# Patient Record
Sex: MALE | Race: WHITE | Employment: OTHER
[De-identification: names, ages, dates, MRNs, and addresses within clinical notes are randomized per-mention and may not be internally consistent; named-entity substitution may affect disease eponyms.]

---

## 2017-01-02 ENCOUNTER — HOME CARE VISIT (OUTPATIENT)
Dept: SCHEDULING | Facility: HOME HEALTH | Age: 82
End: 2017-01-02
Payer: MEDICARE

## 2017-01-02 VITALS
SYSTOLIC BLOOD PRESSURE: 130 MMHG | HEART RATE: 52 BPM | RESPIRATION RATE: 16 BRPM | OXYGEN SATURATION: 98 % | DIASTOLIC BLOOD PRESSURE: 70 MMHG

## 2017-01-02 PROCEDURE — G0151 HHCP-SERV OF PT,EA 15 MIN: HCPCS

## 2017-02-06 RX ORDER — CHLORPHENIRAMINE MALEATE 4 MG
TABLET ORAL
Qty: 45 G | Refills: 0 | Status: SHIPPED | OUTPATIENT
Start: 2017-02-06 | End: 2017-02-09 | Stop reason: SDUPTHER

## 2017-02-09 ENCOUNTER — HOSPITAL ENCOUNTER (OUTPATIENT)
Dept: LAB | Age: 82
Discharge: HOME | End: 2017-02-09
Payer: MEDICARE

## 2017-02-09 ENCOUNTER — OFFICE VISIT (OUTPATIENT)
Dept: INTERNAL MEDICINE CLINIC | Age: 82
End: 2017-02-09

## 2017-02-09 VITALS
WEIGHT: 211 LBS | DIASTOLIC BLOOD PRESSURE: 91 MMHG | SYSTOLIC BLOOD PRESSURE: 154 MMHG | RESPIRATION RATE: 16 BRPM | BODY MASS INDEX: 30.21 KG/M2 | TEMPERATURE: 97.5 F | OXYGEN SATURATION: 96 % | HEART RATE: 68 BPM | HEIGHT: 70 IN

## 2017-02-09 DIAGNOSIS — I10 ESSENTIAL HYPERTENSION: ICD-10-CM

## 2017-02-09 DIAGNOSIS — F02.80 DEMENTIA OF THE ALZHEIMER'S TYPE WITH LATE ONSET WITHOUT BEHAVIORAL DISTURBANCE (HCC): ICD-10-CM

## 2017-02-09 DIAGNOSIS — F32.A DEPRESSION, UNSPECIFIED DEPRESSION TYPE: ICD-10-CM

## 2017-02-09 DIAGNOSIS — I48.0 PAROXYSMAL ATRIAL FIBRILLATION (HCC): ICD-10-CM

## 2017-02-09 DIAGNOSIS — G30.1 DEMENTIA OF THE ALZHEIMER'S TYPE WITH LATE ONSET WITHOUT BEHAVIORAL DISTURBANCE (HCC): ICD-10-CM

## 2017-02-09 DIAGNOSIS — R48.2 GAIT APRAXIA OF ELDERLY: Primary | ICD-10-CM

## 2017-02-09 PROCEDURE — 80048 BASIC METABOLIC PNL TOTAL CA: CPT

## 2017-02-09 PROCEDURE — 84443 ASSAY THYROID STIM HORMONE: CPT

## 2017-02-09 PROCEDURE — 36415 COLL VENOUS BLD VENIPUNCTURE: CPT

## 2017-02-09 NOTE — MR AVS SNAPSHOT
Visit Information Date & Time Provider Department Dept. Phone Encounter #  
 2/9/2017  2:00 PM Ajay Villeda, [address] [id number] Follow-up Instructions Return in about 6 months (around 8/9/2017). Your Appointments 6/16/2017  2:40 PM  
Follow Up with Jesus Holden MD  
[facility] at [facility] [address]) Appt Note: f/u memory loss, $0cp, jrb 11/16/2016  
 [facility], 
[address] [place] [address], [address] [phone]  
[address] [facility], [address] [place] [address], [address] [address] Upcoming Health Maintenance Date Due  
 MEDICARE YEARLY EXAM 11/16/2016 GLAUCOMA SCREENING Q2Y 1/2/2017 DTaP/Tdap/Td series (2 - Td) 11/7/2025 Allergies as of 2/9/2017  Review Complete On: 2/9/2017 By: Kia Almanza III, DO No Known Allergies Current Immunizations  Reviewed on 10/6/2016 Name Date Influenza High Dose Vaccine PF 10/6/2016, 10/31/2014 Influenza Vaccine (Quad) PF 11/16/2015 Pneumococcal Conjugate (PCV-13) 8/12/2015 Pneumococcal Polysaccharide (PPSV-23) 7/29/2013,  Deferred (Patient Refused) Tdap 11/7/2015  6:22 PM  
  
 Not reviewed this visit You Were Diagnosed With   
  
 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 
ICD-9-CM: 401.9 Vitals BP Pulse Temp Resp Height(growth percentile) Weight(growth percentile)  (!) 154/91 (BP 1 Location: Left arm, BP Patient Position: Sitting) 68 97.5 °F (36.4 °C) (Oral) 16 5' 10\" (1.778 m) 211 lb (95.7 kg) SpO2 BMI Smoking Status 96% 30.28 kg/m2 Never Smoker Vitals History BMI and BSA Data Body Mass Index Body Surface Area  
 30.28 kg/m 2 2.17 m 2 Preferred Pharmacy Pharmacy Name Phone [facility] [address], [address] [place] [address]. [phone] Your Updated Medication List  
  
   
This list is accurate as of: 2/9/17  3:18 PM.  Always use your most recent med list.  
  
  
  
  
 acetaminophen 325 mg tablet Commonly known as:  TYLENOL Take  by mouth every four (4) hours as needed for Pain. b complex vitamins tablet Take 1 Tab by mouth daily. BROVANA 15 mcg/2 mL Nebu neb solution Generic drug:  arformoterol 15 mcg by Nebulization route two (2) times a day. budesonide 0.5 mg/2 mL Nbsp Commonly known as:  PULMICORT  
500 mcg by Nebulization route two (2) times a day. buPROPion  mg tablet Commonly known as:  Ettie Pickler Take 1 Tab by mouth every morning. butalbital-acetaminophen-caffeine -40 mg per tablet Commonly known as:  Sheria Loser Take 1 Tab by mouth every six (6) hours as needed for Headache. Max Daily Amount: 4 Tabs. clotrimazole 1 % topical cream  
Commonly known as:  [place] Apply  to affected area two (2) times a day. ELIQUIS 5 mg tablet Generic drug:  apixaban TAKE ONE TABLET BY MOUTH EVERY 12 HOURS  
  
 fluticasone-salmeterol 250-50 mcg/dose diskus inhaler Commonly known as:  ADVAIR DISKUS Take 1 Puff by inhalation every twelve (12) hours. furosemide 40 mg tablet Commonly known as:  LASIX Take 1 Tab by mouth daily. If leg started getting swollen again or weight gain > 3 pounds then switch back to 2 times daily  
  
 hydrOXYzine HCl 25 mg tablet Commonly known as:  ATARAX Take 25 mg by mouth three (3) times daily as needed for Itching. lisinopril 40 mg tablet Commonly known as:  PRINIVIL, ZESTRIL  
TAKE ONE TABLET BY MOUTH DAILY  
  
 memantine 10 mg tablet Commonly known as:  Atha Gander Take 1 Tab by mouth two (2) times a day. metoprolol tartrate 25 mg tablet Commonly known as:  LOPRESSOR Take 25 mg by mouth two (2) times a day. montelukast 10 mg tablet Commonly known as:  SINGULAIR Take 1 Tab by mouth daily. naproxen sodium 220 mg Cap Take  by mouth.  
  
 potassium chloride SR 10 mEq tablet Commonly known as:  KLOR-CON 10 Take 10 mEq by mouth two (2) times a day. promethazine 25 mg tablet Commonly known as:  PHENERGAN Take 1 Tab by mouth every six (6) hours as needed for Nausea. QUEtiapine 25 mg tablet Commonly known as:  SEROquel Take 1 Tab by mouth nightly as needed (agitation). We Performed the Following AMB SUPPLY ORDER [[id number] Custom] Comments:  
 Rolling walker METABOLIC PANEL, BASIC [33824 CPT(R)] [address] Comments:  
 Please evaluate patient for needs home health aide to help with showering etc.  
 [facility] 3RD GENERATION [45266 CPT(R)] Follow-up Instructions Return in about 6 months (around 8/9/2017). Referral Information Referral ID Referred By Referred To  
  
 [id number] Juanita KO Not Available Visits Status Start Date End Date 1 New Request 2/9/17 2/9/18 If your referral has a status of pending review or denied, additional information will be sent to support the outcome of this decision. Patient Instructions Helping a Person With Alzheimer's Disease: Care Instructions Your Care Instructions Alzheimer's disease is a type of dementia. It affects memory, intelligence, judgment, language, and behavior. It is not clear what causes this disease. But it is the most common form of dementia in older adults. It may take many years to develop. Alzheimer's disease is different than mild memory loss that occurs with aging. Family members usually notice symptoms first. But the person also may realize that something is wrong. Follow-up care is a key part of your loved one's treatment and safety. Be sure to make and go to all appointments, and call your doctor if your loved one is having problems. It's also a good idea to know your loved one's test results and keep a list of the medicines he or she takes. How can you care for your loved one at home? · Develop a routine. The person will feel less frustrated or confused with a clear, simple daily plan. Remind him or her about important facts and events. · Be patient. It may take longer for the person to complete a task than it used to. · Help the person eat a balanced diet. Serve plenty of whole grains, fruits, and vegetables every day. If the person is not eating well at mealtimes, give snacks at midmorning and in the afternoon. Offer drinks such as Boost, Ensure, or Sustacal if he or she is losing weight. · Encourage exercise. Walking and other activity may slow the decline of mental ability. Help the person keep an active mind. Encourage hobbies such as reading and crossword puzzles. · Take steps to help if the person is sundowning. This is the restless behavior and trouble with sleeping that may occur in late afternoon and at night. Try not to let the person nap during the day. Offer a glass of warm milk or caffeine-free tea before bedtime. · Ask family members and friends for help. You may need breaks where others can help care for the person. · Talk to the person's doctor about what resources are available for help in your area. · Review all of the person's medicines with his or her doctor. · For as long as the person is able, allow him or her to make decisions about activities, food, clothing, and other choices.  Let the person be independent, even if tasks take more time or are not done perfectly. Tailor tasks to the person's abilities. For example, if cooking is no longer safe, ask for other help. He or she can help set the table or make simple dishes such as a salad. When the person needs help, offer it gently. Keeping safe · Make your home (or the person's home) safe. Tack down rugs, and put no-slip tape in the tub. Install handrails, and put safety switches on stoves and appliances. Keep rooms free of clutter. Make sure walkways around furniture are clear. Do not move furniture around, because the person may become confused. · Use locks on doors and cupboards. Lock up knives, scissors, medicines, cleaning supplies, and other dangerous things. · Do not let the person drive or cook if he or she cannot do it safely. A person with Alzheimer's should not drive unless he or she is able to pass an on-road driving test. Your state 's license bureau can do a driving test if there is any question. · Get medical alert jewel for the person so you can be contacted if he or she wanders away. If possible, provide a safe place for wandering, such as an enclosed yard or garden. When should you call for help? Call 911 anytime you think you may need emergency care. For example, call if: · A person who has Alzheimer's disease has disappeared. · A person who has Alzheimer's disease is seriously injured. Call your doctor now or seek immediate medical care if: · The person you are caring for suddenly sees or hears things that are not there (hallucinates). · The person you are caring for has a sudden, drastic change in his or her behavior. Watch closely for changes in your loved one's health, and be sure to contact the doctor if: · A person who has Alzheimer's disease gradually gets worse or has symptoms that could cause injury. · You need help caring for a person with Alzheimer's disease. · The person has problems with his or her medicine. Where can you learn more? Go to http://rosey-wilmer.info/. Enter D041 in the search box to learn more about \"Helping a Person With Alzheimer's Disease: Care Instructions. \" Current as of: July 26, 2016 Content Version: 11.1 © 3883-4076 [employer]. Care instructions adapted under license by [employer] (which disclaims liability or warranty for this information). If you have questions about a medical condition or this instruction, always ask your healthcare professional. [address] any warranty or liability for your use of this information. Please provide this summary of care documentation to your next provider. Your primary care clinician is listed as Professor Perry 108 If you have any questions after today's visit, please call [phone].

## 2017-02-09 NOTE — PATIENT INSTRUCTIONS
Helping a Person With Alzheimer's Disease: Care Instructions  Your Care Instructions  Alzheimer's disease is a type of dementia. It affects memory, intelligence, judgment, language, and behavior. It is not clear what causes this disease. But it is the most common form of dementia in older adults. It may take many years to develop. Alzheimer's disease is different than mild memory loss that occurs with aging. Family members usually notice symptoms first. But the person also may realize that something is wrong. Follow-up care is a key part of your loved one's treatment and safety. Be sure to make and go to all appointments, and call your doctor if your loved one is having problems. It's also a good idea to know your loved one's test results and keep a list of the medicines he or she takes. How can you care for your loved one at home? · Develop a routine. The person will feel less frustrated or confused with a clear, simple daily plan. Remind him or her about important facts and events. · Be patient. It may take longer for the person to complete a task than it used to. · Help the person eat a balanced diet. Serve plenty of whole grains, fruits, and vegetables every day. If the person is not eating well at mealtimes, give snacks at midmorning and in the afternoon. Offer drinks such as Boost, Ensure, or Sustacal if he or she is losing weight. · Encourage exercise. Walking and other activity may slow the decline of mental ability. Help the person keep an active mind. Encourage hobbies such as reading and crossword puzzles. · Take steps to help if the person is sundowning. This is the restless behavior and trouble with sleeping that may occur in late afternoon and at night. Try not to let the person nap during the day. Offer a glass of warm milk or caffeine-free tea before bedtime. · Ask family members and friends for help. You may need breaks where others can help care for the person.   · Talk to the person's doctor about what resources are available for help in your area. · Review all of the person's medicines with his or her doctor. · For as long as the person is able, allow him or her to make decisions about activities, food, clothing, and other choices. Let the person be independent, even if tasks take more time or are not done perfectly. Tailor tasks to the person's abilities. For example, if cooking is no longer safe, ask for other help. He or she can help set the table or make simple dishes such as a salad. When the person needs help, offer it gently. Keeping safe  · Make your home (or the person's home) safe. Tack down rugs, and put no-slip tape in the tub. Install handrails, and put safety switches on stoves and appliances. Keep rooms free of clutter. Make sure walkways around furniture are clear. Do not move furniture around, because the person may become confused. · Use locks on doors and cupboards. Lock up knives, scissors, medicines, cleaning supplies, and other dangerous things. · Do not let the person drive or cook if he or she cannot do it safely. A person with Alzheimer's should not drive unless he or she is able to pass an on-road driving test. Your state 's license bureau can do a driving test if there is any question. · Get medical alert jewelry for the person so you can be contacted if he or she wanders away. If possible, provide a safe place for wandering, such as an enclosed yard or garden. When should you call for help? Call 911 anytime you think you may need emergency care. For example, call if:  · A person who has Alzheimer's disease has disappeared. · A person who has Alzheimer's disease is seriously injured. Call your doctor now or seek immediate medical care if:  · The person you are caring for suddenly sees or hears things that are not there (hallucinates). · The person you are caring for has a sudden, drastic change in his or her behavior.   Watch closely for changes in your loved one's health, and be sure to contact the doctor if:  · A person who has Alzheimer's disease gradually gets worse or has symptoms that could cause injury. · You need help caring for a person with Alzheimer's disease. · The person has problems with his or her medicine. Where can you learn more? Go to http://rosey-wilmer.info/. Enter N876 in the search box to learn more about \"Helping a Person With Alzheimer's Disease: Care Instructions. \"  Current as of: July 26, 2016  Content Version: 11.1  © 4726-8026 [employer], [employer]. Care instructions adapted under license by [employer] (which disclaims liability or warranty for this information). If you have questions about a medical condition or this instruction, always ask your healthcare professional. [address] any warranty or liability for your use of this information.

## 2017-02-09 NOTE — PROGRESS NOTES
Reviewed record in preparation for visit and have obtained necessary documentation. Identified pt with two pt identifiers(name and ). Chief Complaint   Patient presents with    Hypertension     follow up       Health Maintenance Due   Topic Date Due    MEDICARE YEARLY EXAM  2016    GLAUCOMA SCREENING Q2Y  2017       Mr. Marlee Doss has a reminder for a \"due or due soon\" health maintenance. I have asked that he discuss health maintenance topic(s) due with His  primary care provider. Coordination of Care Questionnaire:  :     1) Have you been to an emergency room, urgent care clinic since your last visit? no   Hospitalized since your last visit? no             2) Have you seen or consulted any other health care providers outside of [address] [place] [address] since your last visit? no  (Include any pap smears or colon screenings in this section.)      Patient is accompanied by self I have received verbal consent from Carlee Ventura to discuss any/all medical information while they are present in the room.

## 2017-02-09 NOTE — PROGRESS NOTES
Divina Calderon is a [de-identified] y.o. male who presents for evaluation of routine follow up. Last seen by me oct 6. Had one trip to ED on dec 7 for dehydration, unclear what precipitated it. Has done well since, though his short term memory is very poor. Does not do much at home. Wife asks for walker for him, as well as aide to help around house, as she is undergoing treatment for ovarian cancer. ROS:  Constitutional: negative for fevers, chills, anorexia and weight loss  Eyes:   negative for visual disturbance and irritation  ENT:   negative for tinnitus,sore throat,nasal congestion,ear pain,hoarseness  Respiratory:  negative for cough, hemoptysis, dyspnea,wheezing  CV:   negative for chest pain, palpitations, lower extremity edema  GI:   negative for nausea, vomiting, diarrhea, abdominal pain,melena  Genitourinary: negative for frequency, dysuria and hematuria  Musculoskel: negative for myalgias, arthralgias, back pain, muscle weakness, joint pain  Neurological:  negative for headaches, dizziness, focal weakness, numbness  Psychiatric:     Negative for depression or anxiety      Past Medical History   Diagnosis Date    Allergy      seasonal    Arrhythmia     Asthma     Dementia     Depression     Heart failure ([facility] [address]     Hypertension     Neurological disorder      dementia    Other ill-defined conditions(799.89)      a-fib    Stroke [facility])        Past Surgical History   Procedure Laterality Date    Hx heent       cateract surgery       Family History   Problem Relation Age of Onset    Stroke Mother     Heart Disease Mother     Cancer Father      prostate    Cancer Brother      colon       Social History     Social History    Marital status:      Spouse name: N/A    Number of children: N/A    Years of education: N/A     Occupational History    Not on file.      Social History Main Topics    Smoking status: Never Smoker    Smokeless tobacco: Never Used    Alcohol use No    Drug use: No    Sexual activity: No     Other Topics Concern    Not on file     Social History Narrative            Visit Vitals    BP (!) 154/91 (BP 1 Location: Left arm, BP Patient Position: Sitting)    Pulse 68    Temp 97.5 °F (36.4 °C) (Oral)    Resp 16    Ht 5' 10\" (1.778 m)    Wt 211 lb (95.7 kg)    SpO2 96%    BMI 30.28 kg/m2       Physical Examination:   General - Well appearing male  HEENT - PERRL, TM no erythema/opacification, normal nasal turbinates, no oropharyngeal erythema or exudate, MMM  Neck - supple, no bruits, no thyroidomegaly, no lymphadenopathy  Pulm - clear to auscultation bilaterally  Cardio - RRR, normal S1 S2, no murmur  Abd - soft, nontender, no masses, no HSM  Extrem - no edema, +2 distal pulses  Neuro-  No focal deficits, CN intact     Assessment/Plan:    1.  htn--bit elevated today, continue with lisinopril, metoprolol, lasix. Reluctant to increase any of those meds  2. Dementia--on namenda, seroquel. Follows with dr Sabrina Edmond. [facility]. 3.  Depression--stable with wellbutrin  4. pafib--eliquis  5. Asthma--on pulmicort, brovana, singulair  6. Recent ed visit for dehydration--check bmp.      rtc 6 months. Wife not interested in filling out any acp paper work--i won't ask her any more.         Booker Felipe III, DO

## 2017-02-09 NOTE — LETTER
2/13/2017 3:28 PM 
 
Mr. Carmelo Ovalle [address] [facility] [address] [id number] Dear Carmelo Ovalle: Please find your most recent results below. Resulted Orders METABOLIC PANEL, BASIC Result Value Ref Range Glucose 109 (H) 65 - 99 mg/dL BUN 15 8 - 27 mg/dL Creatinine 1.24 0.76 - 1.27 mg/dL GFR est non-AA 55 (L) >59 mL/min/1.73 GFR est AA 63 >59 mL/min/1.73  
 BUN/Creatinine ratio 12 10 - 22 Sodium 143 134 - 144 mmol/L Potassium 4.8 3.5 - 5.2 mmol/L Chloride 94 (L) 96 - 106 mmol/L  
 CO2 29 18 - 29 mmol/L Calcium 9.8 8.6 - 10.2 mg/dL Narrative Performed at:  [address]  [id number] : Guicho Esparza MD, Phone:  [phone] [facility] 3RD GENERATION Result Value Ref Range TSH 2.670 0.450 - 4.500 uIU/mL Narrative Performed at:  [address]  [id number] : Guicho Esparza MD, Phone:  [phone] RECOMMENDATIONS: 
Kidney/dehydration numbers back to normal.  
Thyroid normal.  
No new recs. Please call me if you have any questions: [phone] Sincerely, Desi Mercury III, DO

## 2017-02-10 LAB
BUN SERPL-MCNC: 15 MG/DL (ref 8–27)
BUN/CREAT SERPL: 12 (ref 10–22)
CALCIUM SERPL-MCNC: 9.8 MG/DL (ref 8.6–10.2)
CHLORIDE SERPL-SCNC: 94 MMOL/L (ref 96–106)
CO2 SERPL-SCNC: 29 MMOL/L (ref 18–29)
CREAT SERPL-MCNC: 1.24 MG/DL (ref 0.76–1.27)
GLUCOSE SERPL-MCNC: 109 MG/DL (ref 65–99)
POTASSIUM SERPL-SCNC: 4.8 MMOL/L (ref 3.5–5.2)
SODIUM SERPL-SCNC: 143 MMOL/L (ref 134–144)
TSH SERPL DL<=0.005 MIU/L-ACNC: 2.67 UIU/ML (ref 0.45–4.5)

## 2017-03-01 ENCOUNTER — APPOINTMENT (OUTPATIENT)
Dept: GENERAL RADIOLOGY | Age: 82
End: 2017-03-01
Attending: PHYSICIAN ASSISTANT
Payer: MEDICARE

## 2017-03-01 ENCOUNTER — APPOINTMENT (OUTPATIENT)
Dept: CT IMAGING | Age: 82
End: 2017-03-01
Attending: PHYSICIAN ASSISTANT
Payer: MEDICARE

## 2017-03-01 ENCOUNTER — HOSPITAL ENCOUNTER (EMERGENCY)
Age: 82
Discharge: HOME | End: 2017-03-01
Attending: EMERGENCY MEDICINE
Payer: MEDICARE

## 2017-03-01 VITALS
SYSTOLIC BLOOD PRESSURE: 158 MMHG | BODY MASS INDEX: 30.21 KG/M2 | OXYGEN SATURATION: 97 % | DIASTOLIC BLOOD PRESSURE: 90 MMHG | RESPIRATION RATE: 18 BRPM | HEIGHT: 70 IN | WEIGHT: 211 LBS | HEART RATE: 91 BPM | TEMPERATURE: 98.7 F

## 2017-03-01 DIAGNOSIS — F03.90 DEMENTIA WITHOUT BEHAVIORAL DISTURBANCE, UNSPECIFIED DEMENTIA TYPE: ICD-10-CM

## 2017-03-01 DIAGNOSIS — M79.604 RIGHT LEG PAIN: ICD-10-CM

## 2017-03-01 DIAGNOSIS — N30.00 ACUTE CYSTITIS WITHOUT HEMATURIA: Primary | ICD-10-CM
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
## 2017-03-01 LAB
ALBUMIN SERPL BCP-MCNC: 3.1 G/DL (ref 3.5–5)
ALBUMIN/GLOB SERPL: 0.8 {RATIO} (ref 1.1–2.2)
ALP SERPL-CCNC: 74 U/L (ref 45–117)
ALT SERPL-CCNC: 21 U/L (ref 12–78)
ANION GAP BLD CALC-SCNC: 11 MMOL/L (ref 5–15)
APPEARANCE UR: ABNORMAL
AST SERPL W P-5'-P-CCNC: 27 U/L (ref 15–37)
ATRIAL RATE: 102 BPM
BACTERIA URNS QL MICRO: ABNORMAL /HPF
BASOPHILS # BLD AUTO: 0 K/UL (ref 0–0.1)
BASOPHILS # BLD: 0 % (ref 0–1)
BILIRUB SERPL-MCNC: 2 MG/DL (ref 0.2–1)
BILIRUB UR QL: NEGATIVE
BUN SERPL-MCNC: 17 MG/DL (ref 6–20)
BUN/CREAT SERPL: 12 (ref 12–20)
CALCIUM SERPL-MCNC: 8.4 MG/DL (ref 8.5–10.1)
CALCULATED R AXIS, ECG10: 122 DEGREES
CALCULATED T AXIS, ECG11: 28 DEGREES
CHLORIDE SERPL-SCNC: 101 MMOL/L (ref 97–108)
CK MB CFR SERPL CALC: 0.8 % (ref 0–2.5)
CK MB SERPL-MCNC: 1.7 NG/ML (ref 5–25)
CK SERPL-CCNC: 209 U/L (ref 39–308)
CO2 SERPL-SCNC: 29 MMOL/L (ref 21–32)
COLOR UR: ABNORMAL
CREAT SERPL-MCNC: 1.41 MG/DL (ref 0.7–1.3)
DIAGNOSIS, 93000: NORMAL
EOSINOPHIL # BLD: 0 K/UL (ref 0–0.4)
EOSINOPHIL NFR BLD: 0 % (ref 0–7)
EPITH CASTS URNS QL MICRO: ABNORMAL /LPF
ERYTHROCYTE [DISTWIDTH] IN BLOOD BY AUTOMATED COUNT: 13.7 % (ref 11.5–14.5)
GLOBULIN SER CALC-MCNC: 3.9 G/DL (ref 2–4)
GLUCOSE SERPL-MCNC: 107 MG/DL (ref 65–100)
GLUCOSE UR STRIP.AUTO-MCNC: NEGATIVE MG/DL
HCT VFR BLD AUTO: 49.3 % (ref 36.6–50.3)
HGB BLD-MCNC: 16.7 G/DL (ref 12.1–17)
HGB UR QL STRIP: ABNORMAL
HYALINE CASTS URNS QL MICRO: ABNORMAL /LPF (ref 0–5)
KETONES UR QL STRIP.AUTO: NEGATIVE MG/DL
LEUKOCYTE ESTERASE UR QL STRIP.AUTO: ABNORMAL
LYMPHOCYTES # BLD AUTO: 6 % (ref 12–49)
LYMPHOCYTES # BLD: 1 K/UL (ref 0.8–3.5)
MCH RBC QN AUTO: 28.9 PG (ref 26–34)
MCHC RBC AUTO-ENTMCNC: 33.9 G/DL (ref 30–36.5)
MCV RBC AUTO: 85.4 FL (ref 80–99)
MONOCYTES # BLD: 1.7 K/UL (ref 0–1)
MONOCYTES NFR BLD AUTO: 11 % (ref 5–13)
NEUTS SEG # BLD: 12.4 K/UL (ref 1.8–8)
NEUTS SEG NFR BLD AUTO: 83 % (ref 32–75)
NITRITE UR QL STRIP.AUTO: POSITIVE
PH UR STRIP: 6 [PH] (ref 5–8)
PLATELET # BLD AUTO: 191 K/UL (ref 150–400)
POTASSIUM SERPL-SCNC: 3.9 MMOL/L (ref 3.5–5.1)
PROT SERPL-MCNC: 7 G/DL (ref 6.4–8.2)
PROT UR STRIP-MCNC: ABNORMAL MG/DL
Q-T INTERVAL, ECG07: 368 MS
QRS DURATION, ECG06: 84 MS
QTC CALCULATION (BEZET), ECG08: 452 MS
RBC # BLD AUTO: 5.77 M/UL (ref 4.1–5.7)
RBC #/AREA URNS HPF: ABNORMAL /HPF (ref 0–5)
SODIUM SERPL-SCNC: 141 MMOL/L (ref 136–145)
SP GR UR REFRACTOMETRY: 1.01 (ref 1–1.03)
TROPONIN I SERPL-MCNC: <0.04 NG/ML
UA: UC IF INDICATED,UAUC: ABNORMAL
UROBILINOGEN UR QL STRIP.AUTO: 0.2 EU/DL (ref 0.2–1)
VENTRICULAR RATE, ECG03: 91 BPM
WBC # BLD AUTO: 15 K/UL (ref 4.1–11.1)
WBC URNS QL MICRO: >100 /HPF (ref 0–4)

## 2017-03-01 PROCEDURE — 73502 X-RAY EXAM HIP UNI 2-3 VIEWS: CPT

## 2017-03-01 PROCEDURE — 97161 PT EVAL LOW COMPLEX 20 MIN: CPT

## 2017-03-01 PROCEDURE — 97116 GAIT TRAINING THERAPY: CPT

## 2017-03-01 PROCEDURE — 73564 X-RAY EXAM KNEE 4 OR MORE: CPT

## 2017-03-01 PROCEDURE — 84484 ASSAY OF TROPONIN QUANT: CPT | Performed by: PHYSICIAN ASSISTANT

## 2017-03-01 PROCEDURE — 87186 SC STD MICRODIL/AGAR DIL: CPT | Performed by: EMERGENCY MEDICINE

## 2017-03-01 PROCEDURE — 36415 COLL VENOUS BLD VENIPUNCTURE: CPT | Performed by: PHYSICIAN ASSISTANT

## 2017-03-01 PROCEDURE — 81001 URINALYSIS AUTO W/SCOPE: CPT | Performed by: PHYSICIAN ASSISTANT

## 2017-03-01 PROCEDURE — 74011250636 HC RX REV CODE- 250/636: Performed by: PHYSICIAN ASSISTANT

## 2017-03-01 PROCEDURE — G8979 MOBILITY GOAL STATUS: HCPCS

## 2017-03-01 PROCEDURE — 93005 ELECTROCARDIOGRAM TRACING: CPT

## 2017-03-01 PROCEDURE — G8991 OTHER PT/OT GOAL STATUS: HCPCS

## 2017-03-01 PROCEDURE — 87077 CULTURE AEROBIC IDENTIFY: CPT | Performed by: EMERGENCY MEDICINE

## 2017-03-01 PROCEDURE — G8978 MOBILITY CURRENT STATUS: HCPCS

## 2017-03-01 PROCEDURE — 70450 CT HEAD/BRAIN W/O DYE: CPT

## 2017-03-01 PROCEDURE — 82550 ASSAY OF CK (CPK): CPT | Performed by: PHYSICIAN ASSISTANT

## 2017-03-01 PROCEDURE — 96361 HYDRATE IV INFUSION ADD-ON: CPT

## 2017-03-01 PROCEDURE — 97162 PT EVAL MOD COMPLEX 30 MIN: CPT

## 2017-03-01 PROCEDURE — G8980 MOBILITY D/C STATUS: HCPCS

## 2017-03-01 PROCEDURE — 71010 XR CHEST SNGL V: CPT

## 2017-03-01 PROCEDURE — 85025 COMPLETE CBC W/AUTO DIFF WBC: CPT | Performed by: PHYSICIAN ASSISTANT

## 2017-03-01 PROCEDURE — 97530 THERAPEUTIC ACTIVITIES: CPT

## 2017-03-01 PROCEDURE — 80053 COMPREHEN METABOLIC PANEL: CPT | Performed by: PHYSICIAN ASSISTANT

## 2017-03-01 PROCEDURE — 87086 URINE CULTURE/COLONY COUNT: CPT | Performed by: EMERGENCY MEDICINE

## 2017-03-01 PROCEDURE — 74011000258 HC RX REV CODE- 258: Performed by: PHYSICIAN ASSISTANT

## 2017-03-01 PROCEDURE — 99284 EMERGENCY DEPT VISIT MOD MDM: CPT

## 2017-03-01 PROCEDURE — 96365 THER/PROPH/DIAG IV INF INIT: CPT

## 2017-03-01 RX ORDER — SODIUM CHLORIDE 0.9 % (FLUSH) 0.9 %
5-10 SYRINGE (ML) INJECTION EVERY 8 HOURS
Status: DISCONTINUED | OUTPATIENT
Start: 2017-03-01 | End: 2017-03-01 | Stop reason: HOSPADM

## 2017-03-01 RX ORDER — SODIUM CHLORIDE 0.9 % (FLUSH) 0.9 %
5-10 SYRINGE (ML) INJECTION AS NEEDED
Status: DISCONTINUED | OUTPATIENT
Start: 2017-03-01 | End: 2017-03-01 | Stop reason: HOSPADM

## 2017-03-01 RX ORDER — CEPHALEXIN 500 MG/1
500 CAPSULE ORAL 3 TIMES DAILY
Qty: 15 CAP | Refills: 0 | Status: SHIPPED | OUTPATIENT
Start: 2017-03-01 | End: 2017-04-30 | Stop reason: CLARIF

## 2017-03-01 RX ORDER — SODIUM CHLORIDE 9 MG/ML
75 INJECTION, SOLUTION INTRAVENOUS CONTINUOUS
Status: DISCONTINUED | OUTPATIENT
Start: 2017-03-01 | End: 2017-03-01 | Stop reason: HOSPADM

## 2017-03-01 RX ADMIN — Medication 10 ML: at 10:51

## 2017-03-01 RX ADMIN — SODIUM CHLORIDE 75 ML/HR: 900 INJECTION, SOLUTION INTRAVENOUS at 09:45

## 2017-03-01 RX ADMIN — CEFTRIAXONE 1 G: 1 INJECTION, POWDER, FOR SOLUTION INTRAMUSCULAR; INTRAVENOUS at 11:14

## 2017-03-01 NOTE — ED NOTES
Pt assisted with putting on paper scrubs as pt incontinent of urine upon arrival and changed out of his own clothes that were wet. Wife and son remain with pt. Pt sitting up on end of stretcher. No distress noted. Pending lab work.

## 2017-03-01 NOTE — ED NOTES
Assumed care of patient. Pt resting in position of comfort. Call bell within reach. Pt presents to ED via EMS from home. Pt lives with his wife. Hx of dementia. Found on kitchen floor by wife, unsure how long he was on the floor, estimated 3-4 hours. Pt incontinent of urine and wears adult briefs. Hx of stroke last year with no deficits but was told to use a walker which he does not use as prescribed. EMS states that patient holds onto things when he walks normally and was able to ambulate to EMS stretcher today with assistance of EMS providers. Pt has no complaints at this time. Soiled clothing removed and placed in personal belonging bag. Pt able to lift his hips when removing clothing. Pts wife at bedside.  Pt taken to xray

## 2017-03-01 NOTE — DISCHARGE INSTRUCTIONS

## 2017-03-01 NOTE — ED NOTES
FREDDY Russ visited pt. Discharge orders and instructions noted. Discharge instructions, follow up care and prescriptions reviewed with pt and wife and understanding verbalized. Opportunity for questions and clarifications provided. Pt left via wheelchair with ED RN to awaiting vehicle driven by wife/son. In no acute distress.

## 2017-03-01 NOTE — ED PROVIDER NOTES
The history is provided by the EMS personnel and the spouse. No  was used. Maurisio Odonnell is a [de-identified] y.o. male who presents via EMS in [address] to [facility] ED after patient's wife called EMS due to wife finding patient in the kitchen beside kitchen table on floor this morning. Wife did not hear patient fall; wife advises patient often gets up during overnights to [place]". Patient is supposed to use a walker at home to ambulate, but he often does not use it. Josh Mcgraw was not found close to kitchen table /patient this morning. Wife advises that patient's urine has had recent strong odor and patient recently has experienced \"minor\" incontinence of urine. Patient has hx dementia; patient unable to provide details about incident. No other specific c/o or concerns today. PCP: Kia Almanza III, DO  Allergies: none  Social hx: never tobacco, no alcohol    There are no other complaints, changes or physical findings at this time. Past Medical History:   Diagnosis Date    Allergy     seasonal    Arrhythmia     Asthma     Dementia     Depression     Heart failure (HCC)     Hypertension     Neurological disorder     dementia    Other ill-defined conditions(799.89)     a-fib    Stroke [facility])        Past Surgical History:   Procedure Laterality Date    HX HEENT      cateract surgery         Family History:   Problem Relation Age of Onset    Stroke Mother     Heart Disease Mother     Cancer Father      prostate    Cancer Brother      colon       Social History     Social History    Marital status:      Spouse name: N/A    Number of children: N/A    Years of education: N/A     Occupational History    Not on file.      Social History Main Topics    Smoking status: Never Smoker    Smokeless tobacco: Never Used    Alcohol use No    Drug use: No    Sexual activity: No     Other Topics Concern    Not on file     Social History Narrative         ALLERGIES: Review of patient's allergies indicates no known allergies. Review of Systems   Reason unable to perform ROS: unable to provide due to hx dementia; wife and EMS provided history. Vitals:    03/01/17 0917 03/01/17 1232   BP: 162/87 158/90   Pulse: 90 91   Resp: 18 18   Temp: 98.7 °F (37.1 °C)    SpO2: 100% 97%   Weight: 95.7 kg (211 lb)    Height: 5' 10\" (1.778 m)             Physical Exam   Constitutional: He is oriented to person, place, and time. He appears well-developed and well-nourished. No distress. Pleasant [de-identified] yo  male with dementia that is a poor historian   HENT:   Head: Normocephalic and atraumatic. Eyes: Conjunctivae are normal. Right eye exhibits no discharge. Left eye exhibits no discharge. Neck: Normal range of motion. Neck supple. Cardiovascular: Normal rate, regular rhythm, normal heart sounds and intact distal pulses. No murmur heard. Pulmonary/Chest: Effort normal and breath sounds normal. No respiratory distress. He has no wheezes. Abdominal: Soft. Bowel sounds are normal. He exhibits no distension. There is no tenderness. There is no rebound and no guarding. Musculoskeletal:   BACK: Normal spinal curvatures. No step off or deformity. NT to palpation. Pain with supine SLR on right. Strength of the LE 5/5 and equal bilaterally. HIP: TTP to the lateral hip with swelling. NT to palpation of the remainder of the R leg with FROM of the knee, ankle and toes. ROM of the hip limited with pain. Distal NV intact. Cap refill < 2 sec. Lymphadenopathy:     He has no cervical adenopathy. Neurological: He is alert and oriented to person, place, and time. Skin: Skin is warm and dry. He is not diaphoretic. Psychiatric: He has a normal mood and affect. His behavior is normal.   Nursing note and vitals reviewed.        MDM  Number of Diagnoses or Management Options  Diagnosis management comments: DDx: fracture, UTI, dementia, contusion, ICB, SAH, rhabdo    RX meds faxed to pharmacy    ED Course       Procedures     Wife at bedside. Notes that patient has been urinating frequently and notes a strong odor to urine. CONSULT NOTE:   10:17 AM  FREDDY Jackson spoke with Kim Ramos,   Specialty: Physical Therapy  Discussed pt's hx, disposition, and available diagnostic and imaging results. Reviewed care plans. Consultant agrees with plans as outlined. Jeferson Salmeron will evaluate and attempt to ambulate patient. 10:28 AM  Jeferson Salmeron at bedside. PA [facility] aware. PT advises patient is more consistently pointing to right distal femur area and to right knee cap. Family is requesting imaging of right knee. Patient continually advises \"I don't feel well\" Omayra Rochelle patient has at home is a borrowed walker of standard variety which should cause him difficulty. PT will contact Miriam Solorio, Case Management, about home health and rolling walker and resources for caregivers as needed. CONSULT NOTE:   10:59 AM  FREDDY Jackson spoke with Lita Smith,   Specialty: Case Management  Discussed pt's hx, disposition, and available diagnostic and imaging results. Reviewed care plans. Consultant agrees with plans as outlined. Miriam Solorio at bedside evaluating patient. Miriam Solorio will order rolling walker and will order home PT for patient. EKG interpretation: (Preliminary) 10:05  Rhythm: atrial fib; and irregular. Rate (approx.): 91; Axis: right axis deviation; Irregular intervals. Sub-millimeter depression in V4 and V5. History of atrial fibrillation. Written by KIKO Fox, as dictated by Dejan Mcdonough MD.    DISCHARGE NOTE:  1:27 PM  The care plan has been outline with the patient and/or family, who verbally conveyed understanding and agreement. Available results have been reviewed. Patient and/or family understand the follow up plan as outlined and discharge instructions.  Should their condition deterioration at any time after discharge the patient agrees to return, follow up sooner than outlined or seek medical assistance at the closest Emergency Room as soon as possible. Questions have been answered. Discharge instructions and educational information regarding the patient's diagnosis as well a list of reasons why the patient would want to seek immediate medical attention, should their condition change, were reviewed directly with the patient/family     LABS COMPLETED AND REVIEWED:  Recent Results (from the past 12 hour(s))   CBC WITH AUTOMATED DIFF    Collection Time: 03/01/17  9:20 AM   Result Value Ref Range    WBC 15.0 (H) 4.1 - 11.1 K/uL    RBC 5.77 (H) 4.10 - 5.70 M/uL    HGB 16.7 12.1 - 17.0 g/dL    HCT 49.3 36.6 - 50.3 %    MCV 85.4 80.0 - 99.0 FL    MCH 28.9 26.0 - 34.0 PG    MCHC 33.9 30.0 - 36.5 g/dL    RDW 13.7 11.5 - 14.5 %    PLATELET 639 422 - 138 K/uL    NEUTROPHILS 83 (H) 32 - 75 %    LYMPHOCYTES 6 (L) 12 - 49 %    MONOCYTES 11 5 - 13 %    EOSINOPHILS 0 0 - 7 %    BASOPHILS 0 0 - 1 %    ABS. NEUTROPHILS 12.4 (H) 1.8 - 8.0 K/UL    ABS. LYMPHOCYTES 1.0 0.8 - 3.5 K/UL    ABS. MONOCYTES 1.7 (H) 0.0 - 1.0 K/UL    ABS. EOSINOPHILS 0.0 0.0 - 0.4 K/UL    ABS.  BASOPHILS 0.0 0.0 - 0.1 K/UL   EKG, 12 LEAD, INITIAL    Collection Time: 03/01/17 10:05 AM   Result Value Ref Range    Ventricular Rate 91 BPM    Atrial Rate 102 BPM    QRS Duration 84 ms    Q-T Interval 368 ms    QTC Calculation (Bezet) 452 ms    Calculated R Axis 122 degrees    Calculated T Axis 28 degrees    Diagnosis       Atrial fibrillation  Right axis deviation  Nonspecific ST abnormality    Confirmed by Sterling Milligan MD, Jose Delgado ([id number]) on 3/1/2017 39:92:56 AM     METABOLIC PANEL, COMPREHENSIVE    Collection Time: 03/01/17 10:13 AM   Result Value Ref Range    Sodium 141 136 - 145 mmol/L    Potassium 3.9 3.5 - 5.1 mmol/L    Chloride 101 97 - 108 mmol/L    CO2 29 21 - 32 mmol/L    Anion gap 11 5 - 15 mmol/L    Glucose 107 (H) 65 - 100 mg/dL    BUN 17 6 - 20 MG/DL    Creatinine 1.41 (H) 0.70 - 1.30 MG/DL    BUN/Creatinine ratio 12 12 - 20      GFR est AA 59 (L) >60 ml/min/1.73m2    GFR est non-AA 48 (L) >60 ml/min/1.73m2    Calcium 8.4 (L) 8.5 - 10.1 MG/DL    Bilirubin, total 2.0 (H) 0.2 - 1.0 MG/DL    ALT (SGPT) 21 12 - 78 U/L    AST (SGOT) 27 15 - 37 U/L    Alk.  phosphatase 74 45 - 117 U/L    Protein, total 7.0 6.4 - 8.2 g/dL    Albumin 3.1 (L) 3.5 - 5.0 g/dL    Globulin 3.9 2.0 - 4.0 g/dL    A-G Ratio 0.8 (L) 1.1 - 2.2     CK W/ CKMB & INDEX    Collection Time: 03/01/17 10:13 AM   Result Value Ref Range     39 - 308 U/L    CK - MB 1.7 <3.6 NG/ML    CK-MB Index 0.8 0 - 2.5     TROPONIN I    Collection Time: 03/01/17 10:13 AM   Result Value Ref Range    Troponin-I, Qt. <0.04 <0.05 ng/mL   URINALYSIS W/ REFLEX CULTURE    Collection Time: 03/01/17 10:50 AM   Result Value Ref Range    Color YELLOW/STRAW      Appearance CLOUDY (A) CLEAR      Specific gravity 1.015 1.003 - 1.030      pH (UA) 6.0 5.0 - 8.0      Protein TRACE (A) NEG mg/dL    Glucose NEGATIVE  NEG mg/dL    Ketone NEGATIVE  NEG mg/dL    Bilirubin NEGATIVE  NEG      Blood LARGE (A) NEG      Urobilinogen 0.2 0.2 - 1.0 EU/dL    Nitrites POSITIVE (A) NEG      Leukocyte Esterase LARGE (A) NEG      WBC >100 (H) 0 - 4 /hpf    RBC 20-50 0 - 5 /hpf    Epithelial cells FEW FEW /lpf    Bacteria 4+ (A) NEG /hpf    UA:UC IF INDICATED URINE CULTURE ORDERED (A) CNI      Hyaline cast 0-2 0 - 5 /lpf       IMAGING COMPLETED AND REVIEWED:    Ordering Provider: FREDDY Howard Authorizing Provider: FREDDY Howard   Ordering Phone: [phone] Authorizing Phone:     Ordering Fax: [phone] Attending Provider: Justino Reyes MD   Ordering Pager:   PCP: Felix Bernal III      Other Ordering Provider:        Procedure: XR CHEST SNGL V [[id number]]    Procedure Date: 03/01/2017 9:50 AM   Accession Number: [id number]   Order Number: [id number]      Ordering Diagnosis:     Reason for Exam: Chest Pain   Performing Department: [facility] RADIOLOGY   Patient Class: EMERGENCY          Study Result      EXAM: XR CHEST SNGL V     INDICATION: Dementia with chest pain     COMPARISON: 12/7/2016     FINDINGS:   An AP radiograph of the chest was obtained. The positioning is very lordotic. Lines: None. Lungs: The lungs are clear. Pleura: There is no pneumothorax or pleural effusion. Mediastinum: The cardiac and mediastinal contours and pulmonary vascularity are  normal. The aorta is atherosclerotic. Bones and soft tissues: The bones and soft tissues are within normal limits.         IMPRESSION  IMPRESSION: No acute abnormality and no change.                  Result History      XR CHEST SNGL V (Order #[id number]) on 3/1/2017 - Order Result History Report                 There are no end exam questions for this visit.         Signing Date/Time: 03/01/2017 10:01 AM   Signed by:  Elidia Arvizu MD   Interpreted/Read by: Elidia Arvizu MD        Ordering Provider: FREDDY Tanner Authorizing Provider: FREDDY Tanner   Ordering Phone: [phone] Authorizing Phone:     Ordering Fax: [phone] Attending Provider: Cee Mar MD   Ordering Pager:   PCP: Selena Kwon III      Other Ordering Provider:        Procedure: XR HIP RT W OR WO PELV 2-3 VWS [[id number]]    Procedure Date: 03/01/2017 9:50 AM   Accession Number: [id number]   Order Number: [id number]      Ordering Diagnosis:     Reason for Exam: Trauma   Performing Department: [facility] RADIOLOGY   Patient Class: EMERGENCY          Study Result      EXAM: XR HIP RT W OR WO PELV 2-3 VWS     INDICATION: Dementia with right hip pain after being found on the floor by his  wife.     COMPARISON: None.     FINDINGS: An AP view of the pelvis and a frogleg lateral view of the right hip  demonstrate no fracture, dislocation or other acute abnormality.  There are  degenerative changes of the lumbar spine.     IMPRESSION  IMPRESSION: No acute abnormality.                        Result History      XR HIP RT W OR WO PELV 2-3 VWS (Order #[id number]) on 3/1/2017 - Order Result History Report                 There are no end exam questions for this visit.         Signing Date/Time: 03/01/2017 10:01 AM   Signed by:  Elma Sawyer MD   Interpreted/Read by: Elma Sawyer MD        Ordering Provider: Frankey Coyer, [place] Authorizing Provider: Frankey Coyer, [place]   Ordering Phone: [phone] Phone:     Ordering Fax: [phone] Attending Provider: Harry Hernandez MD   Ordering Pager:   PCP: Damien Hong III      Other Ordering Provider:        Procedure: CT HEAD WO CONT [[id number]]    Procedure Date: 03/01/2017 9:57 AM   Accession Number: [id number]   Order Number: [id number]      Ordering Diagnosis:     Reason for Exam: Dementia   Performing Department: [facility] RAD CT   Patient Class: EMERGENCY          Study Result      EXAM: CT HEAD WITHOUT CONTRAST     INDICATION: Dementia.     COMPARISON: 12/7/2016.     CONTRAST: None.     TECHNIQUE: Unenhanced CT of the head was performed using 5 mm images. Brain and  bone windows were generated. Sagittal and coronal reformations were generated. CT dose reduction was achieved through use of a standardized protocol tailored  for this examination and automatic exposure control for dose modulation. CT dose  reduction was achieved through use of a standardized protocol tailored for this  examination and automatic exposure control for dose modulation.     FINDINGS:  The ventricles and sulci are normal in size, shape and configuration and  midline. There is extensive confluent decreased attenuation in the  periventricular white matter which is nonspecific but consistent with small  vessel disease and unchanged. There is no intracranial hemorrhage. There is no  extra-axial collection, mass, mass effect or midline shift. The basilar  cisterns are open. No acute infarct is identified. The bone windows demonstrate  no abnormalities.  The visualized portions of the paranasal sinuses and mastoid  air cells are clear.     IMPRESSION  IMPRESSION: Extensive microvascular disease unchanged. No acute intracranial  abnormality.                     End Exam Questions Answers Comments   CTDIvol               Signing Date/Time: 03/01/2017 10:09 AM   Signed by:  Peg Fuchs MD   Interpreted/Read by: Peg Fuchs MD      Ordering Provider: FREDDY Alvarez Authorizing Provider: FREDDY Alvarez   Ordering Phone: [phone] Phone:     Ordering Fax: [phone] Attending Provider: Jesus Marion MD   Ordering Pager:   PCP: Sharri Boyce III      Other Ordering Provider:        Procedure: XR KNEE RT MIN 4 V [[id number]]    Procedure Date: 03/01/2017 11:47 AM   Accession Number: [id number]   Order Number: [id number]      Ordering Diagnosis:     Reason for Exam: Trauma   Performing Department: [facility] RADIOLOGY   Patient Class: EMERGENCY          Study Result      EXAM: XR KNEE RT MIN 4 V     INDICATION: Dementia with right knee pain after being found on the floor by  his wife this morning.     COMPARISON: None.     FINDINGS: Five images which comprise at least 3 or 4 views of the right knee  demonstrate mild degenerative changes of the knee with joint space narrowing and  some chondrocalcinosis. There is no fracture or other acute osseous or articular  abnormality. There is no effusion. There is no true lateral view. There is  vascular calcification of the superficial femoral and popliteal arteries.     IMPRESSION  IMPRESSION: Osteoarthrosis.  No fracture or acute abnormality.                  Result History      XR KNEE RT MIN 4 V (Order #[id number]) on 3/1/2017 - Order Result History Report                 There are no end exam questions for this visit.         Signing Date/Time: 03/01/2017 12:00 PM   Signed by:  Peg Fuchs MD   Interpreted/Read by: Peg Fuchs MD              Medications   sodium chloride (NS) flush 5-10 mL (10 mL IntraVENous Given 3/1/17 1051)   sodium chloride (NS) flush 5-10 mL (not administered)   0.9% sodium chloride infusion (0 mL/hr IntraVENous IV Completed 3/1/17 1201)   cefTRIAXone (ROCEPHIN) 1 g in 0.9% sodium chloride (MBP/ADV) 50 mL (0 g IntraVENous IV Completed 3/1/17 1201)       CLINICAL IMPRESSION:  1. Acute cystitis without hematuria    2. Right leg pain    3. Fall, initial encounter    4. Dementia without behavioral disturbance, unspecified dementia type        Plan:  Discharge Medication List as of 3/1/2017  1:08 PM      START taking these medications    Details   cephALEXin (KEFLEX) 500 mg capsule Take 1 Cap by mouth three (3) times daily. , Normal, Disp-15 Cap, R-0         CONTINUE these medications which have NOT CHANGED    Details   hydrOXYzine HCl (ATARAX) 25 mg tablet Take 25 mg by mouth three (3) times daily as needed for Itching., Historical Med      promethazine (PHENERGAN) 25 mg tablet Take 1 Tab by mouth every six (6) hours as needed for Nausea., Normal, Disp-12 Tab, R-0      buPROPion XL (WELLBUTRIN XL) 150 mg tablet Take 1 Tab by mouth every morning., Normal, Disp-30 Tab, R-5      ELIQUIS 5 mg tablet TAKE ONE TABLET BY MOUTH EVERY 12 HOURS, Normal, Disp-60 Tab, R-3      clotrimazole (LOTRIMIN) 1 % topical cream Apply  to affected area two (2) times a day., Normal, Disp-30 g, R-1      fluticasone-salmeterol (ADVAIR DISKUS) 250-50 mcg/dose diskus inhaler Take 1 Puff by inhalation every twelve (12) hours. , Normal, Disp-1 Inhaler, R-12      lisinopril (PRINIVIL, ZESTRIL) 40 mg tablet TAKE ONE TABLET BY MOUTH DAILY, NormalCHANGED DOSEDisp-90 Tab, R-3      montelukast (SINGULAIR) 10 mg tablet Take 1 Tab by mouth daily. , Normal, Disp-30 Tab, R-6      b complex vitamins tablet Take 1 Tab by mouth daily. , Historical Med      memantine (NAMENDA) 10 mg tablet Take 1 Tab by mouth two (2) times a day., Normal, Disp-60 Tab, R-11      acetaminophen (TYLENOL) 325 mg tablet Take  by mouth every four (4) hours as needed for Pain., Historical Med      naproxen sodium 220 mg cap Take  by mouth., Historical Med butalbital-acetaminophen-caffeine (FIORICET, ESGIC) -40 mg per tablet Take 1 Tab by mouth every six (6) hours as needed for Headache. Max Daily Amount: 4 Tabs., Print, Disp-30 Tab, R-1      QUEtiapine (SEROQUEL) 25 mg tablet Take 1 Tab by mouth nightly as needed (agitation). , Normal, Disp-30 Tab, R-3      furosemide (LASIX) 40 mg tablet Take 1 Tab by mouth daily. If leg started getting swollen again or weight gain > 3 pounds then switch back to 2 times daily, No Print, Disp-30 Tab, R-0      arformoterol (BROVANA) 15 mcg/2 mL nebu neb solution 15 mcg by Nebulization route two (2) times a day., Historical Med      budesonide (PULMICORT) 0.5 mg/2 mL nebulizer suspension 500 mcg by Nebulization route two (2) times a day., Historical Med      potassium chloride SR (KLOR-CON 10) 10 mEq tablet Take 10 mEq by mouth two (2) times a day., Historical Med      metoprolol (LOPRESSOR) 25 mg tablet Take 25 mg by mouth two (2) times a day., Historical Med           Follow-up Information     Follow up With Details Comments Contact Info    Mohsen Jacques III, DO In 2 days As needed [address]  [phone]          Return to the closest emergency room or follow up sooner for any deterioration    This note is prepared by Tessy Lawrence, acting as Scribe for [place] CAMRON [place] PA-C : The scribe's documentation has been prepared under my direction and personally reviewed by me in its entirety. I confirm that the note above accurately reflects all work, treatment, procedures, and medical decision making performed by me.    6:09 PM  I was personally available for consultation in the emergency department. I have reviewed the chart and agree with the documentation recorded by the [facility], including the assessment, treatment plan, and disposition.   Ofe Benedict MD

## 2017-03-01 NOTE — PROGRESS NOTES
This is an [de-identified] yr old  male who presented to ER via EMS post a fall at home this AM. Patient has a history of dementia. HF, HTN and afib. Therapy mobilized patient in ER and patient was recommended to be discharged home in care of family with home PT. Met with both patient, spouse and son to answer questions the family had regarding care in the home. Mrs. Nuha Godwin stated she is paying privately to have a care aid come several times a week by [employer] but will now increase the hours to daily. Education was provided on Medicare and Medicaid benefits along with personal care, adult day care and foster/assisted living homes. Tea of choice was provided on home care and referral placed to [facility] per Mrs. Silva request. Patient has a standard walker that was given to him but PT is recommending a rolling walker. DME has been ordered and delivered to patient by [employer].     Care Management Interventions  PCP Verified by CM: Yes  Mode of Transport at Discharge:  Other (see comment) (spouse and son to provide)  Transition of Care Consult (CM Consult): [address]: No  Reason Outside [place]: Patient already serviced by other home care/hospice agency ([facility] and [address] [facility] [address])  MyChart Signup: No  Discharge Durable Medical Equipment: Yes (rolling walker through [employer])  Health Maintenance Reviewed: Yes  Physical Therapy Consult: Yes  Occupational Therapy Consult: No  Speech Therapy Consult: No  Current Support Network: Lives with Spouse  Confirm Follow Up Transport: Family  Plan discussed with Pt/Family/Caregiver: Yes  Freedom of Choice Offered: Yes  Discharge Location  Discharge Placement: Home with home health

## 2017-03-01 NOTE — PROGRESS NOTES
physical Therapy Emergency Department EVALUATION/DISCHARGE with CMS G codes  Patient: Amaris Hartmann (6 y.o. male)  Date: 3/1/2017  Primary Diagnosis: There are no admission diagnoses documented for this encounter. Precautions: dementia, fall     ASSESSMENT :  Based on the objective data described below, the patient presents s/p fall, unwitnessed, this am with no known cause. Pt is neg for fxs on xray. Asked by Court HAYES to see pt for eval.   Pt lives with wife in one story home. He is assisted by wife in ADLs. He has been amb with no device but has been recommended a walker. Wife has borrowed a std walker from friend, but pt has been not wanting to use it. Pt currently at a CGA to min A for bed mobility. He is CGA + vcs to [place] [facility] He stands with CGA for using urinal.  He is able to amb with CGA with RW and cues for direction and safety. Pt does not have increased pain c/o with amb. He does not exhibit grimacing or other facial expressions/gestures that indicate pain. He does not exhibit an antalgic gait. He only c/o pain with knee and hip flexion but shows range to 80 degrees in supine and sits well at edge of bed. He does exhibit some swelling of distal RLE with redness and one slightly darkened circular area distal anterior laterally. He does not follow MMT commands but does not appear to have any focal or lateralized weakness. Family is requesting R knee xray as well as some of pt's c/o include the knee; however pt is extremely variable in his reports. The only consistent verbalization is \"I don't feel well\". VSS without significant drop in BP sit to stand. At this time, would recommend use of RW which would be much more appropriate than standard due to dementia, 24/7 S and guarding from family, HHPT to train and assess current safety level in the home and work with family on the prevention of falls and any other injury as dementia progresses.   Family states they are looking into hiring a caregiver. Rounded with PA and CM for referral to HHPT, obtaining RW, and providing resources for caregivers as needed. Further acute physical therapy in the ED is not indicated at this time. PLAN :  Discharge Recommendations:     [x]   Home with family  []   Skilled nursing facility  []   Admission to hospital with rehab likely needed  []   Inpatient rehab referral  []   Outpatient physical therapy referral  [x]   Other: HHPT    Further Equipment Recommendations for Discharge:   [x]   Rolling walker with 5\" wheels  []   Crutches   []   Girtha Lanny   []   Wheelchair   []   Other:     COMMUNICATION/EDUCATION:   Communication/Collaboration:  [x]   Fall prevention education was provided and the patient/caregiver indicated understanding. [x]   Patient/family have participated as able and agree with findings and recommendations. []   Patient is unable to participate in plan of care at this time. Findings and recommendations were discussed with: MD physician and physician assistant, care manager       SUBJECTIVE:   Patient stated I don't feel well.     OBJECTIVE DATA SUMMARY:     Past Medical History:   Diagnosis Date    Allergy     seasonal    Arrhythmia     Asthma     Dementia     Depression     Heart failure (HCC)     Hypertension     Neurological disorder     dementia    Other ill-defined conditions(229.89)     a-fib    Stroke [facility])      Past Surgical History:   Procedure Laterality Date    HX HEENT      cateract surgery     Prior Level of Function/Home Situation: Pt lives with wife in one story home. He is assisted by wife in ADLs. He has been amb with no device but has been recommended a walker. Wife has borrowed a std walker from friend, but pt has been not wanting to use it.       Home Situation  Home Environment: Private residence  Living Alone: No  Support Systems: Child(riri), Family member(s), Spouse/Significant Other/Partner  Patient Expects to be Discharged to[de-identified] Private residence  Current DME Used/Available at Home: Esme Calderon, Shower chair  Critical Behavior:  Neurologic State: Alert, Confused  Orientation Level: Oriented to person  Cognition: Memory loss, Poor safety awareness, Recognition of people/places     Skin:  See above narrative  Strength:    Strength: Generally decreased, functional                    Tone & Sensation: Unable to assess due to dementia                                 Range Of Motion:  AROM: Generally decreased, functional (decreased RLE, various c/o pain with knee and hip flexion)           PROM: Generally decreased, functional (Tolerates R hip flexion to ~80 degrees, resists d/t pain c/o)           Coordination: No noted ataxia or tremor       Functional Mobility:  Bed Mobility:  Rolling: Contact guard assistance (using rail)  Supine to Sit: Minimum assistance  Sit to Supine: Contact guard assistance     Transfers:  Sit to Stand: Minimum assistance  Stand to Sit: Contact guard assistance                       Balance:   Sitting: Intact  Standing: Impaired  Standing - Static: Fair; Other (comment) (with RW)  Standing - Dynamic : Fair (with RW)  Ambulation/Gait Training:  Distance (ft): 35 Feet (ft)  Assistive Device: Walker, rolling  Ambulation - Level of Assistance: Contact guard assistance        Gait Abnormalities: Decreased step clearance;Shuffling gait        Base of Support: Widened     Speed/Josie: Slow;Shuffled  Step Length: Right shortened;Left shortened             Functional Measure:  Barthel Index:    Bathin  Bladder: 10  Bowels: 10  Groomin  Dressin  Feedin  Mobility: 10  Stairs: 5  Toilet Use: 5  Transfer (Bed to Chair and Back): 10  Total: 60       Barthel and G-code impairment scale:  Percentage of impairment CH  0% CI  1-19% CJ  20-39% CK  40-59% CL  60-79% CM  80-99% CN  100%   Barthel Score 0-100 100 99-80 79-60 59-40 20-39 1-19   0   Barthel Score 0-20 20 17-19 13-16 9-12 5-8 1-4 0      The Barthel ADL Index: Guidelines  1. The index should be used as a record of what a patient does, not as a record of what a patient could do. 2. The main aim is to establish degree of independence from any help, physical or verbal, however minor and for whatever reason. 3. The need for supervision renders the patient not independent. 4. A patient's performance should be established using the best available evidence. Asking the patient, friends/relatives and nurses are the usual sources, but direct observation and common sense are also important. However direct testing is not needed. 5. Usually the patient's performance over the preceding 24-48 hours is important, but occasionally longer periods will be relevant. 6. Middle categories imply that the patient supplies over 50 per cent of the effort. 7. Use of aids to be independent is allowed. Harlee Cowden., Barthel, DGeraldineW. ([id number]). Functional evaluation: the Barthel Index. [address] [facility] (14)2. Leonardo Parra brandi ELIESER Abbott, Osmin Abel, Brian Andrew., [place], [address] [place] [address] (1999). Measuring the change indisability after inpatient rehabilitation; comparison of the responsiveness of the Barthel Index and Functional [place] Measure. Journal of Neurology, Neurosurgery, and Psychiatry, 66(4), 541-605. Ligia Ku, N.J.A, MAMTA Joshi, & Noe Bolton MPRASAD. (2004.) Assessment of post-stroke quality of life in cost-effectiveness studies: The usefulness of the Barthel Index and the EuroQoL-5D. Quality of Life Research, 13, 427-71       In compliance with CMSs Claims Based Outcome Reporting, the following G-code set was chosen for this patient based on their primary functional limitation being treated: The outcome measure chosen to determine the severity of the functional limitation was the barthel with a score of 60/100 which was correlated with the impairment scale.     ? Mobility - Walking and Moving Around:     - CURRENT STATUS: CJ - 20%-39% impaired, limited or restricted    - GOAL STATUS: CJ - 20%-39% impaired, limited or restricted    - D/C STATUS:  CJ - 20%-39% impaired, limited or restricted   Pain:  Pain Scale 1: Adult Nonverbal Pain Scale                 Activity Tolerance:   No acute distress during session; only c/o not feeling well but VSS and no grimacing or expressions indicating increased pain with mobility  Please refer to the flowsheet for vital signs taken during this treatment.   After treatment:   []         Patient left in no apparent distress sitting up in chair  [x]         Patient left in no apparent distress in bed  [x]         Call bell left within reach  [x]         Nursing notified  [x]         Caregiver present  []         Bed alarm activated        Thank you for this referral.  Claudia Grady, PT   Time Calculation: 37 mins

## 2017-03-03 ENCOUNTER — TELEPHONE (OUTPATIENT)
Dept: INTERNAL MEDICINE CLINIC | Age: 82
End: 2017-03-03

## 2017-03-03 LAB
BACTERIA SPEC CULT: ABNORMAL
CC UR VC: ABNORMAL
SERVICE CMNT-IMP: ABNORMAL

## 2017-03-13 ENCOUNTER — TELEPHONE (OUTPATIENT)
Dept: INTERNAL MEDICINE CLINIC | Age: 82
End: 2017-03-13

## 2017-03-13 NOTE — TELEPHONE ENCOUNTER
Best contact number is [phone]. Pt wife Rosa Rg, wants to know if  the pt still has a UTI. The urine was delivered to the office  early this week.        Message received & copied from [facility] after closing on 3/10/17

## 2017-03-13 NOTE — TELEPHONE ENCOUNTER
I called and spoke with wife. She was given negative culture result. She states that dony has been running high. She does not have a working bp cuff at home. She states that patient is taking 3 blood pressure medications (metoprolol, lisinopril, and lasix once daily. ). She would like to know if this should be increased.

## 2017-03-13 NOTE — TELEPHONE ENCOUNTER
Pt's wife, Neville Stevens states she wants a call back today as soon as possible. She is worried about this.

## 2017-03-15 NOTE — TELEPHONE ENCOUNTER
I called and spoke with suly (on HIPPA). She was advised to not focus too much on BP as this bp is not high enough to worry. She will only check bp a few times a week. She was advised that patient can do PT but if gets any dizziness, he needs to stop. He verbalized understanding.

## 2017-03-15 NOTE — TELEPHONE ENCOUNTER
Mike Poster [facility] PT states pt's b/p is  160/102 at rest.  He is asking that you call the wife and discuss this as soon as possible.   He couldn't do PT with this elevated b/p

## 2017-03-30 RX ORDER — CHLORPHENIRAMINE MALEATE 4 MG
TABLET ORAL
Qty: 45 G | Refills: 0 | Status: SHIPPED | OUTPATIENT
Start: 2017-03-30 | End: 2017-04-30 | Stop reason: CLARIF

## 2017-03-31 ENCOUNTER — TELEPHONE (OUTPATIENT)
Dept: INTERNAL MEDICINE CLINIC | Age: 82
End: 2017-03-31

## 2017-03-31 NOTE — TELEPHONE ENCOUNTER
Sunitha/[facility] states she's returning your call. Please call back as quickly as possible.  Thank you

## 2017-03-31 NOTE — TELEPHONE ENCOUNTER
Sunitha//[facility] states she needs a call back to et orders for physical therapy extension for patient. Please call.  Thank you

## 2017-03-31 NOTE — TELEPHONE ENCOUNTER
I called and spoke with Peter Allred. She was informed that patient can have extended PT. She verbalized understanding.

## 2017-04-02 DIAGNOSIS — R45.86 MOOD CHANGE: ICD-10-CM

## 2017-04-02 DIAGNOSIS — G30.1 LATE ONSET ALZHEIMER'S DISEASE WITHOUT BEHAVIORAL DISTURBANCE (HCC): ICD-10-CM

## 2017-04-02 DIAGNOSIS — F02.80 LATE ONSET ALZHEIMER'S DISEASE WITHOUT BEHAVIORAL DISTURBANCE (HCC): ICD-10-CM

## 2017-04-02 RX ORDER — MEMANTINE HYDROCHLORIDE 10 MG/1
TABLET ORAL
Qty: 60 TAB | Refills: 10 | Status: SHIPPED | OUTPATIENT
Start: 2017-04-02 | End: 2017-12-18 | Stop reason: SDUPTHER

## 2017-04-30 ENCOUNTER — HOSPITAL ENCOUNTER (EMERGENCY)
Age: 82
Discharge: OTHER INSTITUTION | End: 2017-04-30
Attending: EMERGENCY MEDICINE

## 2017-04-30 ENCOUNTER — HOSPITAL ENCOUNTER (EMERGENCY)
Age: 82
Discharge: HOME | End: 2017-04-30
Attending: EMERGENCY MEDICINE
Payer: MEDICARE

## 2017-04-30 ENCOUNTER — HOSPITAL ENCOUNTER (EMERGENCY)
Age: 82
Discharge: HOME | End: 2017-05-01
Attending: EMERGENCY MEDICINE
Payer: MEDICARE

## 2017-04-30 ENCOUNTER — APPOINTMENT (OUTPATIENT)
Dept: GENERAL RADIOLOGY | Age: 82
End: 2017-04-30
Attending: EMERGENCY MEDICINE
Payer: MEDICARE

## 2017-04-30 VITALS
TEMPERATURE: 97.1 F | OXYGEN SATURATION: 95 % | RESPIRATION RATE: 20 BRPM | DIASTOLIC BLOOD PRESSURE: 102 MMHG | SYSTOLIC BLOOD PRESSURE: 175 MMHG | HEART RATE: 76 BPM

## 2017-04-30 VITALS
BODY MASS INDEX: 29.67 KG/M2 | WEIGHT: 207.23 LBS | OXYGEN SATURATION: 96 % | HEART RATE: 72 BPM | SYSTOLIC BLOOD PRESSURE: 172 MMHG | RESPIRATION RATE: 14 BRPM | TEMPERATURE: 97.8 F | DIASTOLIC BLOOD PRESSURE: 107 MMHG | HEIGHT: 70 IN

## 2017-04-30 DIAGNOSIS — M79.672 LEFT FOOT PAIN: ICD-10-CM

## 2017-04-30 DIAGNOSIS — R60.9 PERIPHERAL EDEMA: Primary | ICD-10-CM

## 2017-04-30 DIAGNOSIS — R60.9 DEPENDENT EDEMA: ICD-10-CM

## 2017-04-30 DIAGNOSIS — G30.9 ALZHEIMER'S DEMENTIA WITHOUT BEHAVIORAL DISTURBANCE, UNSPECIFIED TIMING OF DEMENTIA ONSET: ICD-10-CM

## 2017-04-30 DIAGNOSIS — R40.4 ALTERED AWARENESS, TRANSIENT: ICD-10-CM

## 2017-04-30 DIAGNOSIS — F02.80 ALZHEIMER'S DEMENTIA WITHOUT BEHAVIORAL DISTURBANCE, UNSPECIFIED TIMING OF DEMENTIA ONSET: ICD-10-CM
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
## 2017-04-30 LAB
ALBUMIN SERPL BCP-MCNC: 3.4 G/DL (ref 3.5–5)
ALBUMIN/GLOB SERPL: 0.9 {RATIO} (ref 1.1–2.2)
ALP SERPL-CCNC: 86 U/L (ref 45–117)
ALT SERPL-CCNC: 25 U/L (ref 12–78)
ANION GAP BLD CALC-SCNC: 6 MMOL/L (ref 5–15)
APPEARANCE UR: CLEAR
AST SERPL W P-5'-P-CCNC: 20 U/L (ref 15–37)
BACTERIA URNS QL MICRO: NEGATIVE /HPF
BASOPHILS # BLD AUTO: 0 K/UL (ref 0–0.1)
BASOPHILS # BLD: 0 % (ref 0–1)
BILIRUB SERPL-MCNC: 0.7 MG/DL (ref 0.2–1)
BILIRUB UR QL: NEGATIVE
BNP SERPL-MCNC: 2031 PG/ML (ref 0–450)
BUN SERPL-MCNC: 21 MG/DL (ref 6–20)
BUN/CREAT SERPL: 14 (ref 12–20)
CALCIUM SERPL-MCNC: 9 MG/DL (ref 8.5–10.1)
CHLORIDE SERPL-SCNC: 98 MMOL/L (ref 97–108)
CK MB CFR SERPL CALC: 1.9 % (ref 0–2.5)
CK MB SERPL-MCNC: 1.3 NG/ML (ref 5–25)
CK SERPL-CCNC: 67 U/L (ref 39–308)
CO2 SERPL-SCNC: 34 MMOL/L (ref 21–32)
COLOR UR: NORMAL
CREAT SERPL-MCNC: 1.45 MG/DL (ref 0.7–1.3)
EOSINOPHIL # BLD: 0.4 K/UL (ref 0–0.4)
EOSINOPHIL NFR BLD: 4 % (ref 0–7)
EPITH CASTS URNS QL MICRO: NORMAL /LPF
ERYTHROCYTE [DISTWIDTH] IN BLOOD BY AUTOMATED COUNT: 13.7 % (ref 11.5–14.5)
GLOBULIN SER CALC-MCNC: 4 G/DL (ref 2–4)
GLUCOSE SERPL-MCNC: 116 MG/DL (ref 65–100)
GLUCOSE UR STRIP.AUTO-MCNC: NEGATIVE MG/DL
HCT VFR BLD AUTO: 48.3 % (ref 36.6–50.3)
HGB BLD-MCNC: 16.4 G/DL (ref 12.1–17)
HGB UR QL STRIP: NEGATIVE
HYALINE CASTS URNS QL MICRO: NORMAL /LPF (ref 0–5)
KETONES UR QL STRIP.AUTO: NEGATIVE MG/DL
LEUKOCYTE ESTERASE UR QL STRIP.AUTO: NEGATIVE
LYMPHOCYTES # BLD AUTO: 14 % (ref 12–49)
LYMPHOCYTES # BLD: 1.2 K/UL (ref 0.8–3.5)
MAGNESIUM SERPL-MCNC: 2 MG/DL (ref 1.6–2.4)
MCH RBC QN AUTO: 28.7 PG (ref 26–34)
MCHC RBC AUTO-ENTMCNC: 34 G/DL (ref 30–36.5)
MCV RBC AUTO: 84.6 FL (ref 80–99)
MONOCYTES # BLD: 1.2 K/UL (ref 0–1)
MONOCYTES NFR BLD AUTO: 14 % (ref 5–13)
NEUTS SEG # BLD: 5.5 K/UL (ref 1.8–8)
NEUTS SEG NFR BLD AUTO: 68 % (ref 32–75)
NITRITE UR QL STRIP.AUTO: NEGATIVE
PH UR STRIP: 7 [PH] (ref 5–8)
PLATELET # BLD AUTO: 201 K/UL (ref 150–400)
POTASSIUM SERPL-SCNC: 3.7 MMOL/L (ref 3.5–5.1)
PROT SERPL-MCNC: 7.4 G/DL (ref 6.4–8.2)
PROT UR STRIP-MCNC: NEGATIVE MG/DL
RBC # BLD AUTO: 5.71 M/UL (ref 4.1–5.7)
RBC #/AREA URNS HPF: NORMAL /HPF (ref 0–5)
SODIUM SERPL-SCNC: 138 MMOL/L (ref 136–145)
SP GR UR REFRACTOMETRY: 1.01 (ref 1–1.03)
TROPONIN I SERPL-MCNC: <0.04 NG/ML
UA: UC IF INDICATED,UAUC: NORMAL
UROBILINOGEN UR QL STRIP.AUTO: 0.2 EU/DL (ref 0.2–1)
WBC # BLD AUTO: 8.2 K/UL (ref 4.1–11.1)
WBC URNS QL MICRO: NORMAL /HPF (ref 0–4)

## 2017-04-30 PROCEDURE — 94762 N-INVAS EAR/PLS OXIMTRY CONT: CPT

## 2017-04-30 PROCEDURE — 99285 EMERGENCY DEPT VISIT HI MDM: CPT

## 2017-04-30 RX ORDER — FUROSEMIDE 40 MG/1
40 TABLET ORAL 2 TIMES DAILY
COMMUNITY
End: 2022-03-04

## 2017-04-30 RX ORDER — QUETIAPINE FUMARATE 25 MG/1
25 TABLET, FILM COATED ORAL AS NEEDED
COMMUNITY
End: 2017-09-12

## 2017-04-30 RX ORDER — FUROSEMIDE 10 MG/ML
80 INJECTION INTRAMUSCULAR; INTRAVENOUS
Status: COMPLETED | OUTPATIENT
Start: 2017-04-30 | End: 2017-04-30

## 2017-04-30 RX ORDER — MONTELUKAST SODIUM 10 MG/1
10 TABLET ORAL DAILY
COMMUNITY
End: 2022-03-04

## 2017-04-30 RX ORDER — FLUTICASONE PROPIONATE 50 MCG
2 SPRAY, SUSPENSION (ML) NASAL AS NEEDED
COMMUNITY
End: 2017-09-12

## 2017-04-30 RX ORDER — ALBUTEROL SULFATE 0.83 MG/ML
SOLUTION RESPIRATORY (INHALATION)
COMMUNITY
End: 2017-09-12

## 2017-04-30 RX ADMIN — FUROSEMIDE 80 MG: 10 INJECTION, SOLUTION INTRAMUSCULAR; INTRAVENOUS at 13:57

## 2017-04-30 NOTE — UC PROVIDER NOTE
Patient is a [de-identified] y.o. male presenting with swelling. The history is provided by the patient and the spouse. Swelling    This is a new problem. The current episode started 2 days ago. The problem occurs constantly. The problem has been gradually worsening. The pain is associated with an unknown factor. Pain location: bilateral feet. Quality: unable to describe. The pain is moderate. Pertinent negatives include no fever, no nausea, no vomiting, no chest pain and no back pain. Associated symptoms comments: Difficult to assess, patient with dementia. The pain is worsened by activity (standing). The pain is relieved by nothing. Past workup comments: none. His past medical history is significant for UTI. His past medical history does not include PUD, gallstones, GERD, ulcerative colitis, Crohn's disease, cancer, pancreatitis or diverticulitis. Past medical history comments: Heart failure, atrial fib. The patient's surgical history non-contributory. Past Medical History:   Diagnosis Date    Allergy     seasonal    Arrhythmia     Asthma     Dementia     Depression     Heart failure (HCC)     Hypertension     Neurological disorder     dementia    Other ill-defined conditions     a-fib    Stroke [facility])         Past Surgical History:   Procedure Laterality Date    HX HEENT      cateract surgery         Family History   Problem Relation Age of Onset    Stroke Mother     Heart Disease Mother     Cancer Father      prostate    Cancer Brother      colon        Social History     Social History    Marital status:      Spouse name: N/A    Number of children: N/A    Years of education: N/A     Occupational History    Not on file.      Social History Main Topics    Smoking status: Never Smoker    Smokeless tobacco: Never Used    Alcohol use No    Drug use: No    Sexual activity: No     Other Topics Concern    Not on file     Social History Narrative                ALLERGIES: Review of patient's allergies indicates no known allergies. Review of Systems   Unable to perform ROS: Dementia   Constitutional: Negative for fever. Cardiovascular: Negative for chest pain. Gastrointestinal: Negative for nausea and vomiting. Musculoskeletal: Negative for back pain. Vitals:    04/30/17 1005 04/30/17 1011   BP: (!) 175/102    Pulse: 76    Resp:  20   Temp: 97.1 °F (36.2 °C)    SpO2:  95%       Physical Exam   Constitutional: He appears well-developed and well-nourished. HENT:   Head: Normocephalic and atraumatic. Mouth/Throat: Oropharynx is clear and moist. No oropharyngeal exudate. Eyes: Conjunctivae and EOM are normal. Pupils are equal, round, and reactive to light. Right eye exhibits no discharge. Left eye exhibits no discharge. No scleral icterus. Neck: Normal range of motion. Neck supple. No tracheal deviation present. No thyromegaly present. Cardiovascular: Normal rate, regular rhythm and normal heart sounds. No murmur heard. Pulmonary/Chest: Effort normal and breath sounds normal. No respiratory distress. He has no wheezes. He has no rales. Abdominal: Soft. Bowel sounds are normal. He exhibits no distension. There is no tenderness. There is no rebound and no guarding. Musculoskeletal: He exhibits edema. He exhibits no tenderness. 2+ pitting edema to feet, lower leg bilaterally with marked erythema of feet bilaterally, neurovascular intact, stasis changes present   Lymphadenopathy:     He has no cervical adenopathy. Neurological: He is alert. No cranial nerve deficit. Coordination normal.   Skin: Skin is warm. No rash noted. No erythema. Psychiatric: He has a normal mood and affect. His behavior is normal. Judgment normal.   Nursing note and vitals reviewed.       MDM     Differential Diagnosis; Clinical Impression; Plan:     Bilateral lower leg edema, differential includes chf, venous stasis, cellulitis, less likely dvt bilateral--evaluation outside of scope of urgent care, family to transport patient to emergency department,      Procedures

## 2017-04-30 NOTE — ED NOTES
Assumed care of patient. Patient placed in position of comfort. Call bell in reach. Skin warm and dry. Respirations even and unlabored. In no apparent distress at this time. Pt presents to the ED from the [facility] for c/o increased bilateral lower extremity swelling and pain x several days. History of the same-currently taking 40 mg Lasix BID. Denies CP, SOB. A&O x 4. Family at bedside.

## 2017-04-30 NOTE — UC NOTE
After being seen by provider pt advised to be evaluated in the emergency department.  Report called to [facility] by Dr Yue Vasquez

## 2017-04-30 NOTE — DISCHARGE INSTRUCTIONS
Leg and Ankle Edema: Care Instructions     INCREASE your Lasix to 80 mg (2 tablets) in the morning and 40 mg in the evening (1 tablets) for the next 2 days. Call your primary care doctor in the morning to make a follow up appointment. Your Care Instructions  Swelling in the legs, ankles, and feet is called edema. It is common after you sit or stand for a while. Long plane flights or car rides often cause swelling in the legs and feet. You may also have swelling if you have to stand for long periods of time at your job. Problems with the veins in the legs (varicose veins) and changes in hormones can also cause swelling. Sometimes the swelling in the ankles and feet is caused by a more serious problem, such as heart failure, infection, blood clots, or liver or kidney disease. Follow-up care is a key part of your treatment and safety. Be sure to make and go to all appointments, and call your doctor if you are having problems. Its also a good idea to know your test results and keep a list of the medicines you take. How can you care for yourself at home? · If your doctor gave you medicine, take it as prescribed. Call your doctor if you think you are having a problem with your medicine. · Whenever you are resting, raise your legs up. Try to keep the swollen area higher than the level of your heart. · Take breaks from standing or sitting in one position. ¨ Walk around to increase the blood flow in your lower legs. ¨ Move your feet and ankles often while you stand, or tighten and relax your leg muscles. · Wear support stockings. Put them on in the morning, before swelling gets worse. · Eat a balanced diet. Lose weight if you need to. · Limit the amount of salt (sodium) in your diet. Salt holds fluid in the body and may increase swelling. When should you call for help? Call 911 anytime you think you may need emergency care.  For example, call if:  · You have symptoms of a blood clot in your lung (called a pulmonary embolism). These may include:  ¨ Sudden chest pain. ¨ Trouble breathing. ¨ Coughing up blood. Call your doctor now or seek immediate medical care if:  · You have signs of a blood clot, such as:  ¨ Pain in your calf, back of the knee, thigh, or groin. ¨ Redness and swelling in your leg or groin. · You have symptoms of infection, such as:  ¨ Increased pain, swelling, warmth, or redness. ¨ Red streaks or pus. ¨ A fever. Watch closely for changes in your health, and be sure to contact your doctor if:  · Your swelling is getting worse. · You have new or worsening pain in your legs. · You do not get better as expected. Where can you learn more? Go to http://rosey-wilmer.info/. Enter A566 in the search box to learn more about \"Leg and Ankle Edema: Care Instructions. \"  Current as of: May 27, 2016  Content Version: 11.2  © 5016-2344 [employer]. Care instructions adapted under license by [employer] (which disclaims liability or warranty for this information). If you have questions about a medical condition or this instruction, always ask your healthcare professional. [place] [address] any warranty or liability for your use of this information.

## 2017-04-30 NOTE — ED NOTES
Condom catheter removed, pt tolerated well. Dr. Colón Serve at bedside to provide discharge paperwork. Vital signs stable. Pt in no apparent distress at this time. Mental status at baseline. To waiting room via wheelchair, discharge paperwork in hand. Accompanied by his wife.

## 2017-04-30 NOTE — ED NOTES
Condom catheter applied. Pt tolerated well. Resting in position of comfort. Will continue to monitor.

## 2017-04-30 NOTE — ED PROVIDER NOTES
HPI Comments: Kendrick Taylor, [de-identified] y.o. Male, presents ambulatory with his Spouse to [facility] ED with cc of increased leg swelling with associated leg pain x 3 days. Per spouse, the patient has chronic leg swelling, but she states that it has noticeably worsened. Per spouse, the patient also c/o a mild cough that is exacerbated when he is laying flat. Per spouse, the patient is prescribed Lasix 40 mg BID, and she notes that his last dose was this morning. Per spouse, the patient was also prescribed Eliquis. Per spouse, the patient denies any recent changes in his medications or recent ABX use. The patient specifically denies SOB at this time. PCP: Vadim Vanegas III, DO    PMHx significant for: HTN, atrial fibrillation, CHF, dementia, asthma, stroke, depression  Social history significant for: - Tobacco, - EtOH, - Illicit Drug Use    There are no other complaints, changes, or physical findings at this time. Written by KIKO Milleribalicja, as dictated by Dov Jack DO. The history is provided by the patient and the spouse. No  was used. Past Medical History:   Diagnosis Date    Allergy     seasonal    Arrhythmia     Asthma     Dementia     Depression     Heart failure (HCC)     Hypertension     Neurological disorder     dementia    Other ill-defined conditions     a-fib    Stroke [facility])        Past Surgical History:   Procedure Laterality Date    HX HEENT      cateract surgery         Family History:   Problem Relation Age of Onset    Stroke Mother     Heart Disease Mother     Cancer Father      prostate    Cancer Brother      colon       Social History     Social History    Marital status:      Spouse name: N/A    Number of children: N/A    Years of education: N/A     Occupational History    Not on file.      Social History Main Topics    Smoking status: Never Smoker    Smokeless tobacco: Never Used    Alcohol use No    Drug use: No    Sexual activity: No     Other Topics Concern    Not on file     Social History Narrative         ALLERGIES: Review of patient's allergies indicates no known allergies. Review of Systems   Constitutional: Negative for chills, fatigue and fever. HENT: Negative for congestion and rhinorrhea. Eyes: Negative for visual disturbance. Respiratory: Positive for cough. Negative for shortness of breath and wheezing. Cardiovascular: Positive for leg swelling. Negative for chest pain and palpitations. Gastrointestinal: Negative for abdominal distention, abdominal pain, constipation, diarrhea, nausea and vomiting. Endocrine: Negative. Genitourinary: Negative for difficulty urinating and dysuria. Musculoskeletal: Positive for myalgias (Leg pain). Skin: Negative for rash. Neurological: Negative for dizziness, weakness and light-headedness. Psychiatric/Behavioral: Negative for suicidal ideas. Vitals:    04/30/17 1043 04/30/17 1200   BP: (!) 204/115 (!) 159/106   Pulse: 79 74   Resp: 18 17   Temp: 97.8 °F (36.6 °C)    SpO2: 97% 95%   Weight: 94 kg (207 lb 3.7 oz)    Height: 5' 10\" (1.778 m)             Physical Exam   Constitutional: He is oriented to person, place, and time. He appears well-developed and well-nourished. No distress. HENT:   Head: Normocephalic and atraumatic. Mouth/Throat: Oropharynx is clear and moist.   Eyes: Conjunctivae and EOM are normal.   Neck: Neck supple. No JVD present. No tracheal deviation present. Cardiovascular: Normal rate and intact distal pulses. An irregularly irregular rhythm present. Exam reveals no gallop and no friction rub. No murmur heard. Pulmonary/Chest: Effort normal and breath sounds normal. No stridor. No respiratory distress. He has no wheezes. Lung sounds are clear   Abdominal: Soft. Bowel sounds are normal. He exhibits no distension and no mass. There is no tenderness. There is no guarding. Musculoskeletal: Normal range of motion.  He exhibits no edema or tenderness. 2+ pitting edema to BL LEs   Neurological: He is alert and oriented to person, place, and time. He has normal strength. No focal deficits   Skin: Skin is warm, dry and intact. No rash noted. Psychiatric: He has a normal mood and affect. His behavior is normal. Judgment and thought content normal.   Nursing note and vitals reviewed. Written by KIKO Dominguez, as dictated by Mckay Velasco DO.    MDM  Number of Diagnoses or Management Options  Peripheral edema:   Diagnosis management comments: Patient presenting with bilateral lower extremity edema for the past several days that has been worsening despite being on lasix BID. Patient is in no respiratory distress and has no SOB. Low suspicion for DVT as patient is on Eliquis. Patient has no s/s of cellulitis. Will check labs, CXR to eval for chf exacerbation, anemia, hypoalbuminemia. Amount and/or Complexity of Data Reviewed  Clinical lab tests: ordered and reviewed  Tests in the radiology section of CPT®: ordered and reviewed  Tests in the medicine section of CPT®: ordered and reviewed  Obtain history from someone other than the patient: yes (Spouse)  Review and summarize past medical records: yes  Independent visualization of images, tracings, or specimens: yes    Patient Progress  Patient progress: stable    ED Course       Procedures    EKG interpretation: (Preliminary) 11:24  Rhythm: Atrial fibrillation with premature ventricular or aberrantly conducted complexes; and irregular. Rate (approx.): 73; Axis: left axis deviation; QRS interval: normal ; ST/T wave: No ST changes. Written by KIKO Dominguez, as dictated by Mckay Velasco DO. Progress Note:  2:08 PM  The patient was informed of his lab and imaging results. Will start on IV Lasix and re-evaluate. Written by KIKO Dominguez, as dictated by Mckay Velasco DO.     Progress Note:  3:00 PM  Upon discharge, the patient states that he is feeling better. Written by KIKO Roblero, as dictated by Charmayne Nip, DO.    LABORATORY TESTS:  Recent Results (from the past 12 hour(s))   METABOLIC PANEL, COMPREHENSIVE    Collection Time: 04/30/17 11:20 AM   Result Value Ref Range    Sodium 138 136 - 145 mmol/L    Potassium 3.7 3.5 - 5.1 mmol/L    Chloride 98 97 - 108 mmol/L    CO2 34 (H) 21 - 32 mmol/L    Anion gap 6 5 - 15 mmol/L    Glucose 116 (H) 65 - 100 mg/dL    BUN 21 (H) 6 - 20 MG/DL    Creatinine 1.45 (H) 0.70 - 1.30 MG/DL    BUN/Creatinine ratio 14 12 - 20      GFR est AA 57 (L) >60 ml/min/1.73m2    GFR est non-AA 47 (L) >60 ml/min/1.73m2    Calcium 9.0 8.5 - 10.1 MG/DL    Bilirubin, total 0.7 0.2 - 1.0 MG/DL    ALT (SGPT) 25 12 - 78 U/L    AST (SGOT) 20 15 - 37 U/L    Alk. phosphatase 86 45 - 117 U/L    Protein, total 7.4 6.4 - 8.2 g/dL    Albumin 3.4 (L) 3.5 - 5.0 g/dL    Globulin 4.0 2.0 - 4.0 g/dL    A-G Ratio 0.9 (L) 1.1 - 2.2     CK W/ CKMB & INDEX    Collection Time: 04/30/17 11:20 AM   Result Value Ref Range    CK 67 39 - 308 U/L    CK - MB 1.3 <3.6 NG/ML    CK-MB Index 1.9 0 - 2.5     CBC WITH AUTOMATED DIFF    Collection Time: 04/30/17 11:20 AM   Result Value Ref Range    WBC 8.2 4.1 - 11.1 K/uL    RBC 5.71 (H) 4.10 - 5.70 M/uL    HGB 16.4 12.1 - 17.0 g/dL    HCT 48.3 36.6 - 50.3 %    MCV 84.6 80.0 - 99.0 FL    MCH 28.7 26.0 - 34.0 PG    MCHC 34.0 30.0 - 36.5 g/dL    RDW 13.7 11.5 - 14.5 %    PLATELET 943 468 - 896 K/uL    NEUTROPHILS 68 32 - 75 %    LYMPHOCYTES 14 12 - 49 %    MONOCYTES 14 (H) 5 - 13 %    EOSINOPHILS 4 0 - 7 %    BASOPHILS 0 0 - 1 %    ABS. NEUTROPHILS 5.5 1.8 - 8.0 K/UL    ABS. LYMPHOCYTES 1.2 0.8 - 3.5 K/UL    ABS. MONOCYTES 1.2 (H) 0.0 - 1.0 K/UL    ABS. EOSINOPHILS 0.4 0.0 - 0.4 K/UL    ABS.  BASOPHILS 0.0 0.0 - 0.1 K/UL   TROPONIN I    Collection Time: 04/30/17 11:20 AM   Result Value Ref Range    Troponin-I, Qt. <0.04 <0.05 ng/mL   MAGNESIUM    Collection Time: 04/30/17 11:20 AM   Result Value Ref Range    Magnesium 2.0 1.6 - 2.4 mg/dL   PRO-BNP    Collection Time: 04/30/17 11:20 AM   Result Value Ref Range    NT pro-BNP 2031 (H) 0 - 450 PG/ML   EKG, 12 LEAD, INITIAL    Collection Time: 04/30/17 11:21 AM   Result Value Ref Range    Ventricular Rate 78 BPM    Atrial Rate 63 BPM    QRS Duration 88 ms    Q-T Interval 388 ms    QTC Calculation (Bezet) 442 ms    Calculated R Axis -60 degrees    Calculated T Axis 54 degrees    Diagnosis       ** Poor data quality, interpretation may be adversely affected  Atrial fibrillation  Left axis deviation  Abnormal ECG  When compared with ECG of 01-MAR-2017 10:05,  Questionable change in QRS axis  Nonspecific T wave abnormality no longer evident in Inferior leads     URINALYSIS W/ REFLEX CULTURE    Collection Time: 04/30/17 12:23 PM   Result Value Ref Range    Color YELLOW/STRAW      Appearance CLEAR CLEAR      Specific gravity 1.009 1.003 - 1.030      pH (UA) 7.0 5.0 - 8.0      Protein NEGATIVE  NEG mg/dL    Glucose NEGATIVE  NEG mg/dL    Ketone NEGATIVE  NEG mg/dL    Bilirubin NEGATIVE  NEG      Blood NEGATIVE  NEG      Urobilinogen 0.2 0.2 - 1.0 EU/dL    Nitrites NEGATIVE  NEG      Leukocyte Esterase NEGATIVE  NEG      WBC 0-4 0 - 4 /hpf    RBC 0-5 0 - 5 /hpf    Epithelial cells FEW FEW /lpf    Bacteria NEGATIVE  NEG /hpf    UA:UC IF INDICATED CULTURE NOT INDICATED BY UA RESULT CNI      Hyaline cast 0-2 0 - 5 /lpf       IMAGING RESULTS:  CXR Results  (Last 48 hours)               04/30/17 1146  XR CHEST PORT Final result    Impression:  IMPRESSION: No acute findings               Narrative:  EXAM:  XR CHEST PORT       INDICATION:  Increased leg swelling over last 3 days with leg pain. Mild cough   exacerbated by lying flat. COMPARISON:  March 1, 2017       FINDINGS: A portable AP radiograph of the chest was obtained at 1138 hours. The   lungs and pleural margins are clear. Cardiac size is within normal limits.     There is mild thoracic aortic tortuosity which is unchanged. Mediastinal and   hilar contours are otherwise normal.  No substantial osseous abnormality is   shown. MEDICATIONS GIVEN:  Medications   furosemide (LASIX) injection 80 mg (80 mg IntraVENous Given 4/30/17 4059)       IMPRESSION:  1. Peripheral edema        PLAN:  1. Current Discharge Medication List        2. Follow-up Information     Follow up With Details Comments Rafaela Gu [address] III, DO Schedule an appointment as soon as possible for a visit in 1 day  [address]  [address] [facility]  [phone]      [facility] EMERGENCY DEPT  As needed, If symptoms worsen [address] [place] [address]  [phone]        Return to ED if worse     Discharge Note:  3:05 PM  The pt is ready for discharge. The pt's signs, symptoms, diagnosis, and discharge instructions have been discussed and pt has conveyed their understanding. The pt is to follow up as recommended or return to ER should their symptoms worsen. Plan has been discussed and pt is in agreement. This note is prepared by Johnny Goodson, acting as a Scribe for Hali Alpers, DO. Hali Alpers, DO: The scribe's documentation has been prepared under my direction and personally reviewed by me in its entirety. I confirm that the notes above accurately reflects all work, treatment, procedures, and medical decision making performed by me.

## 2017-04-30 NOTE — DISCHARGE INSTRUCTIONS
Leg and Ankle Edema: Care Instructions  Your Care Instructions  Swelling in the legs, ankles, and feet is called edema. It is common after you sit or stand for a while. Long plane flights or car rides often cause swelling in the legs and feet. You may also have swelling if you have to stand for long periods of time at your job. Problems with the veins in the legs (varicose veins) and changes in hormones can also cause swelling. Sometimes the swelling in the ankles and feet is caused by a more serious problem, such as heart failure, infection, blood clots, or liver or kidney disease. Follow-up care is a key part of your treatment and safety. Be sure to make and go to all appointments, and call your doctor if you are having problems. Its also a good idea to know your test results and keep a list of the medicines you take. How can you care for yourself at home? · If your doctor gave you medicine, take it as prescribed. Call your doctor if you think you are having a problem with your medicine. · Whenever you are resting, raise your legs up. Try to keep the swollen area higher than the level of your heart. · Take breaks from standing or sitting in one position. ¨ Walk around to increase the blood flow in your lower legs. ¨ Move your feet and ankles often while you stand, or tighten and relax your leg muscles. · Wear support stockings. Put them on in the morning, before swelling gets worse. · Eat a balanced diet. Lose weight if you need to. · Limit the amount of salt (sodium) in your diet. Salt holds fluid in the body and may increase swelling. When should you call for help? Call 911 anytime you think you may need emergency care. For example, call if:  · You have symptoms of a blood clot in your lung (called a pulmonary embolism). These may include:  ¨ Sudden chest pain. ¨ Trouble breathing. ¨ Coughing up blood.   Call your doctor now or seek immediate medical care if:  · You have signs of a blood clot, such as:  ¨ Pain in your calf, back of the knee, thigh, or groin. ¨ Redness and swelling in your leg or groin. · You have symptoms of infection, such as:  ¨ Increased pain, swelling, warmth, or redness. ¨ Red streaks or pus. ¨ A fever. Watch closely for changes in your health, and be sure to contact your doctor if:  · Your swelling is getting worse. · You have new or worsening pain in your legs. · You do not get better as expected. Where can you learn more? Go to http://rosey-wilmer.info/. Enter H855 in the search box to learn more about \"Leg and Ankle Edema: Care Instructions. \"  Current as of: May 27, 2016  Content Version: 11.2  © 7164-3575 [employer]. Care instructions adapted under license by [employer] (which disclaims liability or warranty for this information). If you have questions about a medical condition or this instruction, always ask your healthcare professional. [place] [address] any warranty or liability for your use of this information.

## 2017-05-01 ENCOUNTER — APPOINTMENT (OUTPATIENT)
Dept: GENERAL RADIOLOGY | Age: 82
End: 2017-05-01
Attending: EMERGENCY MEDICINE
Payer: MEDICARE

## 2017-05-01 ENCOUNTER — TELEPHONE (OUTPATIENT)
Dept: NEUROLOGY | Age: 82
End: 2017-05-01

## 2017-05-01 ENCOUNTER — APPOINTMENT (OUTPATIENT)
Dept: CT IMAGING | Age: 82
End: 2017-05-01
Attending: EMERGENCY MEDICINE
Payer: MEDICARE

## 2017-05-01 ENCOUNTER — TELEPHONE (OUTPATIENT)
Dept: INTERNAL MEDICINE CLINIC | Age: 82
End: 2017-05-01

## 2017-05-01 VITALS
OXYGEN SATURATION: 95 % | RESPIRATION RATE: 24 BRPM | HEART RATE: 73 BPM | BODY MASS INDEX: 29.67 KG/M2 | WEIGHT: 207.23 LBS | HEIGHT: 70 IN | DIASTOLIC BLOOD PRESSURE: 88 MMHG | TEMPERATURE: 98.4 F | SYSTOLIC BLOOD PRESSURE: 148 MMHG

## 2017-05-01 LAB
ALBUMIN SERPL BCP-MCNC: 3.3 G/DL (ref 3.5–5)
ALBUMIN/GLOB SERPL: 0.8 {RATIO} (ref 1.1–2.2)
ALP SERPL-CCNC: 80 U/L (ref 45–117)
ALT SERPL-CCNC: 23 U/L (ref 12–78)
ANION GAP BLD CALC-SCNC: ABNORMAL MMOL/L (ref 5–15)
APTT PPP: 29 SEC (ref 22.1–32.5)
AST SERPL W P-5'-P-CCNC: 18 U/L (ref 15–37)
ATRIAL RATE: 150 BPM
ATRIAL RATE: 33 BPM
BASOPHILS # BLD AUTO: 0 K/UL (ref 0–0.1)
BASOPHILS # BLD: 0 % (ref 0–1)
BILIRUB SERPL-MCNC: 1.4 MG/DL (ref 0.2–1)
BUN SERPL-MCNC: 18 MG/DL (ref 6–20)
BUN/CREAT SERPL: 12 (ref 12–20)
CALCIUM SERPL-MCNC: 9 MG/DL (ref 8.5–10.1)
CALCULATED R AXIS, ECG10: -61 DEGREES
CALCULATED R AXIS, ECG10: -62 DEGREES
CALCULATED T AXIS, ECG11: -2 DEGREES
CALCULATED T AXIS, ECG11: 68 DEGREES
CHLORIDE SERPL-SCNC: 112 MMOL/L (ref 97–108)
CO2 SERPL-SCNC: 36 MMOL/L (ref 21–32)
CREAT SERPL-MCNC: 1.45 MG/DL (ref 0.7–1.3)
DIAGNOSIS, 93000: NORMAL
DIAGNOSIS, 93000: NORMAL
EOSINOPHIL # BLD: 0.2 K/UL (ref 0–0.4)
EOSINOPHIL NFR BLD: 3 % (ref 0–7)
ERYTHROCYTE [DISTWIDTH] IN BLOOD BY AUTOMATED COUNT: 13.5 % (ref 11.5–14.5)
GLOBULIN SER CALC-MCNC: 4.1 G/DL (ref 2–4)
GLUCOSE BLD STRIP.AUTO-MCNC: 87 MG/DL (ref 65–100)
GLUCOSE SERPL-MCNC: 89 MG/DL (ref 65–100)
HCT VFR BLD AUTO: 47.1 % (ref 36.6–50.3)
HGB BLD-MCNC: 16.4 G/DL (ref 12.1–17)
INR BLD: 1.4 (ref 0.9–1.2)
INR PPP: 1.2 (ref 0.9–1.1)
LYMPHOCYTES # BLD AUTO: 19 % (ref 12–49)
LYMPHOCYTES # BLD: 1.4 K/UL (ref 0.8–3.5)
MCH RBC QN AUTO: 29.7 PG (ref 26–34)
MCHC RBC AUTO-ENTMCNC: 34.8 G/DL (ref 30–36.5)
MCV RBC AUTO: 85.3 FL (ref 80–99)
MONOCYTES # BLD: 1 K/UL (ref 0–1)
MONOCYTES NFR BLD AUTO: 14 % (ref 5–13)
NEUTS SEG # BLD: 4.6 K/UL (ref 1.8–8)
NEUTS SEG NFR BLD AUTO: 64 % (ref 32–75)
PLATELET # BLD AUTO: 193 K/UL (ref 150–400)
POTASSIUM SERPL-SCNC: 3.9 MMOL/L (ref 3.5–5.1)
PROT SERPL-MCNC: 7.4 G/DL (ref 6.4–8.2)
PROTHROMBIN TIME: 12.1 SEC (ref 9–11.1)
Q-T INTERVAL, ECG07: 402 MS
Q-T INTERVAL, ECG07: 422 MS
QRS DURATION, ECG06: 84 MS
QRS DURATION, ECG06: 90 MS
QTC CALCULATION (BEZET), ECG08: 442 MS
QTC CALCULATION (BEZET), ECG08: 452 MS
RBC # BLD AUTO: 5.52 M/UL (ref 4.1–5.7)
SERVICE CMNT-IMP: NORMAL
SODIUM SERPL-SCNC: 146 MMOL/L (ref 136–145)
THERAPEUTIC RANGE,PTTT: NORMAL SECS (ref 58–77)
VENTRICULAR RATE, ECG03: 69 BPM
VENTRICULAR RATE, ECG03: 73 BPM
WBC # BLD AUTO: 7.2 K/UL (ref 4.1–11.1)

## 2017-05-01 PROCEDURE — 85025 COMPLETE CBC W/AUTO DIFF WBC: CPT | Performed by: EMERGENCY MEDICINE

## 2017-05-01 PROCEDURE — 80053 COMPREHEN METABOLIC PANEL: CPT | Performed by: EMERGENCY MEDICINE

## 2017-05-01 PROCEDURE — 82962 GLUCOSE BLOOD TEST: CPT

## 2017-05-01 PROCEDURE — 73630 X-RAY EXAM OF FOOT: CPT

## 2017-05-01 PROCEDURE — 85730 THROMBOPLASTIN TIME PARTIAL: CPT | Performed by: EMERGENCY MEDICINE

## 2017-05-01 PROCEDURE — 70450 CT HEAD/BRAIN W/O DYE: CPT

## 2017-05-01 PROCEDURE — 93005 ELECTROCARDIOGRAM TRACING: CPT

## 2017-05-01 PROCEDURE — 36415 COLL VENOUS BLD VENIPUNCTURE: CPT | Performed by: EMERGENCY MEDICINE

## 2017-05-01 PROCEDURE — 74011250637 HC RX REV CODE- 250/637: Performed by: EMERGENCY MEDICINE

## 2017-05-01 PROCEDURE — 71010 XR CHEST PORT: CPT

## 2017-05-01 PROCEDURE — 85610 PROTHROMBIN TIME: CPT

## 2017-05-01 PROCEDURE — 85610 PROTHROMBIN TIME: CPT | Performed by: EMERGENCY MEDICINE

## 2017-05-01 RX ORDER — LIDOCAINE 50 MG/G
1 PATCH TOPICAL
Status: DISCONTINUED | OUTPATIENT
Start: 2017-05-01 | End: 2017-05-01 | Stop reason: HOSPADM

## 2017-05-01 NOTE — ED NOTES
Bedside and Verbal shift change report given to EDIS Moss (oncoming nurse) by Sanjay Pack (offgoing nurse). Report included the following information SBAR, ED Summary, Intake/Output, MAR and Recent Results. Monitor x 3. Call bell in reach. Bed in lowest position, with side rails up x 2. Pt updated on plan of care. Family at bedside.

## 2017-05-01 NOTE — DISCHARGE INSTRUCTIONS
Preventing Falls: Care Instructions  Your Care Instructions  Getting around your home safely can be a challenge if you have injuries or health problems that make it easy for you to fall. Loose rugs and furniture in walkways are among the dangers for many older people who have problems walking or who have poor eyesight. People who have conditions such as arthritis, osteoporosis, or dementia also have to be careful not to fall. You can make your home safer with a few simple measures. Follow-up care is a key part of your treatment and safety. Be sure to make and go to all appointments, and call your doctor if you are having problems. It's also a good idea to know your test results and keep a list of the medicines you take. How can you care for yourself at home? Taking care of yourself  · You may get dizzy if you do not drink enough water. To prevent dehydration, drink plenty of fluids, enough so that your urine is light yellow or clear like water. Choose water and other caffeine-free clear liquids. If you have kidney, heart, or liver disease and have to limit fluids, talk with your doctor before you increase the amount of fluids you drink. · Exercise regularly to improve your strength, muscle tone, and balance. Walk if you can. Swimming may be a good choice if you cannot walk easily. · Have your vision and hearing checked each year or any time you notice a change. If you have trouble seeing and hearing, you might not be able to avoid objects and could lose your balance. · Know the side effects of the medicines you take. Ask your doctor or pharmacist whether the medicines you take can affect your balance. Sleeping pills or sedatives can affect your balance. · Limit the amount of alcohol you drink. Alcohol can impair your balance and other senses. · Ask your doctor whether calluses or corns on your feet need to be removed.  If you wear loose-fitting shoes because of calluses or corns, you can lose your balance and fall. · Talk to your doctor if you have numbness in your feet. Preventing falls at home  · Remove raised doorway thresholds, throw rugs, and clutter. Repair loose carpet or raised areas in the floor. · Move furniture and electrical cords to keep them out of walking paths. · Use nonskid floor wax, and wipe up spills right away, especially on ceramic tile floors. · If you use a walker or cane, put rubber tips on it. If you use crutches, clean the bottoms of them regularly with an abrasive pad, such as steel wool. · Keep your house well lit, especially [place], and outside walkways. Use night-lights in areas such as hallways and bathrooms. Add extra light switches or use remote switches (such as switches that go on or off when you clap your hands) to make it easier to turn lights on if you have to get up during the night. · Install sturdy handrails on stairways. · Move items in your cabinets so that the things you use a lot are on the lower shelves (about waist level). · Keep a cordless phone and a flashlight with new batteries by your bed. If possible, put a phone in each of the main rooms of your house, or carry a cell phone in case you fall and cannot reach a phone. Or, you can wear a device around your neck or wrist. You push a button that sends a signal for help. · Wear low-heeled shoes that fit well and give your feet good support. Use footwear with nonskid soles. Check the heels and soles of your shoes for wear. Repair or replace worn heels or soles. · Do not wear socks without shoes on wood floors. · Walk on the grass when the sidewalks are slippery. If you live in an area that gets snow and ice in the winter, sprinkle salt on slippery steps and sidewalks. Preventing falls in the bath  · Install grab bars and nonskid mats inside and outside your shower or tub and near the toilet and sinks. · Use shower chairs and bath benches.   · Use a hand-held shower head that will allow you to sit while showering. · Get into a tub or shower by putting the weaker leg in first. Get out of a tub or shower with your strong side first.  · Repair loose toilet seats and consider installing a raised toilet seat to make getting on and off the toilet easier. · Keep your bathroom door unlocked while you are in the shower. Where can you learn more? Go to http://rosey-wilmer.info/. Enter 0476 [id number] in the search box to learn more about \"Preventing Falls: Care Instructions. \"  Current as of: August 4, 2016  Content Version: 11.2  © 8508-0644 [employer]. Care instructions adapted under license by [employer] (which disclaims liability or warranty for this information). If you have questions about a medical condition or this instruction, always ask your healthcare professional. [place] [address] any warranty or liability for your use of this information. Foot Pain: Care Instructions  Your Care Instructions  Foot injuries that cause pain and swelling are fairly common. Almost all sports or home repair projects can cause a misstep that ends up as foot pain. Normal wear and tear, especially as you get older, also can cause foot pain. Most minor foot injuries will heal on their own, and home treatment is usually all you need to do. If you have a severe injury, you may need tests and treatment. Follow-up care is a key part of your treatment and safety. Be sure to make and go to all appointments, and call your doctor if you are having problems. Its also a good idea to know your test results and keep a list of the medicines you take. How can you care for yourself at home? · Take pain medicines exactly as directed. ¨ If the doctor gave you a prescription medicine for pain, take it as prescribed. ¨ If you are not taking a prescription pain medicine, ask your doctor if you can take an over-the-counter medicine. · Rest and protect your foot.  Take a break from any activity that may cause pain. · Put ice or a cold pack on your foot for 10 to 20 minutes at a time. Put a thin cloth between the ice and your skin. · Prop up the sore foot on a pillow when you ice it or anytime you sit or lie down during the next 3 days. Try to keep it above the level of your heart. This will help reduce swelling. · Your doctor may recommend that you wrap your foot with an elastic bandage. Keep your foot wrapped for as long as your doctor advises. · If your doctor recommends crutches, use them as directed. · Wear roomy footwear. · As soon as pain and swelling end, begin gentle exercises of your foot. Your doctor can tell you which exercises will help. When should you call for help? Call 911 anytime you think you may need emergency care. For example, call if:  · Your foot turns pale, white, blue, or cold. Call your doctor now or seek immediate medical care if:  · You cannot move or stand on your foot. · Your foot looks twisted or out of its normal position. · Your foot is not stable when you step down. · You have signs of infection, such as:  ¨ Increased pain, swelling, warmth, or redness. ¨ Red streaks leading from the sore area. ¨ Pus draining from a place on your foot. ¨ A fever. · Your foot is numb or tingly. Watch closely for changes in your health, and be sure to contact your doctor if:  · You do not get better as expected. · You have bruises from an injury that last longer than 2 weeks. Where can you learn more? Go to http://rosey-wilmer.info/. Enter R053 in the search box to learn more about \"Foot Pain: Care Instructions. \"  Current as of: May 23, 2016  Content Version: 11.2  © 2980-9624 [employer]. Care instructions adapted under license by [employer] (which disclaims liability or warranty for this information).  If you have questions about a medical condition or this instruction, always ask your healthcare professional. [address] any warranty or liability for your use of this information. Alzheimer's Disease: Care Instructions  Your Care Instructions  Alzheimer's disease is a type of dementia. It causes memory loss and affects judgment, language, and behavior. You may have trouble making decisions or may get lost in places that you used to know well. Alzheimer's disease is different than mild memory loss that occurs with aging. It is not clear what causes Alzheimer's disease, but it is the most common form of dementia in older adults. Finding out that you have this disease is a shock. You may be afraid and worried about how the condition will change your life. Although there is no cure at this time, medicine in some cases may slow memory loss for a while. Other medicines may be able to help you sleep or cope with depression and behavior changes. Alzheimer's disease is different for everyone. It may take many years to develop. In some cases, people can function well for a long time. In the early stage of the disease, you can do things at home to make life easier and safer. You also can keep doing your hobbies and other activities. Many people find comfort in planning now for their future needs. Follow-up care is a key part of your treatment and safety. Be sure to make and go to all appointments, and call your doctor if you are having problems. Its also a good idea to know your test results and keep a list of the medicines you take. How can you care for yourself at home? Taking care of yourself  · If your doctor gives you medicines, take them exactly as prescribed. Call your doctor if you think you are having a problem with your medicine. You will get more details on the medicines your doctor prescribes. · Eat a balanced diet. Get plenty of whole grains, fruits, and vegetables every day. If you are not hungry at mealtimes, eat snacks at midmorning and in the afternoon.  Try drinks such as Boost, Ensure, or Sustacal if you are having trouble keeping your weight up. · Stay active. Exercise such as walking may slow the decline of your mental abilities. Try to stay active mentally too. Read and work crossword puzzles if you enjoy these activities. · If you have trouble sleeping, do not nap during the day. Get regular exercise (but not within several hours of bedtime). Drink a glass of warm milk or caffeine-free herbal tea before going to bed. · Ask your doctor about support groups and other resources in your area. They can help people who have Alzheimer's disease and their families. · Be patient. You may find that a task takes you longer than it used to. · If you have not already done so, make a list of advance directives. Advance directives are instructions to your doctor and family members about what kind of care you want if you become unable to speak or express yourself. Talk to a  about making a will, if you do not already have one. Keeping schedules  · Develop a routine. You will feel less frustrated or confused if you have a clear, simple plan of what to do every day. ¨ Make lists of your medicines and when to take them. ¨ Write down appointments and other tasks in a calendar. ¨ Put sticky notes around the house to help you remember events and other things you have to do. ¨ Schedule activities and tasks for times of the day when you are best able to handle them. Staying safe  · Tell someone when you are going out and where you are going. Let the person know when you will be back. Before you go out alone, write down where you are going, how to get there, and how to get back home. Do this even if you have gone there many times before. Take someone along with you when possible. · Make your home safe. Tack down rugs, put no-slip tape in the tub, use handrails, and put safety switches on stoves and appliances.   · Have a family member or other caregiver tell you whether you are driving badly. Deciding to stop driving is very hard for many people. Driving helps you feel independent. Your state s license bureau can do a driving test if there is any question. Plan for other means of getting around when you are no longer able to drive. · Use strong lighting, especially at night. Put night-lights in bedrooms, hallways, and bathrooms. · Lower the hot water temperature setting to 120°F or lower to avoid burns. When should you call for help? Call 911 anytime you think you may need emergency care. For example, call if:  · You are lost and do not know whom to call. · You are injured and do not know whom to call. Call your doctor now or seek immediate medical care if:  · Your symptoms suddenly get much worse. Watch closely for changes in your health, and be sure to contact your doctor if:  · You want more information about how you can take care of yourself. Where can you learn more? Go to http://rosey-wilmer.info/. Enter Y179 in the search box to learn more about \"Alzheimer's Disease: Care Instructions. \"  Current as of: July 26, 2016  Content Version: 11.2  © 5470-0339 [employer]. Care instructions adapted under license by [employer] (which disclaims liability or warranty for this information). If you have questions about a medical condition or this instruction, always ask your healthcare professional. [place] [address] any warranty or liability for your use of this information.

## 2017-05-01 NOTE — ED NOTES
.Discharge instructions reviewed with patient & family and given to pt per MD Ed Cole. Pt & family able to return/verbalize discharge instructions. Copy of discharge instructions given. Pt condition stable, no further complaints. Pt out of ER, accompanied by self & family. Family to drive patient home. Assisted to car via wheelchair by ED staff. Patient out of ED prior to obtaining discharge vitals.

## 2017-05-01 NOTE — ED NOTES
Pt ambulated with walker without difficulty. Heather Bustos MD at bedside to speak with pt's family in regards to care plan.

## 2017-05-01 NOTE — ED PROVIDER NOTES
HPI Comments: Roya Cleveland is a [de-identified] y.o. male with PMHx significant for HTN / Afib / Asthma / CVA / Dementia, who presents via EMS to ED [facility] ED for evaluation s/p GLF PTA this evening. Family states they heard a \"thud\" when they found the pt on the floor next to his bed this evening. Family reports the pt appeared lethargic and had slurred speech at that time, prompting them to call EMS. Pt states he was attempting to ambulate to the bathroom when he fell. Family notes the pt was \"in and out of it\" after they found him. Wife reports the pt's dementia has recently appeared to accelerate; she states he has been found multiple times wandering around the house without his pants or underwear on. Wife notes this is not usual behavior for the pt and states he, intermittently walks himself to the bathroom, only to void on the floor. Wife notes the pt has a hx of stroke ~2 years ago. On evaluation in the ED, family notes the pt was seen yesterday morning for BLE swelling and redness; pt continues to c/o L foot pain. Following CT study family notes the pt's speech is improved, but not back to baseline. Family reports adherence with the pt's daily Lasix and Eliquis. Pt specifically denies any recent fever, chills, nausea, vomiting, diarrhea, CP, SOB, neck pain, hip pain, back pain, or HA. [address] [place] [address] Rd III, DO  Cardiologist: Aby Brown MD  Neurologist: Kory Pratt MD      PMHx: Dementia, HTN, Asthma, A-fib, CVA  PSHx: Cataract surgery  Social Hx: - smoking; - EtOH; - illicit drug use    There are no other complains, changes, or physical findings at this time. The history is provided by the patient, a relative and the EMS personnel. No  was used.         Past Medical History:   Diagnosis Date    Allergy     seasonal    Arrhythmia     Asthma     Dementia     Depression     Heart failure (HCC)     Hypertension     Neurological disorder     dementia    Other ill-defined conditions     a-fib    Stroke [facility])        Past Surgical History:   Procedure Laterality Date    HX HEENT      cateract surgery         Family History:   Problem Relation Age of Onset    Stroke Mother     Heart Disease Mother     Cancer Father      prostate    Cancer Brother      colon       Social History     Social History    Marital status:      Spouse name: N/A    Number of children: N/A    Years of education: N/A     Occupational History    Not on file. Social History Main Topics    Smoking status: Never Smoker    Smokeless tobacco: Never Used    Alcohol use No    Drug use: No    Sexual activity: No     Other Topics Concern    Not on file     Social History Narrative         ALLERGIES: Review of patient's allergies indicates no known allergies. Review of Systems   Constitutional: Positive for fatigue. Negative for chills and fever. HENT: Negative. Eyes: Negative. Respiratory: Negative for cough, chest tightness and shortness of breath. Cardiovascular: Negative for chest pain and leg swelling. Gastrointestinal: Negative for abdominal pain, diarrhea, nausea and vomiting. Endocrine: Negative. Genitourinary: Negative for difficulty urinating and dysuria. Musculoskeletal: Negative for myalgias. Skin: Negative. Neurological: Positive for speech difficulty. Negative for headaches. Psychiatric/Behavioral: Negative. All other systems reviewed and are negative.       Patient Vitals for the past 12 hrs:   Temp Pulse Resp BP SpO2   05/01/17 0318 - 72 19 (!) 137/94 (!) 87 %   05/01/17 0215 - 73 20 139/81 95 %   05/01/17 0130 - 70 21 115/81 94 %   05/01/17 0115 - - - 115/59 -   05/01/17 0114 - 72 18 - 98 %   05/01/17 0101 98.4 °F (36.9 °C) - - - -   05/01/17 0100 - 69 19 121/76 93 %   05/01/17 0045 - 69 27 134/72 96 %   05/01/17 0036 - 70 19 - 98 %   05/01/17 0030 - - - 136/90 -   05/01/17 0026 - 71 25 - 99 %   05/01/17 0025 - 71 25 130/71 99 %   05/01/17 0023 - - - 130/71 -            Physical Exam   Constitutional: He appears well-developed and well-nourished. Non-toxic appearance. He does not have a sickly appearance. He does not appear ill. No distress. HENT:   Head: Normocephalic and atraumatic. Nose: Nose normal.   Mouth/Throat: No oropharyngeal exudate. Eyes: Conjunctivae and EOM are normal. Pupils are equal, round, and reactive to light. Neck: Normal range of motion. Neck supple. No JVD present. Cardiovascular: Normal rate, regular rhythm, normal heart sounds and intact distal pulses. Exam reveals no friction rub. No murmur heard. Pulmonary/Chest: Effort normal and breath sounds normal. No stridor. No respiratory distress. He has no wheezes. He has no rales. Abdominal: Soft. Bowel sounds are normal. He exhibits no distension. There is no tenderness. There is no rebound. Musculoskeletal: Normal range of motion. He exhibits no tenderness. Neurological: He is alert. He has normal strength. He is disoriented (disoriented to tonight's events, but aware that he fell, unsure why). No cranial nerve deficit (no appreciable facial droop at time of intial exam by provider (after CT)) or sensory deficit. Abnormal muscle tone: no asymmetery noted  He displays a negative Romberg sign. Coordination normal. GCS eye subscore is 4. GCS verbal subscore is 5. GCS motor subscore is 6. Skin: Skin is warm and dry. No rash noted. He is not diaphoretic. Psychiatric: He has a normal mood and affect. His behavior is normal. Judgment and thought content normal. His speech is not slurred (no speech disturbance appreciated by MD during exam, pt easily understood and without noted slurring). Cognition and memory are impaired. Nursing note and vitals reviewed.        MDM  Number of Diagnoses or Management Options  Altered awareness, transient:   Alzheimer's dementia without behavioral disturbance, unspecified timing of dementia onset:   Dependent edema:   Fall, initial encounter:   Left foot pain:   Diagnosis management comments: DDX:  CVA, ICH, uti, non compliance with walker use, chronic gait instability, dehydration    Plan:  Code S protocol initiated and then discontinued. Head ct, labs, ua, ambulation trial, chart review, foot xray, electrolyte abnormality    Impression:  Recurrent falls, L foot pain, dementia         Amount and/or Complexity of Data Reviewed  Clinical lab tests: ordered and reviewed  Tests in the radiology section of CPT®: ordered and reviewed  Tests in the medicine section of CPT®: ordered and reviewed  Obtain history from someone other than the patient: yes (Daughter / Wife / EMS)  Review and summarize past medical records: yes  Independent visualization of images, tracings, or specimens: yes    Patient Progress  Patient progress: stable    Procedures    Elenore Lesser  1935    Arrival time to ED: [address] [facility] [address]: 0000  Physician at Bedside: 0016  CT Order Time: 0010  ACT Page: Ventura Huan  ACT Call Back: n/a  Cancel Code S: 4850      I reviewed our electronic medical record system for any past medical records that were available that may contribute to the patients current condition, the nursing notes and and vital signs from today's visit    Pt seen in ED earlier today for Bilateral LE edema/ swelling. Negative w/u. 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 Currently anticoagulated and with eliquis. Nursing notes will be reviewed as they become available in realtime while the pt has been in the ED. Annita Lo MD      12:18 AM  Pt's rate is 71 bpm with a ns rhythm as noted on Cardiac monitor. SpO2 is 99%, on RA    PROGRESS NOTE:  12:40 AM  Per nurse, Pt was noted to present slight facial droop prior to CT scan, that has now resolved following study. EKG interpretation 0025: afib, L Axis, rate 69; LA n/a, QRS 9, QTc 452; no significant change from previous, no acute ischemia;  Annita Lo MD    I personally reviewed pt's imaging. Official read by radiology listed below. Miranda Gallagher MD    PROGRESS NOTE:  2:44 AM  Pt reevaluated. Pt and family updated on all available lab and imaging findings at this time. Pt able to ambulate with walker and nursing staff without difficulty. Family reports the pt is currently back to baseline to baseline. Family notes the pt is supposed to be using a walker to ambulate at home, but doesn't. Wife states the pt sleeps in his own room by himself and occasionally gets himself out of bed without aid. Pt already taking Eliquis; family feels comfortable taking the pt home at this time to follow up as an outpatient with Dr. Estefany Macedo later this morning. Pt currently c/o continued L foot pain; denies any hx of gout. Will x-ray and reassess. Written by Damien Qureshi ED Scribe, as dictated by Miranda Gallagher MD      3:38 AM  Progress note:  Pt noted to be feeling better, denies dizziness or vision changes at any time, pt and family feel ready for discharge. Discussed lab and imaging findings with pt and/or family. Will follow up as instructed. All questions have been answered, pt voiced understanding and agreement with plan. If narcotics were prescribed, pt was advised not to drive or operate heavy machinery. If abx were prescribed, pt advised that diarrhea and rash are possible side effects of the medications. Specific return precautions provided as well as instructions to return to the ED should sx worsen at any time.   Miranda Gallagher MD    LABORATORY TESTS:  Recent Results (from the past 12 hour(s))   GLUCOSE, POC    Collection Time: 05/01/17 12:01 AM   Result Value Ref Range    Glucose (POC) 87 65 - 100 mg/dL    Performed by Roxy Montague    POC INR    Collection Time: 05/01/17 12:07 AM   Result Value Ref Range    INR (POC) 1.4 (H) <1.2     CBC WITH AUTOMATED DIFF    Collection Time: 05/01/17 12:26 AM   Result Value Ref Range    WBC 7.2 4.1 - 11.1 K/uL    RBC 5. 52 4.10 - 5.70 M/uL    HGB 16.4 12.1 - 17.0 g/dL    HCT 47.1 36.6 - 50.3 %    MCV 85.3 80.0 - 99.0 FL    MCH 29.7 26.0 - 34.0 PG    MCHC 34.8 30.0 - 36.5 g/dL    RDW 13.5 11.5 - 14.5 %    PLATELET 469 490 - 356 K/uL    NEUTROPHILS 64 32 - 75 %    LYMPHOCYTES 19 12 - 49 %    MONOCYTES 14 (H) 5 - 13 %    EOSINOPHILS 3 0 - 7 %    BASOPHILS 0 0 - 1 %    ABS. NEUTROPHILS 4.6 1.8 - 8.0 K/UL    ABS. LYMPHOCYTES 1.4 0.8 - 3.5 K/UL    ABS. MONOCYTES 1.0 0.0 - 1.0 K/UL    ABS. EOSINOPHILS 0.2 0.0 - 0.4 K/UL    ABS. BASOPHILS 0.0 0.0 - 0.1 K/UL   METABOLIC PANEL, COMPREHENSIVE    Collection Time: 05/01/17 12:26 AM   Result Value Ref Range    Sodium 146 (H) 136 - 145 mmol/L    Potassium 3.9 3.5 - 5.1 mmol/L    Chloride 112 (H) 97 - 108 mmol/L    CO2 36 (H) 21 - 32 mmol/L    Anion gap NEG 2 5 - 15 mmol/L    Glucose 89 65 - 100 mg/dL    BUN 18 6 - 20 MG/DL    Creatinine 1.45 (H) 0.70 - 1.30 MG/DL    BUN/Creatinine ratio 12 12 - 20      GFR est AA 57 (L) >60 ml/min/1.73m2    GFR est non-AA 47 (L) >60 ml/min/1.73m2    Calcium 9.0 8.5 - 10.1 MG/DL    Bilirubin, total 1.4 (H) 0.2 - 1.0 MG/DL    ALT (SGPT) 23 12 - 78 U/L    AST (SGOT) 18 15 - 37 U/L    Alk. phosphatase 80 45 - 117 U/L    Protein, total 7.4 6.4 - 8.2 g/dL    Albumin 3.3 (L) 3.5 - 5.0 g/dL    Globulin 4.1 (H) 2.0 - 4.0 g/dL    A-G Ratio 0.8 (L) 1.1 - 2.2     PROTHROMBIN TIME + INR    Collection Time: 05/01/17 12:26 AM   Result Value Ref Range    INR 1.2 (H) 0.9 - 1.1      Prothrombin time 12.1 (H) 9.0 - 11.1 sec   PTT    Collection Time: 05/01/17 12:26 AM   Result Value Ref Range    aPTT 29.0 22.1 - 32.5 sec    aPTT, therapeutic range     58.0 - 77.0 SECS       IMAGING RESULTS:    CT Results  (Last 48 hours)               05/01/17 0020  CT CODE NEURO HEAD WO CONTRAST Final result    Impression:  IMPRESSION:        Chronic microvascular ischemic disease is unchanged. No acute process on CT.            Narrative:  EXAM:  CT CODE NEURO HEAD WO CONTRAST       INDICATION: Episode of unresponsiveness earlier today. COMPARISON: CT head on 3/1/2017. MRI brain on 11/8/2015. TECHNIQUE: Noncontrast head CT. Coronal and sagittal reformats. CT dose   reduction was achieved through the use of a standardized protocol tailored for   this examination and automatic exposure control for dose modulation. FINDINGS: The ventricles and sulci are age-appropriate without hydrocephalus. There is no mass effect or midline shift. There is no intracranial hemorrhage or   extra-axial fluid collection. Hypoattenuation in the cerebral white matter is   unchanged. No CT evidence of acute infarct. The calvarium is intact. The visualized paranasal sinuses and mastoid air cells   are clear. CXR Results  (Last 48 hours)               05/01/17 0041  XR CHEST PORT Final result    Impression:  IMPRESSION:       No acute process on portable chest. No change. Narrative:  EXAM:  XR CHEST PORT       INDICATION:  Unresponsive episode for a few minutes today. COMPARISON: Portable chest on 4/30/2017 at 1138 hours       TECHNIQUE: Portable semiupright chest AP view 2 images       FINDINGS: Cardiac monitoring wires overlie the thorax. The cardiomediastinal and   hilar contours are within normal limits. The pulmonary vasculature is within   normal limits. The lungs and pleural spaces are clear. The visualized bones and upper abdomen   are age-appropriate. 04/30/17 1146  XR CHEST PORT Final result    Impression:  IMPRESSION: No acute findings               Narrative:  EXAM:  XR CHEST PORT       INDICATION:  Increased leg swelling over last 3 days with leg pain. Mild cough   exacerbated by lying flat. COMPARISON:  March 1, 2017       FINDINGS: A portable AP radiograph of the chest was obtained at 1138 hours. The   lungs and pleural margins are clear. Cardiac size is within normal limits. There is mild thoracic aortic tortuosity which is unchanged. Mediastinal and   hilar contours are otherwise normal.  No substantial osseous abnormality is   shown. EXAM: XR FOOT LT MIN 3 V     INDICATION: Left foot pain.     COMPARISON: None.     FINDINGS: Three views of the left foot demonstrate no acute fracture or  dislocation. The soft tissues are within normal limits. Age-appropriate  multifocal arthritis.     IMPRESSION  IMPRESSION: No acute abnormality. MEDICATIONS GIVEN:  Medications   lidocaine (LIDODERM) 5 % patch 1 Patch (not administered)       IMPRESSION:  1. Fall, initial encounter    2. Alzheimer's dementia without behavioral disturbance, unspecified timing of dementia onset    3. Left foot pain    4. Dependent edema    5. Altered awareness, transient        PLAN:  1. Current Discharge Medication List        2. Follow-up Information     Follow up With Details Comments [address] III, DO Schedule an appointment as soon as possible for a visit in 2 days  [address] [facility]  [address] [facility]  [address] [facility] Flores Gregg MD Call today  [address]   [address]  [phone]          Return to ED if worse     DISCHARGE NOTE:  3:39 AM  The patient's results have been reviewed with family and/or caregiver. They verbally convey their understanding and agreement of the patient's signs, symptoms, diagnosis, treatment, and prognosis. They additionally agree to follow up as recommended in the discharge instructions or to return to the Emergency Room should the patient's condition change prior to their follow-up appointment. The family and/or caregiver verbally agrees with the care-plan and all of their questions have been answered.  The discharge instructions have also been provided to the them along with educational information regarding the patient's diagnosis and a list of reasons why the patient would want to return to the ER prior to their follow-up appointment should their condition change. Written by KIKO Duarte, as dictated by Marcos Alfredo MD.     ATTESTATION:  This note is prepared by Yusuf Greco, acting as Scribe for Marcos Alfredo MD.    Marcos Alfredo MD: The scribe's documentation has been prepared under my direction and personally reviewed by me in its entirety. I confirm that the note above accurately reflects all work, treatment, procedures, and medical decision making performed by me. This note is prepared by Peter Marshall, acting as Scribe for MD Marcos Diop MD : The scribe's documentation has been prepared under my direction and personally reviewed by me in its entirety. I confirm that the note above accurately reflects all work, treatment, procedures, and medical decision making performed by me. This note will not be viewable in [address]

## 2017-05-01 NOTE — ED NOTES
Report given to Deepthi You RN on Nicholas Villagranr at shift change for routine progression of care. Report consisted of patients Situation, Background, Assessment and Recommendations(SBAR). Information from the following report(s) SBAR, ED Summary, [facility] and Recent Results was reviewed with the receiving nurse. Opportunity for questions and clarification was provided.

## 2017-05-01 NOTE — ED NOTES
Assumed care of pt from EMS. Pt presents to ED with chief complaint of unresponsive/altered mental status. PT's wife states that pt fell today and was \"unreponsvie\" for a few minutes. PT denies hitting his head. Pt's wife states  Pt is A&O x 4. Pt denies any other symptoms at this time. Pt resting comfortably on the stretcher in a position of comfort. Pt in no acute distress at this time. Call bell within reach. Side rails x 2. Cardiac monitor x 3. Stretcher locked in the lowest position. Pt aware of plan to await for MD/PA-C/NP assessment, and pt/family verbalizes understanding. Will continue to monitor.

## 2017-05-02 NOTE — TELEPHONE ENCOUNTER
She states that the ED doctor to look at CT was done to make sure nothing else is needed. Please advise.  In the chart for your review

## 2017-05-03 NOTE — TELEPHONE ENCOUNTER
Keven Minor, can you bring the patient in at 200 tomorrow, I tried to call them Mrs. Alfonso Alarcon was not there, seems likely may be out to take a look at him from the ER note

## 2017-05-04 ENCOUNTER — OFFICE VISIT (OUTPATIENT)
Dept: INTERNAL MEDICINE CLINIC | Age: 82
End: 2017-05-04

## 2017-05-04 ENCOUNTER — OFFICE VISIT (OUTPATIENT)
Dept: NEUROLOGY | Age: 82
End: 2017-05-04

## 2017-05-04 VITALS
RESPIRATION RATE: 16 BRPM | SYSTOLIC BLOOD PRESSURE: 146 MMHG | WEIGHT: 205 LBS | HEIGHT: 70 IN | BODY MASS INDEX: 29.35 KG/M2 | OXYGEN SATURATION: 95 % | HEART RATE: 78 BPM | DIASTOLIC BLOOD PRESSURE: 92 MMHG

## 2017-05-04 VITALS
BODY MASS INDEX: 29.26 KG/M2 | DIASTOLIC BLOOD PRESSURE: 88 MMHG | TEMPERATURE: 97.7 F | OXYGEN SATURATION: 96 % | HEIGHT: 70 IN | SYSTOLIC BLOOD PRESSURE: 138 MMHG | WEIGHT: 204.4 LBS | HEART RATE: 63 BPM

## 2017-05-04 DIAGNOSIS — F02.80 DEMENTIA OF THE ALZHEIMER'S TYPE WITH LATE ONSET WITHOUT BEHAVIORAL DISTURBANCE (HCC): ICD-10-CM

## 2017-05-04 DIAGNOSIS — R48.2 GAIT APRAXIA OF ELDERLY: ICD-10-CM

## 2017-05-04 DIAGNOSIS — G30.1 DEMENTIA OF THE ALZHEIMER'S TYPE WITH LATE ONSET WITHOUT BEHAVIORAL DISTURBANCE (HCC): ICD-10-CM

## 2017-05-04 DIAGNOSIS — I63.30 CEREBRAL THROMBOSIS WITH CEREBRAL INFARCTION (HCC): ICD-10-CM

## 2017-05-04 DIAGNOSIS — N18.30 CKD (CHRONIC KIDNEY DISEASE) STAGE 3, GFR 30-59 ML/MIN (HCC): Chronic | ICD-10-CM

## 2017-05-04 DIAGNOSIS — I63.231 CEREBRAL INFARCTION DUE TO STENOSIS OF RIGHT CAROTID ARTERY (HCC): ICD-10-CM

## 2017-05-04 DIAGNOSIS — I10 ESSENTIAL HYPERTENSION: ICD-10-CM

## 2017-05-04 DIAGNOSIS — Z86.73 HISTORY OF CVA (CEREBROVASCULAR ACCIDENT): Chronic | ICD-10-CM

## 2017-05-04 DIAGNOSIS — M47.812 SPONDYLOSIS OF CERVICAL REGION WITHOUT MYELOPATHY OR RADICULOPATHY: ICD-10-CM

## 2017-05-04 DIAGNOSIS — I48.0 PAROXYSMAL ATRIAL FIBRILLATION (HCC): ICD-10-CM

## 2017-05-04 DIAGNOSIS — G44.229 CHRONIC TENSION-TYPE HEADACHE, NOT INTRACTABLE: Primary | ICD-10-CM

## 2017-05-04 DIAGNOSIS — I63.30 CEREBRAL THROMBOSIS WITH CEREBRAL INFARCTION (HCC): Primary | ICD-10-CM

## 2017-05-04 DIAGNOSIS — M10.9 ACUTE GOUT INVOLVING TOE OF LEFT FOOT, UNSPECIFIED CAUSE: Primary | ICD-10-CM

## 2017-05-04 DIAGNOSIS — G44.229 CHRONIC TENSION-TYPE HEADACHE, NOT INTRACTABLE: ICD-10-CM

## 2017-05-04 DIAGNOSIS — R60.0 EDEMA EXTREMITIES: ICD-10-CM

## 2017-05-04 PROBLEM — J41.0 SIMPLE CHRONIC BRONCHITIS (HCC): Chronic | Status: ACTIVE | Noted: 2017-05-04

## 2017-05-04 RX ORDER — PREDNISONE 20 MG/1
TABLET ORAL
Qty: 18 TAB | Refills: 0 | Status: SHIPPED | OUTPATIENT
Start: 2017-05-04 | End: 2017-06-14 | Stop reason: CLARIF

## 2017-05-04 NOTE — PROCEDURES
This study consisted of pulsed wave Doppler examination, Color-flow imaging, and Duplex imaging of both the right and left carotid systems, and both vertebral arteries.        Imaging of both right and left carotid systems showed mild mixed plaquing at the bifurcations and proximal and distal internal and external carotid arteries bilaterally, with stenosis in the range of 16-49 % only and with no flow abnormalities identified.        Both vertebral arteries showed normal antegrade flow.         Clinical correlation recommended

## 2017-05-04 NOTE — PROGRESS NOTES
Yassine Stephenson is a [de-identified] y.o. male who presents for evaluation of hospital follow up. Went to [facility] ed on April 30 and may 1. First visit was for feet swelling, 2nd visit was sp fall. Left foot still bothering him somewhat. Wife contemplating memory units for him, which i agreed with. ROS:  Constitutional: negative for fevers, chills, anorexia and weight loss  Eyes:   negative for visual disturbance and irritation  ENT:   negative for tinnitus,sore throat,nasal congestion,ear pain,hoarseness  Respiratory:  negative for cough, hemoptysis, dyspnea,wheezing  CV:   negative for chest pain, palpitations, lower extremity edema  GI:   negative for nausea, vomiting, diarrhea, abdominal pain,melena  Genitourinary: negative for frequency, dysuria and hematuria  Musculoskel: negative for myalgias, arthralgias, back pain, muscle weakness, joint pain. ++left foot great toe pain  Neurological:  negative for headaches, dizziness, focal weakness, numbness  Psychiatric:     Negative for depression or anxiety      Past Medical History:   Diagnosis Date    Allergy     seasonal    Arrhythmia     Asthma     Dementia     Depression     Heart failure (HCC)     Hypertension     Neurological disorder     dementia    Other ill-defined conditions     a-fib    Stroke [facility])        Past Surgical History:   Procedure Laterality Date    HX HEENT      cateract surgery       Family History   Problem Relation Age of Onset    Stroke Mother     Heart Disease Mother     Cancer Father      prostate    Cancer Brother      colon       Social History     Social History    Marital status:      Spouse name: N/A    Number of children: N/A    Years of education: N/A     Occupational History    Not on file.      Social History Main Topics    Smoking status: Never Smoker    Smokeless tobacco: Never Used    Alcohol use No    Drug use: No    Sexual activity: No     Other Topics Concern    Not on file     Social History Narrative            Visit Vitals    /88 (BP 1 Location: Left arm, BP Patient Position: Sitting)    Pulse 63    Temp 97.7 °F (36.5 °C) (Oral)    Ht 5' 10\" (1.778 m)    Wt 204 lb 6.4 oz (92.7 kg)    SpO2 96%    BMI 29.33 kg/m2       Physical Examination:   General - Well appearing male  HEENT - PERRL, TM no erythema/opacification, normal nasal turbinates, no oropharyngeal erythema or exudate, MMM  Neck - supple, no bruits, no thyroidomegaly, no lymphadenopathy  Pulm - clear to auscultation bilaterally  Cardio - irregular, normal S1 S2, no murmur  Abd - soft, nontender, no masses, no HSM  Extrem - no edema, +2 distal pulses. ++left great toe red, warm, swollen  Neuro-  No focal deficits, CN intact     Assessment/Plan:    1. Gout flare--rx for prednisone. 2.  htn--continue lisinopril, lasix, metoprolol  3.  ckd 3--stable  4. Hx cva--on eliquis  5. pafib--on metoprolol, eliquis  6. Copd--on pulmicort, flonase, albuterol, brovana, singulair  7.   Anxiety and depression--on wellbutrin, seroquel    rtc 3 months        Lety Paniagua III, DO

## 2017-05-04 NOTE — PROGRESS NOTES
Consult    Subjective:     Radha Montoya is a [de-identified] y.o. Right-handed  male seen for urgent work in evaluation of new problem of sudden spell where he collapsed, fell to the floor, seemed confused afterwards and having difficulty with his speech and generalized weakness, and was taken to the emergency room at [facility], where he had workup done and CT scan done of the head that looked unremarkable and showed no change. The patient has had progressive unsteadiness in gait, and is getting physical therapy once a week for gait training with some improvement. He has had 4 falls in the last 6-12 months. He is now using a walker for ambulation and no longer using the cane since he fell twice in the last 2 months. The family is not sure why he loses his balance or falls, but he is somewhat unsteady and ataxic. 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 His previous carotid Dopplers that showed mild disease less than 50%, and they were repeated today and they still show the same without significant carotid stenosis to explain his syncope and unsteadiness and dizziness. He gets dizzy when he stands up quickly. He denies any chest pain or palpitations. He has had no new focal weakness or sensory loss or new focal neurologic deficits to suggest a stroke. He has moderate memory loss, and is on Namenda 10 mg twice a day and tolerating it pretty well. His memory seems to be progressively getting worse however. In view of his recurring syncope we will not add Aricept yet, but he may be a candidate for that soon if his blood pressure stable. He is also seen for memory loss and new problem of worsening gait. He is on Wellbutrin 150 mg once a day for depression, and did not have an apparent seizures the best I can tell by family description. Patient was on Namenda 10 mg twice a day for memory loss and seems to be tolerating it well, and his family feels that it has significantly helped him.  Both his behavior and his memory seems better on the medication. Patient had neuropsych testing done July 2015 that showed he had a moderate severe dementia consistent with Alzheimer's disease, and pseudodementia was not supported. Patient has significant cardiac issues, and his family is not anxious for more medication like cholinesterase inhibitors that may upset his cardiac rhythm. He also has bipolar disorder it appears. He has osteoporosis and COPD and organic heart disease, and atrial fibrillation with previous embolic stroke on anticoagulation now. We encouraged the patient to take a multivitamin and vitamin D on a regular basis and to start a regular exercise program, and try to remain mentally and physically active. We counseled the patient for 20 minutes today, and his family will also try to help him followup with the exercise and he is to continue his therapy at home with PT. . Other behavioral counseling given, and patient encouraged to do puzzles, read, watch TV, and remain physically active and try to interact more socially. Patient has had no new focal weakness, sensory loss, headache, visual changes, or change in bowel or bladder control. He had a small stroke in November with a left cerebral embolic infarct from which she has pretty much recovered. This was diagnosed on an MRI scan in November 2015. He has remained stable on anticoagulation.  These films and his chart was reviewed in detail on the computer by myself today the patient and his family    Past Medical History:   Diagnosis Date    Allergy     seasonal    Arrhythmia     Asthma     Dementia     Depression     Heart failure ([address]     Hypertension     Neurological disorder     dementia    Other ill-defined conditions     a-fib    Stroke [facility])       Past Surgical History:   Procedure Laterality Date    HX HEENT      cateract surgery     Family History   Problem Relation Age of Onset    Stroke Mother     Heart Disease Mother     Cancer Father prostate    Cancer Brother      colon      Social History   Substance Use Topics    Smoking status: Never Smoker    Smokeless tobacco: Never Used    Alcohol use No         Current Outpatient Prescriptions:     furosemide (LASIX) 40 mg tablet, Take 40 mg by mouth two (2) times a day., Disp: , Rfl:     albuterol (PROVENTIL VENTOLIN) 2.5 mg /3 mL (0.083 %) nebulizer solution, by Nebulization route every twelve (12) hours as needed for Wheezing., Disp: , Rfl:     QUEtiapine (SEROQUEL) 25 mg tablet, Take 25 mg by mouth as needed. , Disp: , Rfl:     montelukast (SINGULAIR) 10 mg tablet, Take 10 mg by mouth daily. , Disp: , Rfl:     fluticasone (FLONASE) 50 mcg/actuation nasal spray, 2 Sprays by Both Nostrils route as needed for Rhinitis., Disp: , Rfl:     memantine (NAMENDA) 10 mg tablet, TAKE ONE TABLET BY MOUTH TWICE A DAY, Disp: 60 Tab, Rfl: 10    ELIQUIS 5 mg tablet, TAKE ONE TABLET BY MOUTH EVERY 12 HOURS, Disp: 60 Tab, Rfl: 9    buPROPion XL (WELLBUTRIN XL) 150 mg tablet, Take 1 Tab by mouth every morning., Disp: 30 Tab, Rfl: 5    lisinopril (PRINIVIL, ZESTRIL) 40 mg tablet, TAKE ONE TABLET BY MOUTH DAILY, Disp: 90 Tab, Rfl: 3    arformoterol (BROVANA) 15 mcg/2 mL nebu neb solution, 15 mcg by Nebulization route two (2) times a day., Disp: , Rfl:     budesonide (PULMICORT) 0.5 mg/2 mL nebulizer suspension, 500 mcg by Nebulization route two (2) times a day., Disp: , Rfl:     potassium chloride SR (KLOR-CON 10) 10 mEq tablet, Take 10 mEq by mouth two (2) times a day., Disp: , Rfl:     metoprolol (LOPRESSOR) 25 mg tablet, Take 25 mg by mouth two (2) times a day., Disp: , Rfl:         No Known Allergies     Review of Systems:  A comprehensive review of systems was negative except for: Constitutional: positive for fatigue and malaise  Ears, nose, mouth, throat, and face: positive for hearing loss  Cardiovascular: positive for chest pressure/discomfort, palpitations, irregular heart beats, exertional chest pressure/discomfort  Musculoskeletal: positive for myalgias, arthralgias and stiff joints  Neurological: positive for memory problems, coordination problems, gait problems and weakness  Behvioral/Psych: positive for depression   Vitals:    05/04/17 1303   BP: (!) 146/92   Pulse: 78   Resp: 16   SpO2: 95%   Weight: 205 lb (93 kg)   Height: 5' 10\" (1.778 m)     Objective:     I      NEUROLOGICAL EXAM:    Appearance: The patient is well developed, well nourished, provides a poor history and is in no acute distress. Mental Status: Oriented to place and person, and the president, patient knows age and date of birth, but difficulty giving much of the history. Cognitive function is abnormal and speech is fluent and no aphasia or dysarthria. Mood and affect appropriate but depressed   Cranial Nerves:   Intact visual fields. Fundi are benign. FOZIA, EOM's full, no nystagmus, no ptosis. Facial sensation is normal. Corneal reflexes are not tested. Facial movement is symmetric. Hearing is abnormal bilaterally. Palate is midline with normal sternocleidomastoid and trapezius muscles are normal. Tongue is midline. Neck without meningismus or bruits  Temporal arteries are not tender or enlarged    Motor:  4/5 strength in upper and lower proximal and distal muscles. Normal bulk and tone. No fasciculations. Reflexes:   Deep tendon reflexes 1+/4 and symmetrical.  No babinski or clonus present   Sensory:   Normal to touch, pinprick and vibration and temperature decreased in both feet. DSS is intact   Gait:  Abnormal gait as patient moves slowly with wide-based gait. Tremor:   No tremor noted. Cerebellar:  Abnormal Romberg and tandem cerebellar signs present. Neurovascular:  Abnormal heart sounds and irregular rhythm, peripheral pulses decreased, and no carotid bruits.            Assessment:           Plan:     Patient may have had a TIA or vertebrobasilar artery syndrome, but Dopplers look stable today will continue aspirin and Eliquis  Patient with new problem of recent fall and syncope and possible TIA, for which she got a carotid Doppler study today that looks stable, and patient will continue his current therapy and aspirin therapy  Patient's dementia is getting worse, and I am afraid of Aricept in view of his GI and cardiac history, we will just continue him on Namenda and refills given.   Patient's ER notes all reviewed in detail, CT scan head reviewed, and I agree with interpretations and plans  We encouraged the wife to continue his therapy, keep him mentally and physically active, make sure he uses a walker when walking, and continue his physical therapy  Patient has previous stroke secondary to atrial fibrillation, currently stable on anticoagulation  Patient has senile dementia of Alzheimer's type, stable on Namenda, family does not want cholinesterase inhibitors because of his cardiac problems  Patient counseled for 20 minutes today, told to exercise read and remained mentally and physically active and take vitamins and vitamin D on a regular basis  Refills for his medications in for the patient  35 minutes spent with patient, going over his records, discussing further treatment options, reviewing his x-rays unless [facility] system personally, and discussing his diagnosis prognosis and further treatment and evaluation  Followup in 2 months time or earlier as needed      Signed By: Aydee Reyes MD     May 4, 2017

## 2017-05-04 NOTE — LETTER
5/4/2017 2:25 PM 
 
Patient:  King Urbano YOB: 1935 Date of Visit: 5/4/2017 Dear No Recipients: Thank you for referring Mr. Vernon Lima to me for evaluation/treatment. Below are the relevant portions of my assessment and plan of care. Doppler dictated in procedure note If you have questions, please do not hesitate to call me. I look forward to following Mr. Karyle Gemma along with you. Sincerely, [address]

## 2017-05-04 NOTE — PATIENT INSTRUCTIONS

## 2017-05-04 NOTE — PATIENT INSTRUCTIONS
Helping a Person With Alzheimer's Disease: Care Instructions  Your Care Instructions  Alzheimer's disease is a type of dementia. It affects memory, intelligence, judgment, language, and behavior. It is not clear what causes this disease. But it is the most common form of dementia in older adults. It may take many years to develop. Alzheimer's disease is different than mild memory loss that occurs with aging. Family members usually notice symptoms first. But the person also may realize that something is wrong. Follow-up care is a key part of your loved one's treatment and safety. Be sure to make and go to all appointments, and call your doctor if your loved one is having problems. It's also a good idea to know your loved one's test results and keep a list of the medicines he or she takes. How can you care for your loved one at home? · Develop a routine. The person will feel less frustrated or confused with a clear, simple daily plan. Remind him or her about important facts and events. · Be patient. It may take longer for the person to complete a task than it used to. · Help the person eat a balanced diet. Serve plenty of whole grains, fruits, and vegetables every day. If the person is not eating well at mealtimes, give snacks at midmorning and in the afternoon. Offer drinks such as Boost, Ensure, or Sustacal if he or she is losing weight. · Encourage exercise. Walking and other activity may slow the decline of mental ability. Help the person keep an active mind. Encourage hobbies such as reading and crossword puzzles. · Take steps to help if the person is sundowning. This is the restless behavior and trouble with sleeping that may occur in late afternoon and at night. Try not to let the person nap during the day. Offer a glass of warm milk or caffeine-free tea before bedtime. · Ask family members and friends for help. You may need breaks where others can help care for the person.   · Talk to the person's doctor about what resources are available for help in your area. · Review all of the person's medicines with his or her doctor. · For as long as the person is able, allow him or her to make decisions about activities, food, clothing, and other choices. Let the person be independent, even if tasks take more time or are not done perfectly. Tailor tasks to the person's abilities. For example, if cooking is no longer safe, ask for other help. He or she can help set the table or make simple dishes such as a salad. When the person needs help, offer it gently. Keeping safe  · Make your home (or the person's home) safe. Tack down rugs, and put no-slip tape in the tub. Install handrails, and put safety switches on stoves and appliances. Keep rooms free of clutter. Make sure walkways around furniture are clear. Do not move furniture around, because the person may become confused. · Use locks on doors and cupboards. Lock up knives, scissors, medicines, cleaning supplies, and other dangerous things. · Do not let the person drive or cook if he or she cannot do it safely. A person with Alzheimer's should not drive unless he or she is able to pass an on-road driving test. Your state 's license bureau can do a driving test if there is any question. · Get medical alert jewelry for the person so you can be contacted if he or she wanders away. If possible, provide a safe place for wandering, such as an enclosed yard or garden. When should you call for help? Call 911 anytime you think you may need emergency care. For example, call if:  · A person who has Alzheimer's disease has disappeared. · A person who has Alzheimer's disease is seriously injured. Call your doctor now or seek immediate medical care if:  · The person you are caring for suddenly sees or hears things that are not there (hallucinates). · The person you are caring for has a sudden, drastic change in his or her behavior.   Watch closely for changes in your loved one's health, and be sure to contact the doctor if:  · A person who has Alzheimer's disease gradually gets worse or has symptoms that could cause injury. · You need help caring for a person with Alzheimer's disease. · The person has problems with his or her medicine. Where can you learn more? Go to http://rosey-wilmer.info/. Enter S180 in the search box to learn more about \"Helping a Person With Alzheimer's Disease: Care Instructions. \"  Current as of: July 26, 2016  Content Version: 11.2  © 4954-5444 [employer]. Care instructions adapted under license by [employer] (which disclaims liability or warranty for this information). If you have questions about a medical condition or this instruction, always ask your healthcare professional. [address] any warranty or liability for your use of this information. Gout: Care Instructions  Your Care Instructions  Gout is a form of arthritis caused by a buildup of uric acid crystals in a joint. It causes sudden attacks of pain, swelling, redness, and stiffness, usually in one joint, especially the big toe. Gout usually comes on without a cause. But it can be brought on by drinking alcohol (especially beer) or eating seafood and red meat. Taking certain medicines, such as diuretics or aspirin, also can bring on an attack of gout. Taking your medicines as prescribed and following up with your doctor regularly can help you avoid gout attacks in the future. Follow-up care is a key part of your treatment and safety. Be sure to make and go to all appointments, and call your doctor if you are having problems. Its also a good idea to know your test results and keep a list of the medicines you take. How can you care for yourself at home? · If the joint is swollen, put ice or a cold pack on the area for 10 to 20 minutes at a time.  Put a thin cloth between the ice and your skin.  · Prop up the sore limb on a pillow when you ice it or anytime you sit or lie down during the next 3 days. Try to keep it above the level of your heart. This will help reduce swelling. · Rest sore joints. Avoid activities that put weight or strain on the joints for a few days. Take short rest breaks from your regular activities during the day. · Take your medicines exactly as prescribed. Call your doctor if you think you are having a problem with your medicine. · Take pain medicines exactly as directed. ¨ If the doctor gave you a prescription medicine for pain, take it as prescribed. ¨ If you are not taking a prescription pain medicine, ask your doctor if you can take an over-the-counter medicine. · Eat less seafood and red meat. · Check with your doctor before drinking alcohol. · Losing weight, if you are overweight, may help reduce attacks of gout. But do not go on a [employer]. \" Losing a lot of weight in a short amount of time can cause a gout attack. When should you call for help? Call your doctor now or seek immediate medical care if:  · You have a fever. · The joint is so painful you cannot use it. · You have sudden, unexplained swelling, redness, warmth, or severe pain in one or more joints. Watch closely for changes in your health, and be sure to contact your doctor if:  · You have joint pain. · Your symptoms get worse or are not improving after 2 or 3 days. Where can you learn more? Go to http://rosey-wilmer.info/. Enter Y166 in the search box to learn more about \"Gout: Care Instructions. \"  Current as of: October 31, 2016  Content Version: 11.2  © 8592-4172 [employer]. Care instructions adapted under license by [employer] (which disclaims liability or warranty for this information).  If you have questions about a medical condition or this instruction, always ask your healthcare professional. Tammie Eduardo disclaims any warranty or liability for your use of this information. Purine-Restricted Diet: Care Instructions  Your Care Instructions  Purines are substances that are found in some foods. Your body turns purines into uric acid. High levels of uric acid can cause gout, which is a form of arthritis that causes pain and inflammation in joints. You may be able to help control the amount of uric acid in your body by limiting high-purine foods in your diet. Follow-up care is a key part of your treatment and safety. Be sure to make and go to all appointments, and call your doctor if you are having problems. It's also a good idea to know your test results and keep a list of the medicines you take. How can you care for yourself at home? · Plan your meals and snacks around foods that are low in purines and are safe for you to eat. These foods include:  ¨ Green vegetables and tomatoes. ¨ Fruits. ¨ Whole-grain breads, rice, and cereals. ¨ Eggs, peanut butter, and nuts. ¨ Low-fat milk, cheese, and other milk products. ¨ Popcorn. ¨ Gelatin desserts, chocolate, cocoa, and cakes and sweets, in small amounts. · You can eat certain foods that are medium-high in purines, but eat them only once in a while. These foods include:  ¨ Legumes, such as dried beans and dried peas. You can have 1 cup cooked legumes each day. ¨ Asparagus, cauliflower, spinach, mushrooms, and green peas. ¨ Fish and seafood (other than very high-purine seafood). ¨ Oatmeal, wheat bran, and wheat germ. · Limit very high-purine foods, including:  ¨ Organ meats, such as liver, kidneys, sweetbreads, and brains. ¨ Meats, including walter, beef, pork, and lamb. ¨ Game meats and any other meats in large amounts. ¨ Anchovies, sardines, herring, mackerel, and scallops. ¨ Gravy. ¨ Beer. Where can you learn more? Go to http://rosey-wilmer.info/. Enter F448 in the search box to learn more about \"Purine-Restricted Diet: Care Instructions. \"  Current as of: July 26, 2016  Content Version: 11.2  © 0859-6835 [employer]. Care instructions adapted under license by [employer] (which disclaims liability or warranty for this information). If you have questions about a medical condition or this instruction, always ask your healthcare professional. [address] any warranty or liability for your use of this information.

## 2017-05-04 NOTE — LETTER
5/4/2017 2:21 PM 
 
Patient:  Marco Villatoro YOB: 1935 Date of Visit: 5/4/2017 Dear No Recipients: Thank you for referring . Alejandro Snellen to me for evaluation/treatment. Below are the relevant portions of my assessment and plan of care. Consult Subjective:  
 
Marco Villatoro is a [de-identified] y.o. Right-handed  male seen for urgent work in evaluation of new problem of sudden spell where he collapsed, fell to the floor, seemed confused afterwards and having difficulty with his speech and generalized weakness, and was taken to the emergency room at [facility], where he had workup done and CT scan done of the head that looked unremarkable and showed no change. The patient has had progressive unsteadiness in gait, and is getting physical therapy once a week for gait training with some improvement. He has had 4 falls in the last 6-12 months. He is now using a walker for ambulation and no longer using the cane since he fell twice in the last 2 months. The family is not sure why he loses his balance or falls, but he is somewhat unsteady and ataxic. 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 His previous carotid Dopplers that showed mild disease less than 50%, and they were repeated today and they still show the same without significant carotid stenosis to explain his syncope and unsteadiness and dizziness. He gets dizzy when he stands up quickly. He denies any chest pain or palpitations. He has had no new focal weakness or sensory loss or new focal neurologic deficits to suggest a stroke. He has moderate memory loss, and is on Namenda 10 mg twice a day and tolerating it pretty well. His memory seems to be progressively getting worse however. In view of his recurring syncope we will not add Aricept yet, but he may be a candidate for that soon if his blood pressure stable.   He is also seen for memory loss and new problem of worsening gait.  He is on Wellbutrin 150 mg once a day for depression, and did not have an apparent seizures the best I can tell by family description. Patient was on Namenda 10 mg twice a day for memory loss and seems to be tolerating it well, and his family feels that it has significantly helped him. Both his behavior and his memory seems better on the medication. Patient had neuropsych testing done July 2015 that showed he had a moderate severe dementia consistent with Alzheimer's disease, and pseudodementia was not supported. Patient has significant cardiac issues, and his family is not anxious for more medication like cholinesterase inhibitors that may upset his cardiac rhythm. He also has bipolar disorder it appears. He has osteoporosis and COPD and organic heart disease, and atrial fibrillation with previous embolic stroke on anticoagulation now. We encouraged the patient to take a multivitamin and vitamin D on a regular basis and to start a regular exercise program, and try to remain mentally and physically active. We counseled the patient for 20 minutes today, and his family will also try to help him followup with the exercise and he is to continue his therapy at home with PT. . Other behavioral counseling given, and patient encouraged to do puzzles, read, watch TV, and remain physically active and try to interact more socially. Patient has had no new focal weakness, sensory loss, headache, visual changes, or change in bowel or bladder control. He had a small stroke in November with a left cerebral embolic infarct from which she has pretty much recovered. This was diagnosed on an MRI scan in November 2015. He has remained stable on anticoagulation. These films and his chart was reviewed in detail on the computer by myself today the patient and his family Past Medical History:  
Diagnosis Date  Allergy   
 seasonal  
 Arrhythmia  Asthma  Dementia  Depression  Heart failure ([facility] [address]  Hypertension  Neurological disorder   
 dementia  Other ill-defined conditions   
 a-fib  Stroke ([facility] [address] Past Surgical History:  
Procedure Laterality Date  HX HEENT    
 cateract surgery Family History Problem Relation Age of Onset  Stroke Mother  Heart Disease Mother  Cancer Father   
  prostate  Cancer Brother   
  colon Social History Substance Use Topics  Smoking status: Never Smoker  Smokeless tobacco: Never Used  Alcohol use No  
   
 
Current Outpatient Prescriptions:  
  furosemide (LASIX) 40 mg tablet, Take 40 mg by mouth two (2) times a day., Disp: , Rfl:  
  albuterol (PROVENTIL VENTOLIN) 2.5 mg /3 mL (0.083 %) nebulizer solution, by Nebulization route every twelve (12) hours as needed for Wheezing., Disp: , Rfl:  
  QUEtiapine (SEROQUEL) 25 mg tablet, Take 25 mg by mouth as needed. , Disp: , Rfl:  
  montelukast (SINGULAIR) 10 mg tablet, Take 10 mg by mouth daily. , Disp: , Rfl:  
  fluticasone (FLONASE) 50 mcg/actuation nasal spray, 2 Sprays by Both Nostrils route as needed for Rhinitis., Disp: , Rfl:  
  memantine (NAMENDA) 10 mg tablet, TAKE ONE TABLET BY MOUTH TWICE A DAY, Disp: 60 Tab, Rfl: 10 
  ELIQUIS 5 mg tablet, TAKE ONE TABLET BY MOUTH EVERY 12 HOURS, Disp: 60 Tab, Rfl: 9 
  buPROPion XL (WELLBUTRIN XL) 150 mg tablet, Take 1 Tab by mouth every morning., Disp: 30 Tab, Rfl: 5 
  lisinopril (PRINIVIL, ZESTRIL) 40 mg tablet, TAKE ONE TABLET BY MOUTH DAILY, Disp: 90 Tab, Rfl: 3 
  arformoterol (BROVANA) 15 mcg/2 mL nebu neb solution, 15 mcg by Nebulization route two (2) times a day., Disp: , Rfl:  
  budesonide (PULMICORT) 0.5 mg/2 mL nebulizer suspension, 500 mcg by Nebulization route two (2) times a day., Disp: , Rfl:  
  potassium chloride SR (KLOR-CON 10) 10 mEq tablet, Take 10 mEq by mouth two (2) times a day., Disp: , Rfl:  
  metoprolol (LOPRESSOR) 25 mg tablet, Take 25 mg by mouth two (2) times a day., Disp: , Rfl:  
 
 
 
 No Known Allergies Review of Systems: A comprehensive review of systems was negative except for: Constitutional: positive for fatigue and malaise Ears, nose, mouth, throat, and face: positive for hearing loss Cardiovascular: positive for chest pressure/discomfort, palpitations, irregular heart beats, exertional chest pressure/discomfort Musculoskeletal: positive for myalgias, arthralgias and stiff joints Neurological: positive for memory problems, coordination problems, gait problems and weakness Behvioral/Psych: positive for depression Vitals:  
 05/04/17 1303 BP: (!) 146/92 Pulse: 78 Resp: 16 SpO2: 95% Weight: 205 lb (93 kg) Height: 5' 10\" (1.778 m) Objective: I 
 
 
NEUROLOGICAL EXAM: 
 
Appearance: The patient is well developed, well nourished, provides a poor history and is in no acute distress. Mental Status: Oriented to place and person, and the president, patient knows age and date of birth, but difficulty giving much of the history. Cognitive function is abnormal and speech is fluent and no aphasia or dysarthria. Mood and affect appropriate but depressed Cranial Nerves:   Intact visual fields. Fundi are benign. FOZIA, EOM's full, no nystagmus, no ptosis. Facial sensation is normal. Corneal reflexes are not tested. Facial movement is symmetric. Hearing is abnormal bilaterally. Palate is midline with normal sternocleidomastoid and trapezius muscles are normal. Tongue is midline. Neck without meningismus or bruits Temporal arteries are not tender or enlarged Motor:  4/5 strength in upper and lower proximal and distal muscles. Normal bulk and tone. No fasciculations. Reflexes:   Deep tendon reflexes 1+/4 and symmetrical. 
No babinski or clonus present Sensory:   Normal to touch, pinprick and vibration and temperature decreased in both feet. DSS is intact Gait:  Abnormal gait as patient moves slowly with wide-based gait. Tremor:   No tremor noted. Cerebellar:  Abnormal Romberg and tandem cerebellar signs present. Neurovascular:  Abnormal heart sounds and irregular rhythm, peripheral pulses decreased, and no carotid bruits. Assessment:  
 
 
 
 
Plan:  
 
Patient may have had a TIA or vertebrobasilar artery syndrome, but Dopplers look stable today will continue aspirin and Eliquis Patient with new problem of recent fall and syncope and possible TIA, for which she got a carotid Doppler study today that looks stable, and patient will continue his current therapy and aspirin therapy Patient's dementia is getting worse, and I am afraid of Aricept in view of his GI and cardiac history, we will just continue him on Namenda and refills given. Patient's ER notes all reviewed in detail, CT scan head reviewed, and I agree with interpretations and plans We encouraged the wife to continue his therapy, keep him mentally and physically active, make sure he uses a walker when walking, and continue his physical therapy Patient has previous stroke secondary to atrial fibrillation, currently stable on anticoagulation Patient has senile dementia of Alzheimer's type, stable on Namenda, family does not want cholinesterase inhibitors because of his cardiac problems Patient counseled for 20 minutes today, told to exercise read and remained mentally and physically active and take vitamins and vitamin D on a regular basis Refills for his medications in for the patient 35 minutes spent with patient, going over his records, discussing further treatment options, reviewing his x-rays unless [facility] system personally, and discussing his diagnosis prognosis and further treatment and evaluation Followup in 2 months time or earlier as needed Signed By: Dustin Alberto MD   
 May 4, 2017 If you have questions, please do not hesitate to call me. I look forward to following  Isis Mondragon along with you. Sincerely, Dustin Alberto MD

## 2017-05-04 NOTE — MR AVS SNAPSHOT
Visit Information Date & Time Provider Department Dept. Phone Encounter #  
 5/4/2017  2:15 PM Chema Mendes, [address] [id number] Follow-up Instructions Return in about 3 months (around 8/4/2017). Your Appointments 6/16/2017  2:40 PM  
Follow Up with Antwon Higginbotham MD  
Neurology Clinic at [facility] [address]) Appt Note: f/u memory loss, $0cp, jrb 11/16/2016  
 [address] [facility], 
[address], [address] [address]  
[address], [address], [address] [address] Upcoming Health Maintenance Date Due  
 MEDICARE YEARLY EXAM 11/16/2016 GLAUCOMA SCREENING Q2Y 1/2/2017 INFLUENZA AGE 9 TO ADULT 8/1/2017 DTaP/Tdap/Td series (2 - Td) 11/7/2025 Allergies as of 5/4/2017  Review Complete On: 5/4/2017 By: Agustin Bennett III, DO No Known Allergies Current Immunizations  Reviewed on 10/6/2016 Name Date Influenza High Dose Vaccine PF 10/6/2016, 10/31/2014 Influenza Vaccine (Quad) PF 11/16/2015 Pneumococcal Conjugate (PCV-13) 8/12/2015 Pneumococcal Polysaccharide (PPSV-23) 7/29/2013,  Deferred (Patient Refused) Tdap 11/7/2015  6:22 PM  
  
 Not reviewed this visit You Were Diagnosed With   
  
 Codes Comments Acute gout involving toe of left foot, unspecified cause    -  Primary ICD-10-CM: M10.9 ICD-9-CM: 274.01 Paroxysmal atrial fibrillation (HCC)     ICD-10-CM: I48.0 ICD-9-CM: 427.31 Essential hypertension     ICD-10-CM: I10 
ICD-9-CM: 401.9 Dementia of the Alzheimer's type with late onset without behavioral disturbance     ICD-10-CM: G30.1, F02.80 ICD-9-CM: 331.0, 294.10 Edema extremities     ICD-10-CM: R60.0 ICD-9-CM: 782.3 History of CVA (cerebrovascular accident)     ICD-10-CM: Z86.73 
ICD-9-CM: V12.54   
 CKD (chronic kidney disease) stage 3, GFR 30-59 ml/min     ICD-10-CM: N18.3 ICD-9-CM: 938. 3 Vitals BP Pulse Temp Height(growth percentile) Weight(growth percentile) SpO2  
 138/88 (BP 1 Location: Left arm, BP Patient Position: Sitting) 63 97.7 °F (36.5 °C) (Oral) 5' 10\" (1.778 m) 204 lb 6.4 oz (92.7 kg) 96% BMI Smoking Status 29.33 kg/m2 Never Smoker Vitals History BMI and BSA Data Body Mass Index Body Surface Area  
 29.33 kg/m 2 2.14 m 2 Preferred Pharmacy Pharmacy Name Phone Anastasia Gauze [address] [place] [address] Dr Valle, [address] [place] [address]. [phone] Your Updated Medication List  
  
   
This list is accurate as of: 5/4/17  4:02 PM.  Always use your most recent med list.  
  
  
  
  
 albuterol 2.5 mg /3 mL (0.083 %) nebulizer solution Commonly known as:  PROVENTIL VENTOLIN  
by Nebulization route every twelve (12) hours as needed for Wheezing. BROVANA 15 mcg/2 mL Nebu neb solution Generic drug:  arformoterol 15 mcg by Nebulization route two (2) times a day. budesonide 0.5 mg/2 mL Nbsp Commonly known as:  PULMICORT  
500 mcg by Nebulization route two (2) times a day. buPROPion  mg tablet Commonly known as:  Omar Floro Take 1 Tab by mouth every morning. ELIQUIS 5 mg tablet Generic drug:  apixaban TAKE ONE TABLET BY MOUTH EVERY 12 HOURS  
  
 fluticasone 50 mcg/actuation nasal spray Commonly known as:  Makenzie Aid 2 Sprays by Both Nostrils route as needed for Rhinitis. furosemide 40 mg tablet Commonly known as:  LASIX Take 40 mg by mouth two (2) times a day. lisinopril 40 mg tablet Commonly known as:  PRINIVIL, ZESTRIL  
TAKE ONE TABLET BY MOUTH DAILY  
  
 memantine 10 mg tablet Commonly known as:  Radha Banana TAKE ONE TABLET BY MOUTH TWICE A DAY  
  
 metoprolol tartrate 25 mg tablet Commonly known as:  LOPRESSOR Take 25 mg by mouth two (2) times a day. montelukast 10 mg tablet Commonly known as:  SINGULAIR Take 10 mg by mouth daily. potassium chloride SR 10 mEq tablet Commonly known as:  KLOR-CON 10 Take 10 mEq by mouth two (2) times a day. predniSONE 20 mg tablet Commonly known as:  DELTASONE  
60 mg daily x 3 days, then 40 mg daily x 3 days, then 20 mg daily x 3 days, then stop. Indications: GOUTY ARTHRITIS QUEtiapine 25 mg tablet Commonly known as:  SEROquel Take 25 mg by mouth as needed. Prescriptions Sent to Pharmacy Refills  
 predniSONE (DELTASONE) 20 mg tablet 0 Si mg daily x 3 days, then 40 mg daily x 3 days, then 20 mg daily x 3 days, then stop. Indications: GOUTY ARTHRITIS Class: Normal  
 Pharmacy: [address] Dr Valle, [address] [place] [address].  #: [phone] Follow-up Instructions Return in about 3 months (around 2017). Patient Instructions Helping a Person With Alzheimer's Disease: Care Instructions Your Care Instructions Alzheimer's disease is a type of dementia. It affects memory, intelligence, judgment, language, and behavior. It is not clear what causes this disease. But it is the most common form of dementia in older adults. It may take many years to develop. Alzheimer's disease is different than mild memory loss that occurs with aging. Family members usually notice symptoms first. But the person also may realize that something is wrong. Follow-up care is a key part of your loved one's treatment and safety. Be sure to make and go to all appointments, and call your doctor if your loved one is having problems. It's also a good idea to know your loved one's test results and keep a list of the medicines he or she takes. How can you care for your loved one at home? · Develop a routine. The person will feel less frustrated or confused with a clear, simple daily plan. Remind him or her about important facts and events. · Be patient. It may take longer for the person to complete a task than it used to. · Help the person eat a balanced diet. Serve plenty of whole grains, fruits, and vegetables every day. If the person is not eating well at mealtimes, give snacks at midmorning and in the afternoon. Offer drinks such as Boost, Ensure, or Sustacal if he or she is losing weight. · Encourage exercise. Walking and other activity may slow the decline of mental ability. Help the person keep an active mind. Encourage hobbies such as reading and crossword puzzles. · Take steps to help if the person is sundowning. This is the restless behavior and trouble with sleeping that may occur in late afternoon and at night. Try not to let the person nap during the day. Offer a glass of warm milk or caffeine-free tea before bedtime. · Ask family members and friends for help. You may need breaks where others can help care for the person. · Talk to the person's doctor about what resources are available for help in your area. · Review all of the person's medicines with his or her doctor. · For as long as the person is able, allow him or her to make decisions about activities, food, clothing, and other choices. Let the person be independent, even if tasks take more time or are not done perfectly. Tailor tasks to the person's abilities. For example, if cooking is no longer safe, ask for other help. He or she can help set the table or make simple dishes such as a salad. When the person needs help, offer it gently. Keeping safe · Make your home (or the person's home) safe. Tack down rugs, and put no-slip tape in the tub. Install handrails, and put safety switches on stoves and appliances. Keep rooms free of clutter. Make sure walkways around furniture are clear. Do not move furniture around, because the person may become confused. · Use locks on doors and cupboards. Lock up knives, scissors, medicines, cleaning supplies, and other dangerous things. · Do not let the person drive or cook if he or she cannot do it safely.  A person with Alzheimer's should not drive unless he or she is able to pass an on-road driving test. Your state 's license bureau can do a driving test if there is any question. · Get medical alert jewelry for the person so you can be contacted if he or she wanders away. If possible, provide a safe place for wandering, such as an enclosed yard or garden. When should you call for help? Call 911 anytime you think you may need emergency care. For example, call if: · A person who has Alzheimer's disease has disappeared. · A person who has Alzheimer's disease is seriously injured. Call your doctor now or seek immediate medical care if: · The person you are caring for suddenly sees or hears things that are not there (hallucinates). · The person you are caring for has a sudden, drastic change in his or her behavior. Watch closely for changes in your loved one's health, and be sure to contact the doctor if: · A person who has Alzheimer's disease gradually gets worse or has symptoms that could cause injury. · You need help caring for a person with Alzheimer's disease. · The person has problems with his or her medicine. Where can you learn more? Go to http://rosey-wilmer.info/. Enter S515 in the search box to learn more about \"Helping a Person With Alzheimer's Disease: Care Instructions. \" Current as of: July 26, 2016 Content Version: 11.2 © 2790-7951 MobiTV. Care instructions adapted under license by [employer]ptive Technologies (which disclaims liability or warranty for this information). If you have questions about a medical condition or this instruction, always ask your healthcare professional. [place] [address] any warranty or liability for your use of this information. Gout: Care Instructions Your Care Instructions Gout is a form of arthritis caused by a buildup of uric acid crystals in a joint. It causes sudden attacks of pain, swelling, redness, and stiffness, usually in one joint, especially the big toe. Gout usually comes on without a cause. But it can be brought on by drinking alcohol (especially beer) or eating seafood and red meat. Taking certain medicines, such as diuretics or aspirin, also can bring on an attack of gout. Taking your medicines as prescribed and following up with your doctor regularly can help you avoid gout attacks in the future. Follow-up care is a key part of your treatment and safety. Be sure to make and go to all appointments, and call your doctor if you are having problems. Its also a good idea to know your test results and keep a list of the medicines you take. How can you care for yourself at home? · If the joint is swollen, put ice or a cold pack on the area for 10 to 20 minutes at a time. Put a thin cloth between the ice and your skin. · Prop up the sore limb on a pillow when you ice it or anytime you sit or lie down during the next 3 days. Try to keep it above the level of your heart. This will help reduce swelling. · Rest sore joints. Avoid activities that put weight or strain on the joints for a few days. Take short rest breaks from your regular activities during the day. · Take your medicines exactly as prescribed. Call your doctor if you think you are having a problem with your medicine. · Take pain medicines exactly as directed. ¨ If the doctor gave you a prescription medicine for pain, take it as prescribed. ¨ If you are not taking a prescription pain medicine, ask your doctor if you can take an over-the-counter medicine. · Eat less seafood and red meat. · Check with your doctor before drinking alcohol. · Losing weight, if you are overweight, may help reduce attacks of gout. But do not go on a [employer]. \" Losing a lot of weight in a short amount of time can cause a gout attack. When should you call for help? Call your doctor now or seek immediate medical care if: 
· You have a fever. · The joint is so painful you cannot use it. · You have sudden, unexplained swelling, redness, warmth, or severe pain in one or more joints. Watch closely for changes in your health, and be sure to contact your doctor if: 
· You have joint pain. · Your symptoms get worse or are not improving after 2 or 3 days. Where can you learn more? Go to http://rosey-wilmer.info/. Enter J115 in the search box to learn more about \"Gout: Care Instructions. \" Current as of: October 31, 2016 Content Version: 11.2 © 2857-6874 Parabel. Care instructions adapted under license by LeftRight Studios (which disclaims liability or warranty for this information). If you have questions about a medical condition or this instruction, always ask your healthcare professional. [place] [address] any warranty or liability for your use of this information. Purine-Restricted Diet: Care Instructions Your Care Instructions Purines are substances that are found in some foods. Your body turns purines into uric acid. High levels of uric acid can cause gout, which is a form of arthritis that causes pain and inflammation in joints. You may be able to help control the amount of uric acid in your body by limiting high-purine foods in your diet. Follow-up care is a key part of your treatment and safety. Be sure to make and go to all appointments, and call your doctor if you are having problems. It's also a good idea to know your test results and keep a list of the medicines you take. How can you care for yourself at home? · Plan your meals and snacks around foods that are low in purines and are safe for you to eat. These foods include: ¨ Green vegetables and tomatoes. ¨ Fruits. ¨ Whole-grain breads, rice, and cereals. ¨ Eggs, peanut butter, and nuts. ¨ Low-fat milk, cheese, and other milk products. ¨ Popcorn. ¨ Gelatin desserts, chocolate, cocoa, and cakes and sweets, in small amounts. · You can eat certain foods that are medium-high in purines, but eat them only once in a while. These foods include: ¨ Legumes, such as dried beans and dried peas. You can have 1 cup cooked legumes each day. ¨ Asparagus, cauliflower, spinach, mushrooms, and green peas. ¨ Fish and seafood (other than very high-purine seafood). ¨ Oatmeal, wheat bran, and wheat germ. · Limit very high-purine foods, including: ¨ Organ meats, such as liver, kidneys, sweetbreads, and brains. ¨ Meats, including walter, beef, pork, and lamb. ¨ Game meats and any other meats in large amounts. ¨ Anchovies, sardines, herring, mackerel, and scallops. ¨ Gravy. ¨ Beer. Where can you learn more? Go to http://rosey-wilmer.info/. Enter F448 in the search box to learn more about \"Purine-Restricted Diet: Care Instructions. \" Current as of: July 26, 2016 Content Version: 11.2 © 3306-8739 Ikaria. Care instructions adapted under license by Nichewith (which disclaims liability or warranty for this information). If you have questions about a medical condition or this instruction, always ask your healthcare professional. [place] [address] any warranty or liability for your use of this information. Please provide this summary of care documentation to your next provider. Your primary care clinician is listed as Zhen Melchor If you have any questions after today's visit, please call [phone].

## 2017-05-04 NOTE — MR AVS SNAPSHOT
Visit Information Date & Time Provider Department Dept. Phone Encounter #  
 5/4/2017 12:40 PM Brit Bueno MD Neurology Clinic at [facility] [phone] [id number] Follow-up Instructions Return in about 6 months (around 11/4/2017). Your Appointments 5/4/2017  2:15 PM  
ROUTINE CARE with [facility], [facility] [address] [facility]) Appt Note: ER 4/30/17 F/U//  
 [address] [address]  
[address] [facility] [address] [address]  
  
    
 6/16/2017  2:40 PM  
Follow Up with Brit Bueno MD  
Neurology Clinic at [address]) Appt Note: f/u memory loss, $0cp, jrb 11/16/2016  
 [facility], 
300 [facility], [address] [address]  
[address] , [address], [address] [address] Upcoming Health Maintenance Date Due  
 MEDICARE YEARLY EXAM 11/16/2016 GLAUCOMA SCREENING Q2Y 1/2/2017 INFLUENZA AGE 9 TO ADULT 8/1/2017 DTaP/Tdap/Td series (2 - Td) 11/7/2025 Allergies as of 5/4/2017  Review Complete On: 5/4/2017 By: Brit Bueno MD  
 No Known Allergies Current Immunizations  Reviewed on 10/6/2016 Name Date Influenza High Dose Vaccine PF 10/6/2016, 10/31/2014 Influenza Vaccine (Quad) PF 11/16/2015 Pneumococcal Conjugate (PCV-13) 8/12/2015 Pneumococcal Polysaccharide (PPSV-23) 7/29/2013,  Deferred (Patient Refused) Tdap 11/7/2015  6:22 PM  
  
 Not reviewed this visit You Were Diagnosed With   
  
 Codes Comments Chronic tension-type headache, not intractable    -  Primary ICD-10-CM: L78.347 ICD-9-CM: 339.12 Cerebral thrombosis with cerebral infarction [facility])     ICD-10-CM: I63.30 ICD-9-CM: 434.01 Dementia of the Alzheimer's type with late onset without behavioral disturbance     ICD-10-CM: G30.1, F02.80 ICD-9-CM: 331.0, 294.10 Spondylosis of cervical region without myelopathy or radiculopathy     ICD-10-CM: M47.812 ICD-9-CM: 721.0 Gait apraxia of elderly     ICD-10-CM: R48.2 ICD-9-CM: 784.69 Vitals BP Pulse Resp Height(growth percentile) Weight(growth percentile) SpO2  
 (!) 146/92 78 16 5' 10\" (1.778 m) 205 lb (93 kg) 95% BMI Smoking Status 29.41 kg/m2 Never Smoker Vitals History BMI and BSA Data Body Mass Index Body Surface Area  
 29.41 kg/m 2 2.14 m 2 Preferred Pharmacy Pharmacy Name Phone Tanner Victoria [address], [address] [place] [address]. [phone] Your Updated Medication List  
  
   
This list is accurate as of: 5/4/17  1:22 PM.  Always use your most recent med list.  
  
  
  
  
 albuterol 2.5 mg /3 mL (0.083 %) nebulizer solution Commonly known as:  PROVENTIL VENTOLIN  
by Nebulization route every twelve (12) hours as needed for Wheezing. BROVANA 15 mcg/2 mL Nebu neb solution Generic drug:  arformoterol 15 mcg by Nebulization route two (2) times a day. budesonide 0.5 mg/2 mL Nbsp Commonly known as:  PULMICORT  
500 mcg by Nebulization route two (2) times a day. buPROPion  mg tablet Commonly known as:  Reza Madrid Take 1 Tab by mouth every morning. ELIQUIS 5 mg tablet Generic drug:  apixaban TAKE ONE TABLET BY MOUTH EVERY 12 HOURS  
  
 fluticasone 50 mcg/actuation nasal spray Commonly known as:  Wilbern Cathy 2 Sprays by Both Nostrils route as needed for Rhinitis. furosemide 40 mg tablet Commonly known as:  LASIX Take 40 mg by mouth two (2) times a day. lisinopril 40 mg tablet Commonly known as:  PRINIVIL, ZESTRIL  
TAKE ONE TABLET BY MOUTH DAILY  
  
 memantine 10 mg tablet Commonly known as:  Simuel Nayeli TAKE ONE TABLET BY MOUTH TWICE A DAY  
  
 metoprolol tartrate 25 mg tablet Commonly known as:  LOPRESSOR  
 Take 25 mg by mouth two (2) times a day. montelukast 10 mg tablet Commonly known as:  SINGULAIR Take 10 mg by mouth daily. potassium chloride SR 10 mEq tablet Commonly known as:  KLOR-CON 10 Take 10 mEq by mouth two (2) times a day. QUEtiapine 25 mg tablet Commonly known as:  SEROquel Take 25 mg by mouth as needed. Follow-up Instructions Return in about 6 months (around 11/4/2017). To-Do List   
 05/04/2017 Imaging:  DUPLEX CAROTID BILATERAL AMB NEURO Patient Instructions A Healthy Lifestyle: Care Instructions Your Care Instructions A healthy lifestyle can help you feel good, stay at a healthy weight, and have plenty of energy for both work and play. A healthy lifestyle is something you can share with your whole family. A healthy lifestyle also can lower your risk for serious health problems, such as high blood pressure, heart disease, and diabetes. You can follow a few steps listed below to improve your health and the health of your family. Follow-up care is a key part of your treatment and safety. Be sure to make and go to all appointments, and call your doctor if you are having problems. Its also a good idea to know your test results and keep a list of the medicines you take. How can you care for yourself at home? · Do not eat too much sugar, fat, or fast foods. You can still have dessert and treats now and then. The goal is moderation. · Start small to improve your eating habits. Pay attention to portion sizes, drink less juice and soda pop, and eat more fruits and vegetables. ¨ Eat a healthy amount of food. A 3-ounce serving of meat, for example, is about the size of a deck of cards. Fill the rest of your plate with vegetables and whole grains. ¨ Limit the amount of soda and sports drinks you have every day. Drink more water when you are thirsty. ¨ Eat at least 5 servings of fruits and vegetables every day.  It may seem like a lot, but it is not hard to reach this goal. A serving or helping is 1 piece of fruit, 1 cup of vegetables, or 2 cups of leafy, raw vegetables. Have an apple or some carrot sticks as an afternoon snack instead of a candy bar. Try to have fruits and/or vegetables at every meal. 
· Make exercise part of your daily routine. You may want to start with simple activities, such as walking, bicycling, or slow swimming. Try to be active 30 to 60 minutes every day. You do not need to do all 30 to 60 minutes all at once. For example, you can exercise 3 times a day for 10 or 20 minutes. Moderate exercise is safe for most people, but it is always a good idea to talk to your doctor before starting an exercise program. 
· Keep moving. Vee Aniafernando the lawn, work in the garden, or Quisic. Take the stairs instead of the elevator at work. · If you smoke, quit. People who smoke have an increased risk for heart attack, stroke, cancer, and other lung illnesses. Quitting is hard, but there are ways to boost your chance of quitting tobacco for good. ¨ Use nicotine gum, patches, or lozenges. ¨ Ask your doctor about stop-smoking programs and medicines. ¨ Keep trying. In addition to reducing your risk of diseases in the future, you will notice some benefits soon after you stop using tobacco. If you have shortness of breath or asthma symptoms, they will likely get better within a few weeks after you quit. · Limit how much alcohol you drink. Moderate amounts of alcohol (up to 2 drinks a day for men, 1 drink a day for women) are okay. But drinking too much can lead to liver problems, high blood pressure, and other health problems. Family health If you have a family, there are many things you can do together to improve your health. · Eat meals together as a family as often as possible. · Eat healthy foods. This includes fruits, vegetables, lean meats and dairy, and whole grains. · Include your family in your fitness plan. Most people think of activities such as jogging or tennis as the way to fitness, but there are many ways you and your family can be more active. Anything that makes you breathe hard and gets your heart pumping is exercise. Here are some tips: 
¨ Walk to do errands or to take your child to school or the bus. ¨ Go for a family bike ride after dinner instead of watching TV. Where can you learn more? Go to http://rosey-wilmer.info/. Enter Y146 in the search box to learn more about \"A Healthy Lifestyle: Care Instructions. \" Current as of: July 26, 2016 Content Version: 11.2 © 2332-5797 AlterPoint, Xplore Mobility. Care instructions adapted under license by [employer] (which disclaims liability or warranty for this information). If you have questions about a medical condition or this instruction, always ask your healthcare professional. [address] any warranty or liability for your use of this information. Please provide this summary of care documentation to your next provider. Your primary care clinician is listed as Shelton Cronin If you have any questions after today's visit, please call [phone].

## 2017-05-08 ENCOUNTER — TELEPHONE (OUTPATIENT)
Dept: INTERNAL MEDICINE CLINIC | Age: 82
End: 2017-05-08

## 2017-05-08 NOTE — TELEPHONE ENCOUNTER
[facility] with [facility] is faxing over medical forms to be completed to have to pt admitted into the facility.  Best contact number [phone]       Message received & copied from [facility]

## 2017-05-09 NOTE — TELEPHONE ENCOUNTER
Form received & forwarded to Dr. Andres Degree for completion. PPD skin test required for forms. Wife contacted & made aware of this. They will come in this week to have PPD placed.

## 2017-05-11 ENCOUNTER — CLINICAL SUPPORT (OUTPATIENT)
Dept: INTERNAL MEDICINE CLINIC | Age: 82
End: 2017-05-11

## 2017-05-11 ENCOUNTER — TELEPHONE (OUTPATIENT)
Dept: INTERNAL MEDICINE CLINIC | Age: 82
End: 2017-05-11

## 2017-05-11 DIAGNOSIS — Z11.1 SCREENING FOR TUBERCULOSIS: Primary | ICD-10-CM

## 2017-05-11 NOTE — PROGRESS NOTES
Chief Complaint   Patient presents with    PPD Placement     PPD required for [address] Form pending this PPD.

## 2017-05-11 NOTE — TELEPHONE ENCOUNTER
Mrs Grace Contreras states since starting prednisone patient's feet continue to be swollen. Isolated to mostly top of feet. L great toe is no longer red or hot. Denies new symptoms. He has 2 more days of prednisone left. Please advise recommendations, thanks.

## 2017-05-12 NOTE — TELEPHONE ENCOUNTER
Call completed, two patient identifiers verified. Maryann Riddle advised per Dr. Marisela Venegas to [facility] off the prednisone. Some times prednisone causes some fluid retention, which should improve once stops the medicine. Willena Home sure he is getting up and walking often. Support hose/stockings often help as well. Faraz Gonsalez verbalized an understanding.

## 2017-05-15 ENCOUNTER — TELEPHONE (OUTPATIENT)
Dept: INTERNAL MEDICINE CLINIC | Age: 82
End: 2017-05-15

## 2017-05-15 LAB
MM INDURATION POC: 0 MM (ref 0–5)
PPD POC: NORMAL NEGATIVE

## 2017-05-15 NOTE — TELEPHONE ENCOUNTER
Ashley Douglass/Aidan Tenorio states she needs a call back to get the status of form for patient to move into Memory Unit that was faxed over 5/8/17. Ashley Coronel is re-faxing today. Please call.  Thank you

## 2017-05-15 NOTE — TELEPHONE ENCOUNTER
Patient's wife, Rosemary Wooten, states she needs a call back today in reference to test for patient & documentation that needs to be faxed today. Please call.  Thank you

## 2017-05-17 ENCOUNTER — TELEPHONE (OUTPATIENT)
Dept: INTERNAL MEDICINE CLINIC | Age: 82
End: 2017-05-17

## 2017-05-17 NOTE — TELEPHONE ENCOUNTER
Ashley Coronel with [facility] states that she is needing a call back to be advised if the medical form was received. Please call Ashley Coronel to advise.

## 2017-05-23 ENCOUNTER — TELEPHONE (OUTPATIENT)
Dept: INTERNAL MEDICINE CLINIC | Age: 82
End: 2017-05-23

## 2017-05-23 NOTE — TELEPHONE ENCOUNTER
Message received requesting verbal order. Consulted DO Harshal. Verbal with read back order received to provide María Elenasalomon Bae with a verbal order. Call attempted to patient, there was no answer. Left a voice mail providing a verbal order and requested a return call if needed.

## 2017-06-12 ENCOUNTER — TELEPHONE (OUTPATIENT)
Dept: NEUROLOGY | Age: 82
End: 2017-06-12

## 2017-06-12 NOTE — TELEPHONE ENCOUNTER
----- Message from Karen Trujillo sent at 6/12/2017 12:48 PM EDT -----  Regarding: Dr. Norval Goodell  Patient is trying to reschedule an appointment for a Monday or Tuesday. Please call at [phone].

## 2017-06-13 NOTE — TELEPHONE ENCOUNTER
I called the patient's wife and notified there are no openings on Monday or Tuesday to work in until October. She will keep the appointment they have for tomorrow.  They were originally scheduled for the Friday the 16th, but had to be rescheduled

## 2017-06-14 ENCOUNTER — OFFICE VISIT (OUTPATIENT)
Dept: NEUROLOGY | Age: 82
End: 2017-06-14

## 2017-06-14 VITALS
DIASTOLIC BLOOD PRESSURE: 76 MMHG | SYSTOLIC BLOOD PRESSURE: 122 MMHG | HEART RATE: 71 BPM | BODY MASS INDEX: 28.2 KG/M2 | WEIGHT: 197 LBS | OXYGEN SATURATION: 96 % | HEIGHT: 70 IN

## 2017-06-14 DIAGNOSIS — G30.1 DEMENTIA OF THE ALZHEIMER'S TYPE WITH LATE ONSET WITHOUT BEHAVIORAL DISTURBANCE (HCC): ICD-10-CM

## 2017-06-14 DIAGNOSIS — I63.30 CEREBRAL THROMBOSIS WITH CEREBRAL INFARCTION (HCC): ICD-10-CM

## 2017-06-14 DIAGNOSIS — R48.2 GAIT APRAXIA OF ELDERLY: ICD-10-CM

## 2017-06-14 DIAGNOSIS — F02.80 DEMENTIA OF THE ALZHEIMER'S TYPE WITH LATE ONSET WITHOUT BEHAVIORAL DISTURBANCE (HCC): ICD-10-CM

## 2017-06-14 DIAGNOSIS — I63.231 CEREBRAL INFARCTION DUE TO STENOSIS OF RIGHT CAROTID ARTERY (HCC): ICD-10-CM

## 2017-06-14 DIAGNOSIS — G44.229 CHRONIC TENSION-TYPE HEADACHE, NOT INTRACTABLE: Primary | ICD-10-CM

## 2017-06-14 DIAGNOSIS — M43.02 SPONDYLOLYSIS OF CERVICAL REGION: ICD-10-CM

## 2017-06-14 NOTE — LETTER
6/14/2017 9:31 PM 
 
Patient:  Nicholas Hernandez YOB: 1935 Date of Visit: 6/14/2017 Dear No Recipients: Thank you for referring Mr. Zhen Sandoval to me for evaluation/treatment. Below are the relevant portions of my assessment and plan of care. Consult Subjective:  
 
Nicholas Hernandez is a [de-identified] y.o. Right-handed  male seen for evaluation of problem of sudden spell where he collapsed, fell to the floor, seemed confused afterwards and having difficulty with his speech and generalized weakness, and was taken to the emergency room at [facility], where he had workup done and CT scan done of the head that looked unremarkable and showed no change at the request of Dr. Ashwini Varma and his wife has placed him in assisted living at [facility], and he seems to be thriving there and doing much better. He does get some exercise and social interaction, and has lost some weight, is much more active, much stronger and walking better and not had any more falls or blackout spells. He was just seen 1 month ago here and had a Doppler study that was unremarkable. He takes Namenda for his memory and that seems to have stabilized also. His wife is very pleased with his level of progress. 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 He has had no new focal weakness or sensory loss or new focal neurologic deficits to suggest a stroke. He has moderate memory loss, and is on Namenda 10 mg twice a day and tolerating it pretty well. In view of his recurring syncope we will not add Aricept yet, but he may be a candidate for that soon if his blood pressure stable. Both his behavior and his memory seems better on the medication of Namenda.  Patient had neuropsych testing done July 2015 that showed he had a moderate severe dementia consistent with Alzheimer's disease, and pseudodementia was not supported. Patient has significant cardiac issues, and his family is not anxious for more medication like cholinesterase inhibitors that may upset his cardiac rhythm. He also has bipolar disorder it appears. He has osteoporosis and COPD and organic heart disease, and atrial fibrillation with previous embolic stroke on anticoagulation now. We encouraged the patient to take a multivitamin and vitamin D on a regular basis and to start a regular exercise program, and try to remain mentally and physically active. We counseled the patient for 20 minutes today, and his family will also try to help him followup with the exercise and he is to continue his therapy at home with PT. . Other behavioral counseling given, and patient encouraged to do puzzles, read, watch TV, and remain physically active and try to interact more socially. Patient has had no new focal weakness, sensory loss, headache, visual changes, or change in bowel or bladder control. He had a small stroke in November with a left cerebral embolic infarct from which she has pretty much recovered. This was diagnosed on an MRI scan in November 2015. He has remained stable on anticoagulation. These films and his chart was reviewed in detail on the computer by myself today the patient and his family Past Medical History:  
Diagnosis Date  Allergy   
 seasonal  
 Arrhythmia  Asthma  Dementia  Depression  Heart failure ([address]  Hypertension  Neurological disorder   
 dementia  Other ill-defined conditions   
 a-fib  Stroke ([address] Past Surgical History:  
Procedure Laterality Date  HX HEENT    
 cateract surgery Family History Problem Relation Age of Onset  Stroke Mother  Heart Disease Mother  Cancer Father   
  prostate  Cancer Brother   
  colon Social History Substance Use Topics  Smoking status: Never Smoker  Smokeless tobacco: Never Used  Alcohol use No  
   
 
Current Outpatient Prescriptions:  
  montelukast (SINGULAIR) 10 mg tablet, TAKE ONE TABLET BY MOUTH DAILY, Disp: 30 Tab, Rfl: 9 
  furosemide (LASIX) 40 mg tablet, Take 40 mg by mouth two (2) times a day., Disp: , Rfl:  
  albuterol (PROVENTIL VENTOLIN) 2.5 mg /3 mL (0.083 %) nebulizer solution, by Nebulization route every twelve (12) hours as needed for Wheezing., Disp: , Rfl:  
  QUEtiapine (SEROQUEL) 25 mg tablet, Take 25 mg by mouth as needed. , Disp: , Rfl:  
  montelukast (SINGULAIR) 10 mg tablet, Take 10 mg by mouth daily. , Disp: , Rfl:  
  fluticasone (FLONASE) 50 mcg/actuation nasal spray, 2 Sprays by Both Nostrils route as needed for Rhinitis., Disp: , Rfl:  
  memantine (NAMENDA) 10 mg tablet, TAKE ONE TABLET BY MOUTH TWICE A DAY, Disp: 60 Tab, Rfl: 10 
  ELIQUIS 5 mg tablet, TAKE ONE TABLET BY MOUTH EVERY 12 HOURS, Disp: 60 Tab, Rfl: 9 
  buPROPion XL (WELLBUTRIN XL) 150 mg tablet, Take 1 Tab by mouth every morning., Disp: 30 Tab, Rfl: 5 
  lisinopril (PRINIVIL, ZESTRIL) 40 mg tablet, TAKE ONE TABLET BY MOUTH DAILY, Disp: 90 Tab, Rfl: 3 
  arformoterol (BROVANA) 15 mcg/2 mL nebu neb solution, 15 mcg by Nebulization route two (2) times a day., Disp: , Rfl:  
  budesonide (PULMICORT) 0.5 mg/2 mL nebulizer suspension, 500 mcg by Nebulization route two (2) times a day., Disp: , Rfl:  
  potassium chloride SR (KLOR-CON 10) 10 mEq tablet, Take 10 mEq by mouth two (2) times a day., Disp: , Rfl:  
  metoprolol (LOPRESSOR) 25 mg tablet, Take 25 mg by mouth two (2) times a day., Disp: , Rfl:  
 
 
 
No Known Allergies Review of Systems: A comprehensive review of systems was negative except for: Constitutional: positive for fatigue and malaise Ears, nose, mouth, throat, and face: positive for hearing loss Cardiovascular: positive for chest pressure/discomfort, palpitations, irregular heart beats, exertional chest pressure/discomfort Musculoskeletal: positive for myalgias, arthralgias and stiff joints Neurological: positive for memory problems, coordination problems, gait problems and weakness Behvioral/Psych: positive for depression Vitals:  
 06/14/17 1422 BP: 122/76 Pulse: 71 SpO2: 96% Weight: 197 lb (89.4 kg) Height: 5' 10\" (1.778 m) Objective: I 
 
 
NEUROLOGICAL EXAM: 
 
Appearance: The patient is well developed, well nourished, provides a poor history and is in no acute distress. Mental Status: Oriented to place and person, and the president, patient knows age and date of birth, but difficulty giving much of the history. Cognitive function is abnormal and speech is fluent and no aphasia or dysarthria. Mood and affect appropriate but depressed Cranial Nerves:   Intact visual fields. Fundi are benign. FOZIA, EOM's full, no nystagmus, no ptosis. Facial sensation is normal. Corneal reflexes are not tested. Facial movement is symmetric. Hearing is abnormal bilaterally. Palate is midline with normal sternocleidomastoid and trapezius muscles are normal. Tongue is midline. Neck without meningismus or bruits Temporal arteries are not tender or enlarged Motor:  4/5 strength in upper and lower proximal and distal muscles. Normal bulk and tone. No fasciculations. Reflexes:   Deep tendon reflexes 1+/4 and symmetrical. 
No babinski or clonus present Sensory:   Normal to touch, pinprick and vibration and temperature decreased in both feet. DSS is intact Gait:  Abnormal gait as patient moves slowly with wide-based gait and has to use a walker that is rolling . Tremor:   No tremor noted. Cerebellar:  Abnormal Romberg and tandem cerebellar signs present. Neurovascular:  Abnormal heart sounds and irregular rhythm, peripheral pulses decreased, and no carotid bruits. Assessment:  
 
 
 
 
Plan:  
 
Patient will continue aspirin and Eliquis Patient with new problem of recent fall and syncope and possible TIA, for which she got a carotid Doppler study 1 month ago that looks stable, and patient will continue his current therapy and aspirin therapy Patient's dementia is getting worse, and I am afraid of Aricept in view of his GI and cardiac history, we will just continue him on Namenda and refills given. Patient's ER notes all reviewed in detail, CT scan head reviewed, and I agree with interpretations and plans We encouraged the wife to continue his therapy, keep him mentally and physically active, make sure he uses a walker when walking, and continue his physical therapy Patient has previous stroke secondary to atrial fibrillation, currently stable on anticoagulation Patient has senile dementia of Alzheimer's type, stable on Namenda, family does not want cholinesterase inhibitors because of his cardiac problems Patient counseled for 20 minutes today, told to exercise read and remained mentally and physically active and take vitamins and vitamin D on a regular basis Refills for his medications in for the patient 30 minutes spent with patient, going over his records, discussing further treatment options, reviewing his x-rays unless [facility] system personally, and discussing his diagnosis prognosis and further treatment and evaluation Followup in 12 months time or earlier as needed Signed By: Bryant Friend MD   
 June 14, 2017 If you have questions, please do not hesitate to call me. I look forward to following Mr. Binu Kuhn along with you. Sincerely, Bryant Friend MD

## 2017-06-14 NOTE — MR AVS SNAPSHOT
Visit Information Date & Time Provider Department Dept. Phone Encounter #  
 6/14/2017  2:20 PM Hallie Molina MD Neurology Clinic at [facility] [phone] [id number] Follow-up Instructions Return in about 1 year (around 6/14/2018). Your Appointments 8/4/2017  2:15 PM  
ROUTINE CARE with Juan Barr III, DO [facility] [address]) Appt Note: 3 month follow up  
 [facility] [address] [id number]  
[address] [facility] [address] Upcoming Health Maintenance Date Due  
 MEDICARE YEARLY EXAM 11/16/2016 GLAUCOMA SCREENING Q2Y 1/2/2017 INFLUENZA AGE 9 TO ADULT 8/1/2017 DTaP/Tdap/Td series (2 - Td) 11/7/2025 Allergies as of 6/14/2017  Review Complete On: 6/14/2017 By: Hallie Molina MD  
 No Known Allergies Current Immunizations  Reviewed on 5/11/2017 Name Date Influenza High Dose Vaccine PF 10/6/2016, 10/31/2014 Influenza Vaccine (Quad) PF 11/16/2015 Pneumococcal Conjugate (PCV-13) 8/12/2015 Pneumococcal Polysaccharide (PPSV-23) 7/29/2013,  Deferred (Patient Refused) TB Skin Test (PPD) Intradermal 5/11/2017 Tdap 11/7/2015  6:22 PM  
  
 Not reviewed this visit You Were Diagnosed With   
  
 Codes Comments Chronic tension-type headache, not intractable    -  Primary ICD-10-CM: H71.424 ICD-9-CM: 339.12 Cerebral thrombosis with cerebral infarction [facility])     ICD-10-CM: I63.30 ICD-9-CM: 434.01 Cerebral infarction due to stenosis of right carotid artery (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 
ICD-9-CM: 756.19 Vitals BP Pulse Height(growth percentile) Weight(growth percentile) SpO2 BMI  
 122/76 71 5' 10\" (1.778 m) 197 lb (89.4 kg) 96% 28.27 kg/m2 Smoking Status Never Smoker BMI and BSA Data Body Mass Index Body Surface Area  
 28.27 kg/m 2 2.1 m 2 Preferred Pharmacy Pharmacy Name Phone [facility] [address] [facility]. [phone] Your Updated Medication List  
  
   
This list is accurate as of: 6/14/17  2:36 PM.  Always use your most recent med list.  
  
  
  
  
 albuterol 2.5 mg /3 mL (0.083 %) nebulizer solution Commonly known as:  PROVENTIL VENTOLIN  
by Nebulization route every twelve (12) hours as needed for Wheezing. BROVANA 15 mcg/2 mL Nebu neb solution Generic drug:  arformoterol 15 mcg by Nebulization route two (2) times a day. budesonide 0.5 mg/2 mL Nbsp Commonly known as:  PULMICORT  
500 mcg by Nebulization route two (2) times a day. buPROPion  mg tablet Commonly known as:  Maliha Drum Take 1 Tab by mouth every morning. ELIQUIS 5 mg tablet Generic drug:  apixaban TAKE ONE TABLET BY MOUTH EVERY 12 HOURS  
  
 fluticasone 50 mcg/actuation nasal spray Commonly known as:  Jenelle Saulo 2 Sprays by Both Nostrils route as needed for Rhinitis. furosemide 40 mg tablet Commonly known as:  LASIX Take 40 mg by mouth two (2) times a day. lisinopril 40 mg tablet Commonly known as:  PRINIVIL, ZESTRIL  
TAKE ONE TABLET BY MOUTH DAILY  
  
 memantine 10 mg tablet Commonly known as:  Jennet Burner TAKE ONE TABLET BY MOUTH TWICE A DAY  
  
 metoprolol tartrate 25 mg tablet Commonly known as:  LOPRESSOR Take 25 mg by mouth two (2) times a day. * montelukast 10 mg tablet Commonly known as:  SINGULAIR Take 10 mg by mouth daily. * montelukast 10 mg tablet Commonly known as:  SINGULAIR  
TAKE ONE TABLET BY MOUTH DAILY potassium chloride SR 10 mEq tablet Commonly known as:  KLOR-CON 10 Take 10 mEq by mouth two (2) times a day. QUEtiapine 25 mg tablet Commonly known as:  SEROquel Take 25 mg by mouth as needed. * Notice: This list has 2 medication(s) that are the same as other medications prescribed for you. Read the directions carefully, and ask your doctor or other care provider to review them with you. Follow-up Instructions Return in about 1 year (around 6/14/2018). Patient Instructions A Healthy Lifestyle: Care Instructions Your Care Instructions A healthy lifestyle can help you feel good, stay at a healthy weight, and have plenty of energy for both work and play. A healthy lifestyle is something you can share with your whole family. A healthy lifestyle also can lower your risk for serious health problems, such as high blood pressure, heart disease, and diabetes. You can follow a few steps listed below to improve your health and the health of your family. Follow-up care is a key part of your treatment and safety. Be sure to make and go to all appointments, and call your doctor if you are having problems. Its also a good idea to know your test results and keep a list of the medicines you take. How can you care for yourself at home? · Do not eat too much sugar, fat, or fast foods. You can still have dessert and treats now and then. The goal is moderation. · Start small to improve your eating habits. Pay attention to portion sizes, drink less juice and soda pop, and eat more fruits and vegetables. ¨ Eat a healthy amount of food. A 3-ounce serving of meat, for example, is about the size of a deck of cards. Fill the rest of your plate with vegetables and whole grains. ¨ Limit the amount of soda and sports drinks you have every day. Drink more water when you are thirsty. ¨ Eat at least 5 servings of fruits and vegetables every day.  It may seem like a lot, but it is not hard to reach this goal. A serving or helping is 1 piece of fruit, 1 cup of vegetables, or 2 cups of leafy, raw vegetables. Have an apple or some carrot sticks as an afternoon snack instead of a candy bar. Try to have fruits and/or vegetables at every meal. 
· Make exercise part of your daily routine. You may want to start with simple activities, such as walking, bicycling, or slow swimming. Try to be active 30 to 60 minutes every day. You do not need to do all 30 to 60 minutes all at once. For example, you can exercise 3 times a day for 10 or 20 minutes. Moderate exercise is safe for most people, but it is always a good idea to talk to your doctor before starting an exercise program. 
· Keep moving. Reuben [facility] the lawn, work in the garden, or [employer]. Take the stairs instead of the elevator at work. · If you smoke, quit. People who smoke have an increased risk for heart attack, stroke, cancer, and other lung illnesses. Quitting is hard, but there are ways to boost your chance of quitting tobacco for good. ¨ Use nicotine gum, patches, or lozenges. ¨ Ask your doctor about stop-smoking programs and medicines. ¨ Keep trying. In addition to reducing your risk of diseases in the future, you will notice some benefits soon after you stop using tobacco. If you have shortness of breath or asthma symptoms, they will likely get better within a few weeks after you quit. · Limit how much alcohol you drink. Moderate amounts of alcohol (up to 2 drinks a day for men, 1 drink a day for women) are okay. But drinking too much can lead to liver problems, high blood pressure, and other health problems. Family health If you have a family, there are many things you can do together to improve your health. · Eat meals together as a family as often as possible. · Eat healthy foods. This includes fruits, vegetables, lean meats and dairy, and whole grains. · Include your family in your fitness plan. Most people think of activities such as jogging or tennis as the way to fitness, but there are many ways you and your family can be more active. Anything that makes you breathe hard and gets your heart pumping is exercise. Here are some tips: 
¨ Walk to do errands or to take your child to school or the bus. ¨ Go for a family bike ride after dinner instead of watching TV. Where can you learn more? Go to http://rosey-wilmer.info/. Enter A040 in the search box to learn more about \"A Healthy Lifestyle: Care Instructions. \" Current as of: July 26, 2016 Content Version: 11.2 © 5522-4695 [employer], Compliance 11. Care instructions adapted under license by [employer] (which disclaims liability or warranty for this information). If you have questions about a medical condition or this instruction, always ask your healthcare professional. [address] any warranty or liability for your use of this information. Please provide this summary of care documentation to your next provider. Your primary care clinician is listed as Rey Franklin If you have any questions after today's visit, please call [phone].

## 2017-06-14 NOTE — PATIENT INSTRUCTIONS

## 2017-06-15 NOTE — PROGRESS NOTES
Consult    Subjective:     Elyse Garvey is a [de-identified] y.o. Right-handed  male seen for evaluation of problem of sudden spell where he collapsed, fell to the floor, seemed confused afterwards and having difficulty with his speech and generalized weakness, and was taken to the emergency room at [facility], where he had workup done and CT scan done of the head that looked unremarkable and showed no change at the request of Dr. Sally Ambrosio and his wife has placed him in assisted living at [facility], and he seems to be thriving there and doing much better. He does get some exercise and social interaction, and has lost some weight, is much more active, much stronger and walking better and not had any more falls or blackout spells. He was just seen 1 month ago here and had a Doppler study that was unremarkable. He takes Namenda for his memory and that seems to have stabilized also. His wife is very pleased with his level of progress. 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 He has had no new focal weakness or sensory loss or new focal neurologic deficits to suggest a stroke. He has moderate memory loss, and is on Namenda 10 mg twice a day and tolerating it pretty well. In view of his recurring syncope we will not add Aricept yet, but he may be a candidate for that soon if his blood pressure stable. Both his behavior and his memory seems better on the medication of Namenda. Patient had neuropsych testing done July 2015 that showed he had a moderate severe dementia consistent with Alzheimer's disease, and pseudodementia was not supported. Patient has significant cardiac issues, and his family is not anxious for more medication like cholinesterase inhibitors that may upset his cardiac rhythm.  He also has bipolar disorder it appears. He has osteoporosis and COPD and organic heart disease, and atrial fibrillation with previous embolic stroke on anticoagulation now. We encouraged the patient to take a multivitamin and vitamin D on a regular basis and to start a regular exercise program, and try to remain mentally and physically active. We counseled the patient for 20 minutes today, and his family will also try to help him followup with the exercise and he is to continue his therapy at home with PT. . Other behavioral counseling given, and patient encouraged to do puzzles, read, watch TV, and remain physically active and try to interact more socially. Patient has had no new focal weakness, sensory loss, headache, visual changes, or change in bowel or bladder control. He had a small stroke in November with a left cerebral embolic infarct from which she has pretty much recovered. This was diagnosed on an MRI scan in November 2015. He has remained stable on anticoagulation.  These films and his chart was reviewed in detail on the computer by myself today the patient and his family    Past Medical History:   Diagnosis Date    Allergy     seasonal    Arrhythmia     Asthma     Dementia     Depression     Heart failure ([address]     Hypertension     Neurological disorder     dementia    Other ill-defined conditions     a-fib    Stroke ([address]       Past Surgical History:   Procedure Laterality Date    HX HEENT      cateract surgery     Family History   Problem Relation Age of Onset    Stroke Mother     Heart Disease Mother     Cancer Father      prostate    Cancer Brother      colon      Social History   Substance Use Topics    Smoking status: Never Smoker    Smokeless tobacco: Never Used    Alcohol use No         Current Outpatient Prescriptions:     montelukast (SINGULAIR) 10 mg tablet, TAKE ONE TABLET BY MOUTH DAILY, Disp: 30 Tab, Rfl: 9    furosemide (LASIX) 40 mg tablet, Take 40 mg by mouth two (2) times a day., Disp: , Rfl:     albuterol (PROVENTIL VENTOLIN) 2.5 mg /3 mL (0.083 %) nebulizer solution, by Nebulization route every twelve (12) hours as needed for Wheezing., Disp: , Rfl:     QUEtiapine (SEROQUEL) 25 mg tablet, Take 25 mg by mouth as needed. , Disp: , Rfl:     montelukast (SINGULAIR) 10 mg tablet, Take 10 mg by mouth daily. , Disp: , Rfl:     fluticasone (FLONASE) 50 mcg/actuation nasal spray, 2 Sprays by Both Nostrils route as needed for Rhinitis., Disp: , Rfl:     memantine (NAMENDA) 10 mg tablet, TAKE ONE TABLET BY MOUTH TWICE A DAY, Disp: 60 Tab, Rfl: 10    ELIQUIS 5 mg tablet, TAKE ONE TABLET BY MOUTH EVERY 12 HOURS, Disp: 60 Tab, Rfl: 9    buPROPion XL (WELLBUTRIN XL) 150 mg tablet, Take 1 Tab by mouth every morning., Disp: 30 Tab, Rfl: 5    lisinopril (PRINIVIL, ZESTRIL) 40 mg tablet, TAKE ONE TABLET BY MOUTH DAILY, Disp: 90 Tab, Rfl: 3    arformoterol (BROVANA) 15 mcg/2 mL nebu neb solution, 15 mcg by Nebulization route two (2) times a day., Disp: , Rfl:     budesonide (PULMICORT) 0.5 mg/2 mL nebulizer suspension, 500 mcg by Nebulization route two (2) times a day., Disp: , Rfl:     potassium chloride SR (KLOR-CON 10) 10 mEq tablet, Take 10 mEq by mouth two (2) times a day., Disp: , Rfl:     metoprolol (LOPRESSOR) 25 mg tablet, Take 25 mg by mouth two (2) times a day., Disp: , Rfl:         No Known Allergies     Review of Systems:  A comprehensive review of systems was negative except for: Constitutional: positive for fatigue and malaise  Ears, nose, mouth, throat, and face: positive for hearing loss  Cardiovascular: positive for chest pressure/discomfort, palpitations, irregular heart beats, exertional chest pressure/discomfort  Musculoskeletal: positive for myalgias, arthralgias and stiff joints  Neurological: positive for memory problems, coordination problems, gait problems and weakness  Behvioral/Psych: positive for depression   Vitals:    06/14/17 1422   BP: 122/76   Pulse: 71 SpO2: 96%   Weight: 197 lb (89.4 kg)   Height: 5' 10\" (1.778 m)     Objective:     I      NEUROLOGICAL EXAM:    Appearance: The patient is well developed, well nourished, provides a poor history and is in no acute distress. Mental Status: Oriented to place and person, and the president, patient knows age and date of birth, but difficulty giving much of the history. Cognitive function is abnormal and speech is fluent and no aphasia or dysarthria. Mood and affect appropriate but depressed   Cranial Nerves:   Intact visual fields. Fundi are benign. FOZIA, EOM's full, no nystagmus, no ptosis. Facial sensation is normal. Corneal reflexes are not tested. Facial movement is symmetric. Hearing is abnormal bilaterally. Palate is midline with normal sternocleidomastoid and trapezius muscles are normal. Tongue is midline. Neck without meningismus or bruits  Temporal arteries are not tender or enlarged    Motor:  4/5 strength in upper and lower proximal and distal muscles. Normal bulk and tone. No fasciculations. Reflexes:   Deep tendon reflexes 1+/4 and symmetrical.  No babinski or clonus present   Sensory:   Normal to touch, pinprick and vibration and temperature decreased in both feet. DSS is intact   Gait:  Abnormal gait as patient moves slowly with wide-based gait and has to use a walker that is rolling . Tremor:   No tremor noted. Cerebellar:  Abnormal Romberg and tandem cerebellar signs present. Neurovascular:  Abnormal heart sounds and irregular rhythm, peripheral pulses decreased, and no carotid bruits.            Assessment:           Plan:     Patient will continue aspirin and Eliquis  Patient with new problem of recent fall and syncope and possible TIA, for which she got a carotid Doppler study 1 month ago that looks stable, and patient will continue his current therapy and aspirin therapy  Patient's dementia is getting worse, and I am afraid of Aricept in view of his GI and cardiac history, we will just continue him on Namenda and refills given.   Patient's ER notes all reviewed in detail, CT scan head reviewed, and I agree with interpretations and plans  We encouraged the wife to continue his therapy, keep him mentally and physically active, make sure he uses a walker when walking, and continue his physical therapy  Patient has previous stroke secondary to atrial fibrillation, currently stable on anticoagulation  Patient has senile dementia of Alzheimer's type, stable on Namenda, family does not want cholinesterase inhibitors because of his cardiac problems  Patient counseled for 20 minutes today, told to exercise read and remained mentally and physically active and take vitamins and vitamin D on a regular basis  Refills for his medications in for the patient  30 minutes spent with patient, going over his records, discussing further treatment options, reviewing his x-rays unless [facility] system personally, and discussing his diagnosis prognosis and further treatment and evaluation  Followup in 12 months time or earlier as needed      Signed By: Tarik Cannon MD     June 14, 2017

## 2017-09-06 ENCOUNTER — HOSPITAL ENCOUNTER (EMERGENCY)
Age: 82
Discharge: HOME | End: 2017-09-07
Attending: EMERGENCY MEDICINE
Payer: MEDICARE

## 2017-09-06 ENCOUNTER — APPOINTMENT (OUTPATIENT)
Dept: CT IMAGING | Age: 82
End: 2017-09-06
Attending: EMERGENCY MEDICINE
Payer: MEDICARE

## 2017-09-06 DIAGNOSIS — R10.32 ABDOMINAL PAIN, LLQ (LEFT LOWER QUADRANT): Primary | ICD-10-CM

## 2017-09-06 DIAGNOSIS — R93.89 ABNORMAL FINDING ON CT SCAN: ICD-10-CM

## 2017-09-06 DIAGNOSIS — N39.0 URINARY TRACT INFECTION WITHOUT HEMATURIA, SITE UNSPECIFIED: ICD-10-CM

## 2017-09-06 LAB
ALBUMIN SERPL-MCNC: 3.1 G/DL (ref 3.5–5)
ALBUMIN/GLOB SERPL: 0.9 {RATIO} (ref 1.1–2.2)
ALP SERPL-CCNC: 82 U/L (ref 45–117)
ALT SERPL-CCNC: 20 U/L (ref 12–78)
ANION GAP SERPL CALC-SCNC: 5 MMOL/L (ref 5–15)
APPEARANCE UR: ABNORMAL
AST SERPL-CCNC: 14 U/L (ref 15–37)
BACTERIA URNS QL MICRO: ABNORMAL /HPF
BASOPHILS # BLD: 0 K/UL (ref 0–0.1)
BASOPHILS NFR BLD: 0 % (ref 0–1)
BILIRUB SERPL-MCNC: 1.1 MG/DL (ref 0.2–1)
BILIRUB UR QL: NEGATIVE
BUN SERPL-MCNC: 16 MG/DL (ref 6–20)
BUN/CREAT SERPL: 13 (ref 12–20)
CALCIUM SERPL-MCNC: 8.2 MG/DL (ref 8.5–10.1)
CHLORIDE SERPL-SCNC: 101 MMOL/L (ref 97–108)
CO2 SERPL-SCNC: 32 MMOL/L (ref 21–32)
COLOR UR: ABNORMAL
CREAT BLD-MCNC: 1.2 MG/DL (ref 0.6–1.3)
CREAT SERPL-MCNC: 1.22 MG/DL (ref 0.7–1.3)
EOSINOPHIL # BLD: 0.2 K/UL (ref 0–0.4)
EOSINOPHIL NFR BLD: 2 % (ref 0–7)
EPITH CASTS URNS QL MICRO: ABNORMAL /LPF
ERYTHROCYTE [DISTWIDTH] IN BLOOD BY AUTOMATED COUNT: 13.5 % (ref 11.5–14.5)
GLOBULIN SER CALC-MCNC: 3.3 G/DL (ref 2–4)
GLUCOSE SERPL-MCNC: 92 MG/DL (ref 65–100)
GLUCOSE UR STRIP.AUTO-MCNC: NEGATIVE MG/DL
HCT VFR BLD AUTO: 42.7 % (ref 36.6–50.3)
HGB BLD-MCNC: 14.4 G/DL (ref 12.1–17)
HGB UR QL STRIP: NEGATIVE
HYALINE CASTS URNS QL MICRO: ABNORMAL /LPF (ref 0–5)
KETONES UR QL STRIP.AUTO: NEGATIVE MG/DL
LACTATE SERPL-SCNC: 1.1 MMOL/L (ref 0.4–2)
LEUKOCYTE ESTERASE UR QL STRIP.AUTO: ABNORMAL
LYMPHOCYTES # BLD: 1.5 K/UL (ref 0.8–3.5)
LYMPHOCYTES NFR BLD: 18 % (ref 12–49)
MCH RBC QN AUTO: 28.8 PG (ref 26–34)
MCHC RBC AUTO-ENTMCNC: 33.7 G/DL (ref 30–36.5)
MCV RBC AUTO: 85.4 FL (ref 80–99)
MONOCYTES # BLD: 1.2 K/UL (ref 0–1)
MONOCYTES NFR BLD: 14 % (ref 5–13)
NEUTS SEG # BLD: 5.4 K/UL (ref 1.8–8)
NEUTS SEG NFR BLD: 66 % (ref 32–75)
NITRITE UR QL STRIP.AUTO: NEGATIVE
PH UR STRIP: 6.5 [PH] (ref 5–8)
PLATELET # BLD AUTO: 196 K/UL (ref 150–400)
POTASSIUM SERPL-SCNC: 3.3 MMOL/L (ref 3.5–5.1)
PROT SERPL-MCNC: 6.4 G/DL (ref 6.4–8.2)
PROT UR STRIP-MCNC: NEGATIVE MG/DL
RBC # BLD AUTO: 5 M/UL (ref 4.1–5.7)
RBC #/AREA URNS HPF: ABNORMAL /HPF (ref 0–5)
SODIUM SERPL-SCNC: 138 MMOL/L (ref 136–145)
SP GR UR REFRACTOMETRY: 1.01 (ref 1–1.03)
UA: UC IF INDICATED,UAUC: ABNORMAL
UROBILINOGEN UR QL STRIP.AUTO: 0.2 EU/DL (ref 0.2–1)
WBC # BLD AUTO: 8.3 K/UL (ref 4.1–11.1)
WBC URNS QL MICRO: ABNORMAL /HPF (ref 0–4)

## 2017-09-06 PROCEDURE — 80053 COMPREHEN METABOLIC PANEL: CPT | Performed by: EMERGENCY MEDICINE

## 2017-09-06 PROCEDURE — 87086 URINE CULTURE/COLONY COUNT: CPT | Performed by: EMERGENCY MEDICINE

## 2017-09-06 PROCEDURE — 99285 EMERGENCY DEPT VISIT HI MDM: CPT

## 2017-09-06 PROCEDURE — 82565 ASSAY OF CREATININE: CPT

## 2017-09-06 PROCEDURE — 36415 COLL VENOUS BLD VENIPUNCTURE: CPT | Performed by: EMERGENCY MEDICINE

## 2017-09-06 PROCEDURE — 83605 ASSAY OF LACTIC ACID: CPT | Performed by: EMERGENCY MEDICINE

## 2017-09-06 PROCEDURE — 85025 COMPLETE CBC W/AUTO DIFF WBC: CPT | Performed by: EMERGENCY MEDICINE

## 2017-09-06 PROCEDURE — 87186 SC STD MICRODIL/AGAR DIL: CPT | Performed by: EMERGENCY MEDICINE

## 2017-09-06 PROCEDURE — 74177 CT ABD & PELVIS W/CONTRAST: CPT

## 2017-09-06 PROCEDURE — 87077 CULTURE AEROBIC IDENTIFY: CPT | Performed by: EMERGENCY MEDICINE

## 2017-09-06 PROCEDURE — 81001 URINALYSIS AUTO W/SCOPE: CPT | Performed by: EMERGENCY MEDICINE

## 2017-09-07 VITALS
TEMPERATURE: 97.4 F | RESPIRATION RATE: 18 BRPM | WEIGHT: 190 LBS | BODY MASS INDEX: 28.14 KG/M2 | HEIGHT: 69 IN | DIASTOLIC BLOOD PRESSURE: 74 MMHG | OXYGEN SATURATION: 97 % | HEART RATE: 68 BPM | SYSTOLIC BLOOD PRESSURE: 140 MMHG

## 2017-09-07 PROCEDURE — 74011250637 HC RX REV CODE- 250/637: Performed by: EMERGENCY MEDICINE

## 2017-09-07 PROCEDURE — 74011636320 HC RX REV CODE- 636/320: Performed by: EMERGENCY MEDICINE

## 2017-09-07 PROCEDURE — 74011250636 HC RX REV CODE- 250/636: Performed by: EMERGENCY MEDICINE

## 2017-09-07 RX ORDER — CEPHALEXIN 500 MG/1
500 CAPSULE ORAL 3 TIMES DAILY
Qty: 21 CAP | Refills: 0 | Status: SHIPPED | OUTPATIENT
Start: 2017-09-07 | End: 2017-09-07

## 2017-09-07 RX ORDER — SODIUM CHLORIDE 9 MG/ML
50 INJECTION, SOLUTION INTRAVENOUS
Status: COMPLETED | OUTPATIENT
Start: 2017-09-07 | End: 2017-09-07

## 2017-09-07 RX ORDER — CEPHALEXIN 500 MG/1
500 CAPSULE ORAL 3 TIMES DAILY
Qty: 21 CAP | Refills: 0 | Status: SHIPPED | OUTPATIENT
Start: 2017-09-07 | End: 2017-09-12

## 2017-09-07 RX ORDER — CEPHALEXIN 250 MG/1
500 CAPSULE ORAL
Status: COMPLETED | OUTPATIENT
Start: 2017-09-07 | End: 2017-09-07

## 2017-09-07 RX ORDER — SODIUM CHLORIDE 0.9 % (FLUSH) 0.9 %
10 SYRINGE (ML) INJECTION
Status: COMPLETED | OUTPATIENT
Start: 2017-09-07 | End: 2017-09-07

## 2017-09-07 RX ADMIN — IOPAMIDOL 100 ML: 755 INJECTION, SOLUTION INTRAVENOUS at 00:33

## 2017-09-07 RX ADMIN — Medication 10 ML: at 00:33

## 2017-09-07 RX ADMIN — SODIUM CHLORIDE 50 ML/HR: 900 INJECTION, SOLUTION INTRAVENOUS at 00:33

## 2017-09-07 RX ADMIN — CEPHALEXIN 500 MG: 250 CAPSULE ORAL at 02:18

## 2017-09-07 NOTE — ED NOTES
______Melchor__________________ in to talk with patient and explain plan of care with  understanding and  written & verbal instructions.

## 2017-09-07 NOTE — DISCHARGE INSTRUCTIONS
Urinary Tract Infections in Men: Care Instructions  Your Care Instructions    A urinary tract infection, or UTI, is a general term for an infection anywhere between the kidneys and the tip of the penis. UTIs can also be a result of a prostate problem. Most cause pain or burning when you urinate. Most UTIs are caused by bacteria and can be cured with antibiotics. It is important to complete your treatment so that the infection does not get worse. Follow-up care is a key part of your treatment and safety. Be sure to make and go to all appointments, and call your doctor if you are having problems. It's also a good idea to know your test results and keep a list of the medicines you take. How can you care for yourself at home? · Take your antibiotics as prescribed. Do not stop taking them just because you feel better. You need to take the full course of antibiotics. · Take your medicines exactly as prescribed. Your doctor may have prescribed a medicine, such as phenazopyridine (Pyridium), to help relieve pain when you urinate. This turns your urine orange. You may stop taking it when your symptoms get better. But be sure to take all of your antibiotics, which treat the infection. · Drink extra water for the next day or two. This will help make the urine less concentrated and help wash out the bacteria causing the infection. (If you have kidney, heart, or liver disease and have to limit your fluids, talk with your doctor before you increase your fluid intake.)  · Avoid drinks that are carbonated or have caffeine. They can irritate the bladder. · Urinate often. Try to empty your bladder each time. · To relieve pain, take a hot bath or lay a heating pad (set on low) over your lower belly or genital area. Never go to sleep with a heating pad in place. To help prevent UTIs  · Drink plenty of fluids, enough so that your urine is light yellow or clear like water.  If you have kidney, heart, or liver disease and have to limit fluids, talk with your doctor before you increase the amount of fluids you drink. · Urinate when you have the urge. Do not hold your urine for a long time. Urinate before you go to sleep. · Keep your penis clean. Catheter care  If you have a drainage tube (catheter) in place, the following steps will help you care for it. · Always wash your hands before and after touching your catheter. · Check the area around the urethra for inflammation or signs of infection. Signs of infection include irritated, swollen, red, or tender skin, or pus around the catheter. · Clean the area around the catheter with soap and water two times a day. Dry with a clean towel afterward. · Do not apply powder or lotion to the skin around the catheter. To empty the urine collection bag  · Wash your hands with soap and water. · Without touching the drain spout, remove the spout from its sleeve at the bottom of the collection bag. Open the valve on the spout. · Let the urine flow out of the bag and into the toilet or a container. Do not let the tubing or drain spout touch anything. · After you empty the bag, clean the end of the drain spout with tissue and water. Close the valve and put the drain spout back into its sleeve at the bottom of the collection bag. · Wash your hands with soap and water. When should you call for help? Call your doctor now or seek immediate medical care if:  · Symptoms such as a fever, chills, nausea, or vomiting get worse or happen for the first time. · You have new pain in your back just below your rib cage. This is called flank pain. · There is new blood or pus in your urine. · You are not able to take or keep down your antibiotics. Watch closely for changes in your health, and be sure to contact your doctor if:  · You are not getting better after taking an antibiotic for 2 days. · Your symptoms go away but then come back. Where can you learn more?   Go to http://rosey-wilmer.info/. Enter M428 in the search box to learn more about \"Urinary Tract Infections in Men: Care Instructions. \"  Current as of: November 28, 2016  Content Version: 11.3  © 7447-6623 [employer]. Care instructions adapted under license by [employer] (which disclaims liability or warranty for this information). If you have questions about a medical condition or this instruction, always ask your healthcare professional. [address] any warranty or liability for your use of this information. Abdominal Pain: Care Instructions  Your Care Instructions    Abdominal pain has many possible causes. Some aren't serious and get better on their own in a few days. Others need more testing and treatment. If your pain continues or gets worse, you need to be rechecked and may need more tests to find out what is wrong. You may need surgery to correct the problem. Don't ignore new symptoms, such as fever, nausea and vomiting, urination problems, pain that gets worse, and dizziness. These may be signs of a more serious problem. Your doctor may have recommended a follow-up visit in the next 8 to 12 hours. If you are not getting better, you may need more tests or treatment. The doctor has checked you carefully, but problems can develop later. If you notice any problems or new symptoms, get medical treatment right away. Follow-up care is a key part of your treatment and safety. Be sure to make and go to all appointments, and call your doctor if you are having problems. It's also a good idea to know your test results and keep a list of the medicines you take. How can you care for yourself at home? · Rest until you feel better. · To prevent dehydration, drink plenty of fluids, enough so that your urine is light yellow or clear like water. Choose water and other caffeine-free clear liquids until you feel better.  If you have kidney, heart, or liver disease and have to limit fluids, talk with your doctor before you increase the amount of fluids you drink. · If your stomach is upset, eat mild foods, such as rice, dry toast or crackers, bananas, and applesauce. Try eating several small meals instead of two or three large ones. · Wait until 48 hours after all symptoms have gone away before you have spicy foods, alcohol, and drinks that contain caffeine. · Do not eat foods that are high in fat. · Avoid anti-inflammatory medicines such as aspirin, ibuprofen (Advil, Motrin), and naproxen (Aleve). These can cause stomach upset. Talk to your doctor if you take daily aspirin for another health problem. When should you call for help? Call 911 anytime you think you may need emergency care. For example, call if:  · You passed out (lost consciousness). · You pass maroon or very bloody stools. · You vomit blood or what looks like coffee grounds. · You have new, severe belly pain. Call your doctor now or seek immediate medical care if:  · Your pain gets worse, especially if it becomes focused in one area of your belly. · You have a new or higher fever. · Your stools are black and look like tar, or they have streaks of blood. · You have unexpected vaginal bleeding. · You have symptoms of a urinary tract infection. These may include:  ¨ Pain when you urinate. ¨ Urinating more often than usual.  ¨ Blood in your urine. · You are dizzy or lightheaded, or you feel like you may faint. Watch closely for changes in your health, and be sure to contact your doctor if:  · You are not getting better after 1 day (24 hours). Where can you learn more? Go to http://rosey-wilmer.info/. Enter C392 in the search box to learn more about \"Abdominal Pain: Care Instructions. \"  Current as of: March 20, 2017  Content Version: 11.3  © 7522-0353 [employer].  Care instructions adapted under license by [employer] (which disclaims liability or warranty for this information). If you have questions about a medical condition or this instruction, always ask your healthcare professional. [place] [address] any warranty or liability for your use of this information.

## 2017-09-07 NOTE — ED PROVIDER NOTES
HPI Comments: Pedro Martinez is a [de-identified] y.o. male with PMhx significant for HTN, A-fib, Dementia, asthma, and CVA who presents ambulatory with wife to the ED with c/o intermittent, left-sided abdominal pain x 2 days. The pt notes associated lower back pain and slight nausea. He states the pain is exacerbated with movement. Pt lives in an assisted living facility, per the nursing staff, they report giving the pt pain medications without relief of symptoms. Of note, he reports a past history of diverticulitis and colitis. He specifically denies any fevers, chills, vomiting, chest pain, shortness of breath, headache, dsyuria, hematuria, constipation, or diarrhea. PCP: None    Social history significant for: - Tobacco, - EtOH, - Illicit Drug Use    There are no other complaints, changes, or physical findings at this time. The history is provided by the patient and the spouse. No  was used. Past Medical History:   Diagnosis Date    Allergy     seasonal    Arrhythmia     Asthma     Dementia     Depression     Heart failure (HCC)     Hypertension     Neurological disorder     dementia    Other ill-defined conditions     a-fib    Stroke [facility])        Past Surgical History:   Procedure Laterality Date    HX HEENT      cateract surgery         Family History:   Problem Relation Age of Onset    Stroke Mother     Heart Disease Mother     Cancer Father      prostate    Cancer Brother      colon       Social History     Social History    Marital status:      Spouse name: N/A    Number of children: N/A    Years of education: N/A     Occupational History    Not on file.      Social History Main Topics    Smoking status: Never Smoker    Smokeless tobacco: Never Used    Alcohol use No    Drug use: No    Sexual activity: No     Other Topics Concern    Not on file     Social History Narrative         ALLERGIES: Review of patient's allergies indicates no known allergies. Review of Systems   Constitutional: Negative for chills and fever. HENT: Negative. Negative for congestion, rhinorrhea, sneezing and sore throat. Eyes: Negative. Negative for redness and visual disturbance. Respiratory: Negative. Negative for cough, shortness of breath and wheezing. Cardiovascular: Negative. Negative for chest pain and leg swelling. Gastrointestinal: Positive for abdominal pain and nausea. Negative for constipation, diarrhea and vomiting. Genitourinary: Negative. Negative for difficulty urinating, discharge, dysuria, frequency and hematuria. Musculoskeletal: Positive for back pain. Negative for arthralgias, myalgias and neck stiffness. Skin: Negative. Negative for color change and rash. Neurological: Negative. Negative for dizziness, syncope, weakness, numbness and headaches. Hematological: Negative for adenopathy. Psychiatric/Behavioral: Negative. All other systems reviewed and are negative. Vitals:    09/06/17 2335 09/06/17 2345 09/07/17 0001 09/07/17 0141   BP: 144/77 134/73 142/90 140/74   Pulse:       Resp:       Temp:       SpO2: 92% 97% 97%    Weight:       Height:                Physical Exam   Constitutional: He is oriented to person, place, and time. Non-toxic appearance. No distress. Appears comfortable    HENT:   Head: Atraumatic. Eyes: EOM are normal.   Cardiovascular: Normal rate, regular rhythm, normal heart sounds and intact distal pulses. Exam reveals no gallop and no friction rub. No murmur heard. Pulmonary/Chest: Effort normal and breath sounds normal. No respiratory distress. He has no wheezes. He has no rales. He exhibits no tenderness. Abdominal: Soft. Bowel sounds are normal. He exhibits no distension and no mass. There is tenderness (mild) in the suprapubic area. There is CVA tenderness (Left). There is no rebound and no guarding. Musculoskeletal: Normal range of motion. He exhibits no edema or tenderness. Inconsistent L flank tenderness   Neurological: He is alert and oriented to person, place, and time. Psychiatric: He has a normal mood and affect. Nursing note and vitals reviewed. MDM  Number of Diagnoses or Management Options  Diagnosis management comments: DDx: UTI, Pyelo, diverticulitis, diverticulosis, low suspicion urethra stone, colitis  Will reevaluate after labs and CT scan. Pt declines pain medications at this time. 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 LABORATORY TESTS:  Recent Results (from the past 12 hour(s))   CBC WITH AUTOMATED DIFF    Collection Time: 09/06/17 11:01 PM   Result Value Ref Range    WBC 8.3 4.1 - 11.1 K/uL    RBC 5.00 4. 10 - 5.70 M/uL    HGB 14.4 12.1 - 17.0 g/dL    HCT 42.7 36.6 - 50.3 %    MCV 85.4 80.0 - 99.0 FL    MCH 28.8 26.0 - 34.0 PG    MCHC 33.7 30.0 - 36.5 g/dL    RDW 13.5 11.5 - 14.5 %    PLATELET 021 195 - 730 K/uL    NEUTROPHILS 66 32 - 75 %    LYMPHOCYTES 18 12 - 49 %    MONOCYTES 14 (H) 5 - 13 %    EOSINOPHILS 2 0 - 7 %    BASOPHILS 0 0 - 1 %    ABS. NEUTROPHILS 5.4 1.8 - 8.0 K/UL    ABS. LYMPHOCYTES 1.5 0.8 - 3.5 K/UL    ABS. MONOCYTES 1.2 (H) 0.0 - 1.0 K/UL    ABS. EOSINOPHILS 0.2 0.0 - 0.4 K/UL    ABS.  BASOPHILS 0.0 0.0 - 0.1 K/UL   METABOLIC PANEL, COMPREHENSIVE    Collection Time: 09/06/17 11:01 PM   Result Value Ref Range    Sodium 138 136 - 145 mmol/L    Potassium 3.3 (L) 3.5 - 5.1 mmol/L    Chloride 101 97 - 108 mmol/L    CO2 32 21 - 32 mmol/L    Anion gap 5 5 - 15 mmol/L    Glucose 92 65 - 100 mg/dL    BUN 16 6 - 20 MG/DL Creatinine 1.22 0.70 - 1.30 MG/DL    BUN/Creatinine ratio 13 12 - 20      GFR est AA >60 >60 ml/min/1.73m2    GFR est non-AA 57 (L) >60 ml/min/1.73m2    Calcium 8.2 (L) 8.5 - 10.1 MG/DL    Bilirubin, total 1.1 (H) 0.2 - 1.0 MG/DL    ALT (SGPT) 20 12 - 78 U/L    AST (SGOT) 14 (L) 15 - 37 U/L    Alk. phosphatase 82 45 - 117 U/L    Protein, total 6.4 6.4 - 8.2 g/dL    Albumin 3.1 (L) 3.5 - 5.0 g/dL    Globulin 3.3 2.0 - 4.0 g/dL    A-G Ratio 0.9 (L) 1.1 - 2.2     URINALYSIS W/ REFLEX CULTURE    Collection Time: 09/06/17 11:01 PM   Result Value Ref Range    Color YELLOW/STRAW      Appearance CLOUDY (A) CLEAR      Specific gravity 1.013 1.003 - 1.030      pH (UA) 6.5 5.0 - 8.0      Protein NEGATIVE  NEG mg/dL    Glucose NEGATIVE  NEG mg/dL    Ketone NEGATIVE  NEG mg/dL    Bilirubin NEGATIVE  NEG      Blood NEGATIVE  NEG      Urobilinogen 0.2 0.2 - 1.0 EU/dL    Nitrites NEGATIVE  NEG      Leukocyte Esterase MODERATE (A) NEG      WBC 20-50 0 - 4 /hpf    RBC 0-5 0 - 5 /hpf    Epithelial cells FEW FEW /lpf    Bacteria 2+ (A) NEG /hpf    UA:UC IF INDICATED URINE CULTURE ORDERED (A) CNI      Hyaline cast 0-2 0 - 5 /lpf   LACTIC ACID    Collection Time: 09/06/17 11:01 PM   Result Value Ref Range    Lactic acid 1.1 0.4 - 2.0 MMOL/L   POC CREATININE    Collection Time: 09/06/17 11:46 PM   Result Value Ref Range    Creatinine (POC) 1.2 0.6 - 1.3 MG/DL    GFRAA, POC >60 >60 ml/min/1.73m2    GFRNA, POC 58 (L) >60 ml/min/1.73m2       IMAGING RESULTS:  CT ABD PELV W CONT   Final Result   CT ABDOMEN AND PELVIS WITH CONTRAST. 9/7/2017 12:44 AM      INDICATION: Left lower quadrant abdominal pain for 2 days.     COMPARISON: 12/7/2016.     TECHNIQUE: CT of the abdomen and pelvis was performed after the administration  of 100 cc IV Isovue-370.  CT dose reduction was achieved through use of a  standardized protocol tailored for this examination and automatic exposure  control for dose modulation.     FINDINGS:  Vascular: Soft plaque causes approximately 50% stenosis of the proximal SMA. The  lumen at the stenotic segment on image [id number] measures 3 mm, versus the 7 mm  more normal distal lumen on image [id number]. This has progressed from 12/7/2016. A  diffuse saccular aneurysm of the left common iliac artery measures 21 mm. The  bilateral atria are enlarged. Left anterior descending and right coronary  calcifications are moderate.     Abdomen: There is mild bilateral renal cortical scarring. Incidental note is  made of calcifications in segments 7 of the liver. The distal esophagus,  stomach, duodenum, liver, gallbladder, pancreas, spleen, and adrenals are  otherwise normal.     Pelvis: Descending and sigmoid diverticulosis is moderate. The small bowel,  ileocecal junction, appendix, colon, and bladder are otherwise normal. The  prostate is enlarged. No free air or fluid, and no abdominopelvic  lymphadenopathy.     IMPRESSION  IMPRESSION:   1. No acute process in the abdomen and pelvis. 2. 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 Progressed from 12/7/2016.  3. 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 MEDICATIONS GIVEN:  Medications   sodium chloride (NS) flush 10 mL (10 mL IntraVENous Given 9/7/17 0033)   iopamidol (ISOVUE-370) 76 % injection 100 mL (100 mL IntraVENous Given 9/7/17 0033)   0.9% sodium chloride infusion (50 mL/hr IntraVENous New Bag 9/7/17 0033)   cephALEXin (KEFLEX) capsule 500 mg (500 mg Oral Given 9/7/17 0218)       IMPRESSION:  1. Abdominal pain, LLQ (left lower quadrant)    2. Abnormal finding on CT scan    3. Urinary tract infection without hematuria, site unspecified        PLAN:  1. Current Discharge Medication List      START taking these medications    Details   cephALEXin (KEFLEX) 500 mg capsule Take 1 Cap by mouth three (3) times daily. Qty: 21 Cap, Refills: 0           2.    Follow-up Information     Follow up With Details Comments [address] [place], DO In 3 days Dasia Canales MD In 1 week TO FOLLOW-UP ON THE RESULTS OF YOUR CT SCAN SHOWING THE STENOSIS OF YOUR SMA AND THE ANEURYSM OF YOUR ILIAC ARTERY. [address]  [address] [facility]  [phone]      [facility] EMERGENCY DEPT  As needed, If symptoms worsen [address] [place] [address]  [phone]        Return to ED if worse     Discharge Note:  2:15 AM  The pt is ready for discharge. The pt's signs, symptoms, diagnosis, and discharge instructions have been discussed and pt has conveyed their understanding. The pt is to follow up as recommended or return to ER should their symptoms worsen. Plan has been discussed and pt is in agreement. This note is prepared by Gloria Ferrera, acting as a Scribe for Faviola Mckeon MD.    Faviola Mckeon MD: The scribe's documentation has been prepared under my direction and personally reviewed by me in its entirety. I confirm that the notes above accurately reflects all work, treatment, procedures, and medical decision making performed by me.

## 2017-09-08 ENCOUNTER — PATIENT OUTREACH (OUTPATIENT)
Dept: INTERNAL MEDICINE CLINIC | Age: 82
End: 2017-09-08

## 2017-09-08 NOTE — PROGRESS NOTES
Outgoing call made to Jakob Steiner. States patient is a resident at [facility]  under the care of Tutu Blakely NP and Dr. Francisca Rand of the [facility].

## 2017-09-09 LAB
BACTERIA SPEC CULT: ABNORMAL
CC UR VC: ABNORMAL
SERVICE CMNT-IMP: ABNORMAL

## 2017-09-09 NOTE — PROGRESS NOTES
Attempted to reach patient, but no record of which assisted living noted in chart. Contacted family who verified he was a resident of [facility]. Contacted the facility and after multiple attempts to reach the nursing station, was advised by the  \"they aren't answering the phone, I would try back later\". Will reattempt to reach, as the Keflex the patient was discharged on appears resistant.

## 2017-09-10 NOTE — PROGRESS NOTES
Attempted again to reach patient the evening of 9/9/17 without ever reaching a person (transferred by phone prompt to one area, then another, then the phone would ring busy). Attempted this am to reach a nurse in order to fax the records for the attending to change this patient's antibiotic, but was transferred to a voice mail. A message was left requesting return call, as the VM stated they check the VM line every few hours throughout the day. If no return call, will try once more to reach someone via phone and if unsuccessful will recontact the patient's family and request they have the staff at the assisted living call us or provide a direct number so we might speak with an individual who can provide a fax number for this patient's nursing station.

## 2017-09-12 ENCOUNTER — APPOINTMENT (OUTPATIENT)
Dept: ULTRASOUND IMAGING | Age: 82
DRG: 690 | End: 2017-09-12
Attending: EMERGENCY MEDICINE
Payer: MEDICARE

## 2017-09-12 ENCOUNTER — HOSPITAL ENCOUNTER (INPATIENT)
Age: 82
LOS: 1 days | Discharge: NURSING, REHAB OR LONG-TERM CARE | DRG: 690 | End: 2017-09-15
Attending: EMERGENCY MEDICINE | Admitting: HOSPITALIST
Payer: MEDICARE

## 2017-09-12 DIAGNOSIS — R33.9 URINARY RETENTION: ICD-10-CM

## 2017-09-12 DIAGNOSIS — N17.9 ACUTE KIDNEY INJURY (HCC): Primary | ICD-10-CM

## 2017-09-12 PROBLEM — N39.0 UTI (URINARY TRACT INFECTION): Status: ACTIVE | Noted: 2017-09-12

## 2017-09-12 PROBLEM — N12 PYELONEPHRITIS: Status: ACTIVE | Noted: 2017-09-12

## 2017-09-12 PROBLEM — N39.0 URINARY TRACT INFECTION: Status: ACTIVE | Noted: 2017-09-12

## 2017-09-12 LAB
ALBUMIN SERPL-MCNC: 3.7 G/DL (ref 3.5–5)
ALBUMIN/GLOB SERPL: 0.9 {RATIO} (ref 1.1–2.2)
ALP SERPL-CCNC: 92 U/L (ref 45–117)
ALT SERPL-CCNC: 32 U/L (ref 12–78)
ANION GAP SERPL CALC-SCNC: 16 MMOL/L (ref 5–15)
APPEARANCE UR: CLEAR
AST SERPL-CCNC: 40 U/L (ref 15–37)
BACTERIA URNS QL MICRO: NEGATIVE /HPF
BASOPHILS # BLD: 0 K/UL (ref 0–0.1)
BASOPHILS NFR BLD: 0 % (ref 0–1)
BILIRUB SERPL-MCNC: 1.4 MG/DL (ref 0.2–1)
BILIRUB UR QL: NEGATIVE
BUN SERPL-MCNC: 29 MG/DL (ref 6–20)
BUN/CREAT SERPL: 15 (ref 12–20)
CALCIUM SERPL-MCNC: 9.5 MG/DL (ref 8.5–10.1)
CHLORIDE SERPL-SCNC: 100 MMOL/L (ref 97–108)
CO2 SERPL-SCNC: 25 MMOL/L (ref 21–32)
COLOR UR: ABNORMAL
CREAT SERPL-MCNC: 1.88 MG/DL (ref 0.7–1.3)
EOSINOPHIL # BLD: 0 K/UL (ref 0–0.4)
EOSINOPHIL NFR BLD: 0 % (ref 0–7)
EPITH CASTS URNS QL MICRO: ABNORMAL /LPF
ERYTHROCYTE [DISTWIDTH] IN BLOOD BY AUTOMATED COUNT: 13.8 % (ref 11.5–14.5)
GLOBULIN SER CALC-MCNC: 3.9 G/DL (ref 2–4)
GLUCOSE SERPL-MCNC: 78 MG/DL (ref 65–100)
GLUCOSE UR STRIP.AUTO-MCNC: NEGATIVE MG/DL
HCT VFR BLD AUTO: 47.3 % (ref 36.6–50.3)
HGB BLD-MCNC: 16.2 G/DL (ref 12.1–17)
HGB UR QL STRIP: NEGATIVE
KETONES UR QL STRIP.AUTO: NEGATIVE MG/DL
LACTATE SERPL-SCNC: 1.4 MMOL/L (ref 0.4–2)
LEUKOCYTE ESTERASE UR QL STRIP.AUTO: NEGATIVE
LYMPHOCYTES # BLD: 1 K/UL (ref 0.8–3.5)
LYMPHOCYTES NFR BLD: 14 % (ref 12–49)
MCH RBC QN AUTO: 29.7 PG (ref 26–34)
MCHC RBC AUTO-ENTMCNC: 34.2 G/DL (ref 30–36.5)
MCV RBC AUTO: 86.8 FL (ref 80–99)
MONOCYTES # BLD: 0.9 K/UL (ref 0–1)
MONOCYTES NFR BLD: 12 % (ref 5–13)
MUCOUS THREADS URNS QL MICRO: ABNORMAL /LPF
NEUTS SEG # BLD: 5.1 K/UL (ref 1.8–8)
NEUTS SEG NFR BLD: 74 % (ref 32–75)
NITRITE UR QL STRIP.AUTO: NEGATIVE
PH UR STRIP: 5 [PH] (ref 5–8)
PLATELET # BLD AUTO: 282 K/UL (ref 150–400)
POTASSIUM SERPL-SCNC: 3.8 MMOL/L (ref 3.5–5.1)
PROT SERPL-MCNC: 7.6 G/DL (ref 6.4–8.2)
PROT UR STRIP-MCNC: NEGATIVE MG/DL
RBC # BLD AUTO: 5.45 M/UL (ref 4.1–5.7)
RBC #/AREA URNS HPF: ABNORMAL /HPF (ref 0–5)
SODIUM SERPL-SCNC: 141 MMOL/L (ref 136–145)
SP GR UR REFRACTOMETRY: 1.01 (ref 1–1.03)
UR CULT HOLD, URHOLD: NORMAL
UROBILINOGEN UR QL STRIP.AUTO: 0.2 EU/DL (ref 0.2–1)
WBC # BLD AUTO: 6.9 K/UL (ref 4.1–11.1)
WBC URNS QL MICRO: ABNORMAL /HPF (ref 0–4)

## 2017-09-12 PROCEDURE — 77030005514 HC CATH URETH FOL14 BARD -A

## 2017-09-12 PROCEDURE — 83605 ASSAY OF LACTIC ACID: CPT | Performed by: INTERNAL MEDICINE

## 2017-09-12 PROCEDURE — 74011250636 HC RX REV CODE- 250/636: Performed by: EMERGENCY MEDICINE

## 2017-09-12 PROCEDURE — 36415 COLL VENOUS BLD VENIPUNCTURE: CPT | Performed by: EMERGENCY MEDICINE

## 2017-09-12 PROCEDURE — 96361 HYDRATE IV INFUSION ADD-ON: CPT

## 2017-09-12 PROCEDURE — 85025 COMPLETE CBC W/AUTO DIFF WBC: CPT | Performed by: EMERGENCY MEDICINE

## 2017-09-12 PROCEDURE — 74011000250 HC RX REV CODE- 250: Performed by: INTERNAL MEDICINE

## 2017-09-12 PROCEDURE — 96365 THER/PROPH/DIAG IV INF INIT: CPT

## 2017-09-12 PROCEDURE — 87086 URINE CULTURE/COLONY COUNT: CPT | Performed by: EMERGENCY MEDICINE

## 2017-09-12 PROCEDURE — 51798 US URINE CAPACITY MEASURE: CPT

## 2017-09-12 PROCEDURE — 77030029684 HC NEB SM VOL KT MONA -A

## 2017-09-12 PROCEDURE — 94640 AIRWAY INHALATION TREATMENT: CPT

## 2017-09-12 PROCEDURE — 74011000258 HC RX REV CODE- 258: Performed by: INTERNAL MEDICINE

## 2017-09-12 PROCEDURE — 76770 US EXAM ABDO BACK WALL COMP: CPT

## 2017-09-12 PROCEDURE — 74011250637 HC RX REV CODE- 250/637: Performed by: INTERNAL MEDICINE

## 2017-09-12 PROCEDURE — 87040 BLOOD CULTURE FOR BACTERIA: CPT | Performed by: INTERNAL MEDICINE

## 2017-09-12 PROCEDURE — 74011250636 HC RX REV CODE- 250/636: Performed by: INTERNAL MEDICINE

## 2017-09-12 PROCEDURE — 51702 INSERT TEMP BLADDER CATH: CPT

## 2017-09-12 PROCEDURE — 80053 COMPREHEN METABOLIC PANEL: CPT | Performed by: EMERGENCY MEDICINE

## 2017-09-12 PROCEDURE — 99218 HC RM OBSERVATION: CPT

## 2017-09-12 PROCEDURE — 81001 URINALYSIS AUTO W/SCOPE: CPT | Performed by: EMERGENCY MEDICINE

## 2017-09-12 PROCEDURE — 99285 EMERGENCY DEPT VISIT HI MDM: CPT

## 2017-09-12 RX ORDER — SODIUM FLUORIDE 5 MG/G
PASTE, DENTIFRICE DENTAL 4 TIMES DAILY
COMMUNITY
End: 2022-03-04

## 2017-09-12 RX ORDER — ARFORMOTEROL TARTRATE 15 UG/2ML
15 SOLUTION RESPIRATORY (INHALATION)
Status: DISCONTINUED | OUTPATIENT
Start: 2017-09-12 | End: 2017-09-15 | Stop reason: HOSPADM

## 2017-09-12 RX ORDER — MONTELUKAST SODIUM 10 MG/1
10 TABLET ORAL DAILY
Status: DISCONTINUED | OUTPATIENT
Start: 2017-09-13 | End: 2017-09-15 | Stop reason: HOSPADM

## 2017-09-12 RX ORDER — FUROSEMIDE 40 MG/1
40 TABLET ORAL 2 TIMES DAILY
Status: DISCONTINUED | OUTPATIENT
Start: 2017-09-12 | End: 2017-09-15 | Stop reason: HOSPADM

## 2017-09-12 RX ORDER — TRAMADOL HYDROCHLORIDE 50 MG/1
50 TABLET ORAL 3 TIMES DAILY
Status: DISCONTINUED | OUTPATIENT
Start: 2017-09-12 | End: 2017-09-15 | Stop reason: HOSPADM

## 2017-09-12 RX ORDER — BUDESONIDE 0.5 MG/2ML
500 INHALANT ORAL
Status: DISCONTINUED | OUTPATIENT
Start: 2017-09-12 | End: 2017-09-15 | Stop reason: HOSPADM

## 2017-09-12 RX ORDER — ALBUTEROL SULFATE 0.83 MG/ML
2.5 SOLUTION RESPIRATORY (INHALATION)
COMMUNITY
End: 2022-03-04

## 2017-09-12 RX ORDER — BUPROPION HYDROCHLORIDE 150 MG/1
150 TABLET, EXTENDED RELEASE ORAL DAILY
Status: DISCONTINUED | OUTPATIENT
Start: 2017-09-13 | End: 2017-09-15 | Stop reason: HOSPADM

## 2017-09-12 RX ORDER — SODIUM FLUORIDE 0.5 MG/ML
0.25 SOLUTION/ DROPS ORAL DAILY
Status: DISCONTINUED | OUTPATIENT
Start: 2017-09-13 | End: 2017-09-12

## 2017-09-12 RX ORDER — SODIUM CHLORIDE 9 MG/ML
50 INJECTION, SOLUTION INTRAVENOUS CONTINUOUS
Status: DISCONTINUED | OUTPATIENT
Start: 2017-09-12 | End: 2017-09-15 | Stop reason: HOSPADM

## 2017-09-12 RX ORDER — SODIUM CHLORIDE 0.9 % (FLUSH) 0.9 %
5-10 SYRINGE (ML) INJECTION AS NEEDED
Status: DISCONTINUED | OUTPATIENT
Start: 2017-09-12 | End: 2017-09-15 | Stop reason: HOSPADM

## 2017-09-12 RX ORDER — METOPROLOL TARTRATE 25 MG/1
25 TABLET, FILM COATED ORAL 2 TIMES DAILY
Status: DISCONTINUED | OUTPATIENT
Start: 2017-09-12 | End: 2017-09-15 | Stop reason: HOSPADM

## 2017-09-12 RX ORDER — BUDESONIDE 0.5 MG/2ML
500 INHALANT ORAL 2 TIMES DAILY
COMMUNITY
End: 2022-03-04

## 2017-09-12 RX ORDER — FLUTICASONE PROPIONATE 50 MCG
2 SPRAY, SUSPENSION (ML) NASAL DAILY
Status: DISCONTINUED | OUTPATIENT
Start: 2017-09-13 | End: 2017-09-15 | Stop reason: HOSPADM

## 2017-09-12 RX ORDER — FLUTICASONE PROPIONATE 50 MCG
2 SPRAY, SUSPENSION (ML) NASAL
COMMUNITY
End: 2022-03-04

## 2017-09-12 RX ORDER — LISINOPRIL 20 MG/1
40 TABLET ORAL DAILY
Status: DISCONTINUED | OUTPATIENT
Start: 2017-09-13 | End: 2017-09-15 | Stop reason: HOSPADM

## 2017-09-12 RX ORDER — SODIUM CHLORIDE 0.9 % (FLUSH) 0.9 %
5-10 SYRINGE (ML) INJECTION EVERY 8 HOURS
Status: DISCONTINUED | OUTPATIENT
Start: 2017-09-12 | End: 2017-09-15 | Stop reason: HOSPADM

## 2017-09-12 RX ORDER — MELATONIN
1000 DAILY
COMMUNITY
End: 2022-03-04

## 2017-09-12 RX ORDER — TRAMADOL HYDROCHLORIDE 50 MG/1
50 TABLET ORAL 3 TIMES DAILY
COMMUNITY
Start: 2017-09-12 | End: 2017-09-15

## 2017-09-12 RX ORDER — MELATONIN
1000 DAILY
Status: DISCONTINUED | OUTPATIENT
Start: 2017-09-13 | End: 2017-09-15 | Stop reason: HOSPADM

## 2017-09-12 RX ORDER — TRAMADOL HYDROCHLORIDE AND ACETAMINOPHEN 37.5; 325 MG/1; MG/1
1 TABLET ORAL
COMMUNITY
End: 2022-03-04

## 2017-09-12 RX ORDER — MEMANTINE HYDROCHLORIDE 10 MG/1
10 TABLET ORAL DAILY
Status: DISCONTINUED | OUTPATIENT
Start: 2017-09-13 | End: 2017-09-15 | Stop reason: HOSPADM

## 2017-09-12 RX ORDER — TAMSULOSIN HYDROCHLORIDE 0.4 MG/1
0.4 CAPSULE ORAL DAILY
Status: DISCONTINUED | OUTPATIENT
Start: 2017-09-13 | End: 2017-09-15 | Stop reason: HOSPADM

## 2017-09-12 RX ORDER — LISINOPRIL 40 MG/1
40 TABLET ORAL DAILY
COMMUNITY
End: 2022-03-04

## 2017-09-12 RX ORDER — ARFORMOTEROL TARTRATE 15 UG/2ML
15 SOLUTION RESPIRATORY (INHALATION) 2 TIMES DAILY
COMMUNITY
End: 2022-03-04

## 2017-09-12 RX ORDER — POTASSIUM CHLORIDE 750 MG/1
10 TABLET, FILM COATED, EXTENDED RELEASE ORAL 2 TIMES DAILY
Status: DISCONTINUED | OUTPATIENT
Start: 2017-09-12 | End: 2017-09-15 | Stop reason: HOSPADM

## 2017-09-12 RX ORDER — BUPROPION HYDROCHLORIDE 150 MG/1
150 TABLET ORAL
Status: DISCONTINUED | OUTPATIENT
Start: 2017-09-13 | End: 2017-09-12 | Stop reason: CLARIF

## 2017-09-12 RX ORDER — CIPROFLOXACIN 500 MG/1
500 TABLET ORAL 2 TIMES DAILY
COMMUNITY
Start: 2017-09-12 | End: 2017-09-15

## 2017-09-12 RX ORDER — ALBUTEROL SULFATE 0.83 MG/ML
2.5 SOLUTION RESPIRATORY (INHALATION)
Status: DISCONTINUED | OUTPATIENT
Start: 2017-09-12 | End: 2017-09-15 | Stop reason: HOSPADM

## 2017-09-12 RX ADMIN — Medication 10 ML: at 22:22

## 2017-09-12 RX ADMIN — SODIUM CHLORIDE 100 ML/HR: 900 INJECTION, SOLUTION INTRAVENOUS at 15:05

## 2017-09-12 RX ADMIN — FUROSEMIDE 40 MG: 40 TABLET ORAL at 18:47

## 2017-09-12 RX ADMIN — POTASSIUM CHLORIDE 10 MEQ: 750 TABLET, FILM COATED, EXTENDED RELEASE ORAL at 18:47

## 2017-09-12 RX ADMIN — ALBUTEROL SULFATE 2.5 MG: 2.5 SOLUTION RESPIRATORY (INHALATION) at 23:42

## 2017-09-12 RX ADMIN — BUDESONIDE 500 MCG: 0.5 INHALANT RESPIRATORY (INHALATION) at 23:42

## 2017-09-12 RX ADMIN — TRAMADOL HYDROCHLORIDE 50 MG: 50 TABLET, FILM COATED ORAL at 22:21

## 2017-09-12 RX ADMIN — CEFTRIAXONE 1 G: 1 INJECTION, POWDER, FOR SOLUTION INTRAMUSCULAR; INTRAVENOUS at 16:59

## 2017-09-12 RX ADMIN — Medication 10 ML: at 22:21

## 2017-09-12 RX ADMIN — METOPROLOL TARTRATE 25 MG: 25 TABLET ORAL at 18:47

## 2017-09-12 RX ADMIN — TRAMADOL HYDROCHLORIDE 50 MG: 50 TABLET, FILM COATED ORAL at 18:47

## 2017-09-12 RX ADMIN — APIXABAN 2.5 MG: 2.5 TABLET, FILM COATED ORAL at 22:21

## 2017-09-12 NOTE — DISCHARGE SUMMARY
History & Physical    Primary Care Provider: None  Source of Information: Patient and his wife    History of Presenting Illness:   Ministerio Niño is a [de-identified] y.o. male who presents with Past medical history of dementia, Atrial fibrillation, depression, hypertension presented with frequency urgency, and burning sensation during urination associated with nausea and loss of appetite for the last one week. Patient went to PMD office for the above complaint one week back and he was given oral antibiotics (ciprofloxacin and keflex) with no improvement over the last one week back. Over the last two days his symptoms which were mentioned above became worse associated with difficulty of urinating, and hesitancy. Currently has no fever, chills, or rigor. At the ED urinary catheter produced greater than 1 litre of urine and patient stated that he is relieved now. The patient denies any fever, chills, chest pain, cough, congestion, recent illness, palpitations, or dysuria. Review of Systems:  A comprehensive review of systems was negative except for that written in the History of Present Illness. Past Medical History:   Diagnosis Date    Allergy     seasonal    Arrhythmia     Asthma     Dementia     Depression     Heart failure (HCC)     Hypertension     Neurological disorder     dementia    Other ill-defined conditions     a-fib    Stroke [facility])       Past Surgical History:   Procedure Laterality Date    HX HEENT      cateract surgery     Prior to Admission medications    Medication Sig Start Date End Date Taking? Authorizing Provider   lisinopril (PRINIVIL, ZESTRIL) 40 mg tablet Take 40 mg by mouth daily. Indications: hypertension   Yes Historical Provider   cholecalciferol (VITAMIN D3) 1,000 unit tablet Take 1,000 Units by mouth daily. Yes Historical Provider   apixaban (ELIQUIS) 2.5 mg tablet Take 2.5 mg by mouth two (2) times a day.  Indications: PREVENT THROMBOEMBOLISM IN CHRONIC ATRIAL FIBRILLATION   Yes Historical Provider   traMADol (ULTRAM) 50 mg tablet Take 50 mg by mouth three (3) times daily. X 3 days starting 9/12/17  Indications: Pain 9/12/17 9/14/17 Yes Historical Provider   ciprofloxacin HCl (CIPRO) 500 mg tablet Take 500 mg by mouth two (2) times a day. Start 9/12/17 for 10 days 9/12/17 9/21/17 Yes Historical Provider   arformoterol (BROVANA) 15 mcg/2 mL nebu neb solution 15 mcg by Nebulization route two (2) times a day. Indications: BRONCHOSPASM PREVENTION WITH COPD   Yes Historical Provider   budesonide (PULMICORT) 0.5 mg/2 mL nbsp 500 mcg by Nebulization route two (2) times a day. Indications: SEVERE CHRONIC OBSTRUCTIVE PULMONARY DISEASE   Yes Historical Provider   fluoride, sodium, (PREVIDENT 5000 PLUS) 1.1 % crea by Dental route four (4) times daily. After meals and at bedtime. Indications: DENTAL PLAQUE PREVENTION   Yes Historical Provider   albuterol (PROVENTIL VENTOLIN) 2.5 mg /3 mL (0.083 %) nebulizer solution 2.5 mg by Nebulization route every twelve (12) hours as needed for Wheezing. Yes Historical Provider   fluticasone (FLONASE) 50 mcg/actuation nasal spray 2 Sprays by Both Nostrils route daily as needed for Rhinitis. Yes Historical Provider   traMADol-acetaminophen (ULTRACET) 37.5-325 mg per tablet Take 1 Tab by mouth every four (4) hours as needed for Pain. Indications: Pain   Yes Historical Provider   furosemide (LASIX) 40 mg tablet Take 40 mg by mouth two (2) times a day. Yes Melanie Hernandez MD   montelukast (SINGULAIR) 10 mg tablet Take 10 mg by mouth daily. Yes Melanie Hernandez MD   memantine (NAMENDA) 10 mg tablet TAKE ONE TABLET BY MOUTH TWICE A DAY 4/2/17  Yes Yamila Garcia MD   buPROPion XL (WELLBUTRIN XL) 150 mg tablet Take 1 Tab by mouth every morning. 11/16/16  Yes Yamila Garcia MD   potassium chloride SR (KLOR-CON 10) 10 mEq tablet Take 10 mEq by mouth two (2) times a day.    Yes Historical Provider   metoprolol (LOPRESSOR) 25 mg tablet Take 25 mg by mouth two (2) times a day. Yes Historical Provider     Allergies   Allergen Reactions    No Known Allergies Other (comments)      Family History   Problem Relation Age of Onset    Stroke Mother     Heart Disease Mother     Cancer Father      prostate    Cancer Brother      colon        SOCIAL HISTORY:  Patient resides:  Independently X   Assisted Living    SNF    With family care          Ambulates:   Independently X   w/cane    w/walker    w/wc    CODE STATUS:  DNR    Full X   Other      Objective:     Physical Exam:     Visit Vitals    /87    Pulse 79    Temp 97.9 °F (36.6 °C)    Resp 16    SpO2 99%      O2 Device: Room air    General:  Alert, cooperative, no distress, appears stated age. Head:  Normocephalic, without obvious abnormality, atraumatic. Eyes:  Conjunctivae/corneas clear. PERRL, EOMs intact. Nose: Nares normal. Septum midline. Mucosa normal. No drainage or sinus tenderness. Throat: Lips, mucosa, and tongue normal. Teeth and gums normal.   Neck: Supple, symmetrical, trachea midline, no adenopathy, thyroid: no enlargement/tenderness/nodules, no carotid bruit and no JVD. Back:   Symmetric, no curvature. ROM normal. No CVA tenderness. Lungs:   Clear to auscultation bilaterally. Chest wall:  No tenderness or deformity. Heart:  Regular rate and rhythm, S1, S2 normal, no murmur, click, rub or gallop. Abdomen:   Soft abdomen tenderness over the epigastric area of abdomen   Urinary catheter in situ which is functional    Extremities: Extremities normal, atraumatic, no cyanosis or edema. Pulses: 2+ and symmetric all extremities. Skin: Skin color, texture, turgor normal. No rashes or lesions   Neurologic: CNII-XII intact.           EKG:  Pending previous EKG reviewed which is Atrial fibrillation rate controlled       Data Review:     Recent Days:  Recent Labs      09/12/17   1408   WBC  6.9   HGB  16.2   HCT  47.3   PLT  282     Recent Labs 09/12/17   1408   NA  141   K  3.8   CL  100   CO2  25   GLU  78   BUN  29*   CREA  1.88*   CA  9.5   ALB  3.7   TBILI  1.4*   SGOT  40*   ALT  32     No results for input(s): PH, PCO2, PO2, HCO3, FIO2 in the last 72 hours. 24 Hour Results:  Recent Results (from the past 24 hour(s))   URINALYSIS W/MICROSCOPIC    Collection Time: 09/12/17  1:52 PM   Result Value Ref Range    Color YELLOW/STRAW      Appearance CLEAR CLEAR      Specific gravity 1.014 1.003 - 1.030      pH (UA) 5.0 5.0 - 8.0      Protein NEGATIVE  NEG mg/dL    Glucose NEGATIVE  NEG mg/dL    Ketone NEGATIVE  NEG mg/dL    Bilirubin NEGATIVE  NEG      Blood NEGATIVE  NEG      Urobilinogen 0.2 0.2 - 1.0 EU/dL    Nitrites NEGATIVE  NEG      Leukocyte Esterase NEGATIVE  NEG      WBC 0-4 0 - 4 /hpf    RBC 0-5 0 - 5 /hpf    Epithelial cells FEW FEW /lpf    Bacteria NEGATIVE  NEG /hpf    Mucus TRACE (A) NEG /lpf   URINE CULTURE HOLD SAMPLE    Collection Time: 09/12/17  1:52 PM   Result Value Ref Range    Urine culture hold URINE ON HOLD IN MICROBIOLOGY DEPT FOR 3 DAYS     CBC WITH AUTOMATED DIFF    Collection Time: 09/12/17  2:08 PM   Result Value Ref Range    WBC 6.9 4.1 - 11.1 K/uL    RBC 5.45 4.10 - 5.70 M/uL    HGB 16.2 12.1 - 17.0 g/dL    HCT 47.3 36.6 - 50.3 %    MCV 86.8 80.0 - 99.0 FL    MCH 29.7 26.0 - 34.0 PG    MCHC 34.2 30.0 - 36.5 g/dL    RDW 13.8 11.5 - 14.5 %    PLATELET 133 711 - 266 K/uL    NEUTROPHILS 74 32 - 75 %    LYMPHOCYTES 14 12 - 49 %    MONOCYTES 12 5 - 13 %    EOSINOPHILS 0 0 - 7 %    BASOPHILS 0 0 - 1 %    ABS. NEUTROPHILS 5.1 1.8 - 8.0 K/UL    ABS. LYMPHOCYTES 1.0 0.8 - 3.5 K/UL    ABS. MONOCYTES 0.9 0.0 - 1.0 K/UL    ABS. EOSINOPHILS 0.0 0.0 - 0.4 K/UL    ABS.  BASOPHILS 0.0 0.0 - 0.1 K/UL   METABOLIC PANEL, COMPREHENSIVE    Collection Time: 09/12/17  2:08 PM   Result Value Ref Range    Sodium 141 136 - 145 mmol/L    Potassium 3.8 3.5 - 5.1 mmol/L    Chloride 100 97 - 108 mmol/L    CO2 25 21 - 32 mmol/L    Anion gap 16 (H) 5 - 15 mmol/L    Glucose 78 65 - 100 mg/dL    BUN 29 (H) 6 - 20 MG/DL    Creatinine 1.88 (H) 0.70 - 1.30 MG/DL    BUN/Creatinine ratio 15 12 - 20      GFR est AA 42 (L) >60 ml/min/1.73m2    GFR est non-AA 35 (L) >60 ml/min/1.73m2    Calcium 9.5 8.5 - 10.1 MG/DL    Bilirubin, total 1.4 (H) 0.2 - 1.0 MG/DL    ALT (SGPT) 32 12 - 78 U/L    AST (SGOT) 40 (H) 15 - 37 U/L    Alk.  phosphatase 92 45 - 117 U/L    Protein, total 7.6 6.4 - 8.2 g/dL    Albumin 3.7 3.5 - 5.0 g/dL    Globulin 3.9 2.0 - 4.0 g/dL    A-G Ratio 0.9 (L) 1.1 - 2.2           Imaging:     Assessment:     Active Problems:    Urinary tract infection (9/12/2017)      Pyelonephritis (9/12/2017)      UTI (urinary tract infection) (9/12/2017)    Recurrent urinary tract infection in the presence of nausea to R/O Pyelonephritis   Admit for observation   Trend white count and fever curve  Send for urine culture and follow result   Patient started on Ceftriaxone IV empirically, will switch on the bases of culture results   Continue with IVF   Mckeon catheter for 24 hours   Abdominal us in light of recurrent UTI which is refractory to Abs treatment     Acute Renal insufficiency most likely post Renal 2/2 urinary retention   Abdominal US   Urinary catheter in situ   IVF and follow CBC and BMP in am     History of BPH   Continue with current management as above   Will start on flomax     Atrial fibrillation with RVR   EKG now   Continue with home medication of Eliquis and Metoprolol     Hypertension   Continue with Lisinopril     COPD   Stable   Continue with home medications     Depression/dementia   Continue with home medication of Bupropion and Memantine            Signed By: Olayinka Aragon MD     September 12, 2017

## 2017-09-12 NOTE — ED NOTES
TRANSFER - OUT REPORT:    Verbal report given to EDIS Rosa(name) on Kristy Branch  being transferred to 511(unit) for routine progression of care       Report consisted of patients Situation, Background, Assessment and   Recommendations(SBAR). Information from the following report(s) SBAR, ED Summary, Procedure Summary, Intake/Output and Recent Results was reviewed with the receiving nurse. Lines:   Peripheral IV 09/12/17 Right Antecubital (Active)   Site Assessment Clean, dry, & intact 9/12/2017  2:15 PM   Phlebitis Assessment 0 9/12/2017  2:15 PM   Infiltration Assessment 0 9/12/2017  2:15 PM   Dressing Status Clean, dry, & intact 9/12/2017  2:15 PM   Hub Color/Line Status Pink 9/12/2017  2:15 PM        Opportunity for questions and clarification was provided.       Patient transported with:   [employer]

## 2017-09-12 NOTE — PROGRESS NOTES
Admission Medication Reconciliation:    Information obtained from: [facility] paperwork (scanned into chart)    Significant PMH/Disease States:   Past Medical History:   Diagnosis Date    Allergy     seasonal    Arrhythmia     Asthma     Dementia     Depression     Heart failure ([address]     Hypertension     Neurological disorder     dementia    Other ill-defined conditions     a-fib    Stroke [facility])        Chief Complaint for this Admission:    Chief Complaint   Patient presents with    Bladder Infection       Allergies:  No known allergies    Prior to Admission Medications:   Prior to Admission Medications   Prescriptions Last Dose Informant Patient Reported? Taking? albuterol (PROVENTIL VENTOLIN) 2.5 mg /3 mL (0.083 %) nebulizer solution   Yes Yes   Si.5 mg by Nebulization route every twelve (12) hours as needed for Wheezing. apixaban (ELIQUIS) 2.5 mg tablet   Yes Yes   Sig: Take 2.5 mg by mouth two (2) times a day. Indications: PREVENT THROMBOEMBOLISM IN CHRONIC ATRIAL FIBRILLATION   arformoterol (BROVANA) 15 mcg/2 mL nebu neb solution   Yes Yes   Sig: 15 mcg by Nebulization route two (2) times a day. Indications: BRONCHOSPASM PREVENTION WITH COPD   buPROPion XL (WELLBUTRIN XL) 150 mg tablet   No Yes   Sig: Take 1 Tab by mouth every morning. budesonide (PULMICORT) 0.5 mg/2 mL nbsp   Yes Yes   Si mcg by Nebulization route two (2) times a day. Indications: SEVERE CHRONIC OBSTRUCTIVE PULMONARY DISEASE   cholecalciferol (VITAMIN D3) 1,000 unit tablet   Yes Yes   Sig: Take 1,000 Units by mouth daily. ciprofloxacin HCl (CIPRO) 500 mg tablet   Yes Yes   Sig: Take 500 mg by mouth two (2) times a day. Start 17 for 10 days   fluoride, sodium, (PREVIDENT 5000 PLUS) 1.1 % crea   Yes Yes   Sig: by Dental route four (4) times daily. After meals and at bedtime.   Indications: DENTAL PLAQUE PREVENTION   fluticasone (FLONASE) 50 mcg/actuation nasal spray   Yes Yes   Si Sprays by Both Nostrils route daily as needed for Rhinitis. furosemide (LASIX) 40 mg tablet   Yes Yes   Sig: Take 40 mg by mouth two (2) times a day. lisinopril (PRINIVIL, ZESTRIL) 40 mg tablet   Yes Yes   Sig: Take 40 mg by mouth daily. Indications: hypertension   memantine (NAMENDA) 10 mg tablet   No Yes   Sig: TAKE ONE TABLET BY MOUTH TWICE A DAY   metoprolol (LOPRESSOR) 25 mg tablet  Significant Other Yes Yes   Sig: Take 25 mg by mouth two (2) times a day. montelukast (SINGULAIR) 10 mg tablet   Yes Yes   Sig: Take 10 mg by mouth daily. potassium chloride SR (KLOR-CON 10) 10 mEq tablet  Significant Other Yes Yes   Sig: Take 10 mEq by mouth two (2) times a day. traMADol (ULTRAM) 50 mg tablet   Yes Yes   Sig: Take 50 mg by mouth three (3) times daily. X 3 days starting 9/12/17  Indications: Pain   traMADol-acetaminophen (ULTRACET) 37.5-325 mg per tablet   Yes Yes   Sig: Take 1 Tab by mouth every four (4) hours as needed for Pain. Indications: Pain      Facility-Administered Medications: None         Comments/Recommendations:     Obtained Write.my [facility] paperwork to update PTA medication list.    Reviewed and confirmed medications listed. Added dose to albuterol and start dates/ duration of treatment to Cipro and tramadol (scheduled). Per facility list, Keflex 500 mg TID was d/c 9/11/17 and Cipro 500 mg BID planned to start 9/12/17 for 10 day course.        Kristen Panchal, PharmD

## 2017-09-12 NOTE — Clinical Note
Patient Class[de-identified] Observation [544] Type of Bed: Medical [8] Reason for Observation: recurrent uti r/o pyelonephritis Admitting Diagnosis: Pyelonephritis [150601] Admitting Diagnosis: Urinary tract infection [141563] Admitting Physician: Conner Silverio [[id number]] Attending Physician: Conner Silverio [[id number]]

## 2017-09-12 NOTE — PROGRESS NOTES
TRANSFER - IN REPORT:    Verbal report received from Barbara(name) on Aubrey Hamptonch  being received from ED(unit) for routine progression of care      Report consisted of patients Situation, Background, Assessment and   Recommendations(SBAR). Information from the following report(s) SBAR, Kardex and MAR was reviewed with the receiving nurse. Opportunity for questions and clarification was provided. Assessment completed upon patients arrival to unit and care assumed.

## 2017-09-12 NOTE — PROGRESS NOTES
Noted TRST. EMR reviewed. History significant for UTI, HTN, stroke, afib, dementia, and heart failure. Patient presents to the ED urinary frequency. Met w/patient and spouse - introduced to role of CM. Nursing providing care at time of CM visit. History provided by Mrs. Efrain Luevano - Patient has resided in [facility] since May. Patient able to ambulate. Brian Jimenez follows at facility - requesting PCP updates be placed in the system. ER registration informed. Support is received from [facility] and [facility]. Mrs. Efrain Luevano makes medical decisions. No transitional care needs verbalized at this time. Patient will return back to facility at time of discharge. CM placed call to [facility] spoke w/ [employer]. Tech informed patient to be hospitalized overnight. [facility] Medicare A/B and [employer] are insurance providers. Care Management Interventions  PCP Verified by CM: Yes (Brian Jimenez)  Mode of Transport at Discharge: BLS  Transition of Care Consult (CM Consult):  Other (TRST)  MyChart Signup: No  Discharge Durable Medical Equipment: No  Physical Therapy Consult: No  Occupational Therapy Consult: No  Speech Therapy Consult: No  Current Support Network: Assisted Living ([facility])  Confirm Follow Up Transport:  (TBD)  Plan discussed with Pt/Family/Caregiver: Yes  Discharge Location  Discharge Placement:  (TBD)

## 2017-09-12 NOTE — ED TRIAGE NOTES
Pt lives at [facility] [address] in the [facility] with wife. Complains of urinary frequency and possible bladder infection.

## 2017-09-12 NOTE — ED NOTES
Wife present. States that  has distended abdomen, pees frequently, and complains of pain with urinating.

## 2017-09-12 NOTE — ED PROVIDER NOTES
HPI Comments: [de-identified] y.o. male with past medical history significant for UTI, HTN, stroke, afib, dementia, and heart failure who presents via EMS from [facility] accompanied by his wife and son with chief complaint of urinary frequency. Pt reports several day history of increased urinary frequency, urgency, dysuria, nausea, decreased appetite, chills and diaphoresis. Pt was evaluated on 9/6/17 (see below) with the same and diagnosed with UTI; however, urine culture confirmed culture was not susceptible to prescribed Keflex. Pt was switched to Cipro, which he started taking today. Pt has been unable to f/u with Dr. Alden Blood regarding multiple vascular findings on CT (see below). Pt denies medication allergies. Pt specifically denies fever and hematuria. There are no other acute medical concerns at this time. Old Chart Review:  9/6/17 - pt diagnosed with UTI and discharged on Keflex. Urine culture grew enterococcus faecali, pan susceptible. Attempted multiple times to reach patient unsuccessfully to change abx. CT abd/pelv 9/7/17: Impression: No acute process in the abdomen and pelvis. 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 Progressed from 12/7/2016. 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 Pt referred to vascular surgery and PCP regarding CT findings. Urine culture in March 2017 grew e. Coli resistant to ampicillin. Social hx: denies tobacco use; denies EtOH use; denies illicit drug use; resides at [facility]  PCP: None  Neurology: Maricarmen Rosario MD    Note written by Isamar Cruz, as dictated by Bradford Coronel MD 1:56 PM        The history is provided by the patient, medical records, the spouse and a relative (wife, son). No  was used.         Past Medical History:   Diagnosis Date    Allergy     seasonal    Arrhythmia     Asthma     Dementia     Depression     [address] failure ([facility] [address]     Hypertension     Neurological disorder     dementia    Other ill-defined conditions     a-fib    Stroke [facility])        Past Surgical History:   Procedure Laterality Date    HX HEENT      cateract surgery         Family History:   Problem Relation Age of Onset    Stroke Mother     Heart Disease Mother     Cancer Father      prostate    Cancer Brother      colon       Social History     Social History    Marital status:      Spouse name: N/A    Number of children: N/A    Years of education: N/A     Occupational History    Not on file. Social History Main Topics    Smoking status: Never Smoker    Smokeless tobacco: Never Used    Alcohol use No    Drug use: No    Sexual activity: No     Other Topics Concern    Not on file     Social History Narrative         ALLERGIES: No known allergies    Review of Systems   Constitutional: Positive for appetite change, chills and diaphoresis. Negative for fever. Gastrointestinal: Positive for abdominal distention and nausea. Genitourinary: Positive for dysuria, frequency and urgency. Negative for hematuria. All other systems reviewed and are negative. Vitals:    09/12/17 1349   BP: 144/77   Pulse: 79   Resp: 16   Temp: 97.9 °F (36.6 °C)   SpO2: 97%            Physical Exam     Nursing note and vitals reviewed. Constitutional: appears well-developed and well-nourished. No distress. HENT:   Head: Normocephalic and atraumatic. Sclera anicteric  Nose: No rhinorrhea  Mouth/Throat: Oropharynx is clear and moist. Pharynx normal  Eyes: Conjunctivae are normal. Pupils are equal, round, and reactive to light. Right eye exhibits no discharge. Left eye exhibits no discharge. No scleral icterus. Neck: Painless normal range of motion. Supple  Cardiovascular: Normal rate, regular rhythm, normal heart sounds and intact distal pulses. Exam reveals no gallop and no friction rub. No murmur heard. Pulmonary/Chest: Effort normal and breath sounds normal. No respiratory distress.  no wheezes. no rales. Abdominal: Soft. Bowel sounds are normal. Exhibits no distension and no mass. TENDER SUPRAPUBICALLY. No guarding. Musculoskeletal: Normal range of motion. no tenderness. No edema  Lymphadenopathy:   No cervical adenopathy. Neurological:  Alert and oriented to person, place, and time. Coordination normal. CN 2-12 intact. Moving all extremities. Skin: Skin is warm and dry. No rash noted. No pallor. [facility]  ED Course   64-year-old male with previous history of UTI last one culture proven on September 7 which was enterococcus pan susceptible to Macrobid, amoxicillin, vancomycin, linezolid, dactinomycin, penicillin. Patient was started today on Cipro after previous course of Keflex. Past organism would not have been susceptible to either antibiotic based upon the culture. Patient afebrile. Concerning for possible urinary retention. Differential diagnosis includes UTI, urinary retention, acute kidney injury and others. We'll check labs, urinalysis and bladder check. Procedures    2:25 PM  Pt voided 25 mL. Bladder scan revealed greater than 999 mL remaining in bladder. Mckeon catheter placed. Labs in process to evaluate kidney function. 2:50 PM  Will check renal ultrasound as creatinine has increased from 1.22 to 1.88 in less than 1 week with history of heart failure. Repeat abdominal exam: soft, non-tender. 2:57 PM  Patient is being admitted to the hospital.  The results of their tests and reasons for their admission have been discussed with them and/or available family. They convey agreement and understanding for the need to be admitted and for their admission diagnosis. Consultation will be made now with the inpatient physician for hospitalization. CONSULT NOTE:  3:49 Marian King MD spoke with Dr. Abbie Navarro, Consult for Hospitalist.  Discussed available diagnostic tests and clinical findings. He is in agreement with care plans as outlined.   Dr. Abbie Navarro will evaluate and admit the patient. Recent Results (from the past 24 hour(s))   URINALYSIS W/MICROSCOPIC    Collection Time: 09/12/17  1:52 PM   Result Value Ref Range    Color YELLOW/STRAW      Appearance CLEAR CLEAR      Specific gravity 1.014 1.003 - 1.030      pH (UA) 5.0 5.0 - 8.0      Protein NEGATIVE  NEG mg/dL    Glucose NEGATIVE  NEG mg/dL    Ketone NEGATIVE  NEG mg/dL    Bilirubin NEGATIVE  NEG      Blood NEGATIVE  NEG      Urobilinogen 0.2 0.2 - 1.0 EU/dL    Nitrites NEGATIVE  NEG      Leukocyte Esterase NEGATIVE  NEG      WBC 0-4 0 - 4 /hpf    RBC 0-5 0 - 5 /hpf    Epithelial cells FEW FEW /lpf    Bacteria NEGATIVE  NEG /hpf    Mucus TRACE (A) NEG /lpf   URINE CULTURE HOLD SAMPLE    Collection Time: 09/12/17  1:52 PM   Result Value Ref Range    Urine culture hold URINE ON HOLD IN MICROBIOLOGY DEPT FOR 3 DAYS     METABOLIC PANEL, COMPREHENSIVE    Collection Time: 09/12/17  2:08 PM   Result Value Ref Range    Sodium 141 136 - 145 mmol/L    Potassium 3.8 3.5 - 5.1 mmol/L    Chloride 100 97 - 108 mmol/L    CO2 25 21 - 32 mmol/L    Anion gap 16 (H) 5 - 15 mmol/L    Glucose 78 65 - 100 mg/dL    BUN 29 (H) 6 - 20 MG/DL    Creatinine 1.88 (H) 0.70 - 1.30 MG/DL    BUN/Creatinine ratio 15 12 - 20      GFR est AA 42 (L) >60 ml/min/1.73m2    GFR est non-AA 35 (L) >60 ml/min/1.73m2    Calcium 9.5 8.5 - 10.1 MG/DL    Bilirubin, total 1.4 (H) 0.2 - 1.0 MG/DL    ALT (SGPT) 32 12 - 78 U/L    AST (SGOT) 40 (H) 15 - 37 U/L    Alk. phosphatase 92 45 - 117 U/L    Protein, total 7.6 6.4 - 8.2 g/dL    Albumin 3.7 3.5 - 5.0 g/dL    Globulin 3.9 2.0 - 4.0 g/dL    A-G Ratio 0.9 (L) 1.1 - 2.2         No results found.

## 2017-09-13 LAB
ANION GAP SERPL CALC-SCNC: 11 MMOL/L (ref 5–15)
BACTERIA SPEC CULT: NORMAL
BASOPHILS # BLD: 0 K/UL (ref 0–0.1)
BASOPHILS NFR BLD: 0 % (ref 0–1)
BUN SERPL-MCNC: 23 MG/DL (ref 6–20)
BUN/CREAT SERPL: 19 (ref 12–20)
CALCIUM SERPL-MCNC: 8.4 MG/DL (ref 8.5–10.1)
CC UR VC: NORMAL
CHLORIDE SERPL-SCNC: 107 MMOL/L (ref 97–108)
CO2 SERPL-SCNC: 24 MMOL/L (ref 21–32)
CREAT SERPL-MCNC: 1.24 MG/DL (ref 0.7–1.3)
EOSINOPHIL # BLD: 0.2 K/UL (ref 0–0.4)
EOSINOPHIL NFR BLD: 2 % (ref 0–7)
ERYTHROCYTE [DISTWIDTH] IN BLOOD BY AUTOMATED COUNT: 13.3 % (ref 11.5–14.5)
GLUCOSE SERPL-MCNC: 87 MG/DL (ref 65–100)
HCT VFR BLD AUTO: 42.1 % (ref 36.6–50.3)
HGB BLD-MCNC: 14.3 G/DL (ref 12.1–17)
LYMPHOCYTES # BLD: 1.3 K/UL (ref 0.8–3.5)
LYMPHOCYTES NFR BLD: 16 % (ref 12–49)
MCH RBC QN AUTO: 29.1 PG (ref 26–34)
MCHC RBC AUTO-ENTMCNC: 34 G/DL (ref 30–36.5)
MCV RBC AUTO: 85.6 FL (ref 80–99)
MONOCYTES # BLD: 1.1 K/UL (ref 0–1)
MONOCYTES NFR BLD: 14 % (ref 5–13)
NEUTS SEG # BLD: 5.4 K/UL (ref 1.8–8)
NEUTS SEG NFR BLD: 68 % (ref 32–75)
PLATELET # BLD AUTO: 226 K/UL (ref 150–400)
POTASSIUM SERPL-SCNC: 3.4 MMOL/L (ref 3.5–5.1)
RBC # BLD AUTO: 4.92 M/UL (ref 4.1–5.7)
SERVICE CMNT-IMP: NORMAL
SODIUM SERPL-SCNC: 142 MMOL/L (ref 136–145)
WBC # BLD AUTO: 7.9 K/UL (ref 4.1–11.1)

## 2017-09-13 PROCEDURE — 85025 COMPLETE CBC W/AUTO DIFF WBC: CPT | Performed by: INTERNAL MEDICINE

## 2017-09-13 PROCEDURE — 74011250637 HC RX REV CODE- 250/637: Performed by: INTERNAL MEDICINE

## 2017-09-13 PROCEDURE — 77030029684 HC NEB SM VOL KT MONA -A

## 2017-09-13 PROCEDURE — 36415 COLL VENOUS BLD VENIPUNCTURE: CPT | Performed by: INTERNAL MEDICINE

## 2017-09-13 PROCEDURE — 74011000250 HC RX REV CODE- 250: Performed by: INTERNAL MEDICINE

## 2017-09-13 PROCEDURE — 80048 BASIC METABOLIC PNL TOTAL CA: CPT | Performed by: INTERNAL MEDICINE

## 2017-09-13 PROCEDURE — 51798 US URINE CAPACITY MEASURE: CPT

## 2017-09-13 PROCEDURE — 74011250636 HC RX REV CODE- 250/636: Performed by: EMERGENCY MEDICINE

## 2017-09-13 PROCEDURE — 94640 AIRWAY INHALATION TREATMENT: CPT

## 2017-09-13 PROCEDURE — 93005 ELECTROCARDIOGRAM TRACING: CPT

## 2017-09-13 PROCEDURE — 99218 HC RM OBSERVATION: CPT

## 2017-09-13 PROCEDURE — 74011250636 HC RX REV CODE- 250/636: Performed by: INTERNAL MEDICINE

## 2017-09-13 PROCEDURE — 74011000258 HC RX REV CODE- 258: Performed by: INTERNAL MEDICINE

## 2017-09-13 PROCEDURE — 77010033678 HC OXYGEN DAILY

## 2017-09-13 RX ORDER — ONDANSETRON 2 MG/ML
4 INJECTION INTRAMUSCULAR; INTRAVENOUS
Status: DISCONTINUED | OUTPATIENT
Start: 2017-09-13 | End: 2017-09-15 | Stop reason: HOSPADM

## 2017-09-13 RX ADMIN — MEMANTINE HYDROCHLORIDE 10 MG: 10 TABLET ORAL at 09:43

## 2017-09-13 RX ADMIN — SODIUM CHLORIDE 100 ML/HR: 900 INJECTION, SOLUTION INTRAVENOUS at 13:31

## 2017-09-13 RX ADMIN — TRAMADOL HYDROCHLORIDE 50 MG: 50 TABLET, FILM COATED ORAL at 22:21

## 2017-09-13 RX ADMIN — METOPROLOL TARTRATE 25 MG: 25 TABLET ORAL at 17:47

## 2017-09-13 RX ADMIN — LISINOPRIL 40 MG: 20 TABLET ORAL at 09:42

## 2017-09-13 RX ADMIN — FUROSEMIDE 40 MG: 40 TABLET ORAL at 09:44

## 2017-09-13 RX ADMIN — ARFORMOTEROL TARTRATE 15 MCG: 15 SOLUTION RESPIRATORY (INHALATION) at 08:35

## 2017-09-13 RX ADMIN — FUROSEMIDE 40 MG: 40 TABLET ORAL at 17:46

## 2017-09-13 RX ADMIN — Medication 10 ML: at 22:22

## 2017-09-13 RX ADMIN — CEFTRIAXONE 1 G: 1 INJECTION, POWDER, FOR SOLUTION INTRAMUSCULAR; INTRAVENOUS at 17:42

## 2017-09-13 RX ADMIN — TRAMADOL HYDROCHLORIDE 50 MG: 50 TABLET, FILM COATED ORAL at 09:43

## 2017-09-13 RX ADMIN — METOPROLOL TARTRATE 25 MG: 25 TABLET ORAL at 09:43

## 2017-09-13 RX ADMIN — BUPROPION HYDROCHLORIDE 150 MG: 150 TABLET, EXTENDED RELEASE ORAL at 09:42

## 2017-09-13 RX ADMIN — Medication 10 ML: at 15:53

## 2017-09-13 RX ADMIN — APIXABAN 2.5 MG: 2.5 TABLET, FILM COATED ORAL at 09:43

## 2017-09-13 RX ADMIN — VITAMIN D, TAB 1000IU (100/BT) 1000 UNITS: 25 TAB at 09:43

## 2017-09-13 RX ADMIN — BUDESONIDE 500 MCG: 0.5 INHALANT RESPIRATORY (INHALATION) at 21:55

## 2017-09-13 RX ADMIN — ARFORMOTEROL TARTRATE 15 MCG: 15 SOLUTION RESPIRATORY (INHALATION) at 21:55

## 2017-09-13 RX ADMIN — TAMSULOSIN HYDROCHLORIDE 0.4 MG: 0.4 CAPSULE ORAL at 09:43

## 2017-09-13 RX ADMIN — SODIUM CHLORIDE 100 ML/HR: 900 INJECTION, SOLUTION INTRAVENOUS at 03:57

## 2017-09-13 RX ADMIN — Medication 10 ML: at 06:17

## 2017-09-13 RX ADMIN — APIXABAN 2.5 MG: 2.5 TABLET, FILM COATED ORAL at 22:21

## 2017-09-13 RX ADMIN — MONTELUKAST SODIUM 10 MG: 10 TABLET, FILM COATED ORAL at 09:43

## 2017-09-13 RX ADMIN — Medication 10 ML: at 06:18

## 2017-09-13 RX ADMIN — BUDESONIDE 500 MCG: 0.5 INHALANT RESPIRATORY (INHALATION) at 08:35

## 2017-09-13 RX ADMIN — POTASSIUM CHLORIDE 10 MEQ: 750 TABLET, FILM COATED, EXTENDED RELEASE ORAL at 17:46

## 2017-09-13 RX ADMIN — POTASSIUM CHLORIDE 10 MEQ: 750 TABLET, FILM COATED, EXTENDED RELEASE ORAL at 09:43

## 2017-09-13 RX ADMIN — TRAMADOL HYDROCHLORIDE 50 MG: 50 TABLET, FILM COATED ORAL at 15:44

## 2017-09-13 NOTE — PROGRESS NOTES
Bedside shift change report given to Jose Daniel Pacheco (oncoming nurse) by Patel Osborn RN (offgoing nurse). Report included the following information SBAR, Kardex, Intake/Output, MAR, Accordion and Recent Results.

## 2017-09-13 NOTE — PROGRESS NOTES
Physical Therapy Screening:  Services are not indicated at this time. [facility] therapy based on results obtained during the nursing admission assessment. The patients chart was reviewed and the patient is not appropriate for a skilled therapy evaluation at this time. Please consult physical therapy if any therapy needs arise. Thank you.     Mireille Nieves, PT

## 2017-09-13 NOTE — PROGRESS NOTES
Hospitalist Progress Note  Jeanne Pacheco MD  Office: [phone]  Cell: [phone]      Date of Service:  2017  NAME:  Kim Echols  :  1935  MRN:  [id number]      Admission Summary:   Kim Echols is a [de-identified] y.o. male who presents with Past medical history of dementia, Atrial fibrillation, depression, hypertension presented with frequency urgency, and burning sensation during urination associated with nausea and loss of appetite for the last one week. Patient went to PCP office for the above complaint one week back and he was given oral antibiotics (ciprofloxacin and keflex) with no improvement over the last one week back. Over the last two days his symptoms which were mentioned above became worse associated with difficulty of urinating, and hesitancy. Interval history / Subjective:   Lower abdominal discomfort,      Assessment & Plan: EDEN likely multifactorial  - US Bilateral renal cortical thinning, suggesting chronic intrinsic renal disease. No hydronephrosis.   -henning catheter in place,   -creatinine improving, continue IVF and repeat renal function in ma  -discontinue henning catheter for void trial and if he fail will re insert     Urinary urgency and frequency, rule out UTI, patient with hx of recent UTI  -on ceftiraxon   -UA unremarkable  -follow up on urine cx result    History of BPH   -started on flomax      Atrial fibrillation with RVR   -rate controlled now   -continue with home medication of Eliquis and Metoprolol   -check ekg     Hypertension   -Continue with Lisinopril, metoprolol and monitor BP     COPD   -stable   -continue pulmicort, arformoterol and monitor pulse ox     Depression/dementia   Continue with home medication of Bupropion and Memantine         Code status: Full Code  DVT prophylaxis: on eliquis    Care Plan discussed with: Patient/Family, Nurse and   Disposition: [facility]   Wife at bedside, questions answered     Hospital Problems  Date Reviewed: 9/13/2017          Codes Class Noted POA    * (Principal)Urinary tract infection ICD-10-CM: N39.0  ICD-9-CM: 599.0  9/12/2017 Unknown        Pyelonephritis ICD-10-CM: N12  ICD-9-CM: 590.80  9/12/2017 Unknown        UTI (urinary tract infection) ICD-10-CM: N39.0  ICD-9-CM: 599.0  9/12/2017 Unknown              Vital Signs:    Last 24hrs VS reviewed since prior progress note. Most recent are:  Visit Vitals    /82 (BP 1 Location: Right arm, BP Patient Position: At rest;Supine)    Pulse 68    Temp 97.4 °F (36.3 °C)    Resp 18    SpO2 96%         Intake/Output Summary (Last 24 hours) at 09/13/17 1515  Last data filed at 09/13/17 3592   Gross per 24 hour   Intake           301.67 ml   Output             2850 ml   Net         -2548.33 ml        Physical Examination:             Constitutional:  No acute distress, cooperative, pleasant    ENT:  Oral mucous moist, oropharynx benign. Neck supple,    Resp:  CTA bilaterally. No wheezing/rhonchi/rales. No accessory muscle use   CV:  Regular rhythm, normal rate, no murmurs, gallops, rubs    GI:  Soft, non distended, non tender. normoactive bowel sounds, no hepatosplenomegaly     Musculoskeletal:  No edema    Neurologic:  Conscious and alert, oriented to place and person, moves all extremities, motor 5/5, CN II-XII reviewed     Psych:  Good insight, Not anxious nor agitated.        Data Review:    Review and/or order of clinical lab test      Labs:     Recent Labs      09/13/17   0348  09/12/17   1408   WBC  7.9  6.9   HGB  14.3  16.2   HCT  42.1  47.3   PLT  226  282     Recent Labs      09/13/17   0348  09/12/17   1408   NA  142  141   K  3.4*  3.8   CL  107  100   CO2  24  25   BUN  23*  29*   CREA  1.24  1.88*   GLU  87  78   CA  8.4*  9.5     Recent Labs      09/12/17   1408   SGOT  40*   ALT  32   AP  92   TBILI  1.4*   TP  7.6   ALB  3.7   GLOB  3.9     No results for input(s): INR, PTP, APTT in the last 72 hours. No lab exists for component: INREXT   No results for input(s): FE, TIBC, PSAT, FERR in the last 72 hours. Lab Results   Component Value Date/Time    Folate 10.0 11/09/2015 01:30 AM      No results for input(s): PH, PCO2, PO2 in the last 72 hours. No results for input(s): CPK, CKNDX, TROIQ in the last 72 hours.     No lab exists for component: CPKMB  No results found for: CHOL, CHOLX, CHLST, CHOLV, HDL, LDL, LDLC, DLDLP, TGLX, TRIGL, TRIGP, CHHD, CHHDX  Lab Results   Component Value Date/Time    Glucose (POC) 87 05/01/2017 12:01 AM    Glucose (POC) 146 12/07/2016 02:59 PM     Lab Results   Component Value Date/Time    Color YELLOW/STRAW 09/12/2017 01:52 PM    Appearance CLEAR 09/12/2017 01:52 PM    Specific gravity 1.014 09/12/2017 01:52 PM    pH (UA) 5.0 09/12/2017 01:52 PM    Protein NEGATIVE  09/12/2017 01:52 PM    Glucose NEGATIVE  09/12/2017 01:52 PM    Ketone NEGATIVE  09/12/2017 01:52 PM    Bilirubin NEGATIVE  09/12/2017 01:52 PM    Urobilinogen 0.2 09/12/2017 01:52 PM    Nitrites NEGATIVE  09/12/2017 01:52 PM    Leukocyte Esterase NEGATIVE  09/12/2017 01:52 PM    Epithelial cells FEW 09/12/2017 01:52 PM    Bacteria NEGATIVE  09/12/2017 01:52 PM    WBC 0-4 09/12/2017 01:52 PM    RBC 0-5 09/12/2017 01:52 PM         Medications Reviewed:     Current Facility-Administered Medications   Medication Dose Route Frequency    0.9% sodium chloride infusion  100 mL/hr IntraVENous CONTINUOUS    albuterol (PROVENTIL VENTOLIN) nebulizer solution 2.5 mg  2.5 mg Nebulization Q12H PRN    apixaban (ELIQUIS) tablet 2.5 mg  2.5 mg Oral BID    arformoterol (BROVANA) neb solution 15 mcg  15 mcg Nebulization BID RT    budesonide (PULMICORT) 500 mcg/2 ml nebulizer suspension  500 mcg Nebulization BID RT    cholecalciferol (VITAMIN D3) tablet 1,000 Units  1,000 Units Oral DAILY    fluticasone (FLONASE) 50 mcg/actuation nasal spray 2 Spray  2 Spray Both Nostrils DAILY    furosemide (LASIX) tablet 40 mg  40 mg Oral BID    lisinopril (PRINIVIL, ZESTRIL) tablet 40 mg  40 mg Oral DAILY    memantine (NAMENDA) tablet 10 mg  10 mg Oral DAILY    metoprolol tartrate (LOPRESSOR) tablet 25 mg  25 mg Oral BID    montelukast (SINGULAIR) tablet 10 mg  10 mg Oral DAILY    potassium chloride SR (KLOR-CON 10) tablet 10 mEq  10 mEq Oral BID    traMADol (ULTRAM) tablet 50 mg  50 mg Oral TID    sodium chloride (NS) flush 5-10 mL  5-10 mL IntraVENous Q8H    sodium chloride (NS) flush 5-10 mL  5-10 mL IntraVENous PRN    sodium chloride (NS) flush 5-10 mL  5-10 mL IntraVENous Q8H    sodium chloride (NS) flush 5-10 mL  5-10 mL IntraVENous PRN    cefTRIAXone (ROCEPHIN) 1 g in 0.9% sodium chloride (MBP/ADV) 50 mL  1 g IntraVENous Q24H    tamsulosin (FLOMAX) capsule 0.4 mg  0.4 mg Oral DAILY    buPROPion SR (WELLBUTRIN SR) tablet 150 mg  150 mg Oral DAILY     ______________________________________________________________________  EXPECTED LENGTH OF STAY: - - -  ACTUAL LENGTH OF STAY:          0                 Christen Clifton MD

## 2017-09-14 LAB
ATRIAL RATE: 98 BPM
CALCULATED R AXIS, ECG10: -57 DEGREES
CALCULATED T AXIS, ECG11: -51 DEGREES
DIAGNOSIS, 93000: NORMAL
Q-T INTERVAL, ECG07: 434 MS
QRS DURATION, ECG06: 92 MS
QTC CALCULATION (BEZET), ECG08: 440 MS
VENTRICULAR RATE, ECG03: 62 BPM

## 2017-09-14 PROCEDURE — 93971 EXTREMITY STUDY: CPT

## 2017-09-14 PROCEDURE — 99218 HC RM OBSERVATION: CPT

## 2017-09-14 PROCEDURE — 74011250636 HC RX REV CODE- 250/636: Performed by: INTERNAL MEDICINE

## 2017-09-14 PROCEDURE — 74011000250 HC RX REV CODE- 250: Performed by: INTERNAL MEDICINE

## 2017-09-14 PROCEDURE — 74011250637 HC RX REV CODE- 250/637: Performed by: HOSPITALIST

## 2017-09-14 PROCEDURE — 74011000258 HC RX REV CODE- 258: Performed by: INTERNAL MEDICINE

## 2017-09-14 PROCEDURE — 74011250637 HC RX REV CODE- 250/637: Performed by: INTERNAL MEDICINE

## 2017-09-14 PROCEDURE — 65270000029 HC RM PRIVATE

## 2017-09-14 PROCEDURE — 74011250636 HC RX REV CODE- 250/636: Performed by: EMERGENCY MEDICINE

## 2017-09-14 PROCEDURE — 51798 US URINE CAPACITY MEASURE: CPT

## 2017-09-14 PROCEDURE — 77030005538 HC CATH URETH FOL44 BARD -B

## 2017-09-14 PROCEDURE — 74011250636 HC RX REV CODE- 250/636: Performed by: HOSPITALIST

## 2017-09-14 PROCEDURE — 94640 AIRWAY INHALATION TREATMENT: CPT

## 2017-09-14 RX ORDER — LIDOCAINE 50 MG/G
1 PATCH TOPICAL EVERY 24 HOURS
Status: DISCONTINUED | OUTPATIENT
Start: 2017-09-14 | End: 2017-09-15 | Stop reason: HOSPADM

## 2017-09-14 RX ORDER — DIPHENHYDRAMINE HCL 25 MG
25 CAPSULE ORAL
Status: DISCONTINUED | OUTPATIENT
Start: 2017-09-14 | End: 2017-09-14

## 2017-09-14 RX ORDER — MORPHINE SULFATE 2 MG/ML
1 INJECTION, SOLUTION INTRAMUSCULAR; INTRAVENOUS
Status: DISCONTINUED | OUTPATIENT
Start: 2017-09-14 | End: 2017-09-15 | Stop reason: HOSPADM

## 2017-09-14 RX ORDER — DIPHENHYDRAMINE HCL 25 MG
25 CAPSULE ORAL ONCE
Status: COMPLETED | OUTPATIENT
Start: 2017-09-14 | End: 2017-09-14

## 2017-09-14 RX ORDER — MORPHINE SULFATE 2 MG/ML
1 INJECTION, SOLUTION INTRAMUSCULAR; INTRAVENOUS ONCE
Status: COMPLETED | OUTPATIENT
Start: 2017-09-14 | End: 2017-09-14

## 2017-09-14 RX ORDER — ACETAMINOPHEN 325 MG/1
650 TABLET ORAL
Status: DISCONTINUED | OUTPATIENT
Start: 2017-09-14 | End: 2017-09-15 | Stop reason: HOSPADM

## 2017-09-14 RX ADMIN — TRAMADOL HYDROCHLORIDE 50 MG: 50 TABLET, FILM COATED ORAL at 21:12

## 2017-09-14 RX ADMIN — BUDESONIDE 500 MCG: 0.5 INHALANT RESPIRATORY (INHALATION) at 08:52

## 2017-09-14 RX ADMIN — POTASSIUM CHLORIDE 10 MEQ: 750 TABLET, FILM COATED, EXTENDED RELEASE ORAL at 08:50

## 2017-09-14 RX ADMIN — POTASSIUM CHLORIDE 10 MEQ: 750 TABLET, FILM COATED, EXTENDED RELEASE ORAL at 18:19

## 2017-09-14 RX ADMIN — DIPHENHYDRAMINE HYDROCHLORIDE 25 MG: 25 CAPSULE ORAL at 01:48

## 2017-09-14 RX ADMIN — ARFORMOTEROL TARTRATE 15 MCG: 15 SOLUTION RESPIRATORY (INHALATION) at 20:32

## 2017-09-14 RX ADMIN — SODIUM CHLORIDE 100 ML/HR: 900 INJECTION, SOLUTION INTRAVENOUS at 01:32

## 2017-09-14 RX ADMIN — MONTELUKAST SODIUM 10 MG: 10 TABLET, FILM COATED ORAL at 08:50

## 2017-09-14 RX ADMIN — ARFORMOTEROL TARTRATE 15 MCG: 15 SOLUTION RESPIRATORY (INHALATION) at 08:52

## 2017-09-14 RX ADMIN — CEFTRIAXONE 1 G: 1 INJECTION, POWDER, FOR SOLUTION INTRAMUSCULAR; INTRAVENOUS at 16:49

## 2017-09-14 RX ADMIN — FUROSEMIDE 40 MG: 40 TABLET ORAL at 18:19

## 2017-09-14 RX ADMIN — METOPROLOL TARTRATE 25 MG: 25 TABLET ORAL at 08:50

## 2017-09-14 RX ADMIN — MORPHINE SULFATE 1 MG: 2 INJECTION, SOLUTION INTRAMUSCULAR; INTRAVENOUS at 16:47

## 2017-09-14 RX ADMIN — MEMANTINE HYDROCHLORIDE 10 MG: 10 TABLET ORAL at 08:50

## 2017-09-14 RX ADMIN — Medication 10 ML: at 14:00

## 2017-09-14 RX ADMIN — Medication 10 ML: at 16:08

## 2017-09-14 RX ADMIN — METOPROLOL TARTRATE 25 MG: 25 TABLET ORAL at 18:19

## 2017-09-14 RX ADMIN — APIXABAN 5 MG: 5 TABLET, FILM COATED ORAL at 21:12

## 2017-09-14 RX ADMIN — LISINOPRIL 40 MG: 20 TABLET ORAL at 08:50

## 2017-09-14 RX ADMIN — TRAMADOL HYDROCHLORIDE 50 MG: 50 TABLET, FILM COATED ORAL at 08:50

## 2017-09-14 RX ADMIN — TAMSULOSIN HYDROCHLORIDE 0.4 MG: 0.4 CAPSULE ORAL at 08:51

## 2017-09-14 RX ADMIN — BUDESONIDE 500 MCG: 0.5 INHALANT RESPIRATORY (INHALATION) at 20:32

## 2017-09-14 RX ADMIN — FUROSEMIDE 40 MG: 40 TABLET ORAL at 08:50

## 2017-09-14 RX ADMIN — VITAMIN D, TAB 1000IU (100/BT) 1000 UNITS: 25 TAB at 08:50

## 2017-09-14 RX ADMIN — Medication 10 ML: at 21:12

## 2017-09-14 RX ADMIN — MORPHINE SULFATE 1 MG: 2 INJECTION, SOLUTION INTRAMUSCULAR; INTRAVENOUS at 09:49

## 2017-09-14 RX ADMIN — BUPROPION HYDROCHLORIDE 150 MG: 150 TABLET, EXTENDED RELEASE ORAL at 08:50

## 2017-09-14 RX ADMIN — APIXABAN 2.5 MG: 2.5 TABLET, FILM COATED ORAL at 08:50

## 2017-09-14 RX ADMIN — TRAMADOL HYDROCHLORIDE 50 MG: 50 TABLET, FILM COATED ORAL at 16:57

## 2017-09-14 NOTE — PROGRESS NOTES
Page sent out to [facility] on call for EKG results, awaiting return phone call. 2108 Spoke with Saniya Loredo and inform him of EKG results. No new order received. Mentioned to continue to monitor patient.

## 2017-09-14 NOTE — PROGRESS NOTES
Patient c/o abd discomfort. Attempted to use urinal and was unsuccessful. unable to void post henning removal at 4pm. Bladder scan 672ml. Spoke with Li Ochoa and he gave orders for st cath x1.    2300 St Cath 600ml clear yellow. Patient didn't tolerated well. C/o penile discomfort while this nurse advance catheter. 0345 Patient have made several attempts to use the urinal and is unable to void. Restlessness.  Spoke with Misty and received another st cath orrder x1

## 2017-09-14 NOTE — PROCEDURES
[address]  *** FINAL REPORT ***    Name: Alan Keynon  MRN: [id number]    Outpatient  : 30 Dec 1935  HIS Order #: [id number]  [address] [facility] Visit #: [id number]  Date: 14 Sep 2017    TYPE OF TEST: Peripheral Venous Testing    REASON FOR TEST  Pain in limb    Left Leg:-  Deep venous thrombosis:           No  Superficial venous thrombosis:    No  Deep venous insufficiency:        Not examined  Superficial venous insufficiency: Not examined      INTERPRETATION/FINDINGS  PROCEDURE:  Color duplex ultrasound imaging of lower extremity veins. FINDINGS:       Right: The common femoral vein is patent and without evidence of  thrombus. Phasic flow is observed. This extremity was not otherwise  evaluated. Left:   The common femoral, deep femoral, femoral, popliteal,  posterior tibial, peroneal, and great saphenous are patent and without   evidence of thrombus;  each is fully compressible and there is no  narrowing of the flow channel on color Doppler imaging. Phasic flow  is observed in the common femoral vein. IMPRESSION:  No evidence of left lower extremity vein thrombosis. ADDITIONAL COMMENTS    I have personally reviewed the data relevant to the interpretation of  this  study.     TECHNOLOGIST: KENN Euceda, MAGGIE  Signed: 2017 10:49 AM    PHYSICIAN: Dorcas Zhang., MD  Signed: 2017 06:43 AM

## 2017-09-14 NOTE — PHYSICIAN ADVISORY
Letter of Status Determination:   Recommend hospitalization status upgraded from   OBSERVATION  to INPATIENT  Status     Pt Name:  Tigre Cota   MR#   [facility] # [id number] /  [id number]   [facility]#  [id number]   [address] [place] [address] 06 104  @ [facility]   Hospitalization date  9/12/2017  1:29 PM   Current Attending Physician  Molly Javier MD   Principal diagnosis  Urinary tract infection      Clinicals  [de-identified] y.o. y.o  male hospitalized with above diagnosis   This pt was treated by his PCP about one week PTA with PO abx for enterococcus UTI. 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 The pt came to ER this episode of care on 09/12/17 with complaints of urinary frequency, nausea. Pyelonephritis was suspected in addition to documented evidence of EDEN. The pt continues to have urinary retention and henning attempts were made for relief. He does have history of BPH. Urine culture is negative. [place] (MCG) criteria   Does  NOT apply    STATUS DETERMINATION  This patient is at high risk of adverse events and deterioration based on documented clinical data, comorbid conditions and current acute care course. Mr. Bower is expected to meet Inpatient Admission status criteria in accordance with CMS regulation Section 43 .3. Specifically, due to medical necessity the patient's stay is expected to exceed Two Midnights. It is our recommendation that this patient's hospitalization status should be upgraded from  OBSERVATION to INPATIENT status. The final decision of the patient's hospitalization status depends on the attending physician's judgment.           Additional comments     Payor: [place] / Plan: VA MEDICARE PART A & B / Product Type: Medicare /         Ángel Patel MD MPH FACP     Physician [address] [place] [address] [facility],[address]   President Medical Staff, [address] [place] [address]  [phone]        [phone]    .

## 2017-09-14 NOTE — PROGRESS NOTES
Bedside and Verbal shift change report given to Nacho Robb RN (oncoming nurse) by Maria R Momin RN (offgoing nurse). Report included the following information SBAR, Kardex, ED Summary, Intake/Output, MAR and Recent Results.

## 2017-09-14 NOTE — PROGRESS NOTES
Chart reviewed. CM met with pt and wife at bedside. Wife expressed concerns about pt's lack of ambulation since being in the hospital. CM spoke with MD and requested PT orders. Pt lives at [facility] unit. Wife states pt was ambulatory PTA. Wife indicated that pt may need transportation set up at discharge. CM will continue to follow.     Jerzy Babcock, BSW/CRM

## 2017-09-14 NOTE — PROGRESS NOTES
Bedside shift change report given to Juancarlos Wyatt (oncoming nurse) by Karthik Bates RN (offgoing nurse). Report included the following information SBAR, Kardex, MAR and Recent Results.

## 2017-09-15 VITALS
TEMPERATURE: 97.9 F | BODY MASS INDEX: 26.33 KG/M2 | OXYGEN SATURATION: 96 % | RESPIRATION RATE: 18 BRPM | DIASTOLIC BLOOD PRESSURE: 84 MMHG | HEART RATE: 64 BPM | SYSTOLIC BLOOD PRESSURE: 131 MMHG | WEIGHT: 178.3 LBS

## 2017-09-15 LAB
ANION GAP SERPL CALC-SCNC: 8 MMOL/L (ref 5–15)
BASOPHILS # BLD: 0 K/UL (ref 0–0.1)
BASOPHILS NFR BLD: 0 % (ref 0–1)
BUN SERPL-MCNC: 9 MG/DL (ref 6–20)
BUN/CREAT SERPL: 9 (ref 12–20)
CALCIUM SERPL-MCNC: 8 MG/DL (ref 8.5–10.1)
CHLORIDE SERPL-SCNC: 99 MMOL/L (ref 97–108)
CO2 SERPL-SCNC: 28 MMOL/L (ref 21–32)
CREAT SERPL-MCNC: 1.01 MG/DL (ref 0.7–1.3)
EOSINOPHIL # BLD: 0.1 K/UL (ref 0–0.4)
EOSINOPHIL NFR BLD: 1 % (ref 0–7)
ERYTHROCYTE [DISTWIDTH] IN BLOOD BY AUTOMATED COUNT: 13.2 % (ref 11.5–14.5)
GLUCOSE SERPL-MCNC: 102 MG/DL (ref 65–100)
HCT VFR BLD AUTO: 40.6 % (ref 36.6–50.3)
HGB BLD-MCNC: 14 G/DL (ref 12.1–17)
LYMPHOCYTES # BLD: 0.9 K/UL (ref 0.8–3.5)
LYMPHOCYTES NFR BLD: 12 % (ref 12–49)
MCH RBC QN AUTO: 29.5 PG (ref 26–34)
MCHC RBC AUTO-ENTMCNC: 34.5 G/DL (ref 30–36.5)
MCV RBC AUTO: 85.5 FL (ref 80–99)
MONOCYTES # BLD: 1.2 K/UL (ref 0–1)
MONOCYTES NFR BLD: 15 % (ref 5–13)
NEUTS SEG # BLD: 5.6 K/UL (ref 1.8–8)
NEUTS SEG NFR BLD: 72 % (ref 32–75)
PLATELET # BLD AUTO: 203 K/UL (ref 150–400)
POTASSIUM SERPL-SCNC: 2.9 MMOL/L (ref 3.5–5.1)
POTASSIUM SERPL-SCNC: 3.6 MMOL/L (ref 3.5–5.1)
RBC # BLD AUTO: 4.75 M/UL (ref 4.1–5.7)
SODIUM SERPL-SCNC: 135 MMOL/L (ref 136–145)
WBC # BLD AUTO: 7.9 K/UL (ref 4.1–11.1)

## 2017-09-15 PROCEDURE — 97162 PT EVAL MOD COMPLEX 30 MIN: CPT | Performed by: PHYSICAL THERAPIST

## 2017-09-15 PROCEDURE — 74011250636 HC RX REV CODE- 250/636: Performed by: HOSPITALIST

## 2017-09-15 PROCEDURE — 97116 GAIT TRAINING THERAPY: CPT | Performed by: PHYSICAL THERAPIST

## 2017-09-15 PROCEDURE — 74011250636 HC RX REV CODE- 250/636: Performed by: INTERNAL MEDICINE

## 2017-09-15 PROCEDURE — 97530 THERAPEUTIC ACTIVITIES: CPT

## 2017-09-15 PROCEDURE — 97165 OT EVAL LOW COMPLEX 30 MIN: CPT

## 2017-09-15 PROCEDURE — 84132 ASSAY OF SERUM POTASSIUM: CPT | Performed by: HOSPITALIST

## 2017-09-15 PROCEDURE — G8988 SELF CARE GOAL STATUS: HCPCS

## 2017-09-15 PROCEDURE — 85025 COMPLETE CBC W/AUTO DIFF WBC: CPT | Performed by: INTERNAL MEDICINE

## 2017-09-15 PROCEDURE — 74011250637 HC RX REV CODE- 250/637: Performed by: INTERNAL MEDICINE

## 2017-09-15 PROCEDURE — 80048 BASIC METABOLIC PNL TOTAL CA: CPT | Performed by: INTERNAL MEDICINE

## 2017-09-15 PROCEDURE — 36415 COLL VENOUS BLD VENIPUNCTURE: CPT | Performed by: INTERNAL MEDICINE

## 2017-09-15 PROCEDURE — 74011250637 HC RX REV CODE- 250/637: Performed by: HOSPITALIST

## 2017-09-15 PROCEDURE — 74011000258 HC RX REV CODE- 258: Performed by: INTERNAL MEDICINE

## 2017-09-15 PROCEDURE — G8987 SELF CARE CURRENT STATUS: HCPCS

## 2017-09-15 RX ORDER — POTASSIUM CHLORIDE 14.9 MG/ML
10 INJECTION INTRAVENOUS
Status: DISPENSED | OUTPATIENT
Start: 2017-09-15 | End: 2017-09-15

## 2017-09-15 RX ORDER — CEFUROXIME AXETIL 250 MG/1
500 TABLET ORAL 2 TIMES DAILY
Qty: 20 TAB | Refills: 0 | Status: SHIPPED | OUTPATIENT
Start: 2017-09-15 | End: 2017-09-20

## 2017-09-15 RX ORDER — TAMSULOSIN HYDROCHLORIDE 0.4 MG/1
0.4 CAPSULE ORAL DAILY
Qty: 30 CAP | Refills: 0 | Status: SHIPPED | OUTPATIENT
Start: 2017-09-15 | End: 2022-03-04

## 2017-09-15 RX ORDER — PREDNISONE 20 MG/1
20 TABLET ORAL DAILY
Qty: 2 TAB | Refills: 0 | Status: SHIPPED | OUTPATIENT
Start: 2017-09-16 | End: 2022-03-04

## 2017-09-15 RX ORDER — PREDNISONE 20 MG/1
20 TABLET ORAL DAILY
Status: DISCONTINUED | OUTPATIENT
Start: 2017-09-16 | End: 2017-09-15 | Stop reason: HOSPADM

## 2017-09-15 RX ORDER — TAMSULOSIN HYDROCHLORIDE 0.4 MG/1
0.4 CAPSULE ORAL DAILY
Qty: 30 CAP | Refills: 0 | Status: SHIPPED | OUTPATIENT
Start: 2017-09-15 | End: 2017-09-15

## 2017-09-15 RX ADMIN — METOPROLOL TARTRATE 25 MG: 25 TABLET ORAL at 09:20

## 2017-09-15 RX ADMIN — APIXABAN 5 MG: 5 TABLET, FILM COATED ORAL at 09:20

## 2017-09-15 RX ADMIN — MORPHINE SULFATE 1 MG: 2 INJECTION, SOLUTION INTRAMUSCULAR; INTRAVENOUS at 13:01

## 2017-09-15 RX ADMIN — SODIUM CHLORIDE 50 ML/HR: 900 INJECTION, SOLUTION INTRAVENOUS at 09:07

## 2017-09-15 RX ADMIN — TRAMADOL HYDROCHLORIDE 50 MG: 50 TABLET, FILM COATED ORAL at 16:20

## 2017-09-15 RX ADMIN — CEFTRIAXONE 1 G: 1 INJECTION, POWDER, FOR SOLUTION INTRAMUSCULAR; INTRAVENOUS at 16:20

## 2017-09-15 RX ADMIN — TAMSULOSIN HYDROCHLORIDE 0.4 MG: 0.4 CAPSULE ORAL at 09:20

## 2017-09-15 RX ADMIN — MEMANTINE HYDROCHLORIDE 10 MG: 10 TABLET ORAL at 09:20

## 2017-09-15 RX ADMIN — Medication 10 ML: at 05:03

## 2017-09-15 RX ADMIN — VITAMIN D, TAB 1000IU (100/BT) 1000 UNITS: 25 TAB at 09:20

## 2017-09-15 RX ADMIN — FUROSEMIDE 40 MG: 40 TABLET ORAL at 09:20

## 2017-09-15 RX ADMIN — BUPROPION HYDROCHLORIDE 150 MG: 150 TABLET, EXTENDED RELEASE ORAL at 09:20

## 2017-09-15 RX ADMIN — POTASSIUM CHLORIDE 10 MEQ: 200 INJECTION, SOLUTION INTRAVENOUS at 10:48

## 2017-09-15 RX ADMIN — Medication 10 ML: at 16:21

## 2017-09-15 RX ADMIN — POTASSIUM CHLORIDE 10 MEQ: 200 INJECTION, SOLUTION INTRAVENOUS at 10:00

## 2017-09-15 RX ADMIN — TRAMADOL HYDROCHLORIDE 50 MG: 50 TABLET, FILM COATED ORAL at 09:20

## 2017-09-15 RX ADMIN — MONTELUKAST SODIUM 10 MG: 10 TABLET, FILM COATED ORAL at 09:20

## 2017-09-15 RX ADMIN — POTASSIUM CHLORIDE 10 MEQ: 200 INJECTION, SOLUTION INTRAVENOUS at 07:35

## 2017-09-15 RX ADMIN — POTASSIUM CHLORIDE 10 MEQ: 750 TABLET, FILM COATED, EXTENDED RELEASE ORAL at 09:20

## 2017-09-15 RX ADMIN — LISINOPRIL 40 MG: 20 TABLET ORAL at 09:20

## 2017-09-15 RX ADMIN — ACETAMINOPHEN 650 MG: 325 TABLET, FILM COATED ORAL at 04:57

## 2017-09-15 NOTE — PROGRESS NOTES
Problem: Discharge Planning  Goal: *Discharge to safe environment  Outcome: Progressing Towards Goal  Discharge disposition: [facility])

## 2017-09-15 NOTE — DISCHARGE SUMMARY
Discharge Summary       PATIENT ID: Madison Summers  MRN: [id number]   YOB: 1935    DATE OF ADMISSION: 9/12/2017  1:29 PM    DATE OF DISCHARGE: 9/15/2017   PRIMARY CARE PROVIDER: None     ATTENDING PHYSICIAN: Candance Gear, MD  DISCHARGING PROVIDER: Manuela Baum MD    To contact this individual call [phone] and ask the  to page. If unavailable ask to be transferred the Adult Hospitalist Department. CONSULTATIONS: IP CONSULT TO VASCULAR SURGERY    PROCEDURES/SURGERIES: * No surgery found *    ADMITTING [address] [place] [address] COURSE:     Marylene Campion a [de-identified] y. o. male who presents with Past medical history of dementia, Atrial fibrillation, depression, hypertension presented with frequency urgency, and burning sensation during urination associated with nausea and loss of appetite for the last one week. Patient went to PCP office for the above complaint one week back and he was given oral antibiotics (ciprofloxacin and keflex) with no improvement over the last one week back. Over the last two days his symptoms which were mentioned above became worse associated with difficulty of urinating, and hesitancy. EDEN on CKD stage I multifactorial  - US Bilateral renal cortical thinning, suggesting chronic intrinsic renal disease. No hydronephrosis.   -henning catheter in place, and discontinue on 9/13 but failed void trial and placed on 9/14  -creatinine improving, IVF and repeat renal function improved  Recurrent UTI POA  -on ceftiraxon, switch to cefuroxime on discharge with probiotics  -UA unremarkable  -urine cx no growth   Urinary retention with hx of BPH   -continue on flomax   -henning catheter reinserted on 9/14  -outpatient follow up with urologist   Atrial fibrillation with RVR   -rate controlled now   -continue with home medication of Eliquis and Metoprolol   -check ekg    Hypertension   -Continue with Lisinopril, metoprolol and monitor BP  COPD   -stable   -continue pulmicort, arformoterol and monitor pulse ox    Depression/dementia   -Continue with home medication of Bupropion and Memantine    Left leg pain possible due to gout  -doppler study negative for deep vein thrombosis  -prn analgesics, prednisone 20 mg po q daily for 3 days  Hypokalemia  -replaced and resolved     Code status: Full Code  DVT prophylaxis: on eliquis      DISCHARGE DIAGNOSES / PLAN:      DEEN on CKD stage I multifactorial  -creatinine improved and normal  Recurrent UTI POA  -continue cefuroxime 500 mg po bid for 5 days with floraQ  Urinary retention with hx of BPH   -continue on flomax   -henning catheter reinserted on 9/14  -outpatient follow up with urologist   Atrial fibrillation with RVR   -continue Metoprolol and Eliquis 5 mg po bid  -check ekg    Hypertension   -Continue with Lisinopril, metoprolol and monitor BP  COPD   -stable   -continue pulmicort, arformoterol and monitor pulse ox    Depression/dementia   -Continue with home medication of Bupropion and Memantine    -continue supportive care  Left leg pain possible due to gout  -prednisone 20 mg po q daily for 3 days  Hypokalemia   -replaced and resolved    PENDING TEST RESULTS:   At the time of discharge the following test results are still pending: none    FOLLOW UP APPOINTMENTS:    Call and make appointment to see a Primary Care Physician in one wee  Call and make appointment to see a Urologist in one week  Follow up with Vascular surgeon as an outpatient     ADDITIONAL CARE RECOMMENDATIONS:     DIET: Cardiac Diet    ACTIVITY: Activity as tolerated    WOUND CARE: none    EQUIPMENT needed: none       DISCHARGE MEDICATIONS:  Current Discharge Medication List      START taking these medications    Details   cefUROXime (CEFTIN) 250 mg tablet Take 2 Tabs by mouth two (2) times a day for 5 days. Qty: 20 Tab, Refills: 0      L. acidoph & paracasei- S therm- Bifido (MONISHA-Q/RISAQUAD) 8 billion cell cap cap Take 1 Cap by mouth daily.   Qty: 10 Cap, Refills: 0      tamsulosin (FLOMAX) 0.4 mg capsule Take 1 Cap by mouth daily. Qty: 30 Cap, Refills: 0      predniSONE (DELTASONE) 20 mg tablet Take 1 Tab by mouth daily. Qty: 2 Tab, Refills: 0         CONTINUE these medications which have CHANGED    Details   apixaban (ELIQUIS) 5 mg tablet Take 1 Tab by mouth two (2) times a day. Indications: PREVENT THROMBOEMBOLISM IN CHRONIC ATRIAL FIBRILLATION  Qty: 60 Tab, Refills: 0         CONTINUE these medications which have NOT CHANGED    Details   lisinopril (PRINIVIL, ZESTRIL) 40 mg tablet Take 40 mg by mouth daily. Indications: hypertension      cholecalciferol (VITAMIN D3) 1,000 unit tablet Take 1,000 Units by mouth daily. arformoterol (BROVANA) 15 mcg/2 mL nebu neb solution 15 mcg by Nebulization route two (2) times a day. Indications: BRONCHOSPASM PREVENTION WITH COPD      budesonide (PULMICORT) 0.5 mg/2 mL nbsp 500 mcg by Nebulization route two (2) times a day. Indications: SEVERE CHRONIC OBSTRUCTIVE PULMONARY DISEASE      fluoride, sodium, (PREVIDENT 5000 PLUS) 1.1 % crea by Dental route four (4) times daily. After meals and at bedtime. Indications: DENTAL PLAQUE PREVENTION      albuterol (PROVENTIL VENTOLIN) 2.5 mg /3 mL (0.083 %) nebulizer solution 2.5 mg by Nebulization route every twelve (12) hours as needed for Wheezing. fluticasone (FLONASE) 50 mcg/actuation nasal spray 2 Sprays by Both Nostrils route daily as needed for Rhinitis. traMADol-acetaminophen (ULTRACET) 37.5-325 mg per tablet Take 1 Tab by mouth every four (4) hours as needed for Pain. Indications: Pain      furosemide (LASIX) 40 mg tablet Take 40 mg by mouth two (2) times a day. montelukast (SINGULAIR) 10 mg tablet Take 10 mg by mouth daily.       memantine (NAMENDA) 10 mg tablet TAKE ONE TABLET BY MOUTH TWICE A DAY  Qty: 60 Tab, Refills: 10    Associated Diagnoses: Late onset Alzheimer's disease without behavioral disturbance; Mood change (HCC)      buPROPion XL [facility] XL) 150 mg tablet Take 1 Tab by mouth every morning. 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 metoprolol (LOPRESSOR) 25 mg tablet Take 25 mg by mouth two (2) times a day. STOP taking these medications       traMADol (ULTRAM) 50 mg tablet Comments:   Reason for Stopping:         ciprofloxacin HCl (CIPRO) 500 mg tablet Comments:   Reason for Stopping:                 NOTIFY YOUR PHYSICIAN FOR ANY OF THE FOLLOWING:   Fever over 101 degrees for 24 hours. Chest pain, shortness of breath, fever, chills, nausea, vomiting, diarrhea, change in mentation, falling, weakness, bleeding. Severe pain or pain not relieved by medications. Or, any other signs or symptoms that you may have questions about.     DISPOSITION:    Home With:   OT  PT  HH  RN      x Long term SNF/Inpatient Rehab    Independent/assisted living    Hospice    Other:       PATIENT CONDITION AT DISCHARGE:     Functional status    Poor    x Deconditioned     Independent      Cognition     Lucid     Forgetful     Dementia      Catheters/lines (plus indication)   x Mckeon     PICC     PEG     None      Code status    x Full code     DNR      PHYSICAL EXAMINATION AT DISCHARGE:   Refer to Progress Note      CHRONIC MEDICAL DIAGNOSES:  Problem List as of 9/15/2017  Date Reviewed: 9/13/2017          Codes Class Noted - Resolved    * (Principal)Urinary tract infection ICD-10-CM: N39.0  ICD-9-CM: 599.0  9/12/2017 - Present        Pyelonephritis ICD-10-CM: N12  ICD-9-CM: 590.80  9/12/2017 - Present        UTI (urinary tract infection) ICD-10-CM: N39.0  ICD-9-CM: 599.0  9/12/2017 - Present        History of CVA (cerebrovascular accident) (Chronic) ICD-10-CM: Z14.88  ICD-9-CM: V12.54  5/4/2017 - Present        CKD (chronic kidney disease) stage 3, GFR 30-59 ml/min (Chronic) ICD-10-CM: N18.3  ICD-9-CM: 585.3  5/4/2017 - Present        Simple chronic bronchitis (HCC) (Chronic) ICD-10-CM: J41.0  ICD-9-CM: 491.0  5/4/2017 - Present        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 Chronic tension-type headache, not intractable ICD-10-CM: G00.000  ICD-9-CM: 339.12  11/8/2015 - Present        Gait apraxia of elderly ICD-10-CM: R48.2  ICD-9-CM: 784.69  11/8/2015 - Present        Spondylolysis of cervical region ICD-10-CM: M43.02  ICD-9-CM: 756.19  11/8/2015 - Present        Cervical spondyloarthritis ICD-10-CM: M88.183  ICD-9-CM: 721.0  11/8/2015 - Present        Cerebral thrombosis with cerebral infarction [facility]) ICD-10-CM: I63.30  ICD-9-CM: 434.01  11/8/2015 - Present        Cerebral infarction due to stenosis of carotid artery ([facility] [address] ICD-10-CM: L33.272  ICD-9-CM: 433.11  11/8/2015 - Present        Depression ICD-10-CM: F32.9  ICD-9-CM: 690  7/7/2015 - Present        Acute bronchitis ICD-10-CM: J20.9  ICD-9-CM: 466.0  7/19/2013 - Present        Edema extremities ICD-10-CM: R60.0  ICD-9-CM: 782.3  7/19/2013 - Present        HTN (hypertension) ICD-10-CM: I10  ICD-9-CM: 401.9  7/19/2013 - Present        Atrial fibrillation ([facility] [address] ICD-10-CM: I48.91  ICD-9-CM: 427.31  7/19/2013 - Present    Overview Signed 7/19/2013  1:21 PM by [id number] onset.               RESOLVED: Supratherapeutic INR ICD-10-CM: R79.1  ICD-9-CM: 790.92  11/7/2015 - 2/9/2017              Greater than 20 minutes were spent with the patient on counseling and coordination of care    Signed: Manuela Baum MD  9/15/2017  3:41 PM

## 2017-09-15 NOTE — H&P
History & Physical     Primary Care Provider: None  Source of Information: Patient and his wife         History of Presenting Illness:   Kimmy Mendez is a [de-identified] y.o. male who presents with Past medical history of dementia, Atrial fibrillation, depression, hypertension presented with frequency urgency, and burning sensation during urination associated with nausea and loss of appetite for the last one week. Patient went to PMD office for the above complaint one week back and he was given oral antibiotics (ciprofloxacin and keflex) with no improvement over the last one week back. Over the last two days his symptoms which were mentioned above became worse associated with difficulty of urinating, and hesitancy. Currently has no fever, chills, or rigor. At the ED urinary catheter produced greater than 1 litre of urine and patient stated that he is relieved now.      The patient denies any fever, chills, chest pain, cough, congestion, recent illness, palpitations, or dysuria.       Review of Systems:  A comprehensive review of systems was negative except for that written in the History of Present Illness.           Past Medical History:   Diagnosis Date    Allergy       seasonal    Arrhythmia      Asthma      Dementia      Depression      Heart failure (HCC)      Hypertension      Neurological disorder       dementia    Other ill-defined conditions       a-fib    Stroke [facility])              Past Surgical History:   Procedure Laterality Date    HX HEENT         cateract surgery              Prior to Admission medications    Medication Sig Start Date End Date Taking? Authorizing Provider   lisinopril (PRINIVIL, ZESTRIL) 40 mg tablet Take 40 mg by mouth daily.  Indications: hypertension     Yes Historical Provider   cholecalciferol (VITAMIN D3) 1,000 unit tablet Take 1,000 Units by mouth daily.     Yes Historical Provider   apixaban (ELIQUIS) 2.5 mg tablet Take 2.5 mg by mouth two (2) times a day. Indications: PREVENT THROMBOEMBOLISM IN CHRONIC ATRIAL FIBRILLATION     Yes Historical Provider   traMADol (ULTRAM) 50 mg tablet Take 50 mg by mouth three (3) times daily. X 3 days starting 9/12/17  Indications: Pain 9/12/17 9/14/17 Yes Historical Provider   ciprofloxacin HCl (CIPRO) 500 mg tablet Take 500 mg by mouth two (2) times a day. Start 9/12/17 for 10 days 9/12/17 9/21/17 Yes Historical Provider   arformoterol (BROVANA) 15 mcg/2 mL nebu neb solution 15 mcg by Nebulization route two (2) times a day. Indications: BRONCHOSPASM PREVENTION WITH COPD     Yes Historical Provider   budesonide (PULMICORT) 0.5 mg/2 mL nbsp 500 mcg by Nebulization route two (2) times a day. Indications: SEVERE CHRONIC OBSTRUCTIVE PULMONARY DISEASE     Yes Historical Provider   fluoride, sodium, (PREVIDENT 5000 PLUS) 1.1 % crea by Dental route four (4) times daily. After meals and at bedtime. Indications: DENTAL PLAQUE PREVENTION     Yes Historical Provider   albuterol (PROVENTIL VENTOLIN) 2.5 mg /3 mL (0.083 %) nebulizer solution 2.5 mg by Nebulization route every twelve (12) hours as needed for Wheezing.     Yes Historical Provider   fluticasone (FLONASE) 50 mcg/actuation nasal spray 2 Sprays by Both Nostrils route daily as needed for Rhinitis.     Yes Historical Provider   traMADol-acetaminophen (ULTRACET) 37.5-325 mg per tablet Take 1 Tab by mouth every four (4) hours as needed for Pain. Indications: Pain     Yes Historical Provider   furosemide (LASIX) 40 mg tablet Take 40 mg by mouth two (2) times a day.     Yes Melanie Hernandez MD   montelukast (SINGULAIR) 10 mg tablet Take 10 mg by mouth daily.     Yes Melanie Hernandez MD   memantine (NAMENDA) 10 mg tablet TAKE ONE TABLET BY MOUTH TWICE A DAY 4/2/17   Yes Ketan Rausch MD   buPROPion XL (WELLBUTRIN XL) 150 mg tablet Take 1 Tab by mouth every morning.  11/16/16   Yes Ketan Rausch MD   potassium chloride SR (KLOR-CON 10) 10 mEq tablet Take 10 mEq by mouth two (2) times a day.     Yes Historical Provider   metoprolol (LOPRESSOR) 25 mg tablet Take 25 mg by mouth two (2) times a day.     Yes Historical Provider           Allergies   Allergen Reactions    No Known Allergies Other (comments)             Family History   Problem Relation Age of Onset    Stroke Mother      Heart Disease Mother      Cancer Father         prostate    Cancer Brother         colon         SOCIAL HISTORY:  Patient resides:  Independently X   Assisted Living     SNF     With family care              Ambulates:   Independently X   w/cane     w/walker     w/wc     CODE STATUS:  DNR     Full X   Other        Objective:      Physical Exam:           Visit Vitals    /87    Pulse 79    Temp 97.9 °F (36.6 °C)    Resp 16    SpO2 99%      O2 Device: Room air     General:  Alert, cooperative, no distress, appears stated age. Head:  Normocephalic, without obvious abnormality, atraumatic. Eyes:  Conjunctivae/corneas clear. PERRL, EOMs intact. Nose: Nares normal. Septum midline. Mucosa normal. No drainage or sinus tenderness. Throat: Lips, mucosa, and tongue normal. Teeth and gums normal.   Neck: Supple, symmetrical, trachea midline, no adenopathy, thyroid: no enlargement/tenderness/nodules, no carotid bruit and no JVD. Back:   Symmetric, no curvature. ROM normal. No CVA tenderness. Lungs:   Clear to auscultation bilaterally. Chest wall:  No tenderness or deformity. Heart:  Regular rate and rhythm, S1, S2 normal, no murmur, click, rub or gallop. Abdomen:   Soft abdomen tenderness over the epigastric area of abdomen   Urinary catheter in situ which is functional    Extremities: Extremities normal, atraumatic, no cyanosis or edema. Pulses: 2+ and symmetric all extremities.    Skin: Skin color, texture, turgor normal. No rashes or lesions   Neurologic: CNII-XII intact.             EKG:  Pending previous EKG reviewed which is Atrial fibrillation rate controlled         Data Review:    Recent Days:      Recent Labs       09/12/17   1408   WBC  6.9   HGB  16.2   HCT  47.3   PLT  282          Recent Labs       09/12/17   1408   NA  141   K  3.8   CL  100   CO2  25   GLU  78   BUN  29*   CREA  1.88*   CA  9.5   ALB  3.7   TBILI  1.4*   SGOT  40*   ALT  32      No results for input(s): PH, PCO2, PO2, HCO3, FIO2 in the last 72 hours.     24 Hour Results:   Recent Results          Recent Results (from the past 24 hour(s))   URINALYSIS W/MICROSCOPIC     Collection Time: 09/12/17  1:52 PM   Result Value Ref Range     Color YELLOW/STRAW        Appearance CLEAR CLEAR       Specific gravity 1.014 1.003 - 1.030       pH (UA) 5.0 5.0 - 8.0       Protein NEGATIVE  NEG mg/dL     Glucose NEGATIVE  NEG mg/dL     Ketone NEGATIVE  NEG mg/dL     Bilirubin NEGATIVE  NEG       Blood NEGATIVE  NEG       Urobilinogen 0.2 0.2 - 1.0 EU/dL     Nitrites NEGATIVE  NEG       Leukocyte Esterase NEGATIVE  NEG       WBC 0-4 0 - 4 /hpf     RBC 0-5 0 - 5 /hpf     Epithelial cells FEW FEW /lpf     Bacteria NEGATIVE  NEG /hpf     Mucus TRACE (A) NEG /lpf   URINE CULTURE HOLD SAMPLE     Collection Time: 09/12/17  1:52 PM   Result Value Ref Range     Urine culture hold URINE ON HOLD IN MICROBIOLOGY DEPT FOR 3 DAYS      CBC WITH AUTOMATED DIFF     Collection Time: 09/12/17  2:08 PM   Result Value Ref Range     WBC 6.9 4.1 - 11.1 K/uL     RBC 5.45 4.10 - 5.70 M/uL     HGB 16.2 12.1 - 17.0 g/dL     HCT 47.3 36.6 - 50.3 %     MCV 86.8 80.0 - 99.0 FL     MCH 29.7 26.0 - 34.0 PG     MCHC 34.2 30.0 - 36.5 g/dL     RDW 13.8 11.5 - 14.5 %     PLATELET 913 697 - 477 K/uL     NEUTROPHILS 74 32 - 75 %     LYMPHOCYTES 14 12 - 49 %     MONOCYTES 12 5 - 13 %     EOSINOPHILS 0 0 - 7 %     BASOPHILS 0 0 - 1 %     ABS. NEUTROPHILS 5.1 1.8 - 8.0 K/UL     ABS. LYMPHOCYTES 1.0 0.8 - 3.5 K/UL     ABS. MONOCYTES 0.9 0.0 - 1.0 K/UL     ABS. EOSINOPHILS 0.0 0.0 - 0.4 K/UL     ABS.  BASOPHILS 0.0 0.0 - 0.1 K/UL   METABOLIC PANEL, COMPREHENSIVE     Collection Time: 09/12/17  2:08 PM   Result Value Ref Range     Sodium 141 136 - 145 mmol/L     Potassium 3.8 3.5 - 5.1 mmol/L     Chloride 100 97 - 108 mmol/L     CO2 25 21 - 32 mmol/L     Anion gap 16 (H) 5 - 15 mmol/L     Glucose 78 65 - 100 mg/dL     BUN 29 (H) 6 - 20 MG/DL     Creatinine 1.88 (H) 0.70 - 1.30 MG/DL     BUN/Creatinine ratio 15 12 - 20       GFR est AA 42 (L) >60 ml/min/1.73m2     GFR est non-AA 35 (L) >60 ml/min/1.73m2     Calcium 9.5 8.5 - 10.1 MG/DL     Bilirubin, total 1.4 (H) 0.2 - 1.0 MG/DL     ALT (SGPT) 32 12 - 78 U/L     AST (SGOT) 40 (H) 15 - 37 U/L     Alk.  phosphatase 92 45 - 117 U/L     Protein, total 7.6 6.4 - 8.2 g/dL     Albumin 3.7 3.5 - 5.0 g/dL     Globulin 3.9 2.0 - 4.0 g/dL     A-G Ratio 0.9 (L) 1.1 - 2.2                 Imaging:      Assessment:      Active Problems:    Urinary tract infection (9/12/2017)       Pyelonephritis (9/12/2017)       UTI (urinary tract infection) (9/12/2017)     Recurrent urinary tract infection in the presence of nausea to R/O Pyelonephritis   Admit for observation   Trend white count and fever curve  Send for urine culture and follow result   Patient started on Ceftriaxone IV empirically, will switch on the bases of culture results   Continue with IVF   Mckeon catheter for 24 hours   Abdominal us in light of recurrent UTI which is refractory to Abs treatment      Acute Renal insufficiency most likely post Renal 2/2 urinary retention   Abdominal US   Urinary catheter in situ   IVF and follow CBC and BMP in am      History of BPH   Continue with current management as above   Will start on flomax      Atrial fibrillation with RVR   EKG now   Continue with home medication of Eliquis and Metoprolol      Hypertension   Continue with Lisinopril      COPD   Stable   Continue with home medications      Depression/dementia   Continue with home medication of Bupropion and Memantine             Signed By: Niesha Martinez MD      September 12, 2017

## 2017-09-15 NOTE — DISCHARGE INSTRUCTIONS
Discharge SNF/Rehab Instructions/LTAC       PATIENT ID: Aubrey Quevedo  MRN: [id number]   YOB: 1935    DATE OF ADMISSION: 9/12/2017  1:29 PM    DATE OF DISCHARGE: 9/15/2017    PRIMARY CARE PROVIDER: None       ATTENDING PHYSICIAN: Alan Dangelo MD  DISCHARGING PROVIDER: Alan Dangelo MD     To contact this individual call [phone] and ask the  to page. If unavailable ask to be transferred the Adult Hospitalist Department. CONSULTATIONS: IP CONSULT TO VASCULAR SURGERY    PROCEDURES/SURGERIES: * No surgery found *    ADMITTING [address] [place] [address] COURSE:     Ovidio nieto [de-identified] y. o. male who presents with Past medical history of dementia, Atrial fibrillation, depression, hypertension presented with frequency urgency, and burning sensation during urination associated with nausea and loss of appetite for the last one week. Patient went to PCP office for the above complaint one week back and he was given oral antibiotics (ciprofloxacin and keflex) with no improvement over the last one week back. Over the last two days his symptoms which were mentioned above became worse associated with difficulty of urinating, and hesitancy. EDEN likely multifactorial  - US Bilateral renal cortical thinning, suggesting chronic intrinsic renal disease. No hydronephrosis.   -henning catheter in place, and discontinue on 9/13 but failed void trial and placed on 9/14  -creatinine improving, IVF and repeat renal function improved  Recurrent UTI POA  -on ceftiraxon, switch to cefuroxime on discharge with probiotics  -UA unremarkable  -urine cx no growth   Urinary retention with hx of BPH   -continue on flomax   -henning catheter reinserted on 9/14  -outpatient follow up with urologist   Atrial fibrillation with RVR   -rate controlled now   -continue with home medication of Eliquis and Metoprolol   -check ekg    Hypertension   -Continue with Lisinopril, metoprolol and monitor BP  COPD -stable   -continue pulmicort, arformoterol and monitor pulse ox    Depression/dementia   -Continue with home medication of Bupropion and Memantine    Left leg pain possible due to gout  -doppler study negative for deep vein thrombosis  -prn analgesics, prednisone 20 mg po q daily for 3 days     Code status: Full Code  DVT prophylaxis: on eliquis      DISCHARGE DIAGNOSES / PLAN:      EDEN likely multifactorial  -creatinine improved and normal  Recurrent UTI POA  -continue cefuroxime 500 mg po bid for 5 days with floraQ  Urinary retention with hx of BPH   -continue on flomax   -henning catheter reinserted on 9/14  -outpatient follow up with urologist   Atrial fibrillation with RVR   -continue Metoprolol and Eliquis 5 mg po bid  -check ekg    Hypertension   -Continue with Lisinopril, metoprolol and monitor BP  COPD   -stable   -continue pulmicort, arformoterol and monitor pulse ox    Depression/dementia   -Continue with home medication of Bupropion and Memantine    -continue supportive care  Left leg pain possible due to gout  -prednisone 20 mg po q daily for 3 days    PENDING TEST RESULTS:   At the time of discharge the following test results are still pending: none    FOLLOW UP APPOINTMENTS:    Call and make appointment to see a Primary Care Physician in one wee  Call and make appointment to see a Urologist in one week  Follow up with Vascular surgeon as an outpatient    ADDITIONAL CARE RECOMMENDATIONS:     DIET: Cardiac Diet    TUBE FEEDING INSTRUCTIONS: none    OXYGEN / BiPAP SETTINGS: none    ACTIVITY: Activity as tolerated    WOUND CARE: none    EQUIPMENT needed: none      DISCHARGE MEDICATIONS:   See Medication Reconciliation Form      NOTIFY YOUR PHYSICIAN FOR ANY OF THE FOLLOWING:   Fever over 101 degrees for 24 hours. Chest pain, shortness of breath, fever, chills, nausea, vomiting, diarrhea, change in mentation, falling, weakness, bleeding. Severe pain or pain not relieved by medications.   Or, any other signs or symptoms that you may have questions about. DISPOSITION:    Home With:   OT  PT  HH  RN      x SNF/Inpatient Rehab/LTAC    Independent/assisted living    Hospice    Memory Care Unit       PATIENT CONDITION AT DISCHARGE:     Functional status    Poor    x Deconditioned     Independent      Cognition     Lucid     Forgetful    x Dementia      Catheters/lines (plus indication)   x Mckeon     PICC     PEG     None      Code status    x Full code     DNR      PHYSICAL EXAMINATION AT DISCHARGE:   Refer to Progress Note      CHRONIC MEDICAL DIAGNOSES:  Problem List as of 9/15/2017  Date Reviewed: 9/13/2017          Codes Class Noted - Resolved    * (Principal)Urinary tract infection ICD-10-CM: N39.0  ICD-9-CM: 599.0  9/12/2017 - Present        Pyelonephritis ICD-10-CM: N12  ICD-9-CM: 590.80  9/12/2017 - Present        UTI (urinary tract infection) ICD-10-CM: N39.0  ICD-9-CM: 599.0  9/12/2017 - Present        History of CVA (cerebrovascular accident) (Chronic) ICD-10-CM: W59.35  ICD-9-CM: V12.54  5/4/2017 - Present        CKD (chronic kidney disease) stage 3, GFR 30-59 ml/min (Chronic) ICD-10-CM: N18.3  ICD-9-CM: 585.3  5/4/2017 - Present        Simple chronic bronchitis (HCC) (Chronic) ICD-10-CM: J41.0  ICD-9-CM: 491.0  5/4/2017 - Present        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              Chronic tension-type headache, not intractable ICD-10-CM: D86.135  ICD-9-CM: 339.12  11/8/2015 - Present        Gait apraxia of elderly ICD-10-CM: R48.2  ICD-9-CM: 784.69  11/8/2015 - Present        Spondylolysis of cervical region ICD-10-CM: M43.02  ICD-9-CM: 756.19  11/8/2015 - Present        Cervical spondyloarthritis ICD-10-CM: Q98.063  ICD-9-CM: 721.0  11/8/2015 - Present        Cerebral thrombosis with cerebral infarction [facility]) ICD-10-CM: I63.30  ICD-9-CM: 434.01  11/8/2015 - Present        Cerebral infarction due to stenosis of carotid artery ([facility] ICD-10-CM: Q53.570  ICD-9-CM: 433.11  11/8/2015 - Present        Depression ICD-10-CM: F32.9  ICD-9-CM: 027  7/7/2015 - Present        Acute bronchitis ICD-10-CM: J20.9  ICD-9-CM: 466.0  7/19/2013 - Present        Edema extremities ICD-10-CM: R60.0  ICD-9-CM: 782.3  7/19/2013 - Present        HTN (hypertension) ICD-10-CM: I10  ICD-9-CM: 401.9  7/19/2013 - Present        Atrial fibrillation ([facility] ICD-10-CM: I48.91  ICD-9-CM: 427.31  7/19/2013 - Present    Overview Signed 7/19/2013  1:21 PM by [id number] onset.               RESOLVED: Supratherapeutic INR ICD-10-CM: R79.1  ICD-9-CM: 790.92  11/7/2015 - 2/9/2017                CDMP Checked:   Yes x     PROBLEM LIST Updated:  Yes x         Signed:   Deirdre Durbin MD  9/15/2017  12:36 PM

## 2017-09-15 NOTE — PROGRESS NOTES
Hospitalist Progress Note  Emmy Morales MD  Office: [phone]  Cell: [phone]      Date of Service:  2017  NAME:  Donte Martinez  :  1935  MRN:  [id number]      Admission Summary:   Donte Martinez is a [de-identified] y.o. male who presents with Past medical history of dementia, Atrial fibrillation, depression, hypertension presented with frequency urgency, and burning sensation during urination associated with nausea and loss of appetite for the last one week. Patient went to PCP office for the above complaint one week back and he was given oral antibiotics (ciprofloxacin and keflex) with no improvement over the last one week back. Over the last two days his symptoms which were mentioned above became worse associated with difficulty of urinating, and hesitancy. Interval history / Subjective:   Lower abdominal discomfort,      Assessment & Plan: EDEN likely multifactorial  - US Bilateral renal cortical thinning, suggesting chronic intrinsic renal disease. No hydronephrosis.   -henning catheter in place, and discontinue on  but failed void trial and placed on   -creatinine improving, continue IVF and repeat renal function in am    Urinary urgency and frequency, rule out UTI, patient with hx of recent UTI  -on ceftiraxon   -UA unremarkable  -urine cx no growth    History of BPH   -continue on flomax   -henning catheter reinserted on      Atrial fibrillation with RVR   -rate controlled now   -continue with home medication of Eliquis and Metoprolol   -check ekg     Hypertension   -Continue with Lisinopril, metoprolol and monitor BP     COPD   -stable   -continue pulmicort, arformoterol and monitor pulse ox     Depression/dementia   Continue with home medication of Bupropion and Memantine     Left leg pain, hx of reported PVD  -doppler study negative for deep vein thrombosis  -prn analgesics, lidocaine patch  -consult vascular surgeon      Code status: Full Code  DVT prophylaxis: on eliquis    Care Plan discussed with: Patient/Family, Nurse and   Disposition: [facility]   Wife at bedside, questions answered     Hospital Problems  Date Reviewed: 9/13/2017          Codes Class Noted POA    * (Principal)Urinary tract infection ICD-10-CM: N39.0  ICD-9-CM: 599.0  9/12/2017 Unknown        Pyelonephritis ICD-10-CM: N12  ICD-9-CM: 590.80  9/12/2017 Unknown        UTI (urinary tract infection) ICD-10-CM: N39.0  ICD-9-CM: 599.0  9/12/2017 Unknown              Vital Signs:    Last 24hrs VS reviewed since prior progress note. Most recent are:  Visit Vitals    /78 (BP 1 Location: Right arm, BP Patient Position: At rest;Supine)    Pulse 81    Temp 98.1 °F (36.7 °C)    Resp 18    Wt 80.9 kg (178 lb 4.8 oz)    SpO2 98%    BMI 26.33 kg/m2         Intake/Output Summary (Last 24 hours) at 09/14/17 2026  Last data filed at 09/14/17 1144   Gross per 24 hour   Intake                0 ml   Output             2050 ml   Net            -2050 ml        Physical Examination:             Constitutional:  No acute distress, cooperative, pleasant    ENT:  Oral mucous moist, oropharynx benign. Neck supple,    Resp:  CTA bilaterally. No wheezing/rhonchi/rales. No accessory muscle use   CV:  Regular rhythm, normal rate, no murmurs, gallops, rubs    GI:  Soft, non distended, non tender. normoactive bowel sounds, no hepatosplenomegaly     Musculoskeletal:  No edema    Neurologic:  Conscious and alert, oriented to place and person, moves all extremities, motor 5/5, CN II-XII reviewed     Psych:  Good insight, Not anxious nor agitated.        Data Review:    Review and/or order of clinical lab test      Labs:     Recent Labs      09/13/17   0348  09/12/17   1408   WBC  7.9  6.9   HGB  14.3  16.2   HCT  42.1  47.3   PLT  226  282     Recent Labs      09/13/17   0348  09/12/17   1408   NA  142  141   K  3.4*  3.8   CL  107  100 CO2  24  25   BUN  23*  29*   CREA  1.24  1.88*   GLU  87  78   CA  8.4*  9.5     Recent Labs      09/12/17   1408   SGOT  40*   ALT  32   AP  92   TBILI  1.4*   TP  7.6   ALB  3.7   GLOB  3.9     No results for input(s): INR, PTP, APTT in the last 72 hours. No lab exists for component: INREXT, INREXT   No results for input(s): FE, TIBC, PSAT, FERR in the last 72 hours. Lab Results   Component Value Date/Time    Folate 10.0 11/09/2015 01:30 AM      No results for input(s): PH, PCO2, PO2 in the last 72 hours. No results for input(s): CPK, CKNDX, TROIQ in the last 72 hours.     No lab exists for component: CPKMB  No results found for: CHOL, CHOLX, CHLST, CHOLV, HDL, LDL, LDLC, DLDLP, TGLX, TRIGL, TRIGP, CHHD, CHHDX  Lab Results   Component Value Date/Time    Glucose (POC) 87 05/01/2017 12:01 AM    Glucose (POC) 146 12/07/2016 02:59 PM     Lab Results   Component Value Date/Time    Color YELLOW/STRAW 09/12/2017 01:52 PM    Appearance CLEAR 09/12/2017 01:52 PM    Specific gravity 1.014 09/12/2017 01:52 PM    pH (UA) 5.0 09/12/2017 01:52 PM    Protein NEGATIVE  09/12/2017 01:52 PM    Glucose NEGATIVE  09/12/2017 01:52 PM    Ketone NEGATIVE  09/12/2017 01:52 PM    Bilirubin NEGATIVE  09/12/2017 01:52 PM    Urobilinogen 0.2 09/12/2017 01:52 PM    Nitrites NEGATIVE  09/12/2017 01:52 PM    Leukocyte Esterase NEGATIVE  09/12/2017 01:52 PM    Epithelial cells FEW 09/12/2017 01:52 PM    Bacteria NEGATIVE  09/12/2017 01:52 PM    WBC 0-4 09/12/2017 01:52 PM    RBC 0-5 09/12/2017 01:52 PM         Medications Reviewed:     Current Facility-Administered Medications   Medication Dose Route Frequency    acetaminophen (TYLENOL) tablet 650 mg  650 mg Oral Q4H PRN    lidocaine (LIDODERM) 5 % patch 1 Patch  1 Patch TransDERmal Q24H    morphine injection 1 mg  1 mg IntraVENous Q4H PRN    apixaban (ELIQUIS) tablet 5 mg  5 mg Oral BID    ondansetron (ZOFRAN) injection 4 mg  4 mg IntraVENous Q4H PRN    0.9% sodium chloride infusion  50 mL/hr IntraVENous CONTINUOUS    albuterol (PROVENTIL VENTOLIN) nebulizer solution 2.5 mg  2.5 mg Nebulization Q12H PRN    arformoterol (BROVANA) neb solution 15 mcg  15 mcg Nebulization BID RT    budesonide (PULMICORT) 500 mcg/2 ml nebulizer suspension  500 mcg Nebulization BID RT    cholecalciferol (VITAMIN D3) tablet 1,000 Units  1,000 Units Oral DAILY    fluticasone (FLONASE) 50 mcg/actuation nasal spray 2 Spray  2 Spray Both Nostrils DAILY    furosemide (LASIX) tablet 40 mg  40 mg Oral BID    lisinopril (PRINIVIL, ZESTRIL) tablet 40 mg  40 mg Oral DAILY    memantine (NAMENDA) tablet 10 mg  10 mg Oral DAILY    metoprolol tartrate (LOPRESSOR) tablet 25 mg  25 mg Oral BID    montelukast (SINGULAIR) tablet 10 mg  10 mg Oral DAILY    potassium chloride SR (KLOR-CON 10) tablet 10 mEq  10 mEq Oral BID    traMADol (ULTRAM) tablet 50 mg  50 mg Oral TID    sodium chloride (NS) flush 5-10 mL  5-10 mL IntraVENous Q8H    sodium chloride (NS) flush 5-10 mL  5-10 mL IntraVENous PRN    sodium chloride (NS) flush 5-10 mL  5-10 mL IntraVENous Q8H    sodium chloride (NS) flush 5-10 mL  5-10 mL IntraVENous PRN    cefTRIAXone (ROCEPHIN) 1 g in 0.9% sodium chloride (MBP/ADV) 50 mL  1 g IntraVENous Q24H    tamsulosin (FLOMAX) capsule 0.4 mg  0.4 mg Oral DAILY    buPROPion SR (WELLBUTRIN SR) tablet 150 mg  150 mg Oral DAILY     ______________________________________________________________________  EXPECTED LENGTH OF STAY: - - -  ACTUAL LENGTH OF STAY:          0                 Lois Foster MD

## 2017-09-15 NOTE — ROUTINE PROCESS
Bedside shift change report given to Fred Cerda RN (oncoming nurse) by Guillermo Lawrence RN (offgoing nurse). Report included the following information SBAR.

## 2017-09-15 NOTE — PROGRESS NOTES
Problem: Self Care Deficits Care Plan (Adult)  Goal: *Acute Goals and Plan of Care (Insert Text)  Occupational Therapy Goals  Initiated 9/15/2017    1. Patient will participate in upper extremity therapeutic exercise/activities with supervision/set-up for 10-15 minutes within 7 day(s). 2. Patient will perform upper body dressing with supervision/set-up within 7 day(s). 3. Patient will perform grooming with supervision/set-up within 7 day(s). 4. Patient will perform toilet transfers with moderate assistance within 7 day(s). 5. Patient will perform all aspects of toileting with moderate assistance within 7 day(s). OCCUPATIONAL THERAPY EVALUATION  Patient: Bebeto Montez (5 y.o. male)  Date: 9/15/2017  Primary Diagnosis: Pyelonephritis  Urinary tract infection  UTI (urinary tract infection)  Pyelonephritis        Precautions:   Fall      ASSESSMENT :     Chart reviewed and patient cleared for therapy by RN. Based on the objective data described below, the patient presents with decreased independence in ADLs and functional mobility due to impaired cognition, balance, endurance, strength, and b/l foot pain. Today patient required Mod A x2 for sit> stand transfer and toilet transfer, Mod A for UB bathing/dressing and was dependent for LB dressing/bathing and toileting. Patient resides in a memory care unit and wife reports that patient received assistance for ADLs but was very ambulatory using RW. Today patient walked two feet using RW Mod A x2. He is unsafe to return home at this time and requires physical assistance for all ADLS. He will beneft from a a SNF placement for further rehab. Patient will benefit from skilled intervention to address the above impairments.   Patients rehabilitation potential is considered to be Good  Factors which may influence rehabilitation potential include:   [X]             None noted  [ ]             Mental ability/status  [ ]             Medical condition  [ ] Home/family situation and support systems  [ ]             Safety awareness  [ ]             Pain tolerance/management  [ ]             Other:        PLAN :  Recommendations and Planned Interventions:  [X]               Self Care Training                  [X]        Therapeutic Activities  [X]               Functional Mobility Training    [ ]        Cognitive Retraining  [X]               Therapeutic Exercises           [X]        Endurance Activities  [X]               Balance Training                   [X]        Neuromuscular Re-Education  [ ]               Visual/Perceptual Training     [X]   Home Safety Training  [X]               Patient Education                 [X]        Family Training/Education  [ ]               Other (comment):     Frequency/Duration: Patient will be followed by occupational therapy 5 times a week to address goals. Discharge Recommendations: Alfonzo Chavez  Further Equipment Recommendations for Discharge: TBD       SUBJECTIVE:   Patient stated you need a lift for me\"      OBJECTIVE DATA SUMMARY:   HISTORY:   Past Medical History:   Diagnosis Date    Allergy       seasonal    Arrhythmia      Asthma      Dementia      Depression      Heart failure ([facility] [address]      Hypertension      Neurological disorder       dementia    Other ill-defined conditions       a-fib    Stroke [facility])       Past Surgical History:   Procedure Laterality Date    HX HEENT         cateract surgery        Prior Level of Function/Home Situation:  alycia lives in [address] [place] [address]. Wife reports that patient has assist for all ADLs but is ambulatory and walks frequently with RW during day.    Expanded or extensive additional review of patient history:      Home Situation  Home Environment: Assisted living  [facility] Name: [address] [place] [address] Street: Assisted living, Spouse/Significant Other/Partner  [X]  Right hand dominant             [ ]  Left hand dominant EXAMINATION OF PERFORMANCE DEFICITS:  Cognitive/Behavioral Status:  Neurologic State: Confused  Orientation Level: Oriented to person;Disoriented to place; Disoriented to situation;Disoriented to time  Cognition: Decreased attention/concentration;Memory loss; Follows commands              Skin: IV line in L hand     Edema: none in the uppers      Hearing: Auditory  Auditory Impairment: None     Vision/Perceptual:            Does not report any changes in vision                          Range of Motion:  WFL           Strength:  WFL but would benefit from UE strengthening           Coordination:     Fine Motor Skills-Upper: Left Intact; Right Intact    Gross Motor Skills-Upper: Left Intact; Right Intact     Tone & Sensation:  WNL           Balance:     Standing: impaired      Functional Mobility and Transfers for ADLs:  Bed Mobility:     NT - patient received in chair. Likely requires 2 persons for bed mobility. Transfers:  Sit to Stand: Moderate assistance;Assist x2  Stand to Sit: Moderate assistance;Assist x2  Bed to Chair: Moderate assistance;Assist x2  Toilet Transfer : Moderate assistance;Assist x2  Shower Transfer: Maximum assistance;Assist x2     ADL Assessment:  Feeding: Setup     Oral Facial Hygiene/Grooming: Minimum assistance     Bathing: Maximum assistance     Upper Body Dressing: Moderate assistance     Lower Body Dressing: Total assistance (A to don clothing over feet and pull over hips)     Toileting:  Total assistance (total A for hygiene and clothing mgmt)                 Functional Measure:  Barthel Index:      Bathin  Bladder: 0  Bowels: 5  Groomin  Dressin  Feedin  Mobility: 0  Stairs: 0  Toilet Use: 0  Transfer (Bed to Chair and Back): 5  Total: 15         Barthel and G-code impairment scale:  Percentage of impairment CH  0% CI  1-19% CJ  20-39% CK  40-59% CL  60-79% CM  80-99% CN  100%   Barthel Score 0-100 100 99-80 79-60 59-40 20-39 1-19    0   Barthel Score 0-20 20 17-19 13-16 9-12 5-8 1-4 0      The Barthel ADL Index: Guidelines  1. The index should be used as a record of what a patient does, not as a record of what a patient could do. 2. The main aim is to establish degree of independence from any help, physical or verbal, however minor and for whatever reason. 3. The need for supervision renders the patient not independent. 4. A patient's performance should be established using the best available evidence. Asking the patient, friends/relatives and nurses are the usual sources, but direct observation and common sense are also important. However direct testing is not needed. 5. Usually the patient's performance over the preceding 24-48 hours is important, but occasionally longer periods will be relevant. 6. Middle categories imply that the patient supplies over 50 per cent of the effort. 7. Use of aids to be independent is allowed. Lynsey Sage., Barthel, D.W. (9997). Functional evaluation: the Barthel Index. [address] [facility] (14)2. Ceci Youngblood brandi ELIESER Abbott, Mark Ferro., [facility]., [place], [address] [place] [address] (1999). Measuring the change indisability after inpatient rehabilitation; comparison of the responsiveness of the Barthel Index and Functional [place] Measure. Journal of Neurology, Neurosurgery, and Psychiatry, 66(4), 320-837. Tamera Madsen, N.J.A, MAMTA Joshi, & Orlando Klein M.A. (2004.) Assessment of post-stroke quality of life in cost-effectiveness studies: The usefulness of the Barthel Index and the EuroQoL-5D. Quality of Life Research, 13, 367-24            G codes: In compliance with CMSs Claims Based Outcome Reporting, the following G-code set was chosen for this patient based on their primary functional limitation being treated: The outcome measure chosen to determine the severity of the functional limitation was the Barthel with a score of 15/100 which was correlated with the impairment scale.       · Self Care:               - CURRENT STATUS:    CM - 80%-99% impaired, limited or restricted               - GOAL STATUS:           CK - 40%-59% impaired, limited or restricted               - D/C STATUS:                       ---------------To be determined---------------      Occupational Therapy Evaluation Charge Determination   History Examination Decision-Making   MEDIUM Complexity : Expanded review of history including physical, cognitive and psychosocial  history  MEDIUM Complexity : 3-5 performance deficits relating to physical, cognitive , or psychosocial skils that result in activity limitations and / or participation restrictions MEDIUM Complexity : Patient may present with comorbidities that affect occupational performnce. Miniml to moderate modification of tasks or assistance (eg, physical or verbal ) with assesment(s) is necessary to enable patient to complete evaluation       Based on the above components, the patient evaluation is determined to be of the following complexity level: MEDIUM  Pain:  Pain Scale 1: Numeric (0 - 10)  Pain Intensity 1: 5  Pain Location 1: Leg  Pain Orientation 1: Left;Right  Pain Description 1: Aching;Constant  Pain Intervention(s) 1: Medication (see MAR)  Activity Tolerance:   Poor      Please refer to the flowsheet for vital signs taken during this treatment. After treatment:   [X] Patient left in no apparent distress sitting up in chair  [ ] Patient left in no apparent distress in bed  [X] Call bell left within reach  [ ] Nursing notified  [X] Caregiver present  [ ] Bed alarm activated      COMMUNICATION/EDUCATION:   The patients plan of care was discussed with: Physical Therapist and Registered Nurse.  [X] Home safety education was provided and the patient/caregiver indicated understanding. [X] Patient/family have participated as able in goal setting and plan of care. [X] Patient/family agree to work toward stated goals and plan of care.   [ ] Patient understands intent and goals of therapy, but is neutral about his/her participation. [ ] Patient is unable to participate in goal setting and plan of care. This patients plan of care is appropriate for delegation to JOSELYN.      Thank you for this referral.  Russell Ortega OT  Time Calculation: 16 mins

## 2017-09-15 NOTE — CDMP QUERY
#2    Please clarify if this patient is being treated/managed for:    =>Hypokalemia in the setting of use of lasix requiring supplemental potassium  =>Other Explanation of clinical findings  =>Unable to Determine (no explanation of clinical findings)    The medical record reflects the following clinical findings, treatment, and risk factors:    Risk Factors: lasix  Clinical Indicators: KCl 2.9  Treatment: supplemental potassium    Please clarify and document your clinical opinion in the progress notes and discharge summary including the definitive and/or presumptive diagnosis, (suspected or probable), related to the above clinical findings. Please include clinical findings supporting your diagnosis.     Thank you,         Orin Valle [facility]

## 2017-09-15 NOTE — PROGRESS NOTES
Chart reviewed. CM spoke with therapy regarding discharge planning. Therapy is recommending SNF. CM met with pt and wife to offer freedom of choice. Wife prefers [facility]. CM sent referral via [facility]. CM called Tatianna Brown, admissions director at [facility] ([phone]) and left voicemail. Awaiting return call to find out of facility can accept this patient. 3:30 PM: [facility] is willing to accept pt and has a bed available today. CM will coordinate transportation. CM scheduled ambulance transport with [facility] ([phone]) for 5:30 PM. CM informed pt's wife and RN of transport time. CM called [facility] and notified Cruz Larson in admissions of transport time. CM faxed discharge paperwork to [place] at [phone]. Discharge folder is located on pt hard chart to include ambulance form and discharge instructions. RN to follow with Kardex and MAR. RN to call report to [phone].     John Bhardwaj, BSW/CRM

## 2017-09-15 NOTE — PROGRESS NOTES
Bedside and Verbal shift change report given to Anila Grove RN (oncoming nurse) by Jose Daniel Lopez RN (offgoing nurse). Report included the following information SBAR, Kardex, ED Summary, Procedure Summary, Intake/Output, MAR and Recent Results.

## 2017-09-15 NOTE — PROGRESS NOTES
Note entered on 9.15.17:    Spiritual Care Partner Volunteer visited patient in [facility] on 9.14.17. Documented by:  Rev. Elwanda Pilon Marchia Cooks, M.Div, [facility]

## 2017-09-15 NOTE — PROGRESS NOTES
Hospital to SNF SBAR Handoff - Carrie Smith                                                                        [de-identified] y.o.   male    [address]   Room: [facility]/01    [address]  Unit Phone# :  [phone]      Jolene [address] 56  [address] [facility] [address]  Dept: [address]: [phone]                    SITUATION     Admitted:  9/12/2017         Attending Provider:  Flor Conte MD       Consultations:  IP CONSULT TO VASCULAR SURGERY    PCP:  Darcie Benitez MD   [phone]    Treatment Team: Attending Provider: Flor Cnote MD; Consulting Provider: Michelle Urbano MD; Utilization Review: Estefani Marte; Care Manager: Felicita July    Admitting Dx:  Pyelonephritis  Urinary tract infection  UTI (urinary tract infection)  Pyelonephritis       Principal Problem: Urinary tract infection    * No surgery found * of      BY: * Surgery not found *             ON: * No surgery found *                  Code Status: Full Code                Advance Directives:   Advance Care Planning 9/12/2017   Patient's Healthcare Decision Maker is: Legal Next of Kin   Confirm Advance Directive None    (Send w/patient)   No Doesnt Have       Isolation:  There are currently no Active Isolations       MDRO: No current active infections    Pain Medications given:  Tramadol  50 mg  Last dose: 9/15/2017 at  [facility] needed: yes  Type of equipment: Mckeon catheter      (Not currently on dialysis)  (Not currently on dialysis)  (Not currently on dialysis)     BACKGROUND     Allergies:   Allergies   Allergen Reactions    No Known Allergies Other (comments)       Past Medical History:   Diagnosis Date    Allergy     seasonal    Arrhythmia     Asthma     Dementia     Depression     Heart failure (HCC)     Hypertension     Neurological disorder     dementia    Other ill-defined conditions     a-fib    Stroke [facility])        Past Surgical History:   Procedure Laterality Date    HX HEENT      cateract surgery       Prescriptions Prior to Admission   Medication Sig    lisinopril (PRINIVIL, ZESTRIL) 40 mg tablet Take 40 mg by mouth daily. Indications: hypertension    cholecalciferol (VITAMIN D3) 1,000 unit tablet Take 1,000 Units by mouth daily.  apixaban (ELIQUIS) 2.5 mg tablet Take 2.5 mg by mouth two (2) times a day. Indications: PREVENT THROMBOEMBOLISM IN CHRONIC ATRIAL FIBRILLATION    [] traMADol (ULTRAM) 50 mg tablet Take 50 mg by mouth three (3) times daily. X 3 days starting 17  Indications: Pain    ciprofloxacin HCl (CIPRO) 500 mg tablet Take 500 mg by mouth two (2) times a day. Start 17 for 10 days    arformoterol (BROVANA) 15 mcg/2 mL nebu neb solution 15 mcg by Nebulization route two (2) times a day. Indications: BRONCHOSPASM PREVENTION WITH COPD    budesonide (PULMICORT) 0.5 mg/2 mL nbsp 500 mcg by Nebulization route two (2) times a day. Indications: SEVERE CHRONIC OBSTRUCTIVE PULMONARY DISEASE    fluoride, sodium, (PREVIDENT 5000 PLUS) 1.1 % crea by Dental route four (4) times daily. After meals and at bedtime. Indications: DENTAL PLAQUE PREVENTION    albuterol (PROVENTIL VENTOLIN) 2.5 mg /3 mL (0.083 %) nebulizer solution 2.5 mg by Nebulization route every twelve (12) hours as needed for Wheezing.  fluticasone (FLONASE) 50 mcg/actuation nasal spray 2 Sprays by Both Nostrils route daily as needed for Rhinitis.  traMADol-acetaminophen (ULTRACET) 37.5-325 mg per tablet Take 1 Tab by mouth every four (4) hours as needed for Pain. Indications: Pain    furosemide (LASIX) 40 mg tablet Take 40 mg by mouth two (2) times a day.  montelukast (SINGULAIR) 10 mg tablet Take 10 mg by mouth daily.  memantine (NAMENDA) 10 mg tablet TAKE ONE TABLET BY MOUTH TWICE A DAY    buPROPion XL (WELLBUTRIN XL) 150 mg tablet Take 1 Tab by mouth every morning.     potassium chloride SR (KLOR-CON 10) 10 mEq tablet Take 10 mEq by mouth two (2) times a day.  metoprolol (LOPRESSOR) 25 mg tablet Take 25 mg by mouth two (2) times a day. Hard scripts included in transfer packet yes    Vaccinations:    Immunization History   Administered Date(s) Administered    Influenza High Dose Vaccine PF 10/31/2014, 10/06/2016    Influenza Vaccine (Quad) PF 11/16/2015    Pneumococcal Conjugate (PCV-13) 08/12/2015    Pneumococcal Polysaccharide (PPSV-23) 07/29/2013    TB Skin Test (PPD) Intradermal 05/11/2017    Tdap 11/07/2015       Readmission Risks:    Known Risks: N/A        The Charlson CoMorbitiy Index tool is an evidenced based tool that has more automatic generated information. The tool looks at many different items such as the age of the patient, how many times they were admitted in the last calendar year, current length of stay in the hospital and their diagnosis. All of these items are pulled automatically from information documented in the chart from various places and will generate a score that predicts whether a patient is at low (less than 13), medium (13-20) or high (21 or greater) risk of being readmitted.         ASSESSMENT                Temp: 97.9 °F (36.6 °C) (09/15/17 1528) Pulse (Heart Rate): 64 (09/15/17 1528)     Resp Rate: 18 (09/15/17 1528)           BP: 131/84 (09/15/17 1528)     O2 Sat (%): 96 % (09/15/17 1528)     Weight: 80.9 kg (178 lb 4.8 oz)    Height: (not recorded)       If above not within 1 hour of discharge:    BP:_____  P:____  R:____ T:_____ O2 Sat: ___%  O2: ______    Active Orders   Diet    DIET CARDIAC Regular         Orientation: only aware of  person     Active Behaviors: None                                   Active Lines/Drains:  (Peg Tube / Mckeon / CL or S/L?): yes    Urinary Status: Mckeon     Last BM: Last Bowel Movement Date:  (PTA)     Skin Integrity: Intact             Mobility: Slightly limited   Weight Bearing Status: WBAT (Weight Bearing as Tolerated)      Gait Training  Assistive Device: Gait belt, Walker, rolling  Ambulation - Level of Assistance: Minimal assistance, Assist x2  Distance (ft): 4 Feet (ft) (x 2)         Lab Results   Component Value Date/Time    Glucose 102 09/15/2017 04:46 AM    Hemoglobin A1c 5.8 11/09/2015 01:30 AM    INR 1.2 05/01/2017 12:26 AM    INR 1.7 11/10/2015 12:58 AM    INR (POC) 1.4 05/01/2017 12:07 AM    HGB 14.0 09/15/2017 04:46 AM    HGB 14.3 09/13/2017 03:48 AM        RECOMMENDATION     See After Visit Summary (AVS) for:  · Discharge instructions  · After [address]   · Special equipment needed (entered pre-discharge by Care Management)  · Medication Reconciliation    · Follow up Appointment(s)         Report given/sent by:  Amadeo Monae RN                    Verbal report given to:  Asha Hilton LPN  FAXED to: [phone]       Estimated discharge time:  9/15/2017 at [phone]

## 2017-09-15 NOTE — PROGRESS NOTES
1645 Report called to Jt Mccain LPN at [email or web address]. All opportunity for clarification and questions provided. 0860 Report given to [facility] transport team. All opportunity for clarification and questions given. IV removed by PCT without complication. Pt stable and in no acute distress at time of transport.

## 2017-09-15 NOTE — PROGRESS NOTES
Problem: Mobility Impaired (Adult and Pediatric)  Goal: *Acute Goals and Plan of Care (Insert Text)  Physical Therapy Goals  Initiated 9/15/2017  1. Patient will move from supine to sit and sit to supine in bed with minimal assistance within 7 day(s). 2. Patient will transfer from bed to chair and chair to bed with minimal assistance using the least restrictive device within 7 day(s). 3. Patient will perform sit to stand with minimal assistance within 7 day(s). 4. Patient will ambulate with Mathew for 100 feet with the least restrictive device within 7 day(s). PHYSICAL THERAPY EVALUATION  Patient: Jan Sandoval (84 y.o. male)  Date: 9/15/2017  Primary Diagnosis: Pyelonephritis  Urinary tract infection  UTI (urinary tract infection)  Pyelonephritis        Precautions:   Fall      ASSESSMENT :  Based on the objective data described below, the patient presents with decreased functional mobility from baseline level of function. Prior to admit patient was living at [facility]) in memory care unit. Per wife patient was ambulating with a RW \"all over the place\" and had assistance for some self care but otherwise moving well. Currently up in chair and agreeable to PT/OT. Patient currently needing modA x 2 for sit to stand transfer. Reports high levels of pain in his feet and reluctant to take any steps. After encouragement is able to take approx 4 steps with RW and Mathew-modA x 2. Patient with overall poor balance and very fearful of falling. Patient is quite far from his baseline level of function and do not anticipate patient will get level of rehab he needs in [facility] setting. Recommend SNF rehab at [place]. Notified case management and wife also agreeable to this POC. Will continue to follow. Patient will benefit from skilled intervention to address the above impairments.   Patients rehabilitation potential is considered to be Good  Factors which may influence rehabilitation potential include:   [X] None noted  [ ]         Mental ability/status  [ ]         Medical condition  [ ]         Home/family situation and support systems  [ ]         Safety awareness  [ ]         Pain tolerance/management  [ ]         Other:        PLAN :  Recommendations and Planned Interventions:  [X]           Bed Mobility Training             [ ]    Neuromuscular Re-Education  [X]           Transfer Training                   [ ]    Orthotic/Prosthetic Training  [X]           Gait Training                         [ ]    Modalities  [X]           Therapeutic Exercises           [ ]    Edema Management/Control  [X]           Therapeutic Activities            [X]    Patient and Family Training/Education  [ ]           Other (comment):     Frequency/Duration: Patient will be followed by physical therapy  4 times a week to address goals. Discharge Recommendations: [facility]  Further Equipment Recommendations for Discharge: TBD       SUBJECTIVE:   Patient stated Is that what I do honey?       OBJECTIVE DATA SUMMARY:   HISTORY:    Past Medical History:   Diagnosis Date    Allergy       seasonal    Arrhythmia      Asthma      Dementia      Depression      Heart failure ([facility] [address]      Hypertension      Neurological disorder       dementia    Other ill-defined conditions       a-fib    Stroke [facility])       Past Surgical History:   Procedure Laterality Date    HX HEENT         cateract surgery     Prior Level of Function/Home Situation: per wife patient lives in [facility].  Ambulates with RW and has assist for ADL's  Personal factors and/or comorbidities impacting plan of care:      Home Situation  Home Environment: Assisted living  [facility] Name: [address] [place] [address] Street: Assisted living, Spouse/Significant Other/Partner  Current DME Used/Available at Home: Walker, rolling     EXAMINATION/PRESENTATION/DECISION MAKING:   Critical Behavior:  Neurologic State: Confused  Orientation Level: Oriented to person, Disoriented to place, Disoriented to situation, Disoriented to time  Cognition: Decreased attention/concentration, Memory loss, Follows commands     Hearing: Auditory  Auditory Impairment: None     Range Of Motion:  AROM: Generally decreased, functional                       Strength:    Strength: Generally decreased, functional           Functional Mobility:  Bed Mobility:      not tested        Transfers:  Sit to Stand: Moderate assistance;Assist x2  Stand to Sit: Moderate assistance;Assist x2        Bed to Chair: Moderate assistance;Assist x2              Balance:   Sitting: Intact  Standing: Impaired  Standing - Static: Fair  Standing - Dynamic : Fair  Ambulation/Gait Training:  Distance (ft): 4 Feet (ft) (x 2)  Assistive Device: Gait belt;Walker, rolling  Ambulation - Level of Assistance: Minimal assistance;Assist x2        Gait Abnormalities: Decreased step clearance;Shuffling gait        Base of Support: Widened     Speed/Josie: Pace decreased (<100 feet/min); Shuffled; Slow  Step Length: Left shortened;Right shortened              Functional Measure:  Barthel Index:      Bathin  Bladder: 0  Bowels: 5  Groomin  Dressin  Feedin  Mobility: 0  Stairs: 0  Toilet Use: 0  Transfer (Bed to Chair and Back): 5  Total: 15         Barthel and G-code impairment scale:  Percentage of impairment CH  0% CI  1-19% CJ  20-39% CK  40-59% CL  60-79% CM  80-99% CN  100%   Barthel Score 0-100 100 99-80 79-60 59-40 20-39 1-19    0   Barthel Score 0-20 20 17-19 13-16 9-12 5-8 1-4 0      The Barthel ADL Index: Guidelines  1. The index should be used as a record of what a patient does, not as a record of what a patient could do. 2. The main aim is to establish degree of independence from any help, physical or verbal, however minor and for whatever reason. 3. The need for supervision renders the patient not independent.   4. A patient's performance should be established using the best available evidence. Asking the patient, friends/relatives and nurses are the usual sources, but direct observation and common sense are also important. However direct testing is not needed. 5. Usually the patient's performance over the preceding 24-48 hours is important, but occasionally longer periods will be relevant. 6. Middle categories imply that the patient supplies over 50 per cent of the effort. 7. Use of aids to be independent is allowed. Jose De Jesus Cummins., Barthel, D.W. (3323). Functional evaluation: the Barthel Index. [facility] (14)2. Thais Mora brandi ELIESER Abbott, Michelle Gutierrez., Lady Sargent., Zoe Godfrey, [address] (1999). Measuring the change indisability after inpatient rehabilitation; comparison of the responsiveness of the Barthel Index and Functional [employer] [place] Measure. Journal of Neurology, Neurosurgery, and Psychiatry, 66(4), 313-793. NOHELIA AlmeidaA, MAMTA Jsohi, & Chadwick Frankel M.A. (2004.) Assessment of post-stroke quality of life in cost-effectiveness studies: The usefulness of the Barthel Index and the EuroQoL-5D. Quality of Life Research, 13, 806-62            G codes: In compliance with CMSs Claims Based Outcome Reporting, the following G-code set was chosen for this patient based on their primary functional limitation being treated: The outcome measure chosen to determine the severity of the functional limitation was the Barthel with a score of 15/100 which was correlated with the impairment scale.       · Mobility - Walking and Moving Around:               - CURRENT STATUS:    CM - 80%-99% impaired, limited or restricted               - GOAL STATUS:           CL - 60%-79% impaired, limited or restricted               - D/C STATUS:                       ---------------To be determined---------------         Pain:  Pain Scale 1: Numeric (0 - 10)  Pain Intensity 1: 5  Pain Location 1: Leg  Pain Orientation 1: Left;Right  Pain Description 1: Aching;Constant  Pain Intervention(s) 1: Medication (see MAR)  Activity Tolerance:   VSS  Please refer to the flowsheet for vital signs taken during this treatment. After treatment:   [X]         Patient left in no apparent distress sitting up in chair  [ ]         Patient left in no apparent distress in bed  [X]         Call bell left within reach  [X]         Nursing notified  [X]         Caregiver present  [ ]         Bed alarm activated      COMMUNICATION/EDUCATION:   The patients plan of care was discussed with: Physical Therapist, Occupational Therapist and Registered Nurse.  [X]         Fall prevention education was provided and the patient/caregiver indicated understanding. [X]         Patient/family have participated as able in goal setting and plan of care. [X]         Patient/family agree to work toward stated goals and plan of care. [ ]         Patient understands intent and goals of therapy, but is neutral about his/her participation. [ ]         Patient is unable to participate in goal setting and plan of care.      Thank you for this referral.  Naty Adams, PT, DPT   Time Calculation: 18 mins

## 2017-09-15 NOTE — PROGRESS NOTES
Hospitalist Progress Note  Hilda Loja MD  Office: [phone]  Cell: [phone]      Date of Service:  9/15/2017  NAME:  Sierra Live  :  1935  MRN:  [id number]      Admission Summary:   Sierra Live is a [de-identified] y.o. male who presents with Past medical history of dementia, Atrial fibrillation, depression, hypertension presented with frequency urgency, and burning sensation during urination associated with nausea and loss of appetite for the last one week. Patient went to PCP office for the above complaint one week back and he was given oral antibiotics (ciprofloxacin and keflex) with no improvement over the last one week back. Over the last two days his symptoms which were mentioned above became worse associated with difficulty of urinating, and hesitancy. Interval history / Subjective:   Lower abdominal discomfort,      Assessment & Plan: EDEN likely multifactorial  - US Bilateral renal cortical thinning, suggesting chronic intrinsic renal disease. No hydronephrosis.   -henning catheter in place, and discontinue on  but failed void trial and placed on   -creatinine improving, continue IVF and repeat renal function in am    Recurrent UTI POA  -on ceftiraxon , switch to po abx on discharge  -UA unremarkable  -urine cx no growth    History of BPH   -continue on flomax   -henning catheter reinserted on   -outpatient follow up with urologist     Atrial fibrillation with RVR   -rate controlled now   -continue with home medication of Eliquis and Metoprolol   -check ekg     Hypertension   -Continue with Lisinopril, metoprolol and monitor BP     COPD   -stable   -continue pulmicort, arformoterol and monitor pulse ox     Depression/dementia   Continue with home medication of Bupropion and Memantine     Left leg pain unclear etiology possible gout vs PVD  -doppler study negative for deep vein thrombosis  -prn analgesics, lidocaine patch  -add prednisone       Code status: Full Code  DVT prophylaxis: on eliquis    Care Plan discussed with: Patient/Family, Nurse and   Disposition: [facility]   Wife at bedside, questions answered     Hospital Problems  Date Reviewed: 9/13/2017          Codes Class Noted POA    * (Principal)Urinary tract infection ICD-10-CM: N39.0  ICD-9-CM: 599.0  9/12/2017 Unknown        Pyelonephritis ICD-10-CM: N12  ICD-9-CM: 590.80  9/12/2017 Unknown        UTI (urinary tract infection) ICD-10-CM: N39.0  ICD-9-CM: 599.0  9/12/2017 Unknown              Vital Signs:    Last 24hrs VS reviewed since prior progress note. Most recent are:  Visit Vitals    BP (!) 150/107 (BP 1 Location: Right arm, BP Patient Position: At rest)    Pulse 83    Temp 98.4 °F (36.9 °C)    Resp 18    Wt 80.9 kg (178 lb 4.8 oz)    SpO2 98%    BMI 26.33 kg/m2         Intake/Output Summary (Last 24 hours) at 09/15/17 1226  Last data filed at 09/15/17 0735   Gross per 24 hour   Intake                0 ml   Output             1825 ml   Net            -1825 ml        Physical Examination:             Constitutional:  No acute distress, cooperative, pleasant    ENT:  Oral mucous moist, oropharynx benign. Neck supple,    Resp:  CTA bilaterally. No wheezing/rhonchi/rales. No accessory muscle use   CV:  Regular rhythm, normal rate, no murmurs, gallops, rubs    GI:  Soft, non distended, non tender. normoactive bowel sounds, no hepatosplenomegaly     Musculoskeletal:  No edema    Neurologic:  Conscious and alert, oriented to place and person, moves all extremities, motor 5/5, CN II-XII reviewed     Psych:  Good insight, Not anxious nor agitated.        Data Review:    Review and/or order of clinical lab test      Labs:     Recent Labs      09/15/17   0446  09/13/17   0348   WBC  7.9  7.9   HGB  14.0  14.3   HCT  40.6  42.1   PLT  203  226     Recent Labs      09/15/17   0446  09/13/17   0348  09/12/17   1408   NA  135* 142  141   K  2.9*  3.4*  3.8   CL  99  107  100   CO2  28  24  25   BUN  9  23*  29*   CREA  1.01  1.24  1.88*   GLU  102*  87  78   CA  8.0*  8.4*  9.5     Recent Labs      09/12/17   1408   SGOT  40*   ALT  32   AP  92   TBILI  1.4*   TP  7.6   ALB  3.7   GLOB  3.9     No results for input(s): INR, PTP, APTT in the last 72 hours. No lab exists for component: INREXT, INREXT   No results for input(s): FE, TIBC, PSAT, FERR in the last 72 hours. Lab Results   Component Value Date/Time    Folate 10.0 11/09/2015 01:30 AM      No results for input(s): PH, PCO2, PO2 in the last 72 hours. No results for input(s): CPK, CKNDX, TROIQ in the last 72 hours.     No lab exists for component: CPKMB  No results found for: CHOL, CHOLX, CHLST, CHOLV, HDL, LDL, LDLC, DLDLP, TGLX, TRIGL, TRIGP, CHHD, CHHDX  Lab Results   Component Value Date/Time    Glucose (POC) 87 05/01/2017 12:01 AM    Glucose (POC) 146 12/07/2016 02:59 PM     Lab Results   Component Value Date/Time    Color YELLOW/STRAW 09/12/2017 01:52 PM    Appearance CLEAR 09/12/2017 01:52 PM    Specific gravity 1.014 09/12/2017 01:52 PM    pH (UA) 5.0 09/12/2017 01:52 PM    Protein NEGATIVE  09/12/2017 01:52 PM    Glucose NEGATIVE  09/12/2017 01:52 PM    Ketone NEGATIVE  09/12/2017 01:52 PM    Bilirubin NEGATIVE  09/12/2017 01:52 PM    Urobilinogen 0.2 09/12/2017 01:52 PM    Nitrites NEGATIVE  09/12/2017 01:52 PM    Leukocyte Esterase NEGATIVE  09/12/2017 01:52 PM    Epithelial cells FEW 09/12/2017 01:52 PM    Bacteria NEGATIVE  09/12/2017 01:52 PM    WBC 0-4 09/12/2017 01:52 PM    RBC 0-5 09/12/2017 01:52 PM         Medications Reviewed:     Current Facility-Administered Medications   Medication Dose Route Frequency    acetaminophen (TYLENOL) tablet 650 mg  650 mg Oral Q4H PRN    lidocaine (LIDODERM) 5 % patch 1 Patch  1 Patch TransDERmal Q24H    morphine injection 1 mg  1 mg IntraVENous Q4H PRN    apixaban (ELIQUIS) tablet 5 mg  5 mg Oral BID    ondansetron [facility]) injection 4 mg  4 mg IntraVENous Q4H PRN    0.9% sodium chloride infusion  50 mL/hr IntraVENous CONTINUOUS    albuterol (PROVENTIL VENTOLIN) nebulizer solution 2.5 mg  2.5 mg Nebulization Q12H PRN    arformoterol (BROVANA) neb solution 15 mcg  15 mcg Nebulization BID RT    budesonide (PULMICORT) 500 mcg/2 ml nebulizer suspension  500 mcg Nebulization BID RT    cholecalciferol (VITAMIN D3) tablet 1,000 Units  1,000 Units Oral DAILY    fluticasone (FLONASE) 50 mcg/actuation nasal spray 2 Spray  2 Spray Both Nostrils DAILY    furosemide (LASIX) tablet 40 mg  40 mg Oral BID    lisinopril (PRINIVIL, ZESTRIL) tablet 40 mg  40 mg Oral DAILY    memantine (NAMENDA) tablet 10 mg  10 mg Oral DAILY    metoprolol tartrate (LOPRESSOR) tablet 25 mg  25 mg Oral BID    montelukast (SINGULAIR) tablet 10 mg  10 mg Oral DAILY    potassium chloride SR (KLOR-CON 10) tablet 10 mEq  10 mEq Oral BID    traMADol (ULTRAM) tablet 50 mg  50 mg Oral TID    sodium chloride (NS) flush 5-10 mL  5-10 mL IntraVENous Q8H    sodium chloride (NS) flush 5-10 mL  5-10 mL IntraVENous PRN    sodium chloride (NS) flush 5-10 mL  5-10 mL IntraVENous Q8H    sodium chloride (NS) flush 5-10 mL  5-10 mL IntraVENous PRN    cefTRIAXone (ROCEPHIN) 1 g in 0.9% sodium chloride (MBP/ADV) 50 mL  1 g IntraVENous Q24H    tamsulosin (FLOMAX) capsule 0.4 mg  0.4 mg Oral DAILY    buPROPion SR (WELLBUTRIN SR) tablet 150 mg  150 mg Oral DAILY     ______________________________________________________________________  EXPECTED LENGTH OF STAY: 3d 9h  ACTUAL LENGTH OF STAY:          1                 Tatyana Arroyo MD

## 2017-09-15 NOTE — CDMP QUERY
The diagnosis of Acute Kidney Injury has been documented for this patient. Please clarify if EDEN has been ruled out  Possible Acute Kidney Insufficiency           Current (2012) RIFLE criteria notes the following: Section 2: EDEN Definition: \"EDEN is defined as any of the following\":     · Increase in SCr by (>/=) 0.3 mg/dl within 48 hours; or   · Increase in SCr to (>/=)1. 5 times baseline, which is known or presumed to have occurred within the prior 7 days; or   · Urine volume <0.5 ml/kg/h for 6 hours. The record reflects:   Current admission labs:  GFR 35    BUN: 29     Creatinine: 1.88  Baseline labs                   GFR 57    BUN: 16     Creatinine: 1.22      Based on the noted clinical findings, the diagnosis of acute kidney failure may be challenged by an external reviewer.           Thank you,         Devaughn Lanes [address] [place] [address]

## 2017-09-17 LAB
BACTERIA SPEC CULT: NORMAL
SERVICE CMNT-IMP: NORMAL

## 2017-09-20 ENCOUNTER — PATIENT OUTREACH (OUTPATIENT)
Dept: CASE MANAGEMENT | Age: 82
End: 2017-09-20

## 2017-09-20 NOTE — PROGRESS NOTES
Community Care Team Documentation for Patient in [facility]  Subsequent Follow up     Patient remains at [address] ([facility]). See previous [facility] Team notes. RRAT score: 31    PCP : Karthik Aponte MD    PCP follow up appointment:  SNF to schedule    Per Bob Allen at [place], Reviewed [place] questions with SNF to ensure patient arrived with admission packet in order. Confirmed patient arrived to SNF with Mckeon catheter in place. SNF provider to conduct voiding trial and determine if urology follow is appropriate. Vascular surgeon follow up also suggested in hospital.  SNF staff to discuss with SNF provider. PT/OT working with patient. Barrier: dementia - will definitely impact his progress. Patient is setup to supervision for upper body ADLs requiring maxing cuing for sequencing, min assist for lower body ADLs. Plan at this time to return to [address] [place] [address] at [facility]. Family meeting to be held. Date TBD. Anticipated discharge date from SNF:  TBD    SNF discharge plan:  TBD    Medications were not reconciled and general patient assessment was not completed during this skilled nursing facility outreach.      Ethel Castro, MSN, RN, ACNS-BC, [facility]  Nurse Navigator, [employer] [facility] [phone]

## 2017-09-25 ENCOUNTER — HOSPITAL ENCOUNTER (EMERGENCY)
Age: 82
Discharge: OTHER INSTITUTION | End: 2017-09-26
Attending: EMERGENCY MEDICINE | Admitting: EMERGENCY MEDICINE
Payer: MEDICARE

## 2017-09-25 VITALS
OXYGEN SATURATION: 90 % | TEMPERATURE: 98.3 F | SYSTOLIC BLOOD PRESSURE: 123 MMHG | WEIGHT: 190 LBS | HEART RATE: 75 BPM | HEIGHT: 70 IN | RESPIRATION RATE: 18 BRPM | DIASTOLIC BLOOD PRESSURE: 77 MMHG | BODY MASS INDEX: 27.2 KG/M2

## 2017-09-25 DIAGNOSIS — T83.9XXA FOLEY CATHETER PROBLEM, INITIAL ENCOUNTER (HCC): ICD-10-CM

## 2017-09-25 DIAGNOSIS — R33.9 URINARY RETENTION: Primary | ICD-10-CM

## 2017-09-25 DIAGNOSIS — N17.9 AKI (ACUTE KIDNEY INJURY) (HCC): ICD-10-CM

## 2017-09-25 LAB
ANION GAP SERPL CALC-SCNC: 10 MMOL/L (ref 5–15)
APPEARANCE UR: ABNORMAL
BACTERIA URNS QL MICRO: NEGATIVE /HPF
BASOPHILS # BLD: 0 K/UL (ref 0–0.1)
BASOPHILS NFR BLD: 0 % (ref 0–1)
BILIRUB UR QL: NEGATIVE
BUN SERPL-MCNC: 20 MG/DL (ref 6–20)
BUN/CREAT SERPL: 14 (ref 12–20)
CALCIUM SERPL-MCNC: 8.5 MG/DL (ref 8.5–10.1)
CHLORIDE SERPL-SCNC: 99 MMOL/L (ref 97–108)
CO2 SERPL-SCNC: 30 MMOL/L (ref 21–32)
COLOR UR: ABNORMAL
CREAT SERPL-MCNC: 1.41 MG/DL (ref 0.7–1.3)
EOSINOPHIL # BLD: 0.2 K/UL (ref 0–0.4)
EOSINOPHIL NFR BLD: 3 % (ref 0–7)
EPITH CASTS URNS QL MICRO: ABNORMAL /LPF
ERYTHROCYTE [DISTWIDTH] IN BLOOD BY AUTOMATED COUNT: 13 % (ref 11.5–14.5)
GLUCOSE SERPL-MCNC: 111 MG/DL (ref 65–100)
GLUCOSE UR STRIP.AUTO-MCNC: NEGATIVE MG/DL
HCT VFR BLD AUTO: 46.6 % (ref 36.6–50.3)
HGB BLD-MCNC: 15.9 G/DL (ref 12.1–17)
HGB UR QL STRIP: ABNORMAL
KETONES UR QL STRIP.AUTO: NEGATIVE MG/DL
LEUKOCYTE ESTERASE UR QL STRIP.AUTO: ABNORMAL
LYMPHOCYTES # BLD: 1.6 K/UL (ref 0.8–3.5)
LYMPHOCYTES NFR BLD: 22 % (ref 12–49)
MCH RBC QN AUTO: 28.8 PG (ref 26–34)
MCHC RBC AUTO-ENTMCNC: 34.1 G/DL (ref 30–36.5)
MCV RBC AUTO: 84.4 FL (ref 80–99)
MONOCYTES # BLD: 0.6 K/UL (ref 0–1)
MONOCYTES NFR BLD: 9 % (ref 5–13)
NEUTS SEG # BLD: 4.9 K/UL (ref 1.8–8)
NEUTS SEG NFR BLD: 66 % (ref 32–75)
NITRITE UR QL STRIP.AUTO: POSITIVE
PH UR STRIP: 5.5 [PH] (ref 5–8)
PLATELET # BLD AUTO: 326 K/UL (ref 150–400)
POTASSIUM SERPL-SCNC: 3.4 MMOL/L (ref 3.5–5.1)
PROT UR STRIP-MCNC: 100 MG/DL
RBC # BLD AUTO: 5.52 M/UL (ref 4.1–5.7)
RBC #/AREA URNS HPF: >100 /HPF (ref 0–5)
SODIUM SERPL-SCNC: 139 MMOL/L (ref 136–145)
SP GR UR REFRACTOMETRY: 1.01 (ref 1–1.03)
UA: UC IF INDICATED,UAUC: ABNORMAL
UROBILINOGEN UR QL STRIP.AUTO: 1 EU/DL (ref 0.2–1)
WBC # BLD AUTO: 7.3 K/UL (ref 4.1–11.1)
WBC URNS QL MICRO: ABNORMAL /HPF (ref 0–4)

## 2017-09-25 PROCEDURE — 74011000250 HC RX REV CODE- 250: Performed by: EMERGENCY MEDICINE

## 2017-09-25 PROCEDURE — 36415 COLL VENOUS BLD VENIPUNCTURE: CPT | Performed by: EMERGENCY MEDICINE

## 2017-09-25 PROCEDURE — 77030005530 HC CATH URETH FOL40 BARD -B

## 2017-09-25 PROCEDURE — 99285 EMERGENCY DEPT VISIT HI MDM: CPT

## 2017-09-25 PROCEDURE — 81001 URINALYSIS AUTO W/SCOPE: CPT | Performed by: EMERGENCY MEDICINE

## 2017-09-25 PROCEDURE — 96374 THER/PROPH/DIAG INJ IV PUSH: CPT

## 2017-09-25 PROCEDURE — 85025 COMPLETE CBC W/AUTO DIFF WBC: CPT | Performed by: EMERGENCY MEDICINE

## 2017-09-25 PROCEDURE — 96361 HYDRATE IV INFUSION ADD-ON: CPT

## 2017-09-25 PROCEDURE — 80048 BASIC METABOLIC PNL TOTAL CA: CPT | Performed by: EMERGENCY MEDICINE

## 2017-09-25 PROCEDURE — 74011250636 HC RX REV CODE- 250/636: Performed by: EMERGENCY MEDICINE

## 2017-09-25 PROCEDURE — 51702 INSERT TEMP BLADDER CATH: CPT

## 2017-09-25 PROCEDURE — 77030005514 HC CATH URETH FOL14 BARD -A

## 2017-09-25 RX ORDER — LIDOCAINE HYDROCHLORIDE 20 MG/ML
JELLY TOPICAL ONCE
Status: COMPLETED | OUTPATIENT
Start: 2017-09-25 | End: 2017-09-25

## 2017-09-25 RX ORDER — LIDOCAINE HYDROCHLORIDE 20 MG/ML
JELLY TOPICAL
Status: DISCONTINUED | OUTPATIENT
Start: 2017-09-25 | End: 2017-09-25

## 2017-09-25 RX ORDER — FENTANYL CITRATE 50 UG/ML
50 INJECTION, SOLUTION INTRAMUSCULAR; INTRAVENOUS
Status: COMPLETED | OUTPATIENT
Start: 2017-09-25 | End: 2017-09-25

## 2017-09-25 RX ADMIN — SODIUM CHLORIDE 1000 ML: 900 INJECTION, SOLUTION INTRAVENOUS at 21:07

## 2017-09-25 RX ADMIN — FENTANYL CITRATE 50 MCG: 50 INJECTION, SOLUTION INTRAMUSCULAR; INTRAVENOUS at 20:16

## 2017-09-25 RX ADMIN — LIDOCAINE HYDROCHLORIDE: 20 JELLY TOPICAL at 20:27

## 2017-09-25 NOTE — ED NOTES
Per EMS, patient lives at [facility] went to Urologist today had henning removed. Returned to [facility], pt had not voided and had henning placed again. Upon arrival to ED pt has 16F with 30cc, with blood in tubing. Pt c/o \"pain in the pit of my stomach. \" h/o chronic uti's. A&Ox2 at baseline. Reportedly last given Tramadol at 1730 at [facility].

## 2017-09-26 NOTE — ED NOTES
Pt now able to say \"yes, no, hey. \" Family at bedside. Pt remains alert to self, and now place, responds also with head nodding.

## 2017-09-26 NOTE — ED PROVIDER NOTES
[address]  EMERGENCY DEPARTMENT HISTORY AND PHYSICAL EXAM       Date of Service: 9/25/2017   Patient Name: Erin Dwyer   YOB: 1935  Medical Record Number: [id number]    History of Presenting Illness     Chief Complaint   Patient presents with    Urinary Retention     henning removed at urologist, did not void then placed again at [facility]        History Provided By:  family    Additional History:   Erin Dwyer is a [de-identified] y.o. male with PMhx significant for HTN, dementia, and depression who presents via EMS to the ED with cc of abdominal pain and urinary retention. Per family, pt was seen at his Urologist's today where he had his catheter removed. Family notes following that, he complained of pain to the suprapubic abdomen and was unable to void. Family states that [employer] attempted to place a henning back in. Family reports pt only outputted blood in the henning and was still complaining of a moderate amount of pain. Pt and family specifically deny any nausea, vomiting, diarrhea, chest pain, or SOB. Social Hx: -  Tobacco, -  EtOH, --  Illicit Drugs    There are no other complaints, changes or physical findings at this time.     Primary Care Provider: Stalin Latham MD     Past History     Past Medical History:   Past Medical History:   Diagnosis Date    Allergy     seasonal    Arrhythmia     Asthma     Dementia     Depression     Heart failure ([facility] [address]     Hypertension     Neurological disorder     dementia    Other ill-defined conditions     a-fib    Stroke [facility])         Past Surgical History:   Past Surgical History:   Procedure Laterality Date    HX HEENT      cateract surgery        Family History:   Family History   Problem Relation Age of Onset    Stroke Mother     Heart Disease Mother     Cancer Father      prostate    Cancer Brother      colon        Social History:   Social History   Substance Use Topics    Smoking status: Never Smoker    Smokeless tobacco: Never Used    Alcohol use No      Allergies: Allergies   Allergen Reactions    No Known Allergies Other (comments)       Review of Systems   Review of Systems   Constitutional: Negative for chills and fever. Respiratory: Negative for cough and shortness of breath. Cardiovascular: Negative for chest pain. Gastrointestinal: Negative for constipation, diarrhea, nausea and vomiting. Genitourinary:        + urinary retention   Neurological: Negative for weakness and numbness. All other systems reviewed and are negative. Physical Exam  Physical Exam   Constitutional: He is oriented to person, place, and time. He appears well-developed and well-nourished. HENT:   Head: Normocephalic and atraumatic. Eyes: Conjunctivae and EOM are normal.   Neck: Normal range of motion. Neck supple. Cardiovascular: Normal rate and regular rhythm. Pulmonary/Chest: Effort normal and breath sounds normal. No respiratory distress. Abdominal: Soft. He exhibits no distension. Exquisite tenderness to suprapubic abdomen. Genitourinary:   Genitourinary Comments: Henning in place with bright red blood draining with minimal urine output. Musculoskeletal: Normal range of motion. Neurological: He is alert and oriented to person, place, and time. Skin: Skin is warm and dry. Psychiatric: He has a normal mood and affect. Nursing note and vitals reviewed. Medical Decision Making   I am the first provider for this patient. I reviewed the vital signs, available nursing notes, past medical history, past surgical history, family history and social history. Old Medical Records: Old medical records. Provider Notes:   Pt presents with suprapubic pain and urinary rentention likely failed voiding trial. Concern for traumatic henning placement, will remove and replace. ED Course:  9:44 PM   Initial assessment performed.  The patients presenting problems have been discussed, and they are in agreement with the care plan formulated and outlined with them. I have encouraged them to ask questions as they arise throughout their visit. Progress Notes:     Progress Note:   9:01 PM  Mckeon placed. 650 mL of bloody urine outputted. Irrigated with clots discharged. Urine appears more dilute. Written by Mattie Prasad, ED Scribe, as dictated by Caty Cheema MD.    Progress Note:   10:05 PM  Creatinine slightly elevated from discharge. Will hydrate. Urine nitrate positive but no WBC, likely due to blood in urine. Will not treat with antibiotics given no UTI. Written by Mattie Prasad, ED Scribe, as dictated by Caty Cheema MD.     Diagnostic Study Results     Labs -      Recent Results (from the past 12 hour(s))   CBC WITH AUTOMATED DIFF    Collection Time: 09/25/17  8:15 PM   Result Value Ref Range    WBC 7.3 4.1 - 11.1 K/uL    RBC 5.52 4.10 - 5.70 M/uL    HGB 15.9 12.1 - 17.0 g/dL    HCT 46.6 36.6 - 50.3 %    MCV 84.4 80.0 - 99.0 FL    MCH 28.8 26.0 - 34.0 PG    MCHC 34.1 30.0 - 36.5 g/dL    RDW 13.0 11.5 - 14.5 %    PLATELET 900 482 - 516 K/uL    NEUTROPHILS 66 32 - 75 %    LYMPHOCYTES 22 12 - 49 %    MONOCYTES 9 5 - 13 %    EOSINOPHILS 3 0 - 7 %    BASOPHILS 0 0 - 1 %    ABS. NEUTROPHILS 4.9 1.8 - 8.0 K/UL    ABS. LYMPHOCYTES 1.6 0.8 - 3.5 K/UL    ABS. MONOCYTES 0.6 0.0 - 1.0 K/UL    ABS. EOSINOPHILS 0.2 0.0 - 0.4 K/UL    ABS.  BASOPHILS 0.0 0.0 - 0.1 K/UL   METABOLIC PANEL, BASIC    Collection Time: 09/25/17  8:15 PM   Result Value Ref Range    Sodium 139 136 - 145 mmol/L    Potassium 3.4 (L) 3.5 - 5.1 mmol/L    Chloride 99 97 - 108 mmol/L    CO2 30 21 - 32 mmol/L    Anion gap 10 5 - 15 mmol/L    Glucose 111 (H) 65 - 100 mg/dL    BUN 20 6 - 20 MG/DL    Creatinine 1.41 (H) 0.70 - 1.30 MG/DL    BUN/Creatinine ratio 14 12 - 20      GFR est AA 59 (L) >60 ml/min/1.73m2    GFR est non-AA 48 (L) >60 ml/min/1.73m2    Calcium 8.5 8.5 - 10.1 MG/DL   URINALYSIS W/ REFLEX CULTURE Collection Time: 09/25/17  8:41 PM   Result Value Ref Range    Color RED      Appearance OPAQUE (A) CLEAR      Specific gravity 1.015 1.003 - 1.030      pH (UA) 5.5 5.0 - 8.0      Protein 100 (A) NEG mg/dL    Glucose NEGATIVE  NEG mg/dL    Ketone NEGATIVE  NEG mg/dL    Bilirubin NEGATIVE  NEG      Blood LARGE (A) NEG      Urobilinogen 1.0 0.2 - 1.0 EU/dL    Nitrites POSITIVE (A) NEG      Leukocyte Esterase TRACE (A) NEG      WBC 0-4 0 - 4 /hpf    RBC >100 (H) 0 - 5 /hpf    Epithelial cells FEW FEW /lpf    Bacteria NEGATIVE  NEG /hpf    UA:UC IF INDICATED CULTURE NOT INDICATED BY UA RESULT CNI       Vital Signs-Reviewed the patient's vital signs. Patient Vitals for the past 12 hrs:   Temp Pulse Resp BP SpO2   09/25/17 2341 - 75 18 - 90 %   09/25/17 2336 - 64 18 123/77 95 %   09/25/17 2300 - - - 130/80 -   09/25/17 2230 - 69 18 116/77 90 %   09/25/17 2130 - 71 18 103/61 92 %   09/25/17 2030 - 73 18 124/78 97 %   09/25/17 2000 - 69 20 (!) 145/95 95 %   09/25/17 1944 98.3 °F (36.8 °C) 80 24 (!) 161/134 96 %     Medications Given in the ED:  Medications   fentaNYL citrate (PF) injection 50 mcg (50 mcg IntraVENous Given 9/25/17 2016)   lidocaine (URO-JET) 2 % jelly ( Urethral Given 9/25/17 2027)   sodium chloride 0.9 % bolus infusion 1,000 mL (0 mL IntraVENous IV Completed 9/25/17 2330)     Pulse Oximetry Analysis - Normal 96% on RA     Cardiac Monitor:   Rate: 80  Rhythm: Normal Sinus Rhythm      Diagnosis   Clinical Impression:   1. Urinary retention    2. Mckeon catheter problem, initial encounter ([facility] [address]    3. [facility] ([facility]       Plan:  1:   Follow-up Information     Follow up With Details Comments [facility] MD Eder  As needed [address] [place] [phone]          Return to ED if Worse    Disposition Note:  DISCHARGE NOTE:    9:45 PM  The patient is ready for discharge.  The patient signs, symptoms, diagnosis, and discharge instructions have been discussed and the patient has conveyed their understanding. The patient is to follow-up as reccommended or returned to the ER should their symptoms worsen. Plan has been discussed and patient is in agreement.     _______________________________   Attestations: This note is prepared by Mattie Prasad, acting as Scribe for Caty Cheema MD.    Caty Cheema MD: The scribe's documentation has been prepared under my direction and personally reviewed by me in its entirety.  I confirm that the note above accurately reflects all work, treatment, procedures, and medical decision making performed by me.    _______________________________

## 2017-09-26 NOTE — DISCHARGE INSTRUCTIONS
Urinary Retention: Care Instructions  Your Care Instructions    Urinary retention means that you aren't able to urinate. In men, it is often caused by a blockage of the urinary tract from an enlarged prostate gland. In men and women, it can also be caused by an infection or nerve damage. Or it may be a side effect of a medicine. The doctor may have drained the urine from your bladder. If you still have problems passing urine, you may need to use a catheter at home. This is used to empty your bladder until the problem can be fixed. Your doctor may put a catheter in your bladder before you go home. If so, he or she will tell you when to come back to have the catheter removed. The doctor has checked you closely. But problems can develop later. If you notice any problems or new symptoms, get medical treatment right away. Follow-up care is a key part of your treatment and safety. Be sure to make and go to all appointments, and call your doctor if you are having problems. It's also a good idea to know your test results and keep a list of the medicines you take. How can you care for yourself at home? · Take your medicines exactly as prescribed. Call your doctor if you think you are having a problem with your medicine. You will get more details on the specific medicines your doctor prescribes. · Check with your doctor before you use any over-the-counter medicines. Many cold and allergy medicines, for example, can make this problem worse. Make sure your doctor knows all of the medicines, vitamins, supplements, and herbal remedies you take. · Spread out through the day the amount of fluid you drink. Do not drink a lot at bedtime. · Avoid alcohol and caffeine. · If you have been given a catheter, or if one is already in place, follow the instructions you were given. Always wash your hands before and after you handle the catheter. When should you call for help?   Call your doctor now or seek immediate medical care if:  · You cannot urinate at all, or it is getting harder to urinate. · You have symptoms of a urinary tract infection. These may include:  ¨ Pain or burning when you urinate. ¨ A frequent need to urinate without being able to pass much urine. ¨ Pain in the flank, which is just below the rib cage and above the waist on either side of the back. ¨ Blood in your urine. ¨ A fever. Watch closely for changes in your health, and be sure to contact your doctor if:  · You have any problems with your catheter. · You do not get better as expected. Where can you learn more? Go to http://rosey-wilmer.info/. Enter M244 in the search box to learn more about \"Urinary Retention: Care Instructions. \"  Current as of: August 12, 2016  Content Version: 11.3  © 3993-6210 [employer]. Care instructions adapted under license by [employer] (which disclaims liability or warranty for this information). If you have questions about a medical condition or this instruction, always ask your healthcare professional. [place] [address] any warranty or liability for your use of this information.

## 2017-09-26 NOTE — ED NOTES
Called [facility] to setup transportation back [employer].

## 2017-09-26 NOTE — ED NOTES
[facility] in ED to transport patient back to [facility] via [address]. Family to accompany EMS. Patient out of ED prior to obtaining discharge vitals. Belongings & discharge paperwork out of ED w/ patient family & [facility].

## 2017-09-26 NOTE — ED NOTES
Monitor x 2. Call bell in reach. Bed in lowest position, with side rails up x 2. Pt updated on plan of care.

## 2017-09-26 NOTE — ED NOTES
[address] henning placed after patient tolerated uro-jet, as ordered. Henning drained into draining bag, dark bloody-urine, initially with scant amount of dime sized clot prior to irrigation. After irrigation with 20cc of normal saline, fewer to no clots observed, urine now more yellow with red tinge color . Pt now resting quietly, tolerated well. Katia & henning care provided. 2054:Monitor x 2. Call bell in reach. Bed in lowest position, with side rails up x 2. Pt updated on plan of care. MD Janene Kasper at bedside to update.

## 2017-09-27 ENCOUNTER — PATIENT OUTREACH (OUTPATIENT)
Dept: CASE MANAGEMENT | Age: 82
End: 2017-09-27

## 2017-09-27 NOTE — PROGRESS NOTES
Community Care Team Documentation for Patient in [facility]  Subsequent Follow up     Patient remains at [facility] ([facility]). See previous [facility] Team notes. PCP : Nayeli Seals MD    Per Lennox Carbajal at [facility], PT/OT continue. Currently contact guard when ambulating 15-60ft with RW. Standing 1-2mins independently. Contact guard with transfers. Min assist with bed mobility and upper body ADLs. Mod assist with lower body ADLs. Max assist with clothing management when toileting. Therapy anticipates 3 week LOS. Pt reports pain in left foot- SNF monitoring. Urology FU attended 9/25, henning removed. Henning placed at SNF due to not voiding 9/25. Pt went to ED 9/25 with blood in urine. Pt returned to [facility] 9/25. Plan at this time to return to [address] [place] [address] at [facility]. Family meeting to be held. Date TBD. Medications were not reconciled and general patient assessment was not completed during this skilled nursing facility outreach.      CARLOS BurchW

## 2017-09-29 ENCOUNTER — EXTERNAL NURSING HOME DOCUMENTATION (OUTPATIENT)
Dept: INTERNAL MEDICINE CLINIC | Age: 82
End: 2017-09-29

## 2017-09-29 DIAGNOSIS — R33.9 URINE RETENTION: ICD-10-CM

## 2017-09-29 DIAGNOSIS — N17.9 AKI (ACUTE KIDNEY INJURY) (HCC): Primary | ICD-10-CM

## 2017-09-29 DIAGNOSIS — G30.1 DEMENTIA OF THE ALZHEIMER'S TYPE WITH LATE ONSET WITHOUT BEHAVIORAL DISTURBANCE (HCC): ICD-10-CM

## 2017-09-29 DIAGNOSIS — N18.30 CKD (CHRONIC KIDNEY DISEASE) STAGE 3, GFR 30-59 ML/MIN (HCC): Chronic | ICD-10-CM

## 2017-09-29 DIAGNOSIS — F02.80 DEMENTIA OF THE ALZHEIMER'S TYPE WITH LATE ONSET WITHOUT BEHAVIORAL DISTURBANCE (HCC): ICD-10-CM

## 2017-09-29 DIAGNOSIS — I10 ESSENTIAL HYPERTENSION: ICD-10-CM

## 2017-09-29 NOTE — PROGRESS NOTES
History of Present Illness:   Esperanza Britt is an [de-identified]year-old male who presented to [address] [place] [address] with urinary frequency, burning sensation during urination associated with nausea and loss of appetite. The patient went to PCP's office and was given oral antibiotics and did not improve. He was admitted to the hospital with acute kidney failure. He was admitted to the hospital with acute kidney injury and recurrent urinary tract infections. Ultrasound of his kidneys showed bilateral renal cortical thinning suggestive of chronic intrinsic renal disease. No hydronephrosis was found. The patient also came in with urinary retention with a history of BPH. Mckeon catheter was in place, which was discontinued initially, but the patient is unable to urinate. Other than that, his UTI improved in the hospital.  He was stabilized and was transferred to [facility]. I am seeing him here. He is doing well. He is very uncomfortable with his catheter and wanted us to remove it. No abdominal pain, no flank pain, no fever right now. Past Medical History:   1. Atrial fibrillation. 2. Chronic obstructive pulmonary disease. 3. Depression. 4. Dementia. 5. Hypertension. 6. BPH.   7. Osteoarthritis. 8. Allergic rhinitis. Past Surgical History: Tonsillectomy. Allergies:  He is not allergic to any medications.       Medications:  Ceftin 250 mg one tablet twice a day for five days, probiotic one capsule once daily, Flomax 0.4 mg once daily, Eliquis 5 mg one tablet twice daily, Albuterol nebulizer every 12 hours as needed, Brovana 15 mcg nebulizer twice a day, Pulmicort 0.5 mg/2 mL twice a day, Wellbutrin  mg once daily, Flonase two sprays in each nostril once daily, Furosemide 40 mg one tablet twice daily, Lisinopril 40 mg once daily, Namenda 10 mg twice a day, Metoprolol 25 mg twice daily, Singulair 10 mg once daily, KCL 10 mEq twice daily, Ultracet 37.5/325 one tablet every 4 hours as needed for pain, vitamin D3 1000 units once daily. Social History:  The patient quit smoking many years back. Never been an alcoholic. Never used any drugs. He is staying in his house with his family prior to his rehab placement in [facility]. Family History:  Noncontributory. Review of Systems:  A complete review of systems was done. Right now, essentially negative. Physical Examination:     GENERAL:  This is a pleasant, white male not appearing in any distress. VITALS:  T:  97.5 degrees Fahrenheit. P:  82 per minute. BP:  122/68 mmHg. SaO2:  98% on room air. Weight is 160 pounds. HEENT:  No abnormality detected. NECK:  Supple, no JVD, no carotid bruits, no thyromegaly. CHEST:  Chest is clear to auscultation bilaterally. No rales, no rhonchi. CARDIOVASCULAR:  S1, S2 normal.  Regular rate and rhythm. ABDOMEN:  Soft, nontender, nondistended, bowel sounds present. EXTREMITIES:  No edema is noticed. Dorsalis pedis pulse normal with equal flow. NEUROLOGICAL:  Alert and oriented x2. Mild dementia is present. Cranial nerves II - XII grossly intact. Motor is 5/5 bilaterally. Sensory is within normal limits. Assessment and Plan:   1. Acute kidney injury. Due to recurrent urinary tract infections and chronic intrinsic renal disease. No hydronephrosis was found. Creatinine is improved. We will monitor his labs. 2. Recurrent urinary tract infections. The patient has received Ceftriaxone in the hospital.  Right now, the patient is on Ceftin. We will continue it for now. The patient is asymptomatic right now. 3. Urinary retention with history of BPH. He is on Mckeon catheter, which is very uncomfortable. We will send him to the urologist as soon as possible. 4. Atrial fibrillation with RVR. Rate is controlled. He is on Metoprolol and Eliquis. We will continue it for now. 5. Hypertension. He is on Lisinopril and Metoprolol.   Blood pressure is stable. 6. COPD. He is on Brovana, Pulmicort and DuoNeb nebulizer along with Singulair. He is stable. 7. Dementia. The patient is on Namenda, doing fine. 8. Depression with anxiety. He is on Wellbutrin XR. He is stable. He will continue physical therapy and occupational therapy here. THIS IS NOT A COMPLETE MEDICAL RECORD ON THIS PATIENT.    THIS IS A PATIENT AT [facility].    PLEASE CONTACT THE FACILITY LISTED BELOW FOR MORE INFORMATION ON THIS PATIENT.    THE COMPLETE RECORD RESIDES WITH THIS LONG TERM CARE FACILITY

## 2017-10-05 ENCOUNTER — PATIENT OUTREACH (OUTPATIENT)
Dept: CASE MANAGEMENT | Age: 82
End: 2017-10-05

## 2017-10-05 NOTE — PROGRESS NOTES
Community Care Team Documentation for Patient in [facility]  Subsequent Follow up     Patient remains at [facility] ([facility]). See previous [facility] Team notes. PCP : Сергей Hastings MD    Per Elma Peoples at [facility], [facility].     Medications were not reconciled and general patient assessment was not completed during this skilled nursing facility outreach.      CARLOS TaverasW

## 2017-10-12 ENCOUNTER — EXTERNAL NURSING HOME DOCUMENTATION (OUTPATIENT)
Dept: INTERNAL MEDICINE CLINIC | Age: 82
End: 2017-10-12

## 2017-10-12 VITALS
SYSTOLIC BLOOD PRESSURE: 122 MMHG | OXYGEN SATURATION: 98 % | HEART RATE: 50 BPM | RESPIRATION RATE: 18 BRPM | DIASTOLIC BLOOD PRESSURE: 71 MMHG | TEMPERATURE: 97.3 F

## 2017-10-12 DIAGNOSIS — F03.90 DEMENTIA WITHOUT BEHAVIORAL DISTURBANCE, UNSPECIFIED DEMENTIA TYPE: ICD-10-CM

## 2017-10-12 DIAGNOSIS — I10 ESSENTIAL HYPERTENSION: ICD-10-CM

## 2017-10-12 DIAGNOSIS — J44.9 CHRONIC OBSTRUCTIVE PULMONARY DISEASE, UNSPECIFIED COPD TYPE (HCC): ICD-10-CM

## 2017-10-12 DIAGNOSIS — F32.A DEPRESSION, UNSPECIFIED DEPRESSION TYPE: ICD-10-CM

## 2017-10-12 DIAGNOSIS — I48.91 ATRIAL FIBRILLATION, UNSPECIFIED TYPE (HCC): ICD-10-CM

## 2017-10-12 DIAGNOSIS — Z97.8 FOLEY CATHETER IN PLACE: ICD-10-CM

## 2017-10-12 DIAGNOSIS — R33.9 URINE RETENTION: Primary | ICD-10-CM

## 2017-10-12 NOTE — PROGRESS NOTES
THIS IS NOT A COMPLETE MEDICAL RECORD ON THIS PATIENT. THIS IS A PATIENT AT [facility]. PLEASE CONTACT THE FACILITY LISTED BELOW FOR MORE INFORMATION ON THIS PATIENT. THE COMPLETE RECORD RESIDES WITH THIS LONG TERM CARE FACILITY. HISTORY OF PRESENT ILLNESS  Uri Nuñez is a [de-identified] y.o. male. This patient is currently under the care of Dr. Tameka Marquez at [facility].  The patient was admitted to this facility following hospitalization related to UTI, EDEN, and urinary retention. The patient's past medical history includes dementia, atrial fibrillation, depression, COPD, and hypertension. The patient has had outpatient follow-up with urology, Dr. Bobby Garrido. He has recommended cystoscopy with Urolift under MAC anesthesia. The patient is found lying in bed this afternoon. He states that he is feeling well. He denies pain and shortness of breath. Visit Vitals    /71    Pulse (!) 50    Temp 97.3 °F (36.3 °C)    Resp 18    SpO2 98%     HPI    Review of Systems   Constitutional: Negative for chills and fever. Respiratory: Negative for cough and shortness of breath. Cardiovascular: Negative for chest pain and leg swelling. Gastrointestinal: Negative for abdominal pain. Genitourinary:        Catheter in place       Physical Exam   Constitutional: He appears well-developed. No distress. HENT:   Head: Normocephalic and atraumatic. Cardiovascular: Normal rate and regular rhythm. Pulmonary/Chest: Effort normal and breath sounds normal. No respiratory distress. He has no wheezes. He has no rales. Abdominal: Soft. He exhibits no distension. Genitourinary:   Genitourinary Comments: Mckeon catheter in place draining yellow urine   Musculoskeletal: He exhibits no edema. Neurological: He is alert. Oriented to self, answering simple questions appropriately   Skin: Skin is warm and dry. ASSESSMENT and PLAN  Encounter Diagnoses   Name Primary?     Urine retention Yes    Mckeon catheter in place     Atrial fibrillation, unspecified type ([facility] [address]     Dementia without behavioral disturbance, unspecified dementia type     [facility] pulmonary disease[facility]     Depression, unspecified depression type     Essential hypertension      CBC, CMP tomorrow morning. EKG   Await results for pre-op clearance    Lab results and schedule of future lab studies reviewed   Reviewed medications and side effects     Nursing to continue to monitor closely and notify MD/NP of any change in condition. Discussed above plan of care with Dr. Jennifer Olguin.

## 2017-10-13 ENCOUNTER — EXTERNAL NURSING HOME DOCUMENTATION (OUTPATIENT)
Dept: INTERNAL MEDICINE CLINIC | Age: 82
End: 2017-10-13

## 2017-10-13 DIAGNOSIS — R48.2 GAIT APRAXIA OF ELDERLY: ICD-10-CM

## 2017-10-13 DIAGNOSIS — G30.1 DEMENTIA OF THE ALZHEIMER'S TYPE WITH LATE ONSET WITHOUT BEHAVIORAL DISTURBANCE (HCC): ICD-10-CM

## 2017-10-13 DIAGNOSIS — N18.30 CKD (CHRONIC KIDNEY DISEASE) STAGE 3, GFR 30-59 ML/MIN (HCC): Primary | Chronic | ICD-10-CM

## 2017-10-13 DIAGNOSIS — J44.9 CHRONIC OBSTRUCTIVE PULMONARY DISEASE, UNSPECIFIED COPD TYPE (HCC): ICD-10-CM

## 2017-10-13 DIAGNOSIS — F02.80 DEMENTIA OF THE ALZHEIMER'S TYPE WITH LATE ONSET WITHOUT BEHAVIORAL DISTURBANCE (HCC): ICD-10-CM

## 2017-10-13 DIAGNOSIS — I63.231 CEREBRAL INFARCTION DUE TO STENOSIS OF RIGHT CAROTID ARTERY (HCC): ICD-10-CM

## 2017-10-13 NOTE — PROGRESS NOTES
Subjective:  Mr. Naomi Hoskins was admitted to the hospital with acute kidney injury and urinary retention. He has been doing well since he is in rehab. He mentioned there is no dysuria right now or discomfort in the abdomen, no fever. His urine was sent for culture. UA showed some urinary cast, but culture was pending. No fever, no other discomfort. He is eating well. He has a good appetite. Objective:    VITALS:  T:  97.7 degrees Fahrenheit. P:  70 per minute. BP:  151/64 mmHg. SaO2:  99% on room air. Weight is 178 pounds. HEENT:  No abnormality detected. NECK:  Supple, no JVD, no carotid bruits, no thyromegaly. CHEST:  Chest is clear to auscultation bilaterally. No rales, no rhonchi. CARDIOVASCULAR:  S1, S2 normal.  Regular rate and rhythm. ABDOMEN:  Soft, nontender, nondistended, bowel sounds present. EXTREMITIES:  No edema is noticed. Assessment and Plan:   1. Urinary retention with history of BPH. The patient is supposed to see urologist to get his catheter discontinued. 2. Acute kidney injury with recurrent urinary tract infections. The patient received IV antibiotics in the hospital followed by p.o. antibiotics. UA showed urinary cast.  We are waiting for urine culture. The patient needs to get CBC and CMP soon. 3. Atrial fibrillation with RVR. Rate is controlled. He is on Metoprolol and Eliquis, doing well. 4. History of COPD. Stable. He is taking Pulmicort and Brovana. 5. Dementia. He is on Namenda. He is stable. 6. Depression with anxiety. He is stable taking Wellbutrin XL. THIS IS NOT A COMPLETE MEDICAL RECORD ON THIS PATIENT.    THIS IS A PATIENT AT [facility].    PLEASE CONTACT THE FACILITY LISTED BELOW FOR MORE INFORMATION ON THIS PATIENT.    THE COMPLETE RECORD RESIDES WITH THIS LONG TERM CARE FACILITY

## 2017-10-19 PROBLEM — J44.9 CHRONIC OBSTRUCTIVE PULMONARY DISEASE (HCC): Status: ACTIVE | Noted: 2017-10-19

## 2017-10-23 ENCOUNTER — EXTERNAL NURSING HOME DOCUMENTATION (OUTPATIENT)
Dept: INTERNAL MEDICINE CLINIC | Age: 82
End: 2017-10-23

## 2017-10-23 DIAGNOSIS — I48.91 ATRIAL FIBRILLATION, UNSPECIFIED TYPE (HCC): ICD-10-CM

## 2017-10-23 DIAGNOSIS — J44.9 CHRONIC OBSTRUCTIVE PULMONARY DISEASE, UNSPECIFIED COPD TYPE (HCC): ICD-10-CM

## 2017-10-23 DIAGNOSIS — F03.90 DEMENTIA WITHOUT BEHAVIORAL DISTURBANCE, UNSPECIFIED DEMENTIA TYPE: ICD-10-CM

## 2017-10-23 DIAGNOSIS — F32.A DEPRESSION, UNSPECIFIED DEPRESSION TYPE: ICD-10-CM

## 2017-10-23 DIAGNOSIS — M10.9 GOUT, UNSPECIFIED CAUSE, UNSPECIFIED CHRONICITY, UNSPECIFIED SITE: ICD-10-CM

## 2017-10-23 DIAGNOSIS — I10 ESSENTIAL HYPERTENSION: ICD-10-CM

## 2017-10-23 DIAGNOSIS — Z97.8 FOLEY CATHETER IN PLACE: ICD-10-CM

## 2017-10-23 DIAGNOSIS — R33.9 URINARY RETENTION: Primary | ICD-10-CM

## 2017-10-23 NOTE — PROGRESS NOTES
THIS IS NOT A COMPLETE MEDICAL RECORD ON THIS PATIENT. THIS IS A PATIENT AT [facility]. PLEASE CONTACT THE FACILITY LISTED BELOW FOR MORE INFORMATION ON THIS PATIENT. THE COMPLETE RECORD RESIDES WITH THIS LONG TERM CARE FACILITY. HISTORY OF PRESENT ILLNESS  Fred Del Cid is a [de-identified] y.o. male. This patient is currently under the care of Dr. Sheree Deleon at [facility].  The patient was admitted to this facility following hospitalization related to UTI, EDEN, and urinary retention. The patient's past medical history includes dementia, atrial fibrillation, depression, COPD, and hypertension. The patient is currently scheduled to discharge to [address] [place] [address] 10/24/17. The patient states he is feeling well at the time of today's visit. Nursing does not report any concerns or change in condition at this time. HPI    Review of Systems   Constitutional: Negative for chills and fever. Respiratory: Negative for cough and shortness of breath. Cardiovascular: Negative for chest pain and leg swelling. Gastrointestinal: Negative for abdominal pain. Genitourinary:        Catheter in place       Physical Exam   Constitutional: He appears well-developed. No distress. HENT:   Head: Normocephalic and atraumatic. Cardiovascular: Normal rate and regular rhythm. Pulmonary/Chest: Effort normal and breath sounds normal. No respiratory distress. He has no wheezes. He has no rales. Abdominal: Soft. He exhibits no distension. Genitourinary:   Genitourinary Comments: Mckeon catheter in place draining yellow urine   Musculoskeletal: He exhibits no edema. Neurological: He is alert. Oriented to self, answering simple questions appropriately   Skin: Skin is warm and dry. ASSESSMENT and PLAN  Encounter Diagnoses   Name Primary?     Urinary retention Yes    Mckeon catheter in place     Dementia without behavioral disturbance, unspecified dementia type     Atrial fibrillation, unspecified type ([address]  Depression, unspecified depression type     Chronic obstructive pulmonary disease, unspecified COPD type ([facility] [address] [facility]     Essential hypertension     Gout, unspecified cause, unspecified chronicity, unspecified site      Patient to have home health services upon discharge. Will provide prescriptions for 30 day supply of medications at discharge. Patient to follow-up with PCP within 2 weeks of discharge and specialists (urology) as scheduled. Nursing to continue to monitor closely and notify MD/NP of any change in condition prior to discharge.

## 2017-11-13 RX ORDER — CHLORPHENIRAMINE MALEATE 4 MG
TABLET ORAL
Qty: 15 G | Refills: 0 | Status: SHIPPED | OUTPATIENT
Start: 2017-11-13 | End: 2022-03-04

## 2017-12-18 DIAGNOSIS — F02.80 LATE ONSET ALZHEIMER'S DISEASE WITHOUT BEHAVIORAL DISTURBANCE (HCC): ICD-10-CM

## 2017-12-18 DIAGNOSIS — G30.1 LATE ONSET ALZHEIMER'S DISEASE WITHOUT BEHAVIORAL DISTURBANCE (HCC): ICD-10-CM

## 2017-12-18 DIAGNOSIS — R45.86 MOOD CHANGE: ICD-10-CM

## 2017-12-18 RX ORDER — MEMANTINE HYDROCHLORIDE 10 MG/1
TABLET ORAL
Qty: 60 TAB | Refills: 11 | Status: SHIPPED | OUTPATIENT
Start: 2017-12-18 | End: 2022-03-04

## 2017-12-18 NOTE — TELEPHONE ENCOUNTER
Future Appointments  Date Time Provider Jose Navarretei   6/13/2018 2:00 PM Vinicius Verde MD [address]                         Last Appointment My Department:  6/14/17    Please advise of refill below.    Requested Prescriptions     Pending Prescriptions Disp Refills    memantine (NAMENDA) 10 mg tablet 60 Tab 11     Sig: TAKE ONE TABLET BY MOUTH TWICE A DAY

## 2017-12-18 NOTE — TELEPHONE ENCOUNTER
----- Message from Brook Carrel sent at 12/18/2017 11:10 AM EST -----  Regarding: /Refill  Pt needs a prior authorization for Memantine hcl 10 mg at [facility] (P) [phone]. Pt stated that pharmacy still has not receive the medication and it was requested 2 wks ago. Best contact number is [phone].

## 2017-12-21 ENCOUNTER — TELEPHONE (OUTPATIENT)
Dept: NEUROLOGY | Age: 82
End: 2017-12-21

## 2018-01-29 ENCOUNTER — TELEPHONE (OUTPATIENT)
Dept: NEUROLOGY | Age: 83
End: 2018-01-29

## 2018-01-29 NOTE — TELEPHONE ENCOUNTER
Letter was needed for patient's Long term care services. For [facility] Dr Margoth Epps dictated letter and I notified the patient's wife this is done. She would like this mailed to the address confirmed by phone in the chart.   Mailed

## 2018-05-21 ENCOUNTER — HOSPITAL ENCOUNTER (EMERGENCY)
Age: 83
Discharge: HOME | End: 2018-05-21
Attending: EMERGENCY MEDICINE
Payer: MEDICARE

## 2018-05-21 VITALS
SYSTOLIC BLOOD PRESSURE: 148 MMHG | OXYGEN SATURATION: 97 % | HEIGHT: 70 IN | BODY MASS INDEX: 27.2 KG/M2 | HEART RATE: 66 BPM | WEIGHT: 190 LBS | RESPIRATION RATE: 16 BRPM | DIASTOLIC BLOOD PRESSURE: 83 MMHG | TEMPERATURE: 98.4 F

## 2018-05-21 DIAGNOSIS — T18.9XXA SWALLOWED FOREIGN BODY, INITIAL ENCOUNTER: Primary | ICD-10-CM

## 2018-05-21 PROCEDURE — 99285 EMERGENCY DEPT VISIT HI MDM: CPT

## 2018-05-21 PROCEDURE — 74011000250 HC RX REV CODE- 250: Performed by: EMERGENCY MEDICINE

## 2018-05-21 RX ORDER — LIDOCAINE HYDROCHLORIDE 20 MG/ML
10 SOLUTION OROPHARYNGEAL
Status: COMPLETED | OUTPATIENT
Start: 2018-05-21 | End: 2018-05-21

## 2018-05-21 RX ADMIN — LIDOCAINE HYDROCHLORIDE 10 ML: 20 SOLUTION ORAL; TOPICAL at 20:10

## 2018-05-21 NOTE — DISCHARGE INSTRUCTIONS
Swallowed Object in Throat or Esophagus: Care Instructions  Your Care Instructions  When you swallow food, liquid, or an object, it passes from your mouth and goes down your throat and esophagus and into your stomach. But sometimes these things can get stuck in your throat or esophagus. This may make you choke, cough, or gag. Some objects can cause more problems than others. Sharp, long, or large objects can scratch or cut your throat, your esophagus, and your stomach if they get stuck or if they are swallowed. When this happens, these areas can bleed or get infected. If the object was stuck in your throat or esophagus, your doctor probably removed it. If you swallowed the object, your doctor may have suggested that you wait and see if the object comes out in your stool. Most swallowed objects will pass through your body without any problem and show up in your stool within 3 days. If the object does not show up in your stool within 7 days, your doctor may order tests to find out where it is in your body. Your throat may feel sore after you have had an object removed or have swallowed an object that has scratched your throat. It may hurt for a few days when you eat or swallow. The scratch itself may make it feel as if something is still stuck in your throat. Follow-up care is a key part of your treatment and safety. Be sure to make and go to all appointments, and call your doctor if you are having problems. It is also a good idea to know your test results and keep a list of the medicines you take. How can you care for yourself at home? · Take pain medicines exactly as directed. ¨ If the doctor gave you a prescription medicine for pain, take it as prescribed. ¨ If you are not taking a prescription pain medicine, ask your doctor if you can take an over-the-counter medicine. · If your doctor prescribed antibiotics, take them as directed. Do not stop taking them just because you feel better.  You need to take the full course of antibiotics. · Drink liquids. If swallowing liquids is easy, try eating soft foods like bread or bananas. If these foods are easy to swallow, start to add other foods. · Avoid very hot or very cold foods. · If you swallowed an object and it has passed through to your stomach, try eating foods that are high in fiber, such as fruits, vegetables, and whole grains. These foods may help you pass the object more quickly. · Watch your stools to see if the object has passed. Do not use a laxative unless your doctor says that it is okay. · Do not smoke. Smoking can irritate your throat and your esophagus even more. If you need help quitting, talk to your doctor about stop-smoking programs and medicines. These can increase your chances of quitting for good. To prevent swallowing objects or choking:  · Cut food into small pieces. · Eat slowly, take small bites, and chew your food all the way. · Do not laugh or talk with food in your mouth. · Do not eat or drink while you are doing something else, such as when you drive. · Do not hold objects, such as pins, nails, or toothpicks, in your mouth or between your lips. · Limit how much alcohol you drink while you eat. When should you call for help? Call 911 anytime you think you may need emergency care. For example, call if:  ? · You have chest pain. ? · You vomit a large amount of blood or what looks like coffee grounds. ? · You have severe stomach pain. ? · You pass maroon or very bloody stools. ? · You can't swallow. ? · You have severe trouble breathing. ?Call your doctor now or seek immediate medical care if:  ? · You have any stomach pain. ? · You have signs of an infection, such as:  ¨ Pain, swelling, or tenderness in or around your throat, neck, chest, or belly. ¨ A fever. ¨ A cough. ¨ Shortness of breath. ? · You vomit a small amount of blood or what looks like coffee grounds. ? · You have trouble breathing.    ? · You have trouble swallowing. ? · You vomited more than one time since you had an object removed from your throat or esophagus or since you swallowed an object. ? · Your stools are black and tarlike or have streaks of blood. ? Watch closely for changes in your health, and be sure to contact your doctor if:  ? · You still feel like you have something stuck in your throat or esophagus. ? · You do not get better as expected. Where can you learn more? Go to http://rosey-wilmer.info/. Enter Q282 in the search box to learn more about \"Swallowed Object in Throat or Esophagus: Care Instructions. \"  Current as of: March 20, 2017  Content Version: 11.4  © 8513-1247 [employer]. Care instructions adapted under license by [employer] (which disclaims liability or warranty for this information). If you have questions about a medical condition or this instruction, always ask your healthcare professional. [place] [address] any warranty or liability for your use of this information.

## 2018-05-21 NOTE — ED PROVIDER NOTES
EMERGENCY DEPARTMENT HISTORY AND PHYSICAL EXAM      Date: 5/21/2018  Patient Name: Tigre Cota    History of Presenting Illness     Chief Complaint   Patient presents with    Foreign Body Swallowed     pt arrives by EMS from [facility], was given a cup of pills and feels as though he has a pill stuck in throat       History Provided By: Patient and Patient's Daughter    HPI: Tigre Cota, 81 y.o. male with PMHx significant for [facility], HF, asthma and dementia, presents via EMS to the ED with cc of throat discomfort after swallowing a large vitamin D pill earlier today. Daughter states the pt has been coughing/gagging. Pt denies any production with cough. She denies a hx of dysphagia or food bolus impaction. Pt states it feels like the pill has not gone down yet. Pt specifically denies any fever, chills, n/v/d, urinary sxs, chest pain or other new symptoms at this time. Chief Complaint: throat discomfort  Duration: hours   Timing:  Constant  Location: throat  Quality: N/A  Severity: N/A  Modifying Factors: N/A  Associated Symptoms: coughing/gagging      There are no other complaints, changes, or physical findings at this time. PCP: Kentrell Gage MD    Current Outpatient Prescriptions   Medication Sig Dispense Refill    memantine (NAMENDA) 10 mg tablet TAKE ONE TABLET BY MOUTH TWICE A DAY 60 Tab 11    clotrimazole (LOTRIMIN) 1 % topical cream APPLY TO GROIN 3 TIMES DAILY DX YEAST DERMATITIS 15 g 0    apixaban (ELIQUIS) 5 mg tablet Take 1 Tab by mouth two (2) times a day. Indications: PREVENT THROMBOEMBOLISM IN CHRONIC ATRIAL FIBRILLATION 60 Tab 0    L. acidoph & paracasei- S therm- Bifido (MONISHA-Q/RISAQUAD) 8 billion cell cap cap Take 1 Cap by mouth daily. 10 Cap 0    tamsulosin (FLOMAX) 0.4 mg capsule Take 1 Cap by mouth daily. 30 Cap 0    predniSONE (DELTASONE) 20 mg tablet Take 1 Tab by mouth daily. 2 Tab 0    lisinopril (PRINIVIL, ZESTRIL) 40 mg tablet Take 40 mg by mouth daily.  Indications: hypertension      cholecalciferol (VITAMIN D3) 1,000 unit tablet Take 1,000 Units by mouth daily.  arformoterol (BROVANA) 15 mcg/2 mL nebu neb solution 15 mcg by Nebulization route two (2) times a day. Indications: BRONCHOSPASM PREVENTION WITH COPD      budesonide (PULMICORT) 0.5 mg/2 mL nbsp 500 mcg by Nebulization route two (2) times a day. Indications: SEVERE CHRONIC OBSTRUCTIVE PULMONARY DISEASE      fluoride, sodium, (PREVIDENT 5000 PLUS) 1.1 % crea by Dental route four (4) times daily. After meals and at bedtime. Indications: DENTAL PLAQUE PREVENTION      albuterol (PROVENTIL VENTOLIN) 2.5 mg /3 mL (0.083 %) nebulizer solution 2.5 mg by Nebulization route every twelve (12) hours as needed for Wheezing.  fluticasone (FLONASE) 50 mcg/actuation nasal spray 2 Sprays by Both Nostrils route daily as needed for Rhinitis.  traMADol-acetaminophen (ULTRACET) 37.5-325 mg per tablet Take 1 Tab by mouth every four (4) hours as needed for Pain. Indications: Pain      furosemide (LASIX) 40 mg tablet Take 40 mg by mouth two (2) times a day.  montelukast (SINGULAIR) 10 mg tablet Take 10 mg by mouth daily.  buPROPion XL (WELLBUTRIN XL) 150 mg tablet Take 1 Tab by mouth every morning. 30 Tab 5    potassium chloride SR (KLOR-CON 10) 10 mEq tablet Take 10 mEq by mouth two (2) times a day.  metoprolol (LOPRESSOR) 25 mg tablet Take 25 mg by mouth two (2) times a day.          Past History     Past Medical History:  Past Medical History:   Diagnosis Date    Allergy     seasonal    Arrhythmia     Asthma     Dementia     Depression     Heart failure (HCC)     Hypertension     Neurological disorder     dementia    Other ill-defined conditions     a-fib    Stroke [facility])        Past Surgical History:  Past Surgical History:   Procedure Laterality Date    HX HEENT      cateract surgery       Family History:  Family History   Problem Relation Age of Onset    Stroke Mother     Heart Disease Mother     Cancer Father      prostate    Cancer Brother      colon       Social History:  Social History   Substance Use Topics    Smoking status: Never Smoker    Smokeless tobacco: Never Used    Alcohol use No       Allergies: Allergies   Allergen Reactions    No Known Allergies Other (comments)         Review of Systems   Review of Systems   Constitutional: Negative for chills, fatigue and fever. HENT: Negative for congestion, rhinorrhea and sore throat. (+) esophageal discomfort after swallowing pill   Eyes: Negative for pain, discharge and visual disturbance. Respiratory: Negative for cough, chest tightness, shortness of breath and wheezing. Cardiovascular: Negative for chest pain, palpitations and leg swelling. Gastrointestinal: Negative for abdominal pain, constipation, diarrhea, nausea and vomiting. Genitourinary: Negative for dysuria, frequency and hematuria. Musculoskeletal: Negative for arthralgias, back pain and myalgias. Skin: Negative for rash. Neurological: Negative for dizziness, weakness, light-headedness and headaches. Psychiatric/Behavioral: Negative. Physical Exam   Physical Exam   Constitutional: He is oriented to person, place, and time. He appears well-developed and well-nourished. No distress. HENT:   Head: Normocephalic and atraumatic. Mouth/Throat: Oropharynx is clear and moist. No oropharyngeal exudate. Eyes: EOM are normal. Right eye exhibits no discharge. Left eye exhibits no discharge. No scleral icterus. Neck: Normal range of motion. Neck supple. No tracheal deviation present. Cardiovascular: Normal rate, regular rhythm, normal heart sounds and intact distal pulses. Exam reveals no gallop and no friction rub. No murmur heard. Pulmonary/Chest: Effort normal and breath sounds normal. No respiratory distress. He has no wheezes. He has no rales. Abdominal: Soft. He exhibits no distension. There is no tenderness.    Musculoskeletal: Normal range of motion. He exhibits no edema. Lymphadenopathy:     He has no cervical adenopathy. Neurological: He is alert and oriented to person, place, and time. Skin: Skin is warm and dry. No rash noted. Psychiatric: He has a normal mood and affect. Nursing note and vitals reviewed. Medical Decision Making   I am the first provider for this patient. I reviewed the vital signs, available nursing notes, past medical history, past surgical history, family history and social history. Vital Signs-Reviewed the patient's vital signs. Patient Vitals for the past 12 hrs:   Temp Pulse Resp BP SpO2   05/21/18 2000 - - - 148/83 97 %   05/21/18 1930 - - - 141/74 100 %   05/21/18 1900 - - - 140/76 100 %   05/21/18 1852 - 66 - (!) 150/94 100 %   05/21/18 1832 98.4 °F (36.9 °C) 65 16 (!) 194/100 95 %     Records Reviewed: Nursing Notes and Old Medical Records    Provider Notes (Medical Decision Making):   DDx: pill impaction, pill esophagitis, esophogeal stricture, esophogeal spasm, achalasia    ED Course:   7:27 PM  Initial assessment performed. The patients presenting problems have been discussed, and they are in agreement with the care plan formulated and outlined with them. I have encouraged them to ask questions as they arise throughout their visit. PROGRESS NOTE:  8:06 PM  Patient feels he has passed pill and has tolerated water without vomiting. Plan to discharge with follow up as needed. Disposition:  DISCHARGE NOTE:  8:39 PM  The patient has been re-evaluated and is ready for discharge. Reviewed available results with patient. Counseled patient on diagnosis and care plan. Patient has expressed understanding, and all questions have been answered. Patient agrees with plan and agrees to follow up as recommended, or return to the ED if their symptoms worsen. Discharge instructions have been provided and explained to the patient, along with reasons to return to the ED. PLAN:  1. Discharge Medication List as of 5/21/2018  7:55 PM        2. Follow-up Information     Follow up With Details Comments 5 [facility] MD E[place]  As needed [address] [place] [address]  [phone]      [facility] EMERGENCY DEPT  As needed, If symptoms worsen [address] [facility] [id number]  [phone]        Return to ED if worse     Diagnosis     Clinical Impression:   1. Swallowed foreign body, initial encounter        Attestations:    ATTESTATION:  This note is prepared by Albert Mesa, acting as Scribe for Shane Hunter MD.     Shane Hunter MD: The scribe's documentation has been prepared under my direction and personally reviewed by me in its entirety. I confirm that the note above accurately reflects all work, treatment, procedures, and medical decision making performed by me.

## 2018-05-21 NOTE — ED NOTES
Pt drinking EZ gas. Pt states he is able to swallow, but his throat is sore. Pt ambulated to restroom with steady gait, returned to bed, and placed back on monitor. Call bell within reach.

## 2018-05-21 NOTE — ED NOTES
Assumed care of pt from Chacha Grubbs., RN. Pt resting quietly and in no acute distress at this time. Call bell within reach.

## 2018-05-21 NOTE — ED NOTES
Assumed care of patient. Patient is alert and oriented, does not appear to be in distress. Patient ambulatory to ED from [facility]  with c/o throat pain. Pt's family member states the pt was given a large pottasium pill and he had felt that it got stuck in his throat. Pt states he does not think the pill is still in his throat but still has pain.

## 2018-05-22 NOTE — ED NOTES
Provider at bedside for dispo and follow up. Discharge plan reviewed and paperwork signed, teaching completed including treatment received, medications and follow up plan of care, pain level within manageable comfortable limits, wheeled to exit, gait steady, safety maintained.

## 2018-08-13 ENCOUNTER — OFFICE VISIT (OUTPATIENT)
Dept: URGENT CARE | Age: 83
End: 2018-08-13

## 2018-08-13 VITALS
TEMPERATURE: 97 F | RESPIRATION RATE: 18 BRPM | OXYGEN SATURATION: 96 % | DIASTOLIC BLOOD PRESSURE: 97 MMHG | SYSTOLIC BLOOD PRESSURE: 165 MMHG | HEIGHT: 70 IN | HEART RATE: 79 BPM | WEIGHT: 177 LBS | BODY MASS INDEX: 25.34 KG/M2

## 2018-08-13 DIAGNOSIS — K04.7 DENTAL ABSCESS: Primary | ICD-10-CM

## 2018-08-13 RX ORDER — LIDOCAINE HYDROCHLORIDE 20 MG/ML
15 SOLUTION OROPHARYNGEAL
Qty: 1 BOTTLE | Refills: 0 | Status: SHIPPED | OUTPATIENT
Start: 2018-08-13 | End: 2018-08-13

## 2018-08-13 RX ORDER — AMOXICILLIN AND CLAVULANATE POTASSIUM 500; 125 MG/1; MG/1
1 TABLET, FILM COATED ORAL EVERY 12 HOURS
Qty: 14 TAB | Refills: 0 | Status: SHIPPED | OUTPATIENT
Start: 2018-08-13 | End: 2018-08-20

## 2018-08-13 RX ORDER — QUETIAPINE FUMARATE 25 MG/1
TABLET, FILM COATED ORAL
COMMUNITY
Start: 2018-05-18 | End: 2022-03-04

## 2018-08-13 NOTE — MR AVS SNAPSHOT
[address] [place] [address] 
[phone] Patient: Tigist Spann MRN: [id number] :1935 Visit Information Date & Time Provider Department Dept. Phone Encounter #  
 2018  5:30 PM [facility] [phone] [id number] Follow-up Instructions Return in about 1 day (around 2018) for treatment with dentist.  
 [facility] History Upcoming Health Maintenance Date Due  
 GLAUCOMA SCREENING Q2Y 2017 MEDICARE YEARLY EXAM 3/14/2018 Influenza Age 5 to Adult 2018 DTaP/Tdap/Td series (2 - Td) 2025 Allergies as of 2018  Review Complete On: 2018 By: Chelsie Guadarrama RN Severity Noted Reaction Type Reactions No Known Allergies  2017    Other (comments) Current Immunizations  Reviewed on 2017 Name Date Influenza High Dose Vaccine PF 10/6/2016, 10/31/2014 Influenza Vaccine (Quad) PF 2015 Pneumococcal Conjugate (PCV-13) 2015 Pneumococcal Polysaccharide (PPSV-23) 2013,  Deferred (Patient Refused) TB Skin Test (PPD) Intradermal 2017 Tdap 2015  6:22 PM  
  
 Not reviewed this visit You Were Diagnosed With   
  
 Codes Comments Dental abscess    -  Primary ICD-10-CM: K04.7 ICD-9-CM: 522.5 Vitals BP Pulse Temp Resp Height(growth percentile) Weight(growth percentile) (!) 165/97 79 97 °F (36.1 °C) 18 5' 10\" (1.778 m) 177 lb (80.3 kg) SpO2 BMI Smoking Status 96% 25.4 kg/m2 Never Smoker Vitals History BMI and BSA Data Body Mass Index Body Surface Area  
 25.4 kg/m 2 1.99 m 2 Your Updated Medication List  
  
   
This list is accurate as of 18  6:10 PM.  Always use your most recent med list.  
  
  
  
  
 albuterol 2.5 mg /3 mL (0.083 %) nebulizer solution Commonly known as:  PROVENTIL VENTOLIN  
 2.5 mg by Nebulization route every twelve (12) hours as needed for Wheezing. amoxicillin-clavulanate 500-125 mg per tablet Commonly known as:  AUGMENTIN Take 1 Tab by mouth every twelve (12) hours for 7 days. Indications: Skin and Skin Structure Infection  
  
 apixaban 5 mg tablet Commonly known as:  Timmothy [place] Take 1 Tab by mouth two (2) times a day. Indications: PREVENT THROMBOEMBOLISM IN CHRONIC ATRIAL FIBRILLATION  
  
 BROVANA 15 mcg/2 mL Nebu neb solution Generic drug:  arformoterol 15 mcg by Nebulization route two (2) times a day. Indications: BRONCHOSPASM PREVENTION WITH COPD  
  
 budesonide 0.5 mg/2 mL Nbsp Commonly known as:  PULMICORT  
500 mcg by Nebulization route two (2) times a day. Indications: SEVERE CHRONIC OBSTRUCTIVE PULMONARY DISEASE buPROPion  mg tablet Commonly known as:  [place] Take 1 Tab by mouth every morning. clotrimazole 1 % topical cream  
Commonly known as:  LOTRIMIN  
APPLY TO GROIN 3 TIMES DAILY DX YEAST DERMATITIS  
  
 fluticasone 50 mcg/actuation nasal spray Commonly known as:  Adilia Willacy 2 Sprays by Both Nostrils route daily as needed for Rhinitis. furosemide 40 mg tablet Commonly known as:  LASIX Take 40 mg by mouth two (2) times a day. L. acidoph & paracasei- S therm- Bifido 8 billion cell Cap cap Commonly known as:  MONISHA-Q/RISAQUAD Take 1 Cap by mouth daily. lidocaine 2 % solution Commonly known as:  XYLOCAINE Take 15 mL by mouth now for 1 dose. Indications: MOUTH IRRITATION  
  
 lisinopril 40 mg tablet Commonly known as:  Merilynn Mir Take 40 mg by mouth daily. Indications: hypertension  
  
 memantine 10 mg tablet Commonly known as:  Earla Phy TAKE ONE TABLET BY MOUTH TWICE A DAY  
  
 metoprolol tartrate 25 mg tablet Commonly known as:  LOPRESSOR Take 25 mg by mouth two (2) times a day. montelukast 10 mg tablet Commonly known as:  SINGULAIR Take 10 mg by mouth daily. potassium chloride SR 10 mEq tablet Commonly known as:  KLOR-CON 10 Take 10 mEq by mouth two (2) times a day. predniSONE 20 mg tablet Commonly known as:  Marcelene Im Take 1 Tab by mouth daily. PREVIDENT 5000 PLUS 1.1 % Crea Generic drug:  fluoride (sodium) by Dental route four (4) times daily. After meals and at bedtime. Indications: DENTAL PLAQUE PREVENTION  
  
 QUEtiapine 25 mg tablet Commonly known as:  SEROquel  
  
 tamsulosin 0.4 mg capsule Commonly known as:  FLOMAX Take 1 Cap by mouth daily. traMADol-acetaminophen 37.5-325 mg per tablet Commonly known as:  ULTRACET Take 1 Tab by mouth every four (4) hours as needed for Pain. Indications: Pain VITAMIN D3 1,000 unit tablet Generic drug:  cholecalciferol Take 1,000 Units by mouth daily. Prescriptions Printed Refills  
 lidocaine (XYLOCAINE) 2 % solution 0 Sig: Take 15 mL by mouth now for 1 dose. Indications: MOUTH IRRITATION Class: Print Route: Oral  
 amoxicillin-clavulanate (AUGMENTIN) 500-125 mg per tablet 0 Sig: Take 1 Tab by mouth every twelve (12) hours for 7 days. Indications: Skin and Skin Structure Infection Class: Print Route: Oral  
  
Follow-up Instructions Return in about 1 day (around 8/14/2018) for treatment with dentist.  
  
  
Patient Instructions Periodontal Abscess: Care Instructions Your Care Instructions A periodontal abscess is a pocket of pus in the tissues of the gum. It looks like a small red ball pushing out of the swollen gum. An abscess can occur with serious gum disease (periodontitis), which causes the gums to pull away from the teeth. This leaves deep pockets where bacteria can grow. If tartar builds up too much, or if food gets stuck in the pockets, pus forms. If the pus can't drain, it forms an abscess. An abscess can cause a fever and a throbbing pain in nearby teeth.  It can also cause long-term damage to your teeth and gums. The teeth may get loose and fall out. The infection can spread to another part of your body. In most cases, your dentist will give you antibiotics to stop the infection. He or she may need to cut open (xavier) the abscess so that the infection can drain. This should relieve your pain. You may also need more dental treatment, such as tooth removal or oral surgery to fix bone damage caused by the abscess. Follow-up care is a key part of your treatment and safety. Be sure to make and go to all appointments, and call your doctor if you are having problems. It's also a good idea to know your test results and keep a list of the medicines you take. How can you care for yourself at home? · Reduce pain and swelling in your face and jaw by putting ice or a cold pack on the outside of your cheek for 10 to 20 minutes at a time. Put a thin cloth between the ice and your skin. · Be safe with medicines. Read and follow all instructions on the label. ¨ If the doctor gave you a prescription medicine for pain, take it as prescribed. ¨ If you are not taking a prescription pain medicine, ask your doctor if you can take an over-the-counter medicine. · Take your antibiotics as directed. Do not stop taking them just because you feel better. You need to take the full course of antibiotics. To prevent periodontal abscess · Brush and floss every day, and have regular dental checkups. · Eat a healthy diet, and avoid sugary foods and drinks. · Do not smoke or use spit tobacco. Tobacco use slows your ability to heal. It also increases your risk for gum disease and cancer of the mouth and throat. If you need help quitting, talk to your doctor about stop-smoking programs and medicines. These can increase your chances of quitting for good. When should you call for help? Call 911 anytime you think you may need emergency care. For example, call if: 
  · You have trouble breathing.  Call your doctor now or seek immediate medical care if: 
  · You have new or worse symptoms of infection, such as: 
¨ Increased pain, swelling, warmth, or redness. ¨ Red streaks leading from the area. ¨ Pus draining from the area. ¨ A fever.  
 Watch closely for changes in your health, and be sure to contact your doctor if: 
  · You do not get better as expected. Where can you learn more? Go to http://rosey-wilmer.info/. Enter O179 in the search box to learn more about \"Periodontal Abscess: Care Instructions. \" Current as of: May 12, 2017 Content Version: 11.7 © 0451-7346 [employer]. Care instructions adapted under license by [employer] (which disclaims liability or warranty for this information). If you have questions about a medical condition or this instruction, always ask your healthcare professional. [address] any warranty or liability for your use of this information. Introducing [facility]! Krishna Guy introduces [employer] patient portal. Now you can access parts of your medical record, email your doctor's office, and request medication refills online. 1. In your internet browser, go to [email or web address] 2. Click on the First Time User? Click Here link in the Sign In box. You will see the New Member Sign Up page. 3. Enter your [employer]ular Access Code exactly as it appears below. You will not need to use this code after youve completed the sign-up process. If you do not sign up before the expiration date, you must request a new code. · Circular Access Code: [facility] Expires: 9/11/2018  5:21 AM 
 
4. Enter the last four digits of your Social Security Number (xxxx) and Date of Birth (mm/dd/yyyy) as indicated and click Submit. You will be taken to the next sign-up page. 5. Create a Circular ID.  This will be your Circular login ID and cannot be changed, so think of one that is secure and easy to remember. 6. Create a [employer] password. You can change your password at any time. 7. Enter your Password Reset Question and Answer. This can be used at a later time if you forget your password. 8. Enter your e-mail address. You will receive e-mail notification when new information is available in [address] 9. Click Sign Up. You can now view and download portions of your medical record. 10. Click the Download Summary menu link to download a portable copy of your medical information. If you have questions, please visit the Frequently Asked Questions section of the [employer] website. Remember, [employer] is NOT to be used for urgent needs. For medical emergencies, dial 911. Now available from your iPhone and Android! Please provide this summary of care documentation to your next provider. Your primary care clinician is listed as Danny Felty. If you have any questions after today's visit, please call [phone].

## 2018-08-13 NOTE — PROGRESS NOTES
Patient is a 81 y.o. male presenting with facial swelling. The history is provided by the patient and the spouse (Pt. presents with wife who was historian, and states patient has lump on right side of face, that rapidly appeared, pt. states pain in teeth, no weakness in upper/lower extremities, no h/o trauma, No HA, CP, SOB). Facial Swelling   This is a new problem. The current episode started 12 to 24 hours ago. The problem has been gradually worsening. Pertinent negatives include no chest pain, no abdominal pain, no headaches and no shortness of breath. The symptoms are aggravated by eating. He has tried nothing for the symptoms. Past Medical History:   Diagnosis Date    Allergy     seasonal    Arrhythmia     Asthma     Dementia     Depression     Heart failure (HCC)     Hypertension     Neurological disorder     dementia    Other ill-defined conditions(969.29)     a-fib    Stroke [facility])         Past Surgical History:   Procedure Laterality Date    HX HEENT      cateract surgery         Family History   Problem Relation Age of Onset    Stroke Mother     Heart Disease Mother     Cancer Father      prostate    Cancer Brother      colon        Social History     Social History    Marital status:      Spouse name: N/A    Number of children: N/A    Years of education: N/A     Occupational History    Not on file. Social History Main Topics    Smoking status: Never Smoker    Smokeless tobacco: Never Used    Alcohol use No    Drug use: No    Sexual activity: No     Other Topics Concern    Not on file     Social History Narrative                ALLERGIES: No known allergies    Review of Systems   Constitutional: Negative for activity change, appetite change, chills, diaphoresis, fatigue, fever and unexpected weight change. HENT: Positive for dental problem, facial swelling and mouth sores.  Negative for congestion, ear discharge, ear pain, hearing loss, nosebleeds, postnasal drip, rhinorrhea, sinus pain and sore throat. Eyes: Negative for discharge and itching. Respiratory: Negative for apnea, choking, chest tightness and shortness of breath. Cardiovascular: Negative for chest pain, palpitations and leg swelling. Gastrointestinal: Negative for abdominal pain, blood in stool, constipation and diarrhea. Genitourinary: Positive for difficulty urinating. Negative for dysuria. Musculoskeletal: Negative for arthralgias, back pain, gait problem, joint swelling and neck stiffness. Skin: Negative for color change and rash. Allergic/Immunologic: Negative for food allergies. Neurological: Negative for dizziness, seizures, syncope, facial asymmetry, speech difficulty, weakness, light-headedness, numbness and headaches. Hematological: Negative for adenopathy. Psychiatric/Behavioral: Negative for agitation, behavioral problems, hallucinations, self-injury and suicidal ideas. The patient is not nervous/anxious. All other systems reviewed and are negative. Vitals:    08/13/18 1734   BP: (!) 165/97   Pulse: 79   Resp: 18   Temp: 97 °F (36.1 °C)   SpO2: 96%   Weight: 177 lb (80.3 kg)   Height: 5' 10\" (1.778 m)       Physical Exam   Constitutional: He is oriented to person, place, and time. He appears well-developed and well-nourished. No distress. HENT:   Head: Normocephalic and atraumatic. Mouth/Throat: No oropharyngeal exudate. Right lower molar missing with erythema and swelling with enlarged tender gum line, with slight fluctance. And ulceration to gum line at outer margin, visible externally on right lower mandible with edema and ttp. Eyes: EOM are normal. Pupils are equal, round, and reactive to light. Right eye exhibits no discharge. Left eye exhibits no discharge. No scleral icterus. Cinninati test performed with no negative findings to rule in stroke. Neck: Normal range of motion. Neck supple. No JVD present. No tracheal deviation present.  No thyromegaly present. Cardiovascular: Normal rate, regular rhythm and normal heart sounds. Exam reveals no gallop and no friction rub. No murmur heard. Pulmonary/Chest: Effort normal and breath sounds normal. No stridor. He has no wheezes. He has no rales. He exhibits no tenderness. Abdominal: Soft. Bowel sounds are normal. He exhibits no distension and no mass. There is no tenderness. There is no rebound and no guarding. Musculoskeletal: Normal range of motion. He exhibits no edema, tenderness or deformity. WAlks with a  walker   Lymphadenopathy:     He has no cervical adenopathy. Neurological: He is alert and oriented to person, place, and time. No cranial nerve deficit. Coordination normal.   Skin: Skin is warm and dry. He is not diaphoretic. Psychiatric: He has a normal mood and affect. His behavior is normal. Thought content normal.   Nursing note and vitals reviewed. MDM     Differential Diagnosis; Clinical Impression; Plan:     Lower mandibular dental abscess- Augmentin 500mg PO Q 8 hrs, f/u with dentist tomorrow, tylenol PRN/motrin PRN for pain.   Risk of Significant Complications, Morbidity, and/or Mortality:   Presenting problems:  Low  Diagnostic procedures:  Low  Management options:  Low  Progress:   Patient progress:  Stable      Procedures

## 2018-08-13 NOTE — Clinical Note
Dental Abscess, needs to be seen by dentist tomorrow/ ASAP.  Prescribed OTC pain meds/Amoxicillin-clavulanate 875 mg PO every 12 hours or 500 mg PO every eight hours

## 2022-03-04 ENCOUNTER — HOSPITAL ENCOUNTER (INPATIENT)
Age: 87
LOS: 4 days | Discharge: NURSING, REHAB OR LONG-TERM CARE | DRG: 389 | End: 2022-03-08
Attending: EMERGENCY MEDICINE | Admitting: FAMILY MEDICINE
Payer: MEDICARE

## 2022-03-04 ENCOUNTER — APPOINTMENT (OUTPATIENT)
Dept: GENERAL RADIOLOGY | Age: 87
DRG: 389 | End: 2022-03-04
Attending: EMERGENCY MEDICINE
Payer: MEDICARE

## 2022-03-04 ENCOUNTER — APPOINTMENT (OUTPATIENT)
Dept: CT IMAGING | Age: 87
DRG: 389 | End: 2022-03-04
Attending: EMERGENCY MEDICINE
Payer: MEDICARE

## 2022-03-04 DIAGNOSIS — K56.609 SMALL BOWEL OBSTRUCTION (HCC): Primary | ICD-10-CM

## 2022-03-04 DIAGNOSIS — R10.84 ABDOMINAL PAIN, GENERALIZED: ICD-10-CM

## 2022-03-04 DIAGNOSIS — R11.2 NON-INTRACTABLE VOMITING WITH NAUSEA, UNSPECIFIED VOMITING TYPE: ICD-10-CM

## 2022-03-04 LAB
ALBUMIN SERPL-MCNC: 3.5 G/DL (ref 3.5–5)
ALBUMIN/GLOB SERPL: 0.9 {RATIO} (ref 1.1–2.2)
ALP SERPL-CCNC: 110 U/L (ref 45–117)
ALT SERPL-CCNC: 20 U/L (ref 12–78)
ANION GAP SERPL CALC-SCNC: 3 MMOL/L (ref 5–15)
AST SERPL-CCNC: 14 U/L (ref 15–37)
BASOPHILS # BLD: 0 K/UL (ref 0–0.1)
BASOPHILS NFR BLD: 0 % (ref 0–1)
BILIRUB SERPL-MCNC: 1.4 MG/DL (ref 0.2–1)
BNP SERPL-MCNC: 6840 PG/ML
BUN SERPL-MCNC: 21 MG/DL (ref 6–20)
BUN/CREAT SERPL: 18 (ref 12–20)
CALCIUM SERPL-MCNC: 9.3 MG/DL (ref 8.5–10.1)
CALCULATED R AXIS, ECG10: -63 DEGREES
CALCULATED T AXIS, ECG11: 29 DEGREES
CHLORIDE SERPL-SCNC: 106 MMOL/L (ref 97–108)
CO2 SERPL-SCNC: 33 MMOL/L (ref 21–32)
COMMENT, HOLDF: NORMAL
COVID-19 RAPID TEST, COVR: NOT DETECTED
CREAT SERPL-MCNC: 1.14 MG/DL (ref 0.7–1.3)
DIAGNOSIS, 93000: NORMAL
DIFFERENTIAL METHOD BLD: ABNORMAL
EOSINOPHIL # BLD: 0 K/UL (ref 0–0.4)
EOSINOPHIL NFR BLD: 0 % (ref 0–7)
ERYTHROCYTE [DISTWIDTH] IN BLOOD BY AUTOMATED COUNT: 14 % (ref 11.5–14.5)
GLOBULIN SER CALC-MCNC: 3.7 G/DL (ref 2–4)
GLUCOSE SERPL-MCNC: 116 MG/DL (ref 65–100)
HCT VFR BLD AUTO: 51.7 % (ref 36.6–50.3)
HGB BLD-MCNC: 16.7 G/DL (ref 12.1–17)
IMM GRANULOCYTES # BLD AUTO: 0.1 K/UL (ref 0–0.04)
IMM GRANULOCYTES NFR BLD AUTO: 1 % (ref 0–0.5)
LIPASE SERPL-CCNC: 58 U/L (ref 73–393)
LYMPHOCYTES # BLD: 1 K/UL (ref 0.8–3.5)
LYMPHOCYTES NFR BLD: 10 % (ref 12–49)
MAGNESIUM SERPL-MCNC: 2.3 MG/DL (ref 1.6–2.4)
MAGNESIUM SERPL-MCNC: 2.3 MG/DL (ref 1.6–2.4)
MCH RBC QN AUTO: 29.8 PG (ref 26–34)
MCHC RBC AUTO-ENTMCNC: 32.3 G/DL (ref 30–36.5)
MCV RBC AUTO: 92.3 FL (ref 80–99)
MONOCYTES # BLD: 0.8 K/UL (ref 0–1)
MONOCYTES NFR BLD: 8 % (ref 5–13)
NEUTS SEG # BLD: 8.4 K/UL (ref 1.8–8)
NEUTS SEG NFR BLD: 81 % (ref 32–75)
NRBC # BLD: 0 K/UL (ref 0–0.01)
NRBC BLD-RTO: 0 PER 100 WBC
PHOSPHATE SERPL-MCNC: 2.9 MG/DL (ref 2.6–4.7)
PLATELET # BLD AUTO: 223 K/UL (ref 150–400)
PMV BLD AUTO: 10 FL (ref 8.9–12.9)
POTASSIUM SERPL-SCNC: 3.7 MMOL/L (ref 3.5–5.1)
PROT SERPL-MCNC: 7.2 G/DL (ref 6.4–8.2)
Q-T INTERVAL, ECG07: 450 MS
QRS DURATION, ECG06: 94 MS
QTC CALCULATION (BEZET), ECG08: 468 MS
RBC # BLD AUTO: 5.6 M/UL (ref 4.1–5.7)
SAMPLES BEING HELD,HOLD: NORMAL
SODIUM SERPL-SCNC: 142 MMOL/L (ref 136–145)
SOURCE, COVRS: NORMAL
TROPONIN-HIGH SENSITIVITY: 13 NG/L (ref 0–76)
VENTRICULAR RATE, ECG03: 65 BPM
WBC # BLD AUTO: 10.3 K/UL (ref 4.1–11.1)

## 2022-03-04 PROCEDURE — 83880 ASSAY OF NATRIURETIC PEPTIDE: CPT

## 2022-03-04 PROCEDURE — 83735 ASSAY OF MAGNESIUM: CPT

## 2022-03-04 PROCEDURE — 84100 ASSAY OF PHOSPHORUS: CPT

## 2022-03-04 PROCEDURE — 96374 THER/PROPH/DIAG INJ IV PUSH: CPT

## 2022-03-04 PROCEDURE — 65270000032 HC RM SEMIPRIVATE

## 2022-03-04 PROCEDURE — C9113 INJ PANTOPRAZOLE SODIUM, VIA: HCPCS | Performed by: FAMILY MEDICINE

## 2022-03-04 PROCEDURE — 83690 ASSAY OF LIPASE: CPT

## 2022-03-04 PROCEDURE — 80053 COMPREHEN METABOLIC PANEL: CPT

## 2022-03-04 PROCEDURE — 87635 SARS-COV-2 COVID-19 AMP PRB: CPT

## 2022-03-04 PROCEDURE — 74177 CT ABD & PELVIS W/CONTRAST: CPT

## 2022-03-04 PROCEDURE — 96375 TX/PRO/DX INJ NEW DRUG ADDON: CPT

## 2022-03-04 PROCEDURE — 65660000000 HC RM CCU STEPDOWN

## 2022-03-04 PROCEDURE — 99285 EMERGENCY DEPT VISIT HI MDM: CPT

## 2022-03-04 PROCEDURE — 93005 ELECTROCARDIOGRAM TRACING: CPT

## 2022-03-04 PROCEDURE — 74011000250 HC RX REV CODE- 250: Performed by: FAMILY MEDICINE

## 2022-03-04 PROCEDURE — 74011000636 HC RX REV CODE- 636: Performed by: RADIOLOGY

## 2022-03-04 PROCEDURE — 36415 COLL VENOUS BLD VENIPUNCTURE: CPT

## 2022-03-04 PROCEDURE — 71045 X-RAY EXAM CHEST 1 VIEW: CPT

## 2022-03-04 PROCEDURE — 74011250636 HC RX REV CODE- 250/636: Performed by: FAMILY MEDICINE

## 2022-03-04 PROCEDURE — 84484 ASSAY OF TROPONIN QUANT: CPT

## 2022-03-04 PROCEDURE — 99231 SBSQ HOSP IP/OBS SF/LOW 25: CPT | Performed by: PHYSICIAN ASSISTANT

## 2022-03-04 PROCEDURE — 74011250636 HC RX REV CODE- 250/636: Performed by: EMERGENCY MEDICINE

## 2022-03-04 PROCEDURE — 85025 COMPLETE CBC W/AUTO DIFF WBC: CPT

## 2022-03-04 RX ORDER — MELATONIN
1000 DAILY
COMMUNITY

## 2022-03-04 RX ORDER — ATORVASTATIN CALCIUM 20 MG/1
20 TABLET, FILM COATED ORAL
COMMUNITY
End: 2022-03-04

## 2022-03-04 RX ORDER — METOPROLOL TARTRATE 25 MG/1
25 TABLET, FILM COATED ORAL EVERY 12 HOURS
COMMUNITY

## 2022-03-04 RX ORDER — FINASTERIDE 5 MG/1
5 TABLET, FILM COATED ORAL DAILY
COMMUNITY

## 2022-03-04 RX ORDER — NYSTATIN 100000 [USP'U]/G
POWDER TOPICAL
COMMUNITY
End: 2022-03-04

## 2022-03-04 RX ORDER — CANDESARTAN 32 MG/1
32 TABLET ORAL DAILY
COMMUNITY
End: 2022-03-04

## 2022-03-04 RX ORDER — BUPROPION HYDROCHLORIDE 150 MG/1
150 TABLET ORAL DAILY
Status: ON HOLD | COMMUNITY
End: 2022-03-08 | Stop reason: SDUPTHER

## 2022-03-04 RX ORDER — MAGNESIUM HYDROXIDE 400 MG/5ML
595 SUSPENSION, ORAL (FINAL DOSE FORM) ORAL DAILY
COMMUNITY
End: 2022-03-08

## 2022-03-04 RX ORDER — ONDANSETRON 2 MG/ML
4 INJECTION INTRAMUSCULAR; INTRAVENOUS
Status: COMPLETED | OUTPATIENT
Start: 2022-03-04 | End: 2022-03-04

## 2022-03-04 RX ORDER — QUETIAPINE FUMARATE 25 MG/1
12.5 TABLET, FILM COATED ORAL
COMMUNITY

## 2022-03-04 RX ORDER — CLOPIDOGREL BISULFATE 75 MG/1
75 TABLET ORAL DAILY
COMMUNITY
End: 2022-03-04

## 2022-03-04 RX ORDER — TAMSULOSIN HYDROCHLORIDE 0.4 MG/1
0.4 CAPSULE ORAL
COMMUNITY
End: 2022-03-04

## 2022-03-04 RX ORDER — RABEPRAZOLE SODIUM 20 MG/1
20 TABLET, DELAYED RELEASE ORAL
COMMUNITY
End: 2022-03-04

## 2022-03-04 RX ORDER — SODIUM CHLORIDE 9 MG/ML
75 INJECTION, SOLUTION INTRAVENOUS CONTINUOUS
Status: DISCONTINUED | OUTPATIENT
Start: 2022-03-04 | End: 2022-03-06

## 2022-03-04 RX ORDER — LISINOPRIL 40 MG/1
40 TABLET ORAL DAILY
COMMUNITY
End: 2022-03-08

## 2022-03-04 RX ORDER — FUROSEMIDE 20 MG/1
20 TABLET ORAL DAILY
COMMUNITY
End: 2022-03-08

## 2022-03-04 RX ORDER — SODIUM CHLORIDE 0.9 % (FLUSH) 0.9 %
5-40 SYRINGE (ML) INJECTION AS NEEDED
Status: DISCONTINUED | OUTPATIENT
Start: 2022-03-04 | End: 2022-03-06

## 2022-03-04 RX ORDER — TAMSULOSIN HYDROCHLORIDE 0.4 MG/1
0.4 CAPSULE ORAL DAILY
COMMUNITY

## 2022-03-04 RX ORDER — LANOLIN ALCOHOL/MO/W.PET/CERES
1000 CREAM (GRAM) TOPICAL DAILY
COMMUNITY
End: 2022-03-04

## 2022-03-04 RX ORDER — SODIUM CHLORIDE 0.9 % (FLUSH) 0.9 %
5-40 SYRINGE (ML) INJECTION EVERY 8 HOURS
Status: DISCONTINUED | OUTPATIENT
Start: 2022-03-04 | End: 2022-03-06

## 2022-03-04 RX ORDER — TRAZODONE HYDROCHLORIDE 50 MG/1
50 TABLET ORAL
COMMUNITY
End: 2022-03-04

## 2022-03-04 RX ORDER — MEMANTINE HYDROCHLORIDE 10 MG/1
10 TABLET ORAL EVERY 12 HOURS
COMMUNITY

## 2022-03-04 RX ORDER — ASPIRIN 81 MG/1
81 TABLET ORAL DAILY
COMMUNITY
End: 2022-03-04

## 2022-03-04 RX ORDER — ALLOPURINOL 100 MG/1
200 TABLET ORAL DAILY
COMMUNITY
End: 2022-03-04

## 2022-03-04 RX ORDER — MORPHINE SULFATE 4 MG/ML
4 INJECTION INTRAVENOUS ONCE
Status: COMPLETED | OUTPATIENT
Start: 2022-03-04 | End: 2022-03-04

## 2022-03-04 RX ORDER — FLUOXETINE HYDROCHLORIDE 40 MG/1
40 CAPSULE ORAL DAILY
COMMUNITY
End: 2022-03-04

## 2022-03-04 RX ORDER — ONDANSETRON 2 MG/ML
4 INJECTION INTRAMUSCULAR; INTRAVENOUS
Status: DISCONTINUED | OUTPATIENT
Start: 2022-03-04 | End: 2022-03-06

## 2022-03-04 RX ADMIN — MORPHINE SULFATE 4 MG: 4 INJECTION INTRAVENOUS at 07:23

## 2022-03-04 RX ADMIN — SODIUM CHLORIDE 40 MG: 9 INJECTION INTRAMUSCULAR; INTRAVENOUS; SUBCUTANEOUS at 12:31

## 2022-03-04 RX ADMIN — SODIUM CHLORIDE 75 ML/HR: 900 INJECTION, SOLUTION INTRAVENOUS at 12:32

## 2022-03-04 RX ADMIN — SODIUM CHLORIDE 40 MG: 9 INJECTION INTRAMUSCULAR; INTRAVENOUS; SUBCUTANEOUS at 21:21

## 2022-03-04 RX ADMIN — IOPAMIDOL 100 ML: 755 INJECTION, SOLUTION INTRAVENOUS at 08:02

## 2022-03-04 RX ADMIN — SODIUM CHLORIDE 1000 ML: 9 INJECTION, SOLUTION INTRAVENOUS at 07:20

## 2022-03-04 RX ADMIN — ONDANSETRON HYDROCHLORIDE 4 MG: 2 SOLUTION INTRAMUSCULAR; INTRAVENOUS at 07:21

## 2022-03-04 NOTE — CONSULTS
Surgery Consult    Subjective:      Surgical consultation was called on Jairo Barron who is a 81 y.o. male with hx Afib, demenita, HTN, CVA who presents to [facility] ED from assisted living with c/o dark emesis since last night. Pt is sleepy and is not a great historian but his wife is at the bedside, she says the facility called her this morning because he \"was throwing up black vomit\". Pt denies pain or bloating, he says he ate snack yesterday afternoon, nothing this AM.    Pt reports nausea and vomiting and no fever or chills. Pt has been voiding ok,    He has been in assisted living for 4 years, wife describes slow wt loss with his dementia  She reports previous hx of colonoscopies that were unremarkable   No previous SBO  She denies that he has had any previous abdominal surgery  He is on Eliquis for AFIB and got it yesterday, held today  Previous ETOH quit 2 years ago  No tobacco  Denies known hx of PUD. Prior similar episodes:  no      Past Medical History:   Diagnosis Date    Allergy     seasonal    Arrhythmia     Asthma     [facility] ([facility] [address]     Depression     Heart failure (HCC)     Hypertension     Neurological disorder     dementia    Other ill-defined conditions(799.89)     a-fib    Stroke [facility])      Past Surgical History:   Procedure Laterality Date    HX HEENT      cateract surgery      Social History     Socioeconomic History    Marital status:    Tobacco Use    Smoking status: Never Smoker    Smokeless tobacco: Never Used   Substance and Sexual Activity    Alcohol use: No    Drug use: No     Types: Prescription, OTC    Sexual activity: Never      No current facility-administered medications for this encounter.      Current Outpatient Medications   Medication Sig    QUEtiapine (SEROQUEL) 25 mg tablet     memantine (NAMENDA) 10 mg tablet TAKE ONE TABLET BY MOUTH TWICE A DAY    clotrimazole (LOTRIMIN) 1 % topical cream APPLY TO GROIN 3 TIMES DAILY DX YEAST DERMATITIS    apixaban (ELIQUIS) 5 mg tablet Take 1 Tab by mouth two (2) times a day. Indications: PREVENT THROMBOEMBOLISM IN CHRONIC ATRIAL FIBRILLATION    L. acidoph & paracasei- S therm- Bifido (MONISHA-Q/RISAQUAD) 8 billion cell cap cap Take 1 Cap by mouth daily.  tamsulosin (FLOMAX) 0.4 mg capsule Take 1 Cap by mouth daily.  predniSONE (DELTASONE) 20 mg tablet Take 1 Tab by mouth daily.  lisinopril (PRINIVIL, ZESTRIL) 40 mg tablet Take 40 mg by mouth daily. Indications: hypertension    cholecalciferol (VITAMIN D3) 1,000 unit tablet Take 1,000 Units by mouth daily.  arformoterol (BROVANA) 15 mcg/2 mL nebu neb solution 15 mcg by Nebulization route two (2) times a day. Indications: BRONCHOSPASM PREVENTION WITH COPD    budesonide (PULMICORT) 0.5 mg/2 mL nbsp 500 mcg by Nebulization route two (2) times a day. Indications: SEVERE CHRONIC OBSTRUCTIVE PULMONARY DISEASE    fluoride, sodium, (PREVIDENT 5000 PLUS) 1.1 % crea by Dental route four (4) times daily. After meals and at bedtime. Indications: DENTAL PLAQUE PREVENTION    albuterol (PROVENTIL VENTOLIN) 2.5 mg /3 mL (0.083 %) nebulizer solution 2.5 mg by Nebulization route every twelve (12) hours as needed for Wheezing.  fluticasone (FLONASE) 50 mcg/actuation nasal spray 2 Sprays by Both Nostrils route daily as needed for Rhinitis.  traMADol-acetaminophen (ULTRACET) 37.5-325 mg per tablet Take 1 Tab by mouth every four (4) hours as needed for Pain. Indications: Pain    furosemide (LASIX) 40 mg tablet Take 40 mg by mouth two (2) times a day.  montelukast (SINGULAIR) 10 mg tablet Take 10 mg by mouth daily.  buPROPion XL (WELLBUTRIN XL) 150 mg tablet Take 1 Tab by mouth every morning.  potassium chloride SR (KLOR-CON 10) 10 mEq tablet Take 10 mEq by mouth two (2) times a day.  metoprolol (LOPRESSOR) 25 mg tablet Take 25 mg by mouth two (2) times a day.         Allergies   Allergen Reactions    No Known Allergies Other (comments)       Review of Systems:  Review of systems not obtained due to patient factors. Objective:        Patient Vitals for the past 8 hrs:   BP Temp Pulse Resp SpO2 Height Weight   22 0847 (!) 155/111 -- 65 16 95 % -- --   22 0817 (!) 170/97 -- (!) 59 16 96 % -- --   22 0647 (!) 204/105 97.5 °F (36.4 °C) 65 16 93 % -- --   22 0645 (!) 204/105 97.5 °F (36.4 °C) 65 18 93 % 5' 10\" (1.778 m) 165 lb (74.8 kg)       Temp (24hrs), Av.5 °F (36.4 °C), Min:97.5 °F (36.4 °C), Max:97.5 °F (36.4 °C)      Physical Exam:   cooperative, no distress, appears stated age, thin tired, dozing off   Cardiac exam:  irreg                         Lungs: clear anterior   Abdomen: soft, protuberant, hypoactive bowel sounds, nontender, without guarding, without rebound, no scars appreciated   Incisions:  na  Neuro: sleepy, does not answer all questions, wife provides most information, no facial droop    Extremities:  no edema          Labs:   CBC:   Lab Results   Component Value Date/Time    WBC 10.3 2022 06:55 AM    RBC 5.60 2022 06:55 AM    HGB 16.7 2022 06:55 AM    HCT 51.7 (H) 2022 06:55 AM    PLATELET 833  06:55 AM     BMP:   Lab Results   Component Value Date/Time    Glucose 116 (H) 2022 06:55 AM    Sodium 142 2022 06:55 AM    Potassium 3.7 2022 06:55 AM    Chloride 106 2022 06:55 AM    CO2 33 (H) 2022 06:55 AM    BUN 21 (H) 2022 06:55 AM    Creatinine 1.14 2022 06:55 AM    Calcium 9.3 2022 06:55 AM     CMP:  Lab Results   Component Value Date/Time    Glucose 116 (H) 2022 06:55 AM    Sodium 142 2022 06:55 AM    Potassium 3.7 2022 06:55 AM    Chloride 106 2022 06:55 AM    CO2 33 (H) 2022 06:55 AM    BUN 21 (H) 2022 06:55 AM    Creatinine 1.14 2022 06:55 AM    Calcium 9.3 2022 06:55 AM    Anion gap 3 (L) 2022 06:55 AM    BUN/Creatinine ratio 18 2022 06:55 AM    Alk.  phosphatase 110 03/04/2022 06:55 AM    Protein, total 7.2 03/04/2022 06:55 AM    Albumin 3.5 03/04/2022 06:55 AM    Globulin 3.7 03/04/2022 06:55 AM    A-G Ratio 0.9 (L) 03/04/2022 06:55 AM       Radiology review: CT  FINDINGS:   LOWER THORAX: Basilar atelectasis. LIVER: Calcification within segment 7 likely chronic. BILIARY TREE: Gallbladder is within normal limits. CBD is not dilated. SPLEEN: within normal limits. PANCREAS: No mass or ductal dilatation. ADRENALS: Unremarkable. KIDNEYS: Cortical atrophy. No hydronephrosis or stone  STOMACH: Distended  SMALL BOWEL: Distal small bowel obstruction. Transition in the ileum just  proximal to the cecum. There are decompressed loops of terminal ileum. An  obstructing mass is not identified  COLON: Fluid within the colon. Diverticulosis without evidence of diverticulitis  APPENDIX: Not enlarged  PERITONEUM: Small amount of free fluid  RETROPERITONEUM: There are vascular calcifications without aneurysm. No  pathologic adenopathy  REPRODUCTIVE ORGANS: Not enlarged. Brachytherapy seeds. URINARY BLADDER: No mass or calculus. BONES: No destructive bone lesion. ABDOMINAL WALL: Left renal hernia containing colon but this is not source of  obstruction  ADDITIONAL COMMENTS: N/A     IMPRESSION     1. Distal small bowel obstruction      Assessment:     81 y.o. male who presents with vomiting since last night, CT reveals SBO     Colon containing LIH--not source of obstruction  Afib on Eliquis  Demenia       Plan: Bowel rest, NPO, iv hydration, GI prophylaxis, hold Eliquis for now should he require surgery   No acute surgical indication but unclear cause of first SBO in the setting of no previous abdominal surgery  Labs are unremarkable  Antibiotics: na  Meds: IV meds for now  Discussed pt and workup with Dr. Christal Stuart who asks that NGT be placed in the ED and  pt be admitted to the Hospitalist service and we will continue to follow.    Further plan per Dr. Fausto Loving PA-C  Signed By: FREDDY Bolivar     March 4, 2022         ADMIT NOTE    Attending addendum:  I was available to supervise the APC. I have reviewed the APC's note  We discussed the plan of care. Patient with dementia. According to spouse patient started having emesis today. No prior abdominal surgeries. Otherwise well. No history of malignancy. On Eliquis for A. Fib. OBJECTIVE:  Visit Vitals  BP (!) 168/91   Pulse (!) 56   Temp 97.9 °F (36.6 °C)   Resp 18   Ht 5' 10\" (1.778 m)   Wt 165 lb (74.8 kg)   SpO2 97%   BMI 23.68 kg/m²     General: No acute distress, conversant  Eyes: PERRLA, no scleral icterus  HENT: Normocephalic without oral lesions  Neck: Trachea midline without LAD  Cardiac: Normal pulse rate and rhythm  Pulmonary: Symmetric chest rise with normal effort  GI: Soft, NT, mild distention, reducible left inguinal hernia, no splenomegaly  Skin: Warm without rash  Extremities: No edema or joint stiffness  Psych: GCS 14 not oriented to place time or president  ASSESSMENT / Plan:    I agree with the APC's assessment and plan with the following additions/modifications:    Patient has advanced dementia. We attempted to place the NG tube at least 4 or 5 times by the nurse, my NP, and myself. The patient was combative and attempted to swing and us repeatedly. I think that an NG tube is the safest thing for the patient, however, after speaking to the patient's wife she wants to avoid the NG tube to avoid his agitation. We discussed restraining him to place an NG tube, but she declined this as well. She wants to see if he can handle this without any artificial gastric decompression. He did have a BM in the ER. This patient has a bowel obstruction at least partially with no prior abdominal surgery. Condition point is not the hernia. There is a discrete change in the bowel caliber in the right lower quadrant. The family is open to surgical intervention and not palliative measures.     Plan for observation and hydration over the weekend and allow the Eliquis to clear her system. Possibly OR on Wednesday afternoon for robotic lysis of adhesions, possible bowel resection and possible repair the inguinal hernia robotically. The patient's wife is aware that he is at risk of emesis, aspiration, cardiac event, and other unforeseen complications from a small bowel obstruction. She understands he has dementia and this is end-stage. She would still like to engage in surgical management in the future while allowing for his comfort at this time without NG tube.       Signed By: Dayron Cortez MD  Bariatric and General Surgeon  [facility]    March 4, 2022

## 2022-03-04 NOTE — ROUTINE PROCESS
TRANSFER - OUT REPORT:    Verbal report given to [facility] on Carine Lobato  being transferred to [facility] for routine progression of care       Report consisted of patients Situation, Background, Assessment and   Recommendations(SBAR). Information from the following report(s) SBAR, Kardex and ED Summary was reviewed with the receiving nurse. Lines:   Peripheral IV 03/04/22 Left Antecubital (Active)   Site Assessment Clean, dry, & intact 03/04/22 0649   Phlebitis Assessment 0 03/04/22 0649   Infiltration Assessment 0 03/04/22 0649   Dressing Status Clean, dry, & intact 03/04/22 0649       Peripheral IV 03/04/22 Distal;Right;Upper Cephalic (Active)        Opportunity for questions and clarification was provided.       Patient transported with:   Matter and Form

## 2022-03-04 NOTE — ED NOTES
General surgeon Dr. Jonathan Hills   Attempted to put in NG tube but pt  Could not tolerate procedure att.

## 2022-03-04 NOTE — ED TRIAGE NOTES
Pt arrives with EMS  from [facility] where he lives CC N/V/D since yesterday.      Hx a fib, HTN, asthma, alzheimer's

## 2022-03-04 NOTE — ED NOTES
Verbal shift change report given to Dmitriy Gutierrez (oncoming nurse) by Darvin Quintero (offgoing nurse). Report included the following information SBAR, ED Summary and MAR.

## 2022-03-04 NOTE — PROGRESS NOTES
Pharmacist Admission Medication Reconciliation:    Information obtained from: This medication history was obtained from [facility] at the [address] [facility] [address]. Two medication lists were provided. One was the \"physician's orders\" dated 2/1/22 - 2/28/22. The other was a MAR printed on 2/10/22 with hand written notes. I used the MAR from 2/10/22 to update the PTA medication list.      RxQuery data available¹:  YES    Summary:     Medications added: finasteride, potassium gluconate  Medications deleted: albuterol, arformoterol, budesonide, clotrimazole, fluoride toothpaste, fluticasone nasal spray, montelukast, potassium chloride, tramadol/apap  Dose changes: furosemide 20 mg daily (vs 40 mg BID)    Active and held inpatient orders were reviewed and no changes are needed. ¹RxQuery pharmacy benefit data reflects medications processed through the patient's insurance, however this data does NOT capture whether the medication is currently being taken by the patient. Allergies:  No known allergies    Significant PMH/Disease States:   Past Medical History:   Diagnosis Date    Allergy     seasonal    Arrhythmia     Asthma     Dementia ([facility] [address]     Depression     Heart failure ([facility] [address]     Hypertension     Neurological disorder     dementia    Other ill-defined conditions(799.89)     a-fib    Stroke [facility])      Chief Complaint for this Admission:    Chief Complaint   Patient presents with    Vomiting     Prior to Admission Medications:   Prior to Admission Medications   Prescriptions Last Dose Informant Patient Reported? Taking? L.acid,para-B. bifidum-S.therm (RISAQUAD) 8 billion cell cap cap   Yes Yes   Sig: Take 1 Capsule by mouth daily. QUEtiapine (SEROqueL) 25 mg tablet   Yes Yes   Sig: Take 12.5 mg by mouth nightly. apixaban (ELIQUIS) 2.5 mg tablet   Yes Yes   Sig: Take 2.5 mg by mouth every twelve (12) hours. buPROPion XL (WELLBUTRIN XL) 150 mg tablet   Yes Yes   Sig: Take 150 mg by mouth daily.    cholecalciferol (VITAMIN D3) (1000 Units /25 mcg) tablet   Yes Yes   Sig: Take 1,000 Units by mouth daily. finasteride (PROSCAR) 5 mg tablet   Yes Yes   Sig: Take 5 mg by mouth daily. furosemide (LASIX) 20 mg tablet   Yes Yes   Sig: Take 20 mg by mouth daily. lisinopriL (PRINIVIL, ZESTRIL) 40 mg tablet   Yes Yes   Sig: Take 40 mg by mouth daily. memantine (NAMENDA) 10 mg tablet   Yes Yes   Sig: Take 10 mg by mouth every twelve (12) hours. metoprolol tartrate (LOPRESSOR) 25 mg tablet   Yes Yes   Sig: Take 25 mg by mouth every twelve (12) hours every twelve (12) hours. potassium gluconate 595 mg (99 mg) tablet   Yes Yes   Sig: Take 595 mg by mouth daily. tamsulosin (FLOMAX) 0.4 mg capsule   Yes Yes   Sig: Take 0.4 mg by mouth daily. Facility-Administered Medications: None     Thank you for allowing me to participate in this patient's care. Please call  or  with any questions. Preston Campuzano PharmGeraldine D., BCPS, BCPPS

## 2022-03-04 NOTE — ED NOTES
Pt had small, soft bowel movement. Cleaned up of incontinence, new sheets  And blankets applied. New 20RAC placed.    Wife at bedside

## 2022-03-04 NOTE — ED PROVIDER NOTES
21-year-old male with history as below presents to the emergency department by EMS noting a 2-day history of nausea, vomiting, diarrhea and predominately upper abdominal pains. He denies any known sick contacts or recent travel. He took a dose of Tylenol which slightly improved his symptoms earlier. He denies any cough, S OB, chest pain, fever, chills, URI symptoms, dysuria or hematuria. No history of prior abdominal surgeries or kidney stones.   He describes his abdominal pain as tender and achy but seems relatively comfortable on exam.           Past Medical History:   Diagnosis Date    Allergy     seasonal    Arrhythmia     Asthma     Dementia ([facility] [address]     Depression     Heart failure (HCC)     Hypertension     Neurological disorder     dementia    Other ill-defined conditions(799.89)     a-fib    Stroke [facility])        Past Surgical History:   Procedure Laterality Date    HX HEENT      cateract surgery         Family History:   Problem Relation Age of Onset    Stroke Mother     Heart Disease Mother     Cancer Father         prostate    Cancer Brother         colon       Social History     Socioeconomic History    Marital status:      Spouse name: Not on file    Number of children: Not on file    Years of education: Not on file    Highest education level: Not on file   Occupational History    Not on file   Tobacco Use    Smoking status: Never Smoker    Smokeless tobacco: Never Used   Substance and Sexual Activity    Alcohol use: No    Drug use: No     Types: Prescription, OTC    Sexual activity: Never   Other Topics Concern    Not on file   Social History Narrative    Not on file     Social Determinants of Health     Financial Resource Strain:     Difficulty of Paying Living Expenses: Not on file   Food Insecurity:     Worried About Running Out of Food in the Last Year: Not on file    Asmita of Food in the Last Year: Not on file   Transportation Needs:     Lack of Transportation (Medical): Not on file    Lack of Transportation (Non-Medical): Not on file   Physical Activity:     Days of Exercise per Week: Not on file    Minutes of Exercise per Session: Not on file   Stress:     Feeling of Stress : Not on file   Social Connections:     Frequency of Communication with Friends and Family: Not on file    Frequency of Social Gatherings with Friends and Family: Not on file    Attends Anabaptist Services: Not on file    Active Member of [address] [place] [address] or Organizations: Not on file    Attends Club or Organization Meetings: Not on file    Marital Status: Not on file   Intimate Partner Violence:     Fear of Current or Ex-Partner: Not on file    Emotionally Abused: Not on file    Physically Abused: Not on file    Sexually Abused: Not on file   Housing Stability:     Unable to Pay for Housing in the Last Year: Not on file    Number of [place] in the Last Year: Not on file    Unstable Housing in the Last Year: Not on file         ALLERGIES: No known allergies    Review of Systems   Constitutional: Positive for activity change and appetite change. Negative for chills and fever. HENT: Negative for congestion, rhinorrhea, sinus pain, sneezing and sore throat. Eyes: Negative for photophobia and visual disturbance. Respiratory: Negative for cough and shortness of breath. Cardiovascular: Negative for chest pain. Gastrointestinal: Positive for abdominal pain, diarrhea, nausea and vomiting. Negative for blood in stool and constipation. Genitourinary: Negative for difficulty urinating, dysuria, flank pain, hematuria, penile pain and testicular pain. Musculoskeletal: Negative for arthralgias, back pain, myalgias and neck pain. Skin: Negative for rash and wound. Neurological: Negative for syncope, weakness, light-headedness, numbness and headaches. Psychiatric/Behavioral: Negative for self-injury and suicidal ideas.    All other systems reviewed and are negative. Vitals:    03/04/22 0645 03/04/22 0647   BP: (!) 204/105 (!) 204/105   Pulse: 65 65   Resp: 18 16   Temp: 97.5 °F (36.4 °C) 97.5 °F (36.4 °C)   SpO2: 93% 93%   Weight: 74.8 kg (165 lb)    Height: 5' 10\" (1.778 m)             Physical Exam  Vitals and nursing note reviewed. Constitutional:       General: He is not in acute distress. Appearance: Normal appearance. He is well-developed. He is not diaphoretic. Comments: Pleasant, no acute distress   HENT:      Head: Normocephalic and atraumatic. Nose: Nose normal.   Eyes:      Extraocular Movements: Extraocular movements intact. Conjunctiva/sclera: Conjunctivae normal.      Pupils: Pupils are equal, round, and reactive to light. Cardiovascular:      Rate and Rhythm: Normal rate and regular rhythm. Heart sounds: Normal heart sounds. Pulmonary:      Effort: Pulmonary effort is normal.      Breath sounds: Normal breath sounds. Abdominal:      General: There is no distension. Palpations: Abdomen is soft. Tenderness: There is abdominal tenderness in the right upper quadrant and epigastric area. There is no right CVA tenderness, left CVA tenderness, guarding or rebound. Musculoskeletal:         General: No tenderness. Cervical back: Neck supple. Skin:     General: Skin is warm and dry. Neurological:      General: No focal deficit present. Mental Status: He is alert and oriented to person, place, and time. Cranial Nerves: No cranial nerve deficit. Sensory: No sensory deficit. Motor: No weakness. Coordination: Coordination normal.          MDM   56-year-old male presents with nausea, vomiting, diarrhea and abdominal pains since yesterday. He is afebrile with HTN initially but otherwise vital signs stable. He was treated symptomatically with improvement in his symptoms.     6:59 EKG shows atrial fibrillation with a rate of 65 bpm with no acute ST or T wave abnormalities suggestive of ischemia. Labs returned showing no leukocytosis or anemia, normal renal function, T bili 1.4, normal ALT and AST and alk phos, lipase, elevated BNP at 6840, higher than prior measures but no respiratory distress. Negative troponin. CT abdomen pelvis shows distal small bowel obstruction. General surgery was consulted and saw the patient at the bedside. They recommend nonoperative management at this time and will follow along in consultation. We will admit to the hospitalist service for further care and assessment. Procedures      Perfect Serve Consult for Admission  10:04 AM    ED Room Number: ER10/10  Patient Name and age:  Jairo Barron 81 y.o.  male  Working Diagnosis:   1. Small bowel obstruction (HCC)    2. Abdominal pain, generalized    3. Non-intractable vomiting with nausea, unspecified vomiting type        COVID-19 Suspicion:  no  Sepsis present:  no  Reassessment needed: no  Code Status:  Full Code  Readmission: no  Isolation Requirements:  no  Recommended Level of Care:  med/surg  Department:[facility] Adult ED - 21   Other: 71-year-old male presents with nausea vomiting and abdominal pain last couple of days. CT scan shows SBO. General surgery saw and plans for nonoperative management and requests hospitalist admission will follow along.

## 2022-03-04 NOTE — PROGRESS NOTES
Care Management:    Transition of Care Plan:     · RUR: not listed  · Disposition: Return to [facility] at [facility] [place] [address] [place], [address] [facility], [address] [facility]; phone [phone]; fax [phone])  · If discharged over the weekend, CM can call Veneta Mcburney, LPN, Director of Health and Wellness on her cell [phone]  · Transportation: family vs BLS  · COVID vaccine: has had both vaccines and booster (12/21 or 1/22)    Spoke with patient's wife. Patient resides at [facility] in [facility]. She confirmed his vaccination status as receiving both vaccines and booster. She did not know which vaccine. Wife would like patient to return to [facility] if able at discharge. She would be in agreement if SNF was necessary. Spoke with [employer], Hachiko and with Veneta Mcburney, LPN, Director of Health and Wellness. He is okay to return to [facility] if he does not have any skilled needs. They are able to accept him over the weekend. The  can call Ms. Violet Collier on her cell at [phone]. Medical information can be faxed to them at [phone]). Demographics confirmed: yes. Address on face sheet is wife's address and patient's mailing address. Living Situation: Patient lives at [facility] at [facility] [address], [address] [facility] in the memory unit. DME: rolling walker and wheelchair  ADLs: Patient needs assist with all of his ADLs including feeding. He is able to ambulate independently with a walker. He is incontinent of bowel and bladder. Pharmacy: Facility [address]    Previous HH/SNF/rehab: [facility] (09/17)-would not want to return, [facility] (9/17)  Insurance: 1-Medicare; 2-[place]/[employer]  Transportation: wife or daughter    12:17 PM Medicare pt's wife Rosemary Da Silva has received, reviewed, and [id number] IM letter informing them of their right to appeal the discharge.   Signed copy has been placed in ED scan basket. Reason for Admission:  SBO                   RUR Score:    Not listed                 Plan for utilizing home health:      [facility]. PCP: First and Last name:  Dr. Francia Gaytan and Shine Ann NP   Name of Practice: sees at [facility]   Are you a current patient: Yes/No: yes   Approximate date of last visit: March 3, 2022   Can you participate in a virtual visit with your PCP: unknown                    Current Advanced Directive/Advance Care Plan: Full Code  Patient has dementia. Patient does not have an AMD and he is full code. Wife would be primary decision maker. Sons Zara Trujillo and Sanjay Escobar would be secondary. Healthcare Decision Maker:              Primary Decision Maker: Duong Carrizales - Spouse - [phone]   Secondary son Zara Trujillo [phone]  Secondary son Sanjay Escobar [phone]                  Care Management Interventions  PCP Verified by CM: Yes (Dr. Francia Gaytan)  Last Visit to PCP: 03/03/22  Palliative Care Criteria Met (RRAT>21 & CHF Dx)?: No  Mode of Transport at Discharge:  Other (see comment) (likely family)  Transition of Care Consult (CM Consult): Jake Mckeon 44: No  Reason Outside [place]: Patient already serviced by other home care/hospice agency  Discharge Durable Medical Equipment: No  Physical Therapy Consult: No  Occupational Therapy Consult: No  Speech Therapy Consult: No  Support Systems: Spouse/Significant Other,Child(riri),Assisted Living  Confirm Follow Up Transport: Family   Resource Information Provided?: No  Discharge Location  Patient Expects to be Discharged to[de-identified] Assisted Living ([facility] [address] [address])    LIBAN Jo

## 2022-03-04 NOTE — H&P
[address] [facility] Adult  Hospitalist Group  History and Physical    Date of Service:  3/4/2022  Primary Care Provider: Emma Mack MD  Source of information: The patient and Chart review    Chief Complaint: Vomiting      History of Presenting Illness:   Gautam Phelps is a 81 y.o. male who presents with nausea vomiting    Patient lives at [facility], came in today with nausea and vomiting that started yesterday, per patient's wife patient has been \"throwing up black stuff\", patient is a poor historian, patient does complain of mild abdominal pain but denies any other complaints or problems, patient came to the ER and was found to have small bowel obstruction and was requested to be admitted to the hospitalist service, no additional history could be obtained from the patient or his wife        REVIEW OF SYSTEMS:  Pertinent items are noted in the History of Present Illness. Past Medical History:   Diagnosis Date    Allergy     seasonal    Arrhythmia     Asthma     Dementia ([address]     Depression     Heart failure (HCC)     Hypertension     Neurological disorder     dementia    Other ill-defined conditions(799.89)     a-fib    Stroke [facility])       Past Surgical History:   Procedure Laterality Date    HX HEENT      cateract surgery     Prior to Admission medications    Medication Sig Start Date End Date Taking? Authorizing Provider   QUEtiapine (SEROQUEL) 25 mg tablet  5/18/18   Provider, Historical   memantine (NAMENDA) 10 mg tablet TAKE ONE TABLET BY MOUTH TWICE A DAY 12/18/17   Tamy Mcdonough MD   clotrimazole (LOTRIMIN) 1 % topical cream APPLY TO GROIN 3 TIMES DAILY DX YEAST DERMATITIS 11/13/17   Luzma Holder, NP   apixaban (ELIQUIS) 5 mg tablet Take 1 Tab by mouth two (2) times a day.  Indications: PREVENT THROMBOEMBOLISM IN CHRONIC ATRIAL FIBRILLATION 9/15/17   Eufemia Sun MD   L. acidoph & paracasei- S therm- Bifido (MONISHA-Q/RISAQUAD) 8 billion cell cap cap Take 1 Cap by mouth daily. 9/16/17   Faina Corona MD   tamsulosin (FLOMAX) 0.4 mg capsule Take 1 Cap by mouth daily. 9/15/17   Faina Corona MD   predniSONE (DELTASONE) 20 mg tablet Take 1 Tab by mouth daily. 9/16/17   Faina Corona MD   lisinopril (PRINIVIL, ZESTRIL) 40 mg tablet Take 40 mg by mouth daily. Indications: hypertension    Provider, Historical   cholecalciferol (VITAMIN D3) 1,000 unit tablet Take 1,000 Units by mouth daily. Provider, Historical   arformoterol (BROVANA) 15 mcg/2 mL nebu neb solution 15 mcg by Nebulization route two (2) times a day. Indications: BRONCHOSPASM PREVENTION WITH COPD    Provider, Historical   budesonide (PULMICORT) 0.5 mg/2 mL nbsp 500 mcg by Nebulization route two (2) times a day. Indications: SEVERE CHRONIC OBSTRUCTIVE PULMONARY DISEASE    Provider, Historical   fluoride, sodium, (PREVIDENT 5000 PLUS) 1.1 % crea by Dental route four (4) times daily. After meals and at bedtime. Indications: DENTAL PLAQUE PREVENTION    Provider, Historical   albuterol (PROVENTIL VENTOLIN) 2.5 mg /3 mL (0.083 %) nebulizer solution 2.5 mg by Nebulization route every twelve (12) hours as needed for Wheezing. Provider, Historical   fluticasone (FLONASE) 50 mcg/actuation nasal spray 2 Sprays by Both Nostrils route daily as needed for Rhinitis. Provider, Historical   traMADol-acetaminophen (ULTRACET) 37.5-325 mg per tablet Take 1 Tab by mouth every four (4) hours as needed for Pain. Indications: Pain    Provider, Historical   furosemide (LASIX) 40 mg tablet Take 40 mg by mouth two (2) times a day. Other, MD Melanie   montelukast (SINGULAIR) 10 mg tablet Take 10 mg by mouth daily. Other, MD Melanie   buPROPion XL (WELLBUTRIN XL) 150 mg tablet Take 1 Tab by mouth every morning. 11/16/16   Gabriela Reyna MD   potassium chloride SR (KLOR-CON 10) 10 mEq tablet Take 10 mEq by mouth two (2) times a day.     Provider, Historical   metoprolol (LOPRESSOR) 25 mg tablet Take 25 mg by mouth two (2) times a day. Provider, Historical     Allergies   Allergen Reactions    No Known Allergies Other (comments)      Family History   Problem Relation Age of Onset    Stroke Mother     Heart Disease Mother     Cancer Father         prostate    Cancer Brother         colon      Social History:  reports that he has never smoked. He has never used smokeless tobacco. He reports that he does not drink alcohol and does not use drugs. Family and social history were personally reviewed, all pertinent and relevant details are outlined as above. Objective:     Visit Vitals  BP (!) 155/111   Pulse 65   Temp 97.5 °F (36.4 °C)   Resp 16   Ht 5' 10\" (1.778 m)   Wt 74.8 kg (165 lb)   SpO2 95%   BMI 23.68 kg/m²      O2 Device: None    PHYSICAL EXAM:   General: Awake, mildly distressed, pleasant male  HEENT: PEERL,, moist mucus membranes  Neck: Supple,   Chest: Clear to auscultation bilaterally   CVS: S1-S2 heard  Abd: Soft, non-tender, non-distended, bowel sounds absent  Ext: No clubbing, no cyanosis, no edema  Neuro/Psych: No focal neurological deficit  Cap refill: Brisk, less than 3 seconds  Pulses: 2+, symmetric in all extremities  Skin: Warm, dry, without rashes or lesions    Data Review: All diagnostic labs and studies have been reviewed. Abnormal Labs Reviewed   CBC WITH AUTOMATED DIFF - Abnormal; Notable for the following components:       Result Value    HCT 51.7 (*)     NEUTROPHILS 81 (*)     LYMPHOCYTES 10 (*)     IMMATURE GRANULOCYTES 1 (*)     ABS. NEUTROPHILS 8.4 (*)     ABS. IMM.  GRANS. 0.1 (*)     All other components within normal limits   METABOLIC PANEL, COMPREHENSIVE - Abnormal; Notable for the following components:    CO2 33 (*)     Anion gap 3 (*)     Glucose 116 (*)     BUN 21 (*)     Bilirubin, total 1.4 (*)     AST (SGOT) 14 (*)     A-G Ratio 0.9 (*)     All other components within normal limits   LIPASE - Abnormal; Notable for the following components:    Lipase 58 (*) All other components within normal limits   NT-PRO BNP - Abnormal; Notable for the following components:    NT pro-BNP 6,840 (*)     All other components within normal limits       All Micro Results     Procedure Component Value Units Date/Time    COVID-19 RAPID TEST [[id number]]     Order Status: Sent     URINE CULTURE HOLD SAMPLE [[id number]]     Order Status: Sent Specimen: Urine           IMAGING:   XR CHEST PORT   Final Result   1. Bibasilar atelectasis         CT ABD PELV W CONT   Final Result      1. Distal small bowel obstruction           ECG/ECHO:    Results for orders placed or performed during the hospital encounter of 03/04/22   EKG, 12 LEAD, INITIAL   Result Value Ref Range    Ventricular Rate 65 BPM    QRS Duration 94 ms    Q-T Interval 450 ms    QTC Calculation (Bezet) 468 ms    Calculated R Axis -63 degrees    Calculated T Axis 29 degrees    Diagnosis       Atrial fibrillation  Left axis deviation  Abnormal ECG  When compared with ECG of 13-SEP-2017 16:08,  Nonspecific T wave abnormality, improved in Inferior leads  T wave amplitude has increased in Anterior leads          Assessment:   Given the patient's current clinical presentation, there is a high level of concern for decompensation if discharged from the emergency department. Complex decision making was performed, which includes reviewing the patient's available past medical records, laboratory results, and imaging studies.     Active Problems:    SBO (small bowel obstruction) ([facility]) (3/4/2022)  A. fib, rate controlled  Dementia  Depression  Hypertension  Plan:   Patient will be admitted on surgical telemetry bed, IV hydration, n.p.o., hold Eliquis for now per surgical recommendation, avoid opioid medications, Protonix, monitor, repeat abdominal x-ray in the morning  Currently rate controlled, hold Eliquis, EKG, monitor  Fall precautions  Hydralazine as needed          DIET: DIET NPO   ISOLATION PRECAUTIONS: There are currently no Active Isolations  CODE STATUS: Prior   DVT PROPHYLAXIS: SCDs  FUNCTIONAL STATUS PRIOR TO HOSPITALIZATION: Ambulatory and capable of self-care but relies on assistive devices (rolling walker/cane). EARLY MOBILITY ASSESSMENT: Recommend an assessment from physical therapy and/or occupational therapy  ANTICIPATED DISCHARGE: Greater than 48 hours. Signed By: Avani Anna MD     March 4, 2022         Please note that this dictation may have been completed with Dragon, the computer voice recognition software. Quite often unanticipated grammatical, syntax, homophones, and other interpretive errors are inadvertently transcribed by the computer software. Please disregard these errors. Please excuse any errors that have escaped final proofreading.

## 2022-03-05 LAB
ANION GAP SERPL CALC-SCNC: 3 MMOL/L (ref 5–15)
BASOPHILS # BLD: 0 K/UL (ref 0–0.1)
BASOPHILS NFR BLD: 1 % (ref 0–1)
BUN SERPL-MCNC: 14 MG/DL (ref 6–20)
BUN/CREAT SERPL: 13 (ref 12–20)
CALCIUM SERPL-MCNC: 8.7 MG/DL (ref 8.5–10.1)
CHLORIDE SERPL-SCNC: 109 MMOL/L (ref 97–108)
CO2 SERPL-SCNC: 30 MMOL/L (ref 21–32)
CREAT SERPL-MCNC: 1.08 MG/DL (ref 0.7–1.3)
DIFFERENTIAL METHOD BLD: ABNORMAL
EOSINOPHIL # BLD: 0.2 K/UL (ref 0–0.4)
EOSINOPHIL NFR BLD: 2 % (ref 0–7)
ERYTHROCYTE [DISTWIDTH] IN BLOOD BY AUTOMATED COUNT: 13.6 % (ref 11.5–14.5)
GLUCOSE SERPL-MCNC: 85 MG/DL (ref 65–100)
HCT VFR BLD AUTO: 47.4 % (ref 36.6–50.3)
HGB BLD-MCNC: 15 G/DL (ref 12.1–17)
IMM GRANULOCYTES # BLD AUTO: 0 K/UL (ref 0–0.04)
IMM GRANULOCYTES NFR BLD AUTO: 0 % (ref 0–0.5)
LYMPHOCYTES # BLD: 1 K/UL (ref 0.8–3.5)
LYMPHOCYTES NFR BLD: 11 % (ref 12–49)
MCH RBC QN AUTO: 29.4 PG (ref 26–34)
MCHC RBC AUTO-ENTMCNC: 31.6 G/DL (ref 30–36.5)
MCV RBC AUTO: 92.8 FL (ref 80–99)
MONOCYTES # BLD: 0.8 K/UL (ref 0–1)
MONOCYTES NFR BLD: 9 % (ref 5–13)
NEUTS SEG # BLD: 6.7 K/UL (ref 1.8–8)
NEUTS SEG NFR BLD: 77 % (ref 32–75)
NRBC # BLD: 0 K/UL (ref 0–0.01)
NRBC BLD-RTO: 0 PER 100 WBC
PLATELET # BLD AUTO: 200 K/UL (ref 150–400)
PMV BLD AUTO: 9.8 FL (ref 8.9–12.9)
POTASSIUM SERPL-SCNC: 3.7 MMOL/L (ref 3.5–5.1)
RBC # BLD AUTO: 5.11 M/UL (ref 4.1–5.7)
SODIUM SERPL-SCNC: 142 MMOL/L (ref 136–145)
WBC # BLD AUTO: 8.7 K/UL (ref 4.1–11.1)

## 2022-03-05 PROCEDURE — C9113 INJ PANTOPRAZOLE SODIUM, VIA: HCPCS | Performed by: FAMILY MEDICINE

## 2022-03-05 PROCEDURE — 99231 SBSQ HOSP IP/OBS SF/LOW 25: CPT | Performed by: SURGERY

## 2022-03-05 PROCEDURE — 74011000250 HC RX REV CODE- 250: Performed by: FAMILY MEDICINE

## 2022-03-05 PROCEDURE — 74011000250 HC RX REV CODE- 250: Performed by: STUDENT IN AN ORGANIZED HEALTH CARE EDUCATION/TRAINING PROGRAM

## 2022-03-05 PROCEDURE — 65660000000 HC RM CCU STEPDOWN

## 2022-03-05 PROCEDURE — 74011250636 HC RX REV CODE- 250/636: Performed by: FAMILY MEDICINE

## 2022-03-05 PROCEDURE — 74011250636 HC RX REV CODE- 250/636: Performed by: INTERNAL MEDICINE

## 2022-03-05 PROCEDURE — 74011250637 HC RX REV CODE- 250/637: Performed by: SURGERY

## 2022-03-05 PROCEDURE — 74011000250 HC RX REV CODE- 250: Performed by: INTERNAL MEDICINE

## 2022-03-05 PROCEDURE — 94760 N-INVAS EAR/PLS OXIMETRY 1: CPT

## 2022-03-05 PROCEDURE — 85025 COMPLETE CBC W/AUTO DIFF WBC: CPT

## 2022-03-05 PROCEDURE — 74011250636 HC RX REV CODE- 250/636: Performed by: STUDENT IN AN ORGANIZED HEALTH CARE EDUCATION/TRAINING PROGRAM

## 2022-03-05 PROCEDURE — 80048 BASIC METABOLIC PNL TOTAL CA: CPT

## 2022-03-05 RX ORDER — DOCUSATE SODIUM 100 MG/1
100 CAPSULE, LIQUID FILLED ORAL 2 TIMES DAILY
Status: DISCONTINUED | OUTPATIENT
Start: 2022-03-05 | End: 2022-03-08 | Stop reason: HOSPADM

## 2022-03-05 RX ADMIN — SODIUM CHLORIDE 75 ML/HR: 900 INJECTION, SOLUTION INTRAVENOUS at 18:04

## 2022-03-05 RX ADMIN — Medication 5 ML: at 14:00

## 2022-03-05 RX ADMIN — OLANZAPINE 2.5 MG: 10 INJECTION, POWDER, FOR SOLUTION INTRAMUSCULAR at 20:47

## 2022-03-05 RX ADMIN — WATER 15 MG: 1 INJECTION INTRAMUSCULAR; INTRAVENOUS; SUBCUTANEOUS at 10:38

## 2022-03-05 RX ADMIN — DOCUSATE SODIUM 100 MG: 100 CAPSULE, LIQUID FILLED ORAL at 17:47

## 2022-03-05 RX ADMIN — SODIUM CHLORIDE 40 MG: 9 INJECTION INTRAMUSCULAR; INTRAVENOUS; SUBCUTANEOUS at 14:31

## 2022-03-05 NOTE — PROGRESS NOTES
Bedside and Verbal shift change report given to [address] [place] [address],[address] (oncoming nurse) by Denisha (offgoing nurse). Report included the following information SBAR, Kardex, Intake/Output, MAR and Recent Results.

## 2022-03-05 NOTE — PROGRESS NOTES
0930:   Patient refusing IV insertion. No IV access at this time. Rashaun Roberts MD notified. Orders received for IM ziprasidone. To reattempt IV insertion when patient is more calm. 1315: PIV inserted. NS at 75 ml/hr restarted. Patient resting comfortably at this time.

## 2022-03-05 NOTE — PROGRESS NOTES
2129- Patients IV infiltrated. Will remove and hook fluids up to 20G in RAC.     0009- This RN went into take patients vital signs. Patient moved arm and pulled on IV in right arm. IV now dripping blood. This RN tried to remove IV and patient yelling when tape being pulled off. This RN asked another RN to come into room to assist. Patient now yelling and threatening to hit RN. Tried to remove IV with assistance of other RN, however patient still screaming/cursing and trying to swing at RN. Charge RN came in room as well, patient still screaming and trying to hit/kick. Code atlas called. Tape and IV able to be removed and patient calmed down. Security cleared code atlas as patient  now appears calm. Patient currently has no IV access. Will try to start a new IV once patient completely calm. 1410- This RN and another RN went into patients room to try and start another IV/get lab work. Patient is stating \"no do not touch me\" and is resisting to even straighten his arm out. For safety reasons and patient previously being combative, this RN stopped trying to obtain IV. Perfect serve message sent to [facility], NP in regards to patient not having IV access and what the next steps should be. [address], NP stated to hold off on IV for right now and let the patient rest. Stated it can be done in the AM.     1039- Patient yelling and threatening to hit this RN and other staff again when trying to change brief. This RN tried to explain to patient importance of changing brief and not sitting in urine, however patient still yelling and combative. Changed fitted sheet, pavithra, and brief the safest way possible.

## 2022-03-05 NOTE — FORENSIC NURSE
Safety plan- Two staff members to provide care to patient when possible, redirect patient as needed.

## 2022-03-05 NOTE — PROGRESS NOTES
Progress Note    Patient: Robbie Rodriguez MRN: [id number]  SSN: [id number]    YOB: 1935  Age: 81 y.o. Sex: male      Admit Date: 3/4/2022    Small bowel obstruction    Subjective:     No acute surgical issues. Pt denied any abdominal pain. No nausea or vomiting. He had 1 BM. No fever or chills.     Objective:     Visit Vitals  BP (!) 172/84   Pulse 61   Temp 97.5 °F (36.4 °C)   Resp 18   Ht 5' 10\" (1.778 m)   Wt 165 lb (74.8 kg)   SpO2 98%   BMI 23.68 kg/m²       Temp (24hrs), Av.9 °F (36.6 °C), Min:97.5 °F (36.4 °C), Max:98.5 °F (36.9 °C)        Physical Exam:    Gen:  NAD  Pulm:  Unlabored  Abd:  S/mildly distended/Non-TTP    Recent Results (from the past 24 hour(s))   MAGNESIUM    Collection Time: 22 12:30 PM   Result Value Ref Range    Magnesium 2.3 1.6 - 2.4 mg/dL   PHOSPHORUS    Collection Time: 22 12:30 PM   Result Value Ref Range    Phosphorus 2.9 2.6 - 4.7 MG/DL         Assessment:     Hospital Problems  Date Reviewed: 2017          Codes Class Noted POA    [facility]) ([facility] ICD-10-CM: G37.336  ICD-9-CM: 560.9  3/4/2022 Unknown              Plan/Recommendations/Medical Decision Making:     - Small bowel obstruction:  Appears to be resolving.  - Continue bowel rest  - NPO with ice chips and sips of clears  - AXR tomorrow  - Labs in am

## 2022-03-05 NOTE — PROGRESS NOTES
[address] [facility] Adult  Hospitalist Group                                                                                          Hospitalist Progress Note  Douglas Hood MD  Answering service: [phone] from in house phone        Date of Service:  3/5/2022  NAME:  Samina Tamayo  :  1935  MRN:  [id number]      Admission Summary:   Copied form admit: \" Samina Tamayo is a 81 y.o. male who presents with nausea vomiting     Patient lives at [facility], came in today with nausea and vomiting that started yesterday, per patient's wife patient has been \"throwing up black stuff\", patient is a poor historian, patient does complain of mild abdominal pain but denies any other complaints or problems, patient came to the ER and was found to have small bowel obstruction and was requested to be admitted to the hospitalist service, no additional history could be obtained from the patient or his wife  \"    Interval history / Subjective:     3/5/2022 :    Discussed w wife   Pt resisting care   Very confused, acting this out w staff  Julito Villalba to be given   Case discussed w RN   Ema Scales as below        Assessment & Plan:           Active Problems:    SBO (small bowel obstruction) ([facility] (3/4/2022)- npo, iv to be restarted  Pt not in pain, abd is not distended, cont npo     A. fib, rate controlled    Dementia- acting out w agitation on care efforts, discussed w wife, ,wife agrees w trail of [place]     Depression    Hypertension  BP Readings from Last 1 Encounters:   22 (!) 172/84         Hospital Problems  Date Reviewed: 2017          Codes Class Noted POA    SBO (small bowel obstruction) ([facility] ICD-10-CM: J87.513  ICD-9-CM: 560.9  3/4/2022 Unknown                  After personally interviewing pt at bedside the following is noted on 3/5/2022 :    Review of Systems   Reason unable to perform ROS: pt confused               I had a face to face encounter with patient on 3/5/2022 at bedside for the following physical exam:     PHYSICAL EXAM:    Visit Vitals  BP (!) 172/84   Pulse 66   Temp 97.5 °F (36.4 °C)   Resp 18   Ht 5' 10\" (1.778 m)   Wt 74.8 kg (165 lb)   SpO2 98%   BMI 23.68 kg/m²          Physical Exam  Constitutional:       General: He is not in acute distress. Appearance: He is not ill-appearing, toxic-appearing or diaphoretic. HENT:      Head: Normocephalic and atraumatic. Mouth/Throat:      Mouth: Mucous membranes are dry. Pharynx: Oropharynx is clear. Eyes:      Extraocular Movements: Extraocular movements intact. Conjunctiva/sclera: Conjunctivae normal.      Pupils: Pupils are equal, round, and reactive to light. Cardiovascular:      Rate and Rhythm: Normal rate and regular rhythm. Pulmonary:      Effort: Pulmonary effort is normal.      Breath sounds: Normal breath sounds. Abdominal:      General: Abdomen is flat. There is no distension. Palpations: Abdomen is soft. Tenderness: There is no abdominal tenderness. Skin:     General: Skin is warm and dry. Neurological:      Comments: Non focal, demented,    Psychiatric:      Comments: Not agitated w myself but is to RN's no care efforts. Data Review:    Review and/or order of clinical lab test      Labs:     Recent Labs     03/04/22  0655   WBC 10.3   HGB 16.7   HCT 51.7*        Recent Labs     03/04/22  1230 03/04/22  0655   NA  --  142   K  --  3.7   CL  --  106   CO2  --  33*   BUN  --  21*   CREA  --  1.14   GLU  --  116*   CA  --  9.3   MG 2.3 2.3   PHOS 2.9  --      Recent Labs     03/04/22  0655   ALT 20      TBILI 1.4*   TP 7.2   ALB 3.5   GLOB 3.7   LPSE 58*     No results for input(s): INR, PTP, APTT, INREXT in the last 72 hours. No results for input(s): FE, TIBC, PSAT, FERR in the last 72 hours. Lab Results   Component Value Date/Time    Folate 10.0 11/09/2015 01:30 AM      No results for input(s): PH, PCO2, PO2 in the last 72 hours.   No results for input(s): CPK, CKNDX, TROIQ in the last 72 hours.     No lab exists for component: CPKMB  No results found for: CHOL, CHOLX, CHLST, CHOLV, HDL, HDLP, LDL, LDLC, DLDLP, TGLX, TRIGL, TRIGP, CHHD, CHHDX  Lab Results   Component Value Date/Time    Glucose (POC) 87 05/01/2017 12:01 AM    Glucose (POC) 146 (H) 12/07/2016 02:59 PM     Lab Results   Component Value Date/Time    Color RED 09/25/2017 08:41 PM    Appearance OPAQUE (A) 09/25/2017 08:41 PM    Specific gravity 1.015 09/25/2017 08:41 PM    Specific gravity 1.014 09/12/2017 01:52 PM    pH (UA) 5.5 09/25/2017 08:41 PM    Protein 100 (A) 09/25/2017 08:41 PM    Glucose NEGATIVE  09/25/2017 08:41 PM    Ketone NEGATIVE  09/25/2017 08:41 PM    Bilirubin NEGATIVE  09/25/2017 08:41 PM    Urobilinogen 1.0 09/25/2017 08:41 PM    Nitrites POSITIVE (A) 09/25/2017 08:41 PM    Leukocyte Esterase TRACE (A) 09/25/2017 08:41 PM    Epithelial cells FEW 09/25/2017 08:41 PM    Bacteria NEGATIVE  09/25/2017 08:41 PM    WBC 0-4 09/25/2017 08:41 PM    RBC >100 (H) 09/25/2017 08:41 PM         Medications Reviewed:     Current Facility-Administered Medications   Medication Dose Route Frequency    sodium chloride (NS) flush 5-40 mL  5-40 mL IntraVENous Q8H    sodium chloride (NS) flush 5-40 mL  5-40 mL IntraVENous PRN    0.9% sodium chloride infusion  75 mL/hr IntraVENous CONTINUOUS    ondansetron (ZOFRAN) injection 4 mg  4 mg IntraVENous Q4H PRN    pantoprazole (PROTONIX) 40 mg in 0.9% sodium chloride 10 mL injection  40 mg IntraVENous Q12H     ______________________________________________________________________  EXPECTED LENGTH OF STAY: - - -  ACTUAL LENGTH OF STAY:          1                 Art Eagle MD

## 2022-03-05 NOTE — ROUTINE PROCESS
Bedside and Verbal shift change report given to EDIS Gomez (oncoming nurse) by EDIS Dove (offgoing nurse). Report included the following information SBAR, Kardex, Intake/Output, MAR and Recent Results.

## 2022-03-06 ENCOUNTER — APPOINTMENT (OUTPATIENT)
Dept: GENERAL RADIOLOGY | Age: 87
DRG: 389 | End: 2022-03-06
Attending: SURGERY
Payer: MEDICARE

## 2022-03-06 LAB
GLUCOSE BLD STRIP.AUTO-MCNC: 76 MG/DL (ref 65–117)
SERVICE CMNT-IMP: NORMAL

## 2022-03-06 PROCEDURE — 74011250637 HC RX REV CODE- 250/637: Performed by: SURGERY

## 2022-03-06 PROCEDURE — 94760 N-INVAS EAR/PLS OXIMETRY 1: CPT

## 2022-03-06 PROCEDURE — 99231 SBSQ HOSP IP/OBS SF/LOW 25: CPT | Performed by: SURGERY

## 2022-03-06 PROCEDURE — 74019 RADEX ABDOMEN 2 VIEWS: CPT

## 2022-03-06 PROCEDURE — 82962 GLUCOSE BLOOD TEST: CPT

## 2022-03-06 PROCEDURE — 74011250637 HC RX REV CODE- 250/637: Performed by: INTERNAL MEDICINE

## 2022-03-06 PROCEDURE — 65660000000 HC RM CCU STEPDOWN

## 2022-03-06 RX ORDER — TAMSULOSIN HYDROCHLORIDE 0.4 MG/1
0.4 CAPSULE ORAL DAILY
Status: DISCONTINUED | OUTPATIENT
Start: 2022-03-06 | End: 2022-03-08 | Stop reason: HOSPADM

## 2022-03-06 RX ORDER — PANTOPRAZOLE SODIUM 40 MG/1
40 TABLET, DELAYED RELEASE ORAL
Status: DISCONTINUED | OUTPATIENT
Start: 2022-03-06 | End: 2022-03-08 | Stop reason: HOSPADM

## 2022-03-06 RX ORDER — FINASTERIDE 5 MG/1
5 TABLET, FILM COATED ORAL DAILY
Status: DISCONTINUED | OUTPATIENT
Start: 2022-03-06 | End: 2022-03-08 | Stop reason: HOSPADM

## 2022-03-06 RX ORDER — METOPROLOL TARTRATE 25 MG/1
25 TABLET, FILM COATED ORAL EVERY 12 HOURS
Status: DISCONTINUED | OUTPATIENT
Start: 2022-03-06 | End: 2022-03-08 | Stop reason: HOSPADM

## 2022-03-06 RX ORDER — BUPROPION HYDROCHLORIDE 150 MG/1
150 TABLET, EXTENDED RELEASE ORAL DAILY
Status: DISCONTINUED | OUTPATIENT
Start: 2022-03-06 | End: 2022-03-08 | Stop reason: HOSPADM

## 2022-03-06 RX ORDER — MEMANTINE HYDROCHLORIDE 10 MG/1
10 TABLET ORAL EVERY 12 HOURS
Status: DISCONTINUED | OUTPATIENT
Start: 2022-03-06 | End: 2022-03-08 | Stop reason: HOSPADM

## 2022-03-06 RX ADMIN — BUPROPION HYDROCHLORIDE 150 MG: 150 TABLET, EXTENDED RELEASE ORAL at 11:39

## 2022-03-06 RX ADMIN — METOPROLOL TARTRATE 25 MG: 25 TABLET, FILM COATED ORAL at 11:39

## 2022-03-06 RX ADMIN — MEMANTINE HYDROCHLORIDE 10 MG: 10 TABLET ORAL at 11:39

## 2022-03-06 RX ADMIN — TAMSULOSIN HYDROCHLORIDE 0.4 MG: 0.4 CAPSULE ORAL at 11:39

## 2022-03-06 RX ADMIN — FINASTERIDE 5 MG: 5 TABLET, FILM COATED ORAL at 11:39

## 2022-03-06 RX ADMIN — APIXABAN 2.5 MG: 2.5 TABLET, FILM COATED ORAL at 22:05

## 2022-03-06 RX ADMIN — MEMANTINE HYDROCHLORIDE 10 MG: 10 TABLET ORAL at 22:05

## 2022-03-06 RX ADMIN — DOCUSATE SODIUM 100 MG: 100 CAPSULE, LIQUID FILLED ORAL at 09:04

## 2022-03-06 RX ADMIN — PANTOPRAZOLE SODIUM 40 MG: 40 TABLET, DELAYED RELEASE ORAL at 16:17

## 2022-03-06 RX ADMIN — APIXABAN 2.5 MG: 2.5 TABLET, FILM COATED ORAL at 11:39

## 2022-03-06 RX ADMIN — DOCUSATE SODIUM 100 MG: 100 CAPSULE, LIQUID FILLED ORAL at 22:04

## 2022-03-06 NOTE — PROGRESS NOTES
2327-This RN went into take patients vital signs. Attempted to take BP x3, patient will not sit still and is telling this RN to get it off his arm. This RN attempted to take BP on leg, patient sat up and tried to pull off leg as he stated it is hurting him. Patient complaining of pain at IV site and in his arm. Patients fluids stating occluded. This RN tried to flush IV and IV no longer flushing and patient states it is painful. Patient stating he does not want to be messed with at this time and gets agitated very quickly. Will attempt for an IV when patient is more relaxed. 0398- Patient continuously trying to get up out of bed. States he needs to use the bathroom. Tech attempted to help patient with urinal, patient unable to go. Patient stating he does not want to be messed with at this time. Becomes quickly agitated.

## 2022-03-06 NOTE — PROGRESS NOTES
0945: Pt IV Infiltrated, BP high. MD notified. Okay received to leave IV out for now. 1300: Pt has tolerated clear liquids well. No n/v at this time.

## 2022-03-06 NOTE — PROGRESS NOTES
[facility] Adult  Hospitalist Group                                                                                          Hospitalist Progress Note  Josh Alexis MD  Answering service: [phone] from in house phone        Date of Service:  3/6/2022  NAME:  Urvashi Gorman  :  1935  MRN:  [id number]      Admission Summary:   Copied form admit: \" Urvashi Gorman is a 81 y.o. male who presents with nausea vomiting     Patient lives at [facility], came in today with nausea and vomiting that started yesterday, per patient's wife patient has been \"throwing up black stuff\", patient is a poor historian, patient does complain of mild abdominal pain but denies any other complaints or problems, patient came to the ER and was found to have small bowel obstruction and was requested to be admitted to the hospitalist service, no additional history could be obtained from the patient or his wife  \"    Interval history / Subjective:     3/6/2022 :    Benign KUB   Benign abd exam   Advancing to cl liq   Po meds - many resumed see MAR   Else:        Assessment & Plan:           Active Problems:    SBO (small bowel obstruction) ([facility] (3/4/2022)- npo, iv to be restarted  Pt not in pain, abd is not distended, cont npo   3/6:   Benign KUB   Benign abd exam   Advancing to cl liq   Po meds - many resumed see MAR    A. fib, rate controlled  - resuming OAC/BB 3/6     Dementia- acting out w agitation on care efforts, discussed w wife, ,wife agrees w trail of [place]     Depression    Hypertension  BP Readings from Last 1 Encounters:   22 (!) 180/102         Hospital Problems  Date Reviewed: 2017          Codes Class Noted POA    SBO (small bowel obstruction) ([facility] ICD-10-CM: P64.793  ICD-9-CM: 560.9  3/4/2022 Unknown                  After personally interviewing pt at bedside the following is noted on 3/6/2022 :    Review of Systems   Reason unable to perform ROS: pt confused I had a face to face encounter with patient on 3/6/2022 at bedside for the following physical exam:     PHYSICAL EXAM:    Visit Vitals  BP (!) 180/102 (BP 1 Location: Left arm, BP Patient Position: At rest)   Pulse 86   Temp 97.9 °F (36.6 °C)   Resp 18   Ht 5' 10\" (1.778 m)   Wt 74.8 kg (165 lb)   SpO2 98%   BMI 23.68 kg/m²          Physical Exam  Constitutional:       General: He is not in acute distress. Appearance: He is not ill-appearing, toxic-appearing or diaphoretic. HENT:      Head: Normocephalic and atraumatic. Mouth/Throat:      Mouth: Mucous membranes are dry. Pharynx: Oropharynx is clear. Eyes:      Extraocular Movements: Extraocular movements intact. Conjunctiva/sclera: Conjunctivae normal.      Pupils: Pupils are equal, round, and reactive to light. Cardiovascular:      Rate and Rhythm: Normal rate and regular rhythm. Pulmonary:      Effort: Pulmonary effort is normal.      Breath sounds: Normal breath sounds. Abdominal:      General: Abdomen is flat. There is no distension. Palpations: Abdomen is soft. Tenderness: There is no abdominal tenderness. Skin:     General: Skin is warm and dry. Neurological:      Comments: Non focal, demented,    Psychiatric:      Comments: Not agitated w myself but is to RN's no care efforts.                       Data Review:    Review and/or order of clinical lab test      Labs:     Recent Labs     03/05/22  1300 03/04/22  0655   WBC 8.7 10.3   HGB 15.0 16.7   HCT 47.4 51.7*    223     Recent Labs     03/05/22  1300 03/04/22  1230 03/04/22  0655     --  142   K 3.7  --  3.7   *  --  106   CO2 30  --  33*   BUN 14  --  21*   CREA 1.08  --  1.14   GLU 85  --  116*   CA 8.7  --  9.3   MG  --  2.3 2.3   PHOS  --  2.9  --      Recent Labs     03/04/22  0655   ALT 20      TBILI 1.4*   TP 7.2   ALB 3.5   GLOB 3.7   LPSE 58*     No results for input(s): INR, PTP, APTT, INREXT, INREXT in the last 72 hours. No results for input(s): FE, TIBC, PSAT, FERR in the last 72 hours. Lab Results   Component Value Date/Time    Folate 10.0 11/09/2015 01:30 AM      No results for input(s): PH, PCO2, PO2 in the last 72 hours. No results for input(s): CPK, CKNDX, TROIQ in the last 72 hours. No lab exists for component: CPKMB  No results found for: CHOL, CHOLX, CHLST, CHOLV, HDL, HDLP, LDL, LDLC, DLDLP, TGLX, TRIGL, TRIGP, CHHD, CHHDX  Lab Results   Component Value Date/Time    Glucose (POC) 87 05/01/2017 12:01 AM    Glucose (POC) 146 (H) 12/07/2016 02:59 PM     Lab Results   Component Value Date/Time    Color RED 09/25/2017 08:41 PM    Appearance OPAQUE (A) 09/25/2017 08:41 PM    Specific gravity 1.015 09/25/2017 08:41 PM    Specific gravity 1.014 09/12/2017 01:52 PM    pH (UA) 5.5 09/25/2017 08:41 PM    Protein 100 (A) 09/25/2017 08:41 PM    Glucose NEGATIVE  09/25/2017 08:41 PM    Ketone NEGATIVE  09/25/2017 08:41 PM    Bilirubin NEGATIVE  09/25/2017 08:41 PM    Urobilinogen 1.0 09/25/2017 08:41 PM    Nitrites POSITIVE (A) 09/25/2017 08:41 PM    Leukocyte Esterase TRACE (A) 09/25/2017 08:41 PM    Epithelial cells FEW 09/25/2017 08:41 PM    Bacteria NEGATIVE  09/25/2017 08:41 PM    WBC 0-4 09/25/2017 08:41 PM    RBC >100 (H) 09/25/2017 08:41 PM         Medications Reviewed:     Current Facility-Administered Medications   Medication Dose Route Frequency    memantine (NAMENDA) tablet 10 mg  10 mg Oral Q12H    metoprolol tartrate (LOPRESSOR) tablet 25 mg  25 mg Oral Q12H    tamsulosin (FLOMAX) capsule 0.4 mg  0.4 mg Oral DAILY    finasteride (PROSCAR) tablet 5 mg  5 mg Oral DAILY    . PHARMACY TO SUBSTITUTE PER PROTOCOL (Reordered from: buPROPion XL (WELLBUTRIN XL) 150 mg tablet)    Per Protocol    apixaban (ELIQUIS) tablet 2.5 mg  2.5 mg Oral Q12H    docusate sodium (COLACE) capsule 100 mg  100 mg Oral BID    sodium chloride (NS) flush 5-40 mL  5-40 mL IntraVENous Q8H    sodium chloride (NS) flush 5-40 mL  5-40 mL IntraVENous PRN    0.9% sodium chloride infusion  75 mL/hr IntraVENous CONTINUOUS    ondansetron (ZOFRAN) injection 4 mg  4 mg IntraVENous Q4H PRN    pantoprazole (PROTONIX) 40 mg in 0.9% sodium chloride 10 mL injection  40 mg IntraVENous Q12H     ______________________________________________________________________  EXPECTED LENGTH OF STAY: - - -  ACTUAL LENGTH OF STAY:          2                 Linda Gregg MD

## 2022-03-06 NOTE — ROUTINE PROCESS
Bedside and Verbal shift change report given to Kenneth Jesus RN (oncoming nurse) by EDIS Dove (offgoing nurse). Report included the following information SBAR, Kardex, Intake/Output, MAR and Recent Results.

## 2022-03-06 NOTE — PROGRESS NOTES
Progress Note    Patient: Emily Arora MRN: [id number]  SSN: [id number]    YOB: 1935  Age: 81 y.o. Sex: male      Admit Date: 3/4/2022    Small bowel obstruction    Subjective:     No acute surgical issues. Pt is resting in bed. He denied any abdominal pain. No nausea or vomiting. No fever or chills. AXR showed resolution of SBO. Objective:     Visit Vitals  BP (!) 180/102 (BP 1 Location: Left arm, BP Patient Position: At rest)   Pulse 62   Temp 97.9 °F (36.6 °C)   Resp 18   Ht 5' 10\" (1.778 m)   Wt 165 lb (74.8 kg)   SpO2 98%   BMI 23.68 kg/m²       Temp (24hrs), Av.9 °F (36.6 °C), Min:97.7 °F (36.5 °C), Max:98.1 °F (36.7 °C)        Physical Exam:    Gen:  NAD  Pulm:  Unlabored  Abd:  S/Non-distended/Non-TTP  Groin:  Easily reducible left groin hernia    Recent Results (from the past 24 hour(s))   CBC WITH AUTOMATED DIFF    Collection Time: 22  1:00 PM   Result Value Ref Range    WBC 8.7 4.1 - 11.1 K/uL    RBC 5.11 4.10 - 5.70 M/uL    HGB 15.0 12.1 - 17.0 g/dL    HCT 47.4 36.6 - 50.3 %    MCV 92.8 80.0 - 99.0 FL    MCH 29.4 26.0 - 34.0 PG    MCHC 31.6 30.0 - 36.5 g/dL    RDW 13.6 11.5 - 14.5 %    PLATELET 068 938 - 270 K/uL    MPV 9.8 8.9 - 12.9 FL    NRBC 0.0 0  WBC    ABSOLUTE NRBC 0.00 0.00 - 0.01 K/uL    NEUTROPHILS 77 (H) 32 - 75 %    LYMPHOCYTES 11 (L) 12 - 49 %    MONOCYTES 9 5 - 13 %    EOSINOPHILS 2 0 - 7 %    BASOPHILS 1 0 - 1 %    IMMATURE GRANULOCYTES 0 0.0 - 0.5 %    ABS. NEUTROPHILS 6.7 1.8 - 8.0 K/UL    ABS. LYMPHOCYTES 1.0 0.8 - 3.5 K/UL    ABS. MONOCYTES 0.8 0.0 - 1.0 K/UL    ABS. EOSINOPHILS 0.2 0.0 - 0.4 K/UL    ABS. BASOPHILS 0.0 0.0 - 0.1 K/UL    ABS. IMM.  GRANS. 0.0 0.00 - 0.04 K/UL    DF AUTOMATED     METABOLIC PANEL, BASIC    Collection Time: 22  1:00 PM   Result Value Ref Range    Sodium 142 136 - 145 mmol/L    Potassium 3.7 3.5 - 5.1 mmol/L    Chloride 109 (H) 97 - 108 mmol/L    CO2 30 21 - 32 mmol/L    Anion gap 3 (L) 5 - 15 mmol/L Glucose 85 65 - 100 mg/dL    BUN 14 6 - 20 MG/DL    Creatinine 1.08 0.70 - 1.30 MG/DL    BUN/Creatinine ratio 13 12 - 20      GFR est AA >60 >60 ml/min/1.73m2    GFR est non-AA >60 >60 ml/min/1.73m2    Calcium 8.7 8.5 - 10.1 MG/DL   GLUCOSE, POC    Collection Time: 03/06/22 11:35 AM   Result Value Ref Range    Glucose (POC) 76 65 - 117 mg/dL    Performed by Ruth Li  PCT          Assessment:     Hospital Problems  Date Reviewed: 9/13/2017          Codes Class Noted POA    [facility] ICD-10-CM: H17.983  ICD-9-CM: 560.9  3/4/2022 Unknown              Plan/Recommendations/Medical Decision Making:     - Small bowel obstruction:  Appears to be resolving.   AXR showed no obstruction  - Advance to clear liquids  - Labs in am  - Defer to Dr. Cyndee Vanegas with timing of possible OR for laparoscopy and left inguinal hernia repair

## 2022-03-06 NOTE — PROGRESS NOTES
2000:  Patient agitated. Trying to pull out IV, hitting himself in the head with hand, ripping off tele leads, and trying to get out of bed. Hector OSMAN notified. Orders received to give a one time dose of olanzapine 2.5 mg IV, monitor patient closely, and notify MD if patient is still agitated 1 hour post med administration. Bedside and Verbal shift change report given to [address] [place] [address],[address] (oncoming nurse) by Fermín Worley RN (offgoing nurse). Report included the following information SBAR, Kardex, Intake/Output, MAR and Recent Results.

## 2022-03-07 LAB
ANION GAP SERPL CALC-SCNC: 3 MMOL/L (ref 5–15)
BUN SERPL-MCNC: 13 MG/DL (ref 6–20)
BUN/CREAT SERPL: 14 (ref 12–20)
CALCIUM SERPL-MCNC: 8.2 MG/DL (ref 8.5–10.1)
CHLORIDE SERPL-SCNC: 105 MMOL/L (ref 97–108)
CO2 SERPL-SCNC: 29 MMOL/L (ref 21–32)
COMMENT, HOLDF: NORMAL
CREAT SERPL-MCNC: 0.93 MG/DL (ref 0.7–1.3)
GLUCOSE SERPL-MCNC: 89 MG/DL (ref 65–100)
POTASSIUM SERPL-SCNC: 3 MMOL/L (ref 3.5–5.1)
SAMPLES BEING HELD,HOLD: NORMAL
SODIUM SERPL-SCNC: 137 MMOL/L (ref 136–145)

## 2022-03-07 PROCEDURE — 74011250637 HC RX REV CODE- 250/637: Performed by: SURGERY

## 2022-03-07 PROCEDURE — 97530 THERAPEUTIC ACTIVITIES: CPT

## 2022-03-07 PROCEDURE — 36415 COLL VENOUS BLD VENIPUNCTURE: CPT

## 2022-03-07 PROCEDURE — 97161 PT EVAL LOW COMPLEX 20 MIN: CPT

## 2022-03-07 PROCEDURE — 74011250637 HC RX REV CODE- 250/637: Performed by: INTERNAL MEDICINE

## 2022-03-07 PROCEDURE — 65660000000 HC RM CCU STEPDOWN

## 2022-03-07 PROCEDURE — 99232 SBSQ HOSP IP/OBS MODERATE 35: CPT | Performed by: SURGERY

## 2022-03-07 PROCEDURE — 80048 BASIC METABOLIC PNL TOTAL CA: CPT

## 2022-03-07 PROCEDURE — 97535 SELF CARE MNGMENT TRAINING: CPT

## 2022-03-07 PROCEDURE — 97165 OT EVAL LOW COMPLEX 30 MIN: CPT

## 2022-03-07 RX ORDER — CLONIDINE HYDROCHLORIDE 0.1 MG/1
0.1 TABLET ORAL
Status: DISCONTINUED | OUTPATIENT
Start: 2022-03-07 | End: 2022-03-08 | Stop reason: HOSPADM

## 2022-03-07 RX ADMIN — METOPROLOL TARTRATE 25 MG: 25 TABLET, FILM COATED ORAL at 23:11

## 2022-03-07 RX ADMIN — CLONIDINE HYDROCHLORIDE 0.1 MG: 0.1 TABLET ORAL at 21:52

## 2022-03-07 RX ADMIN — APIXABAN 2.5 MG: 2.5 TABLET, FILM COATED ORAL at 21:45

## 2022-03-07 RX ADMIN — METOPROLOL TARTRATE 25 MG: 25 TABLET, FILM COATED ORAL at 11:05

## 2022-03-07 RX ADMIN — MEMANTINE HYDROCHLORIDE 10 MG: 10 TABLET ORAL at 21:45

## 2022-03-07 RX ADMIN — MEMANTINE HYDROCHLORIDE 10 MG: 10 TABLET ORAL at 08:26

## 2022-03-07 RX ADMIN — FINASTERIDE 5 MG: 5 TABLET, FILM COATED ORAL at 08:26

## 2022-03-07 RX ADMIN — CLONIDINE HYDROCHLORIDE 0.1 MG: 0.1 TABLET ORAL at 08:26

## 2022-03-07 RX ADMIN — TAMSULOSIN HYDROCHLORIDE 0.4 MG: 0.4 CAPSULE ORAL at 08:26

## 2022-03-07 RX ADMIN — BUPROPION HYDROCHLORIDE 150 MG: 150 TABLET, EXTENDED RELEASE ORAL at 08:26

## 2022-03-07 RX ADMIN — DOCUSATE SODIUM 100 MG: 100 CAPSULE, LIQUID FILLED ORAL at 08:26

## 2022-03-07 RX ADMIN — APIXABAN 2.5 MG: 2.5 TABLET, FILM COATED ORAL at 08:26

## 2022-03-07 RX ADMIN — DOCUSATE SODIUM 100 MG: 100 CAPSULE, LIQUID FILLED ORAL at 17:17

## 2022-03-07 RX ADMIN — PANTOPRAZOLE SODIUM 40 MG: 40 TABLET, DELAYED RELEASE ORAL at 07:11

## 2022-03-07 NOTE — PROGRESS NOTES
OCCUPATIONAL THERAPY EVALUATION/DISCHARGE  Patient: Jaime Villanueva (17 y.o. male)  Date: 3/7/2022  Primary Diagnosis: SBO (small bowel obstruction) (HCC) [K56.609]  Procedure(s) (LRB):  ROBOTIC LYSIS OF ADHESIONS POSSIBLE BOWEL RESECTION, POSSIBLE INGUINAL HERNIA REPAIR WITH MESH (URGENT) (N/A)     Precautions:   Fall    ASSESSMENT  Based on the objective data described below, the patient presents with generalized weakness and decreased activity tolerance, however, operating close to baseline following admission for SBO. Pt with dementia at baseline. Pt cleared for therapy by nursing and received supine in bed with wife in room and willing to participate with therapy. Completed bed mobility to sit EOB and demo'd excellent static and dynamic seated balance to manage BLE dressing. Pt completed functional mobility to bathroom for toileting (CGA) and grooming task at sink. Increased need for verbal cues to direct activity due to dementia. At end of session pt returned to bed with all VSS. Pt is functioning close to baseline and does not require further skilled therapy in the acute care setting, however, would benefit from [facility] at d/c to increase safety in ADLs. Current Level of Function (ADLs/self-care): Min A for ADLs due to dementia, CGA with RW for functional mobility     Functional Outcome Measure: The patient scored 40/100 on the Barthel outcome measure which is indicative of dependence in ADLs.       Other factors to consider for discharge: Lives in the Memory Care Unit [facility] :  Recommend with staff: OOB with Ax1 to bathroom, Pt participation in self care as able     Recommendation for discharge: (in order for the patient to meet his/her long term goals)  Occupational therapy at least 2 days/week in the home AND ensure assist and/or supervision for safety with functional mobility     This discharge recommendation:  Has been made in collaboration with the attending provider and/or case management    IF patient discharges home will need the following DME: patient owns DME required for discharge       SUBJECTIVE:   Patient stated I play tennis every day.     OBJECTIVE DATA SUMMARY:   HISTORY:   Past Medical History:   Diagnosis Date    Allergy     seasonal    Arrhythmia     Asthma     Dementia ([address]     Depression     Heart failure (HCC)     Hypertension     Neurological disorder     dementia    Other ill-defined conditions(799.89)     a-fib    Stroke [facility])      Past Surgical History:   Procedure Laterality Date    HX HEENT      cateract surgery       Prior Level of Function/Environment/Context: Pt lives at [facility] in memory care unit. Received Min A for ADLs due to dementia and Mod I with RW for functional mobility. Expanded or extensive additional review of patient history:   Home Situation  Home Environment: Long term care (memory care unit- [facility])  Living Alone: No  Support Systems: Haley (, but wife Sterling Camps elsewhere)  Patient Expects to be Discharged to[de-identified] [facility]  Current DME Used/Available at Home: [place] [employer], rolling,Walker, rollator      EXAMINATION OF PERFORMANCE DEFICITS:  Cognitive/Behavioral Status:  Neurologic State: Alert  Orientation Level: Oriented to person;Oriented to place  Cognition: Follows commands;Decreased attention/concentration  Perception: Appears intact  Perseveration: No perseveration noted  Safety/Judgement: Decreased awareness of environment    Skin: dry, intact     Edema: none noted     Hearing: WDL     Vision/Perceptual:       WDL      Range of Motion:    AROM: Within functional limits     Strength:    Strength: Generally decreased, functional     Coordination:  Coordination: Generally decreased, functional  Fine Motor Skills-Upper: Left Intact; Right Intact    Gross Motor Skills-Upper: Left Intact; Right Intact    Tone & Sensation:    Tone: Normal    Balance:  Sitting: Intact  Standing: Impaired; With support  Standing - Static: Good;Constant support  Standing - Dynamic : Fair;Constant support    Functional Mobility and Transfers for ADLs:  Bed Mobility:  Supine to Sit: Supervision  Sit to Supine: Supervision    Transfers:  Sit to Stand: Contact guard assistance  Stand to Sit: Stand-by assistance  Toilet Transfer : Contact guard assistance  Assistive Device : Walker, rollator    ADL Assessment:  Feeding: Setup;Stand-by assistance    Oral Facial Hygiene/Grooming: Minimum assistance;Setup    Bathing: Minimum assistance    Upper Body Dressing: Setup;Minimum assistance    Lower Body Dressing: Setup;Minimum assistance    Toileting: Minimum assistance     ADL Intervention and task modifications:     Cognitive Retraining  Safety/Judgement: Decreased awareness of environment         Functional Measure:    Barthel Index:  Bathin  Bladder: 5  Bowels: 5  Groomin  Dressin  Feedin  Mobility: 5  Stairs: 0  Toilet Use: 5  Transfer (Bed to Chair and Back): 10  Total: 40/100      The Barthel ADL Index: Guidelines  1. The index should be used as a record of what a patient does, not as a record of what a patient could do. 2. The main aim is to establish degree of independence from any help, physical or verbal, however minor and for whatever reason. 3. The need for supervision renders the patient not independent. 4. A patient's performance should be established using the best available evidence. Asking the patient, friends/relatives and nurses are the usual sources, but direct observation and common sense are also important. However direct testing is not needed. 5. Usually the patient's performance over the preceding 24-48 hours is important, but occasionally longer periods will be relevant. 6. Middle categories imply that the patient supplies over 50 per cent of the effort. 7. Use of aids to be independent is allowed.     Score Interpretation (from [address] [address])    Independent   60-79 Minimally independent 40-59 Partially dependent   20-39 Very dependent   <20 Totally dependent     -Robert Perkins, Barthel, D.W. (5591). Functional evaluation: the Barthel Index. [address] ([address] [facility] [facility] [address] (1997). The Barthel activities of daily living index: self-reporting versus actual performance in the old (> or = 75 years). Journal of [address] [place] [address] 45(7), [facility], ELIESER, Rosa Macias, Stacey Ores. (1999). Measuring the change in disability after inpatient rehabilitation; comparison of the responsiveness of the Barthel Index and Functional [place] Measure. Journal of Neurology, Neurosurgery, and Psychiatry, 66(4), 371-221. Gerry Villarreal, KISHOR, MAMTA Joshi, & Fortunato Rausch MPRASAD. (2004) Assessment of post-stroke quality of life in cost-effectiveness studies: The usefulness of the Barthel Index and the EuroQoL-5D. Quality of Life Research, 15, 128-42         Occupational Therapy Evaluation Charge Determination   History Examination Decision-Making   LOW Complexity : Brief history review  LOW Complexity : 1-3 performance deficits relating to physical, cognitive , or psychosocial skils that result in activity limitations and / or participation restrictions  LOW Complexity : No comorbidities that affect functional and no verbal or physical assistance needed to complete eval tasks       Based on the above components, the patient evaluation is determined to be of the following complexity level: LOW   Pain Rating:  none    Activity Tolerance:   Fair    After treatment patient left in no apparent distress:    Supine in bed, Call bell within reach, Bed / chair alarm activated and Caregiver / family present    COMMUNICATION/EDUCATION:   The patients plan of care was discussed with: Physical therapist and Registered nurse.      Thank you for this referral.  Ramon Caputo OT  Time Calculation: 27 mins

## 2022-03-07 NOTE — PROGRESS NOTES
Bedside shift change report given to Shayne Manuel (oncoming nurse) by Dora Arvizu RN (offgoing nurse). Report included the following information SBAR, Kardex, Intake/Output, MAR and Recent Results.

## 2022-03-07 NOTE — PROGRESS NOTES
Surgery Progress Note    3/7/2022    Admit Date: 3/4/2022    CC: Ready to go home    Subjective:     Patient without event. Having bowel function. Abdominal pain is gone. No emesis since being in the ER. Wife at bedside    Constitutional: No fever or chills  Neurologic: No headache  Eyes: No scleral icterus or irritated eyes  Nose: No nasal pain or drainage  Mouth: No oral lesions or sore throat  Cardiac: No palpations or chest pain  Pulmonary: No cough or shortness of breath  Gastrointestinal: No nausea, emesis, diarrhea, or constipation  Genitourinary: No dysuria  Musculoskeletal: No muscle or joint tenderness  Skin: No rashes or lesions  Psychiatric: Dementia  Objective:     Visit Vitals  BP (!) 186/90   Pulse 62   Temp 97.6 °F (36.4 °C)   Resp 20   Ht 5' 10\" (1.778 m)   Wt 165 lb (74.8 kg)   SpO2 96%   BMI 23.68 kg/m²       General: No acute distress, conversant  Eyes: PERRLA, no scleral icterus  HENT: Normocephalic without oral lesions  Neck: Trachea midline without LAD  Cardiac: Normal pulse rate and rhythm  Pulmonary: Symmetric chest rise with normal effort  GI: Soft, NT, ND, large left reducible inguinal hernia, no splenomegaly  Skin: Warm without rash  Extremities: No edema or joint stiffness  Psych: GCS 14    Labs, vital signs, and I/O reviewed. Assessment:     81-year-old male with large reducible left inguinal hernia and concern for partial SBO without definitive mass on imaging with significant dementia    Plan:     Patient with virgin abdomen. Unsure to the etiology of this partial obstruction. No definitive mass on CT scan. Hernia is not causing the obstruction. I spoke with his wife today and I think that given his age and dementia attempting nonoperative therapy is the best first-line therapy. If he is unable to tolerate a diet we will have to consider exploration. Plan for regular diet today. If he tolerates home tomorrow and outpatient follow-up with me.     Lina Guerra, MD  Bariatric and General Surgeon  [address] [place] [address] [facility]

## 2022-03-07 NOTE — ROUTINE PROCESS
Bedside and Verbal shift change report given to Keith Doshi RN (oncoming nurse) by Derrell RN (offgoing nurse). Report included the following information SBAR, Kardex, Intake/Output, MAR and Recent Results.

## 2022-03-07 NOTE — PROGRESS NOTES
[facility]                                                                                          Hospitalist Progress Note  Josh Alexis MD  Answering service: [phone] OR [phone] from in house phone        Date of Service:  3/7/2022  NAME:  Urvashi Gorman  :  1935  MRN:  [id number]      Admission Summary:   Copied form admit: \" Urvashi Gorman is a 81 y.o. male who presents with nausea vomiting     Patient lives at [facility], came in today with nausea and vomiting that started yesterday, per patient's wife patient has been \"throwing up black stuff\", patient is a poor historian, patient does complain of mild abdominal pain but denies any other complaints or problems, patient came to the ER and was found to have small bowel obstruction and was requested to be admitted to the hospitalist service, no additional history could be obtained from the patient or his wife  \"    Interval history / Subjective:     3/7/2022 :    Benign KUB   Benign abd exam   Advancing to cl liq   Po meds - many resumed see MAR   Else:        Assessment & Plan:           Active Problems:    SBO (small bowel obstruction) ([facility] [address] (3/4/2022)- npo, iv to be restarted  Pt not in pain, abd is not distended, cont npo   3/6:   Benign KUB   Benign abd exam   Advancing to cl liq   Po meds - many resumed see JEFFERY WILKINSON. fib, rate controlled  - resuming OAC/BB 3/6     Dementia- acting out w agitation on care efforts, discussed w wife, ,wife agrees w trail of Geodon     Depression    Hypertension  BP Readings from Last 1 Encounters:   22 138/69         Hospital Problems  Date Reviewed: 2017          Codes Class Noted POA    SBO (small bowel obstruction) ([facility] [address] ICD-10-CM: H92.723  ICD-9-CM: 560.9  3/4/2022 Unknown                  After personally interviewing pt at bedside the following is noted on 3/7/2022 :    Review of Systems   Reason unable to perform ROS: pt confused I had a face to face encounter with patient on 3/7/2022 at bedside for the following physical exam:     PHYSICAL EXAM:    Visit Vitals  /69 (BP 1 Location: Left lower arm, BP Patient Position: Sitting) Comment (BP Patient Position): EOB   Pulse 82   Temp 97.6 °F (36.4 °C)   Resp 20   Ht 5' 10\" (1.778 m)   Wt 74.8 kg (165 lb)   SpO2 96%   BMI 23.68 kg/m²          Physical Exam  Constitutional:       General: He is not in acute distress. Appearance: He is not ill-appearing, toxic-appearing or diaphoretic. HENT:      Head: Normocephalic and atraumatic. Mouth/Throat:      Mouth: Mucous membranes are dry. Pharynx: Oropharynx is clear. Eyes:      Extraocular Movements: Extraocular movements intact. Conjunctiva/sclera: Conjunctivae normal.      Pupils: Pupils are equal, round, and reactive to light. Cardiovascular:      Rate and Rhythm: Normal rate and regular rhythm. Pulmonary:      Effort: Pulmonary effort is normal.      Breath sounds: Normal breath sounds. Abdominal:      General: Abdomen is flat. There is no distension. Palpations: Abdomen is soft. Tenderness: There is no abdominal tenderness. Skin:     General: Skin is warm and dry. Neurological:      Comments: Non focal, demented,    Psychiatric:      Comments: Not agitated w myself but is to RN's no care efforts. Data Review:    Review and/or order of clinical lab test      Labs:     Recent Labs     03/05/22  1300   WBC 8.7   HGB 15.0   HCT 47.4        Recent Labs     03/07/22  0023 03/05/22  1300 03/04/22  1230    142  --    K 3.0* 3.7  --     109*  --    CO2 29 30  --    BUN 13 14  --    CREA 0.93 1.08  --    GLU 89 85  --    CA 8.2* 8.7  --    MG  --   --  2.3   PHOS  --   --  2.9     No results for input(s): ALT, AP, TBIL, TBILI, TP, ALB, GLOB, GGT, AML, LPSE in the last 72 hours.     No lab exists for component: SGOT, GPT, AMYP, HLPSE  No results for input(s): INR, PTP, APTT, INREXT, INREXT in the last 72 hours. No results for input(s): FE, TIBC, PSAT, FERR in the last 72 hours. Lab Results   Component Value Date/Time    Folate 10.0 11/09/2015 01:30 AM      No results for input(s): PH, PCO2, PO2 in the last 72 hours. No results for input(s): CPK, CKNDX, TROIQ in the last 72 hours.     No lab exists for component: CPKMB  No results found for: CHOL, CHOLX, CHLST, CHOLV, HDL, HDLP, LDL, LDLC, DLDLP, TGLX, TRIGL, TRIGP, CHHD, CHHDX  Lab Results   Component Value Date/Time    Glucose (POC) 76 03/06/2022 11:35 AM    Glucose (POC) 87 05/01/2017 12:01 AM    Glucose (POC) 146 (H) 12/07/2016 02:59 PM     Lab Results   Component Value Date/Time    Color RED 09/25/2017 08:41 PM    Appearance OPAQUE (A) 09/25/2017 08:41 PM    Specific gravity 1.015 09/25/2017 08:41 PM    Specific gravity 1.014 09/12/2017 01:52 PM    pH (UA) 5.5 09/25/2017 08:41 PM    Protein 100 (A) 09/25/2017 08:41 PM    Glucose NEGATIVE  09/25/2017 08:41 PM    Ketone NEGATIVE  09/25/2017 08:41 PM    Bilirubin NEGATIVE  09/25/2017 08:41 PM    Urobilinogen 1.0 09/25/2017 08:41 PM    Nitrites POSITIVE (A) 09/25/2017 08:41 PM    Leukocyte Esterase TRACE (A) 09/25/2017 08:41 PM    Epithelial cells FEW 09/25/2017 08:41 PM    Bacteria NEGATIVE  09/25/2017 08:41 PM    WBC 0-4 09/25/2017 08:41 PM    RBC >100 (H) 09/25/2017 08:41 PM         Medications Reviewed:     Current Facility-Administered Medications   Medication Dose Route Frequency    cloNIDine HCL (CATAPRES) tablet 0.1 mg  0.1 mg Oral Q2H PRN    memantine (NAMENDA) tablet 10 mg  10 mg Oral Q12H    metoprolol tartrate (LOPRESSOR) tablet 25 mg  25 mg Oral Q12H    tamsulosin (FLOMAX) capsule 0.4 mg  0.4 mg Oral DAILY    finasteride (PROSCAR) tablet 5 mg  5 mg Oral DAILY    buPROPion SR (WELLBUTRIN SR) tablet 150 mg  150 mg Oral DAILY    apixaban (ELIQUIS) tablet 2.5 mg  2.5 mg Oral Q12H    pantoprazole (PROTONIX) tablet 40 mg  40 mg Oral ACB    docusate sodium (COLACE) capsule 100 mg  100 mg Oral BID     ______________________________________________________________________  EXPECTED LENGTH OF STAY: 2d 9h  ACTUAL LENGTH OF STAY:          Amos Branham MD

## 2022-03-07 NOTE — PROGRESS NOTES
Problem: Mobility Impaired (Adult and Pediatric)  Goal: *Acute Goals and Plan of Care (Insert Text)  Description: FUNCTIONAL STATUS PRIOR TO ADMISSION: At baseline, pt ambulates on memory care unit with RW. Receives assist with ADLs    HOME SUPPORT PRIOR TO ADMISSION: The patient lived in memory care unit. Wife lives separately. Physical Therapy Goals  Initiated 3/7/2022  1. Patient will move from supine to sit and sit to supine  in bed with independence within 7 day(s). 2.  Patient will transfer from bed to chair and chair to bed with supervision/set-up using the least restrictive device within 7 day(s). 3.  Patient will perform sit to stand with modified independence within 7 day(s). 4.  Patient will ambulate with supervision/set-up for 75 feet with the least restrictive device within 7 day(s). Outcome: Not Met   PHYSICAL THERAPY EVALUATION  Patient: Edenilson Reynoso (14 y.o. male)  Date: 3/7/2022  Primary Diagnosis: SBO (small bowel obstruction) ([facility]) [K56.609]  Procedure(s) (LRB):  ROBOTIC LYSIS OF ADHESIONS POSSIBLE BOWEL RESECTION, POSSIBLE INGUINAL HERNIA REPAIR WITH MESH (URGENT) (N/A)     Precautions: fall       ASSESSMENT  Based on the objective data described below, the patient presents with general weakness, decreased standing balance, decreased activity tolerance, mild gait dysfunction, increased risk for fall. Pt confused at baseline due to h/o dementia. Followed simple commands consistently, cooperative throughout session. Pt tolerated amb to/from restroom with RW and prolonged standing activity at sink with intermittent UE support; required CGA for balance and cues for sequence/ safety with mobilization in small environment. Pt demo mild fatigue with activity, vitals stable. Returned to bed at end of session, alarm activated. Suspect pt near baseline LOF with mobility, but will benefit from balance/ gait progression in acute setting to prevent further decline.  Recommend 24/7 supervision with mobility and follow up Kong Lindsey PT at d/c. Current Level of Function Impacting Discharge (mobility/balance): bed mob supervision, transfers/ amb with RW CGA    Functional Outcome Measure: The patient scored 19/28 on the Tinetti outcome measure which is indicative of moderate risk for fall. Other factors to consider for discharge: h/o dementia     Patient will benefit from skilled therapy intervention to address the above noted impairments. PLAN :  Recommendations and Planned Interventions: bed mobility training, transfer training, gait training, therapeutic exercises, patient and family training/education, and therapeutic activities      Frequency/Duration: Patient will be followed by physical therapy:  4 times a week to address goals.     Recommendation for discharge: (in order for the patient to meet his/her long term goals)  Physical therapy at least 2 days/week in the home AND ensure assist and/or supervision for safety with mobility using RW    This discharge recommendation:  Has not yet been discussed the attending provider and/or case management    IF patient discharges home will need the following DME: patient owns DME required for discharge         SUBJECTIVE:   Patient requested use of toilet    OBJECTIVE DATA SUMMARY:   HISTORY:    Past Medical History:   Diagnosis Date    Allergy     seasonal    Arrhythmia     Asthma     Dementia ([facility]     Depression     Heart failure ([facility] [address]     Hypertension     Neurological disorder     dementia    Other ill-defined conditions(799.89)     a-fib    Stroke [facility])      Past Surgical History:   Procedure Laterality Date    HX HEENT      cateract surgery       Personal factors and/or comorbidities impacting plan of care: h/o dementia    Home Situation  Home Environment: Long term care (memory care unit- [address] [place] [address])  Living Alone: No  Support Systems: Haley (, but wife Maria Fernanda [place] elsewhere)  Patient Expects to be Discharged to[de-identified] [address] [facility]  Current DME Used/Available at Home: sujatha Nelson    EXAMINATION/PRESENTATION/DECISION MAKING:   Critical Behavior:  Neurologic State: Confused  Orientation Level: Oriented to person,Oriented to place  Range Of Motion:  AROM: Within functional limits                       Strength:    Strength: Generally decreased, functional                    Tone & Sensation:   Tone: Normal                              Coordination:  Coordination: Generally decreased, functional  Vision:      Functional Mobility:  Bed Mobility:     Supine to Sit: Supervision  Sit to Supine: Supervision     Transfers:  Sit to Stand: Contact guard assistance  Stand to Sit: Stand-by assistance                       Balance:   Sitting: Intact  Standing: Impaired; With support  Standing - Static: Good;Constant support  Standing - Dynamic : Fair;Constant support  Ambulation/Gait Training:  Distance (ft): 20 Feet (ft) (x 2)  Assistive Device: Gait belt;Walker, rolling  Ambulation - Level of Assistance: Contact guard assistance        Gait Abnormalities: Decreased step clearance (stooped posture)              Speed/Josie: Pace decreased (<100 feet/min)  Step Length: Left shortened;Right shortened       Functional Measure:  Tinetti test:    Sitting Balance: 1  Arises: 1  Attempts to Rise: 2  Immediate Standing Balance: 1  Standing Balance: 1  Nudged: 1  Eyes Closed: 1  Turn 360 Degrees - Continuous/Discontinuous: 1  Turn 360 Degrees - Steady/Unsteady: 1  Sitting Down: 1  Balance Score: 11 Balance total score  Indication of Gait: 1  R Step Length/Height: 1  L Step Length/Height: 1  R Foot Clearance: 1  L Foot Clearance: 1  Step Symmetry: 1  Step Continuity: 1  Path: 1  Trunk: 0  Walking Time: 0  Gait Score: 8 Gait total score  Total Score: 19/28 Overall total score         Tinetti Tool Score Risk of Falls  <19 = High Fall Risk  19-24 = Moderate Fall Risk  25-28 = Low Fall Risk  Narenetti ME.  Performance-Oriented Assessment of Mobility Problems in Elderly Patients. [facility] 66; [id number]. (Scoring Description: PT Bulletin Feb. 10, 1993)    Older adults: Hill Cox et al, 2009; n = 1000 [facility] elderly evaluated with ABC, MICHELLE, ADL, and IADL)  · Mean MICHELLE score for males aged 69-68 years = 26.21(3.40)  · Mean MICHELLE score for females age 69-68 years = 25.16(4.30)  · Mean MICHELLE score for males over 80 years = 23.29(6.02)  · Mean MICHELLE score for females over 80 years = 17.20(8.32)           Physical Therapy Evaluation Charge Determination   History Examination Presentation Decision-Making   HIGH Complexity :3+ comorbidities / personal factors will impact the outcome/ POC  MEDIUM Complexity : 3 Standardized tests and measures addressing body structure, function, activity limitation and / or participation in recreation  LOW Complexity : Stable, uncomplicated  LOW Complexity : FOTO score of       Based on the above components, the patient evaluation is determined to be of the following complexity level: LOW     Pain Rating:  Pt denied c/o pain    Activity Tolerance:   Good    After treatment patient left in no apparent distress:   Patient positioned in right sidelying for pressure relief, Call bell within reach, Bed / chair alarm activated, Caregiver / family present, and Side rails x 3    COMMUNICATION/EDUCATION:   The patients plan of care was discussed with: Registered nurse. Fall prevention education was provided and the patient/caregiver indicated understanding., Patient/family have participated as able in goal setting and plan of care. , and Patient/family agree to work toward stated goals and plan of care.     Thank you for this referral.  Jonn Lugo, PT   Time Calculation: 28 mins

## 2022-03-07 NOTE — PROGRESS NOTES
RUR:  11% Low    SHILPI:  Return to [place] at [facility] ([address]. [employer] [place], [facility], p: [phone]) with home health OT/PT; [facility] accepted (p: [phone]). [facility] transport arranged for tomorrow, Tues. 3/8 @ 1PM.  Follow-up with PCP/specialist.     Primary Contact: Wife, Danie Haas, [phone]    Medicare pt has received, reviewed, and signed 2nd IM letter informing them of their right to appeal the discharge. Signed copied has been placed on pt bedside chart. 10:45AM - CM reviewed chart. Per review and ID rounds, patient likely to discharge within 24 hours pending diet tolerance. CM spoke with Alcides Joshua, [facility], [facility], [address] [place] [address] (p: [phone]). Per Alcides Joshua, they are able to accept patient back once medically stable. CM sent [facility] PT/OT order to [facility] (p: [phone]) via [employer] and accepted. BLS transport arranged with [facility] for tomorrow, Tuesday, 3/8 @ 1:00PM. CM provided update to patient and patient' wife via phone. Discharge packet and ambulance form to be placed on patient's chart Tuesday morning, 3/8. Nursing to call report to [phone] or [phone]. CM to continue to follow as needed.     Jacey Lozoya, LIBAN   [phone]

## 2022-03-08 VITALS
BODY MASS INDEX: 23.62 KG/M2 | SYSTOLIC BLOOD PRESSURE: 152 MMHG | TEMPERATURE: 97.5 F | RESPIRATION RATE: 18 BRPM | DIASTOLIC BLOOD PRESSURE: 87 MMHG | OXYGEN SATURATION: 97 % | WEIGHT: 165 LBS | HEART RATE: 51 BPM | HEIGHT: 70 IN

## 2022-03-08 PROCEDURE — 99232 SBSQ HOSP IP/OBS MODERATE 35: CPT | Performed by: SURGERY

## 2022-03-08 PROCEDURE — 74011250637 HC RX REV CODE- 250/637: Performed by: INTERNAL MEDICINE

## 2022-03-08 PROCEDURE — 74011000250 HC RX REV CODE- 250: Performed by: NURSE PRACTITIONER

## 2022-03-08 PROCEDURE — 74011250636 HC RX REV CODE- 250/636: Performed by: NURSE PRACTITIONER

## 2022-03-08 PROCEDURE — 74011250637 HC RX REV CODE- 250/637: Performed by: SURGERY

## 2022-03-08 RX ORDER — POTASSIUM CHLORIDE 750 MG/1
40 TABLET, FILM COATED, EXTENDED RELEASE ORAL
Status: COMPLETED | OUTPATIENT
Start: 2022-03-08 | End: 2022-03-08

## 2022-03-08 RX ORDER — BUPROPION HYDROCHLORIDE 150 MG/1
150 TABLET ORAL DAILY
Qty: 30 TABLET | Refills: 0 | Status: SHIPPED | OUTPATIENT
Start: 2022-03-08

## 2022-03-08 RX ADMIN — WATER 10 MG: 1 INJECTION INTRAMUSCULAR; INTRAVENOUS; SUBCUTANEOUS at 01:51

## 2022-03-08 RX ADMIN — PANTOPRAZOLE SODIUM 40 MG: 40 TABLET, DELAYED RELEASE ORAL at 07:22

## 2022-03-08 RX ADMIN — POTASSIUM CHLORIDE 40 MEQ: 750 TABLET, EXTENDED RELEASE ORAL at 12:59

## 2022-03-08 RX ADMIN — FINASTERIDE 5 MG: 5 TABLET, FILM COATED ORAL at 10:09

## 2022-03-08 RX ADMIN — DOCUSATE SODIUM 100 MG: 100 CAPSULE, LIQUID FILLED ORAL at 10:09

## 2022-03-08 RX ADMIN — METOPROLOL TARTRATE 25 MG: 25 TABLET, FILM COATED ORAL at 10:12

## 2022-03-08 RX ADMIN — TAMSULOSIN HYDROCHLORIDE 0.4 MG: 0.4 CAPSULE ORAL at 10:09

## 2022-03-08 RX ADMIN — APIXABAN 2.5 MG: 2.5 TABLET, FILM COATED ORAL at 10:09

## 2022-03-08 RX ADMIN — MEMANTINE HYDROCHLORIDE 10 MG: 10 TABLET ORAL at 10:09

## 2022-03-08 RX ADMIN — BUPROPION HYDROCHLORIDE 150 MG: 150 TABLET, EXTENDED RELEASE ORAL at 10:09

## 2022-03-08 NOTE — PROGRESS NOTES
Physician Progress Note      Yariel Alex  CSN #:                  [id number]  :                       1935  ADMIT DATE:       3/4/2022 6:37 AM  DISCH DATE:  RESPONDING  PROVIDER #:        Abbi Aragon MD          QUERY TEXT:    Dear attending,    Pt admitted with SBO and has dementia documented. Pt noted to agitated and combative, If possible, please document in the progress notes and discharge summary if you are evaluating and / or treating any of the following: The medical record reflects the following:  Risk Factors: Hx. dementia  Clinical Indicators: ?-Per surgery- Patient has advanced dementia. We attempted to place the NG tube at least 4 or 5 times by the nurse, my NP, and myself. The patient was combative and attempted to swing and us repeatedly. I think that an NG tube is the safest thing for the patient, however, after speaking to the patient's wife she wants to avoid the NG tube to avoid his agitation. We discussed restraining him to place an NG tube, but she declined this as well. She wants to see if he can handle this without any artificial gastric decompression. ?-Per RN note- 0009- This RN tried to remove IV and patient yelling when tape being pulled off. This RN asked another RN to come into room to assist. Patient now yelling and threatening to hit RN. Tried to remove IV with assistance of other RN, however patient still screaming/cursing and trying to swing at RN. Charge RN came in room as well, patient still screaming and trying to hit/kick. Code atlas called. 9707- This RN and another RN went into patients room to try and start another IV/get lab work. Patient is stating \"no do not touch me\" and is resisting to even straighten his arm out. For safety reasons and patient previously being combative  Patient yelling and threatening to hit this RN and other staff again when trying to change brief.  This RN tried to explain to patient importance of changing brief and not sitting in urine, however patient still yelling and combative. 3/5-Per PN- Dementia- acting out w agitation on care efforts, discussed w wife, ,wife agrees w trail of Geodon  Treatment: Geodon 15 mg IM on 3/5, code smita, increased nursing supervision, monitor neuro status    Thank you,  Huel Denver RN, BSN  Clinical documentation Improvement  [phone]  Options provided:  -- Dementia with behavioral disturbance  -- Dementia without behavioral disturbance  -- Other - I will add my own diagnosis  -- Disagree - Not applicable / Not valid  -- Disagree - Clinically unable to determine / Unknown  -- Refer to Clinical Documentation Reviewer    PROVIDER RESPONSE TEXT:    This patient has dementia with behavioral disturbances.     Query created by: Marcos Alex on 3/7/2022 8:54 AM      Electronically signed by:  Arianna Rangel MD 3/8/2022 10:33 AM

## 2022-03-08 NOTE — PROGRESS NOTES
Surgery Progress Note    3/8/2022    Admit Date: 3/4/2022    CC: Ready to go home    Subjective:     Patient without event. Having bowel function. Eating. Sleeping this AM.    Constitutional: No fever or chills  Neurologic: No headache  Eyes: No scleral icterus or irritated eyes  Nose: No nasal pain or drainage  Mouth: No oral lesions or sore throat  Cardiac: No palpations or chest pain  Pulmonary: No cough or shortness of breath  Gastrointestinal: No nausea, emesis, diarrhea, or constipation  Genitourinary: No dysuria  Musculoskeletal: No muscle or joint tenderness  Skin: No rashes or lesions  Psychiatric: Dementia  Objective:     Visit Vitals  BP (!) 140/81   Pulse (!) 53   Temp 97.5 °F (36.4 °C)   Resp 18   Ht 5' 10\" (1.778 m)   Wt 165 lb (74.8 kg)   SpO2 95%   BMI 23.68 kg/m²       General: No acute distress, conversant  Eyes: PERRLA, no scleral icterus  HENT: Normocephalic without oral lesions  Neck: Trachea midline without LAD  Cardiac: Normal pulse rate and rhythm  Pulmonary: Symmetric chest rise with normal effort  GI: Soft, NT, ND, large left reducible inguinal hernia, no splenomegaly  Skin: Warm without rash  Extremities: No edema or joint stiffness  Psych: GCS 14    Labs, vital signs, and I/O reviewed.     Assessment:     81-year-old male with large reducible left inguinal hernia and concern for partial SBO without definitive mass on imaging with significant dementia now with resolved abdominal condition    Plan:     Regular diet  Okay for DC home   Follow up with me in 1-2 weeks in clinic for SBO and inguinal hernia horace Howard MD  Bariatric and General Surgeon  [facility]

## 2022-03-08 NOTE — PROGRESS NOTES
Bedside shift change report given to [address] (oncoming nurse) by Cathryn Osgood RN (offgoing nurse). Report included the following information SBAR, Kardex, Intake/Output, MAR and Recent Results.

## 2022-03-08 NOTE — DISCHARGE SUMMARY
Discharge Summary       PATIENT ID: Jairo Barron  MRN: [id number]   YOB: 1935    DATE OF ADMISSION: 3/4/2022  6:37 AM    DATE OF DISCHARGE: 3/8/2022     PRIMARY CARE PROVIDER: Lovell Kanner, MD     ATTENDING PHYSICIAN:  Kareem Zuñiga MD   DISCHARGING PROVIDER: Kareem Zuñiga MD    To contact this individual call [phone] and ask the  to page. If unavailable ask to be transferred the Adult Hospitalist Department. CONSULTATIONS: IP CONSULT TO GENERAL SURGERY  IP CONSULT TO HOSPITALIST    PROCEDURES/SURGERIES: Procedure(s):  ROBOTIC LYSIS OF ADHESIONS POSSIBLE BOWEL RESECTION, POSSIBLE INGUINAL HERNIA REPAIR WITH MESH (URGENT)    DISCHARGE DIAGNOSES:   Per notes:    Admission Summary:   Copied form admit: \" Justin Jacobs a 81 y. o. male who presents with nausea vomiting     Patient lives at [facility], came in today with nausea and vomiting that started yesterday, per patient's wife patient has been \"throwing up black stuff\", patient is a poor historian, patient does complain of mild abdominal pain but denies any other complaints or problems, patient came to the ER and was found to have small bowel obstruction and was requested to be admitted to the hospitalist service, no additional history could be obtained from the patient or his wife  \"     Interval history / Subjective:      3/7/2022 :   · Benign KUB  · Benign abd exam  · Advancing to cl liq  · Po meds - many resumed see MAR  · Claudean Butcher: home in am on usual /reg diet as of 3/7 am          Assessment & Plan:            Active Problems:    SBO (small bowel obstruction) ([address] (3/4/2022)- npo, iv to be restarted  Pt not in pain, abd is not distended, cont npo   3/6:  · Benign KUB  · Benign abd exam  · Advancing to cl liq  · Po meds - many resumed see [facility]    3/8 stable for discharge, no surgical/acute medical issues, ashlee diet, case discussed w surgery 3/8/2022       A. fib, rate controlled  - resuming OAC/BB 3/6    Dementia- acting out w agitation on care efforts, discussed w wife, ,wife agrees w trail of Geodon   - stable on usual meds forward after initial geodon dose; see dc med list      Depression     Hypertension      BP Readings from Last 1 Encounters:   03/07/22 138/69                  [address] COURSE:   As above     DISCHARGE DIAGNOSES / PLAN:      As above     BMI: Body mass index is 23.68 kg/m². . This patient: Has a BMI within normal limits. PENDING TEST RESULTS:   At the time of discharge the following test results are still pending: na      ADDITIONAL CARE RECOMMENDATIONS:   Na     NOTIFY YOUR PHYSICIAN FOR ANY OF THE FOLLOWING:   Fever over 101 degrees for 24 hours. Chest pain, shortness of breath, fever, chills, nausea, vomiting, diarrhea, change in mentation, falling, weakness, bleeding. Severe pain or pain not relieved by medications, as well as any other signs or symptoms that you may have questions about. FOLLOW UP APPOINTMENTS:    Follow-up Information    None            DIET: Regular Diet    ACTIVITY: Activity as tolerated    EQUIPMENT needed: na     DISCHARGE MEDICATIONS:  Current Discharge Medication List          DISPOSITION:    Home With:   OT  PT  [facility]  RN      x Long term SNF/Inpatient Rehab    Independent/assisted living    Hospice    Other:       PATIENT CONDITION AT DISCHARGE:     Functional status   x Poor     Deconditioned     Independent      Cognition     Lucid     Forgetful    x Dementia      Catheters/lines (plus indication)    [place]     PICC     PEG    x None      Code status    x Full code     DNR      PHYSICAL EXAMINATION AT DISCHARGE:  General:          Alert, cooperative, no distress, appears stated age.      Visit Vitals  BP (!) 152/87   Pulse (!) 51   Temp 97.5 °F (36.4 °C)   Resp 18   Ht 5' 10\" (1.778 m)   Wt 74.8 kg (165 lb)   SpO2 97%   BMI 23.68 kg/m²      CHRONIC MEDICAL DIAGNOSES:  Problem List as of 3/8/2022 Date Reviewed: 9/13/2017 Codes Class Noted - Resolved    SBO (small bowel obstruction) ([facility] ICD-10-CM: E94.061  ICD-9-CM: 560.9  3/4/2022 - Present        Chronic obstructive pulmonary disease ([facility] ICD-10-CM: J44.9  ICD-9-CM: 911  10/19/2017 - Present        Urine retention ICD-10-CM: R33.9  ICD-9-CM: 788.20  9/29/2017 - Present        Urinary tract infection ICD-10-CM: N39.0  ICD-9-CM: 599.0  9/12/2017 - Present        Pyelonephritis ICD-10-CM: N12  ICD-9-CM: 590.80  9/12/2017 - Present        UTI (urinary tract infection) ICD-10-CM: N39.0  ICD-9-CM: 599.0  9/12/2017 - Present        History of CVA (cerebrovascular accident) (Chronic) ICD-10-CM: K05.23  ICD-9-CM: V12.54  5/4/2017 - Present        CKD (chronic kidney disease) stage 3, GFR 30-59 ml/min (HCC) (Chronic) ICD-10-CM: N18.30  ICD-9-CM: 585.3  5/4/2017 - Present        Simple chronic bronchitis (HCC) (Chronic) ICD-10-CM: J41.0  ICD-9-CM: 491.0  5/4/2017 - Present        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              Chronic tension-type headache, not intractable ICD-10-CM: K74.349  ICD-9-CM: 339.12  11/8/2015 - Present        Gait apraxia of elderly ICD-10-CM: R48.2  ICD-9-CM: 784.69  11/8/2015 - Present        Spondylolysis of cervical region ICD-10-CM: M43.02  ICD-9-CM: 756.19  11/8/2015 - Present        Cervical spondyloarthritis ICD-10-CM: R77.767  ICD-9-CM: 721.0  11/8/2015 - Present        Cerebral thrombosis with cerebral infarction [facility]) ICD-10-CM: I63.30  ICD-9-CM: 434.01  11/8/2015 - Present        Cerebral infarction due to stenosis of carotid artery ([facility] ICD-10-CM: E03.489  ICD-9-CM: 433.11  11/8/2015 - Present Depression ICD-10-CM: F31. A  ICD-9-CM: 207  7/7/2015 - Present        Acute bronchitis ICD-10-CM: J20.9  ICD-9-CM: 466.0  7/19/2013 - Present        Edema extremities ICD-10-CM: R60.0  ICD-9-CM: 782.3  7/19/2013 - Present        HTN (hypertension) ICD-10-CM: I10  ICD-9-CM: 401.9  7/19/2013 - Present        Atrial fibrillation ([address] ICD-10-CM: I48.91  ICD-9-CM: 427.31  7/19/2013 - Present    Overview Signed 7/19/2013  1:21 PM by Bob dubon.               RESOLVED: Supratherapeutic INR ICD-10-CM: R79.1  ICD-9-CM: 790.92  11/7/2015 - 2/9/2017              Greater than 20  minutes were spent with the patient on counseling and coordination of care    Signed:   Linda Gregg MD  3/8/2022  9:24 AM

## 2022-03-08 NOTE — PROGRESS NOTES
Pt being transferred via [facility] to [facility]. Wife is at the bedside. Report given to [address] [place] [address].

## 2022-03-18 PROBLEM — J41.0 SIMPLE CHRONIC BRONCHITIS (HCC): Status: ACTIVE | Noted: 2017-05-04

## 2022-03-19 PROBLEM — J44.9 CHRONIC OBSTRUCTIVE PULMONARY DISEASE (HCC): Status: ACTIVE | Noted: 2017-10-19

## 2022-03-19 PROBLEM — Z86.73 HISTORY OF CVA (CEREBROVASCULAR ACCIDENT): Status: ACTIVE | Noted: 2017-05-04

## 2022-03-19 PROBLEM — K56.609 SBO (SMALL BOWEL OBSTRUCTION) (HCC): Status: ACTIVE | Noted: 2022-03-04

## 2022-03-19 PROBLEM — R33.9 URINE RETENTION: Status: ACTIVE | Noted: 2017-09-29

## 2022-03-19 PROBLEM — N39.0 UTI (URINARY TRACT INFECTION): Status: ACTIVE | Noted: 2017-09-12

## 2022-03-19 PROBLEM — N39.0 URINARY TRACT INFECTION: Status: ACTIVE | Noted: 2017-09-12

## 2022-03-19 PROBLEM — N12 PYELONEPHRITIS: Status: ACTIVE | Noted: 2017-09-12

## 2022-03-20 PROBLEM — N18.30 CKD (CHRONIC KIDNEY DISEASE) STAGE 3, GFR 30-59 ML/MIN (HCC): Status: ACTIVE | Noted: 2017-05-04

## 2022-05-02 ENCOUNTER — HOSPITAL ENCOUNTER (EMERGENCY)
Age: 87
Discharge: HOME | End: 2022-05-02
Attending: EMERGENCY MEDICINE
Payer: MEDICARE

## 2022-05-02 ENCOUNTER — APPOINTMENT (OUTPATIENT)
Dept: GENERAL RADIOLOGY | Age: 87
End: 2022-05-02
Attending: EMERGENCY MEDICINE
Payer: MEDICARE

## 2022-05-02 ENCOUNTER — APPOINTMENT (OUTPATIENT)
Dept: CT IMAGING | Age: 87
End: 2022-05-02
Attending: EMERGENCY MEDICINE
Payer: MEDICARE

## 2022-05-02 VITALS
OXYGEN SATURATION: 91 % | HEART RATE: 65 BPM | SYSTOLIC BLOOD PRESSURE: 182 MMHG | DIASTOLIC BLOOD PRESSURE: 84 MMHG | TEMPERATURE: 97.3 F | WEIGHT: 167.55 LBS | RESPIRATION RATE: 20 BRPM | BODY MASS INDEX: 24.04 KG/M2

## 2022-05-02 DIAGNOSIS — S32.020A CLOSED COMPRESSION FRACTURE OF L2 VERTEBRA, INITIAL ENCOUNTER (HCC): Primary | ICD-10-CM

## 2022-05-02 LAB
ALBUMIN SERPL-MCNC: 3.2 G/DL (ref 3.5–5)
ALBUMIN/GLOB SERPL: 0.8 {RATIO} (ref 1.1–2.2)
ALP SERPL-CCNC: 104 U/L (ref 45–117)
ALT SERPL-CCNC: 23 U/L (ref 12–78)
ANION GAP SERPL CALC-SCNC: 7 MMOL/L (ref 5–15)
APPEARANCE UR: ABNORMAL
AST SERPL-CCNC: 18 U/L (ref 15–37)
BACTERIA URNS QL MICRO: NEGATIVE /HPF
BASOPHILS # BLD: 0 K/UL (ref 0–0.1)
BASOPHILS NFR BLD: 1 % (ref 0–1)
BILIRUB SERPL-MCNC: 0.7 MG/DL (ref 0.2–1)
BILIRUB UR QL: NEGATIVE
BUN SERPL-MCNC: 20 MG/DL (ref 6–20)
BUN/CREAT SERPL: 14 (ref 12–20)
CALCIUM SERPL-MCNC: 8.6 MG/DL (ref 8.5–10.1)
CHLORIDE SERPL-SCNC: 109 MMOL/L (ref 97–108)
CO2 SERPL-SCNC: 30 MMOL/L (ref 21–32)
COLOR UR: ABNORMAL
CREAT SERPL-MCNC: 1.38 MG/DL (ref 0.7–1.3)
DIFFERENTIAL METHOD BLD: ABNORMAL
EOSINOPHIL # BLD: 0.2 K/UL (ref 0–0.4)
EOSINOPHIL NFR BLD: 3 % (ref 0–7)
EPITH CASTS URNS QL MICRO: ABNORMAL /LPF
ERYTHROCYTE [DISTWIDTH] IN BLOOD BY AUTOMATED COUNT: 13.8 % (ref 11.5–14.5)
GLOBULIN SER CALC-MCNC: 3.8 G/DL (ref 2–4)
GLUCOSE SERPL-MCNC: 93 MG/DL (ref 65–100)
GLUCOSE UR STRIP.AUTO-MCNC: NEGATIVE MG/DL
HCT VFR BLD AUTO: 49.2 % (ref 36.6–50.3)
HGB BLD-MCNC: 15.7 G/DL (ref 12.1–17)
HGB UR QL STRIP: NEGATIVE
IMM GRANULOCYTES # BLD AUTO: 0.1 K/UL (ref 0–0.04)
IMM GRANULOCYTES NFR BLD AUTO: 1 % (ref 0–0.5)
KETONES UR QL STRIP.AUTO: NEGATIVE MG/DL
LEUKOCYTE ESTERASE UR QL STRIP.AUTO: NEGATIVE
LYMPHOCYTES # BLD: 1 K/UL (ref 0.8–3.5)
LYMPHOCYTES NFR BLD: 13 % (ref 12–49)
MCH RBC QN AUTO: 29.3 PG (ref 26–34)
MCHC RBC AUTO-ENTMCNC: 31.9 G/DL (ref 30–36.5)
MCV RBC AUTO: 91.8 FL (ref 80–99)
MONOCYTES # BLD: 0.5 K/UL (ref 0–1)
MONOCYTES NFR BLD: 7 % (ref 5–13)
NEUTS SEG # BLD: 5.9 K/UL (ref 1.8–8)
NEUTS SEG NFR BLD: 76 % (ref 32–75)
NITRITE UR QL STRIP.AUTO: NEGATIVE
NRBC # BLD: 0 K/UL (ref 0–0.01)
NRBC BLD-RTO: 0 PER 100 WBC
PH UR STRIP: 6 [PH] (ref 5–8)
PLATELET # BLD AUTO: 207 K/UL (ref 150–400)
PMV BLD AUTO: 9.5 FL (ref 8.9–12.9)
POTASSIUM SERPL-SCNC: 3.9 MMOL/L (ref 3.5–5.1)
PROT SERPL-MCNC: 7 G/DL (ref 6.4–8.2)
PROT UR STRIP-MCNC: NEGATIVE MG/DL
RBC # BLD AUTO: 5.36 M/UL (ref 4.1–5.7)
RBC #/AREA URNS HPF: ABNORMAL /HPF (ref 0–5)
SODIUM SERPL-SCNC: 146 MMOL/L (ref 136–145)
SP GR UR REFRACTOMETRY: 1.02 (ref 1–1.03)
UR CULT HOLD, URHOLD: NORMAL
UROBILINOGEN UR QL STRIP.AUTO: 1 EU/DL (ref 0.2–1)
WBC # BLD AUTO: 7.7 K/UL (ref 4.1–11.1)
WBC URNS QL MICRO: ABNORMAL /HPF (ref 0–4)

## 2022-05-02 PROCEDURE — 85025 COMPLETE CBC W/AUTO DIFF WBC: CPT

## 2022-05-02 PROCEDURE — 72128 CT CHEST SPINE W/O DYE: CPT

## 2022-05-02 PROCEDURE — 72131 CT LUMBAR SPINE W/O DYE: CPT

## 2022-05-02 PROCEDURE — 80053 COMPREHEN METABOLIC PANEL: CPT

## 2022-05-02 PROCEDURE — 72192 CT PELVIS W/O DYE: CPT

## 2022-05-02 PROCEDURE — 96372 THER/PROPH/DIAG INJ SC/IM: CPT

## 2022-05-02 PROCEDURE — 70450 CT HEAD/BRAIN W/O DYE: CPT

## 2022-05-02 PROCEDURE — 81001 URINALYSIS AUTO W/SCOPE: CPT

## 2022-05-02 PROCEDURE — 74011250636 HC RX REV CODE- 250/636: Performed by: EMERGENCY MEDICINE

## 2022-05-02 PROCEDURE — 99285 EMERGENCY DEPT VISIT HI MDM: CPT

## 2022-05-02 PROCEDURE — 36415 COLL VENOUS BLD VENIPUNCTURE: CPT

## 2022-05-02 PROCEDURE — 93005 ELECTROCARDIOGRAM TRACING: CPT

## 2022-05-02 PROCEDURE — 71045 X-RAY EXAM CHEST 1 VIEW: CPT

## 2022-05-02 RX ORDER — HYDROCODONE BITARTRATE AND ACETAMINOPHEN 5; 325 MG/1; MG/1
1 TABLET ORAL
Qty: 12 TABLET | Refills: 0 | Status: SHIPPED | OUTPATIENT
Start: 2022-05-02 | End: 2022-05-05

## 2022-05-02 RX ORDER — HYDROMORPHONE HYDROCHLORIDE 1 MG/ML
0.5 INJECTION, SOLUTION INTRAMUSCULAR; INTRAVENOUS; SUBCUTANEOUS ONCE
Status: COMPLETED | OUTPATIENT
Start: 2022-05-02 | End: 2022-05-02

## 2022-05-02 RX ORDER — POLYETHYLENE GLYCOL 3350 17 G/17G
17 POWDER, FOR SOLUTION ORAL DAILY
Qty: 119 G | Refills: 0 | Status: SHIPPED | OUTPATIENT
Start: 2022-05-02 | End: 2022-05-09

## 2022-05-02 RX ADMIN — HYDROMORPHONE HYDROCHLORIDE 0.5 MG: 1 INJECTION, SOLUTION INTRAMUSCULAR; INTRAVENOUS; SUBCUTANEOUS at 20:36

## 2022-05-02 NOTE — ED PROVIDER NOTES
28-year-old male with a history of dementia, heart failure, hypertension, stroke on Eliquis presents with a chief complaint of back pain. Patient tripped while at his nursing facility using his walker. It is not clear whether he hit his head or loss consciousness. He complains of midline lumbar and thoracic pain. Past Medical History:   Diagnosis Date    Allergy     seasonal    Arrhythmia     Asthma     Dementia ([address]     Depression     Heart failure (HCC)     Hypertension     Neurological disorder     dementia    Other ill-defined conditions(799.89)     a-fib    Stroke [facility])        Past Surgical History:   Procedure Laterality Date    HX HEENT      cateract surgery         Family History:   Problem Relation Age of Onset    Stroke Mother     Heart Disease Mother     Cancer Father         prostate    Cancer Brother         colon       Social History     Socioeconomic History    Marital status:      Spouse name: Not on file    Number of children: Not on file    Years of education: Not on file    Highest education level: Not on file   Occupational History    Not on file   Tobacco Use    Smoking status: Never Smoker    Smokeless tobacco: Never Used   Substance and Sexual Activity    Alcohol use: No    Drug use: No     Types: Prescription, OTC    Sexual activity: Never   Other Topics Concern    Not on file   Social History Narrative    Not on file     Social Determinants of Health     Financial Resource Strain:     Difficulty of Paying Living Expenses: Not on file   Food Insecurity:     Worried About Running Out of Food in the Last Year: Not on file    Asmita of Food in the Last Year: Not on file   Transportation Needs:     Lack of Transportation (Medical): Not on file    Lack of Transportation (Non-Medical):  Not on file   Physical Activity:     Days of Exercise per Week: Not on file    Minutes of Exercise per Session: Not on file   Stress:     Feeling of Stress : Not on file   Social Connections:     Frequency of Communication with Friends and Family: Not on file    Frequency of Social Gatherings with Friends and Family: Not on file    Attends Gnosticism Services: Not on file    Active Member of Clubs or Organizations: Not on file    Attends Club or Organization Meetings: Not on file    Marital Status: Not on file   Intimate Partner Violence:     Fear of Current or Ex-Partner: Not on file    Emotionally Abused: Not on file    Physically Abused: Not on file    Sexually Abused: Not on file   Housing Stability:     Unable to Pay for Housing in the Last Year: Not on file    Number of [place] in the Last Year: Not on file    Unstable Housing in the Last Year: Not on file         ALLERGIES: No known allergies    Review of Systems   Unable to perform ROS: Dementia       Vitals:    05/02/22 1823   BP: (!) 175/99   Pulse: (!) 53   Resp: 16   Temp: 97.3 °F (36.3 °C)   SpO2: 97%   Weight: 76 kg (167 lb 8.8 oz)            Physical Exam  Vitals and nursing note reviewed. Constitutional:       General: He is not in acute distress. Appearance: Normal appearance. He is not ill-appearing, toxic-appearing or diaphoretic. HENT:      Head: Normocephalic. Eyes:      Extraocular Movements: Extraocular movements intact. Cardiovascular:      Rate and Rhythm: Normal rate. Pulses: Normal pulses. Pulmonary:      Effort: Pulmonary effort is normal. No respiratory distress. Abdominal:      General: There is no distension. Tenderness: There is no abdominal tenderness. There is no guarding. Musculoskeletal:         General: Normal range of motion. Cervical back: Normal range of motion. Comments:   Thoracic and lumbar tenderness   Skin:     General: Skin is dry. Capillary Refill: Capillary refill takes less than 2 seconds. Neurological:      Mental Status: He is alert and oriented to person, place, and time.    Psychiatric:         Mood and Affect: Mood normal.          MDM  Number of Diagnoses or Management Options  Closed compression fracture of L2 vertebra, initial encounter [facility])  Diagnosis management comments:   Patient presents after a fall. He has midline low back pain. Obtained rule out traumatic injury. CT demonstrates L2 compression fracture. Patient's pain controlled. Will discharge for follow-up outpatient with pain control and laxatives. Discussed my clinical impression(s), any labs and/or radiology results with the patient's family. I answered any questions and addressed any concerns. Discussed the importance of following up with their primary care physician and/or specialist(s). Discussed signs or symptoms that would warrant return back to the ER for further evaluation. The patient's family is agreeable with discharge.     EKG shows atrial fibrillation at a rate of 59, otherwise normal intervals, left axis deviation, no ischemic changes       Amount and/or Complexity of Data Reviewed  Clinical lab tests: ordered and reviewed  Tests in the radiology section of CPT®: ordered and reviewed           Procedures

## 2022-05-02 NOTE — ED TRIAGE NOTES
Pt arrives with a fall ouot of his rollator walker that he was sitting on. Slid down and hit his back, pt was ambulatory after incident but sent for clearance. Pt complaint at this time is some buttock/loewr back pain.

## 2022-05-03 LAB
ATRIAL RATE: 60 BPM
CALCULATED R AXIS, ECG10: -61 DEGREES
CALCULATED T AXIS, ECG11: 31 DEGREES
DIAGNOSIS, 93000: NORMAL
Q-T INTERVAL, ECG07: 456 MS
QRS DURATION, ECG06: 88 MS
QTC CALCULATION (BEZET), ECG08: 451 MS
VENTRICULAR RATE, ECG03: 59 BPM

## 2022-05-03 NOTE — ED NOTES
Bedside verbal report given to [facility] staff. Discharge note: The patient was discharged back to [facility] in stable condition with transport staff. The patient is alert and oriented to name, is in no respiratory distress. The patient's diagnosis, condition and treatment were explained to wife by Dr Ni López and reinforced by nurse. The wife expressed understanding of discharge instructions, prescriptions, and plan of care. A discharge plan has been developed. A  was not involved in the process.

## 2022-05-03 NOTE — ED NOTES
Pt assisted with incontinent care. Pt with large amount of formed brown stool and urine. Skin care done. Pt with stool embedded under finger nails. Tried to clean. Pt also has a large red ~ 12 in abrasion to right mid back. Skin care done.

## 2022-05-03 NOTE — ED NOTES
TRANSFER - OUT REPORT:    Verbal report given to Abe Zhang (name) on Enid Enamorado  being transferred to [facility] (unit) for routine progression of care       Report consisted of patients Situation, Background, Assessment and   Recommendations(SBAR). Information from the following report(s) SBAR, Kardex, ED Summary and MAR was reviewed with the receiving nurse. Lines:   Peripheral IV 05/02/22 Left Antecubital (Active)   Site Assessment Clean, dry, & intact 05/02/22 2013   Phlebitis Assessment 0 05/02/22 2013   Dressing Status Clean, dry, & intact 05/02/22 2013   Hub Color/Line Status Flushed 05/02/22 2013   Action Taken Blood drawn 05/02/22 2013        Opportunity for questions and clarification was provided.       Patient transported with:   Monitor

## 2022-09-29 ENCOUNTER — HOSPITAL ENCOUNTER (EMERGENCY)
Age: 87
Discharge: HOME | DRG: 291 | End: 2022-09-30
Attending: EMERGENCY MEDICINE
Payer: MEDICARE

## 2022-09-29 ENCOUNTER — APPOINTMENT (OUTPATIENT)
Dept: GENERAL RADIOLOGY | Age: 87
DRG: 291 | End: 2022-09-29
Attending: EMERGENCY MEDICINE
Payer: MEDICARE

## 2022-09-29 DIAGNOSIS — R06.02 SHORTNESS OF BREATH: Primary | ICD-10-CM

## 2022-09-29 LAB
ALBUMIN SERPL-MCNC: 3.1 G/DL (ref 3.5–5)
ALBUMIN/GLOB SERPL: 0.8 {RATIO} (ref 1.1–2.2)
ALP SERPL-CCNC: 129 U/L (ref 45–117)
ALT SERPL-CCNC: 30 U/L (ref 12–78)
ANION GAP SERPL CALC-SCNC: 1 MMOL/L (ref 5–15)
AST SERPL-CCNC: 22 U/L (ref 15–37)
BASOPHILS # BLD: 0 K/UL (ref 0–0.1)
BASOPHILS NFR BLD: 1 % (ref 0–1)
BILIRUB SERPL-MCNC: 1.2 MG/DL (ref 0.2–1)
BUN SERPL-MCNC: 19 MG/DL (ref 6–20)
BUN/CREAT SERPL: 13 (ref 12–20)
CALCIUM SERPL-MCNC: 9 MG/DL (ref 8.5–10.1)
CHLORIDE SERPL-SCNC: 108 MMOL/L (ref 97–108)
CO2 SERPL-SCNC: 33 MMOL/L (ref 21–32)
COMMENT, HOLDF: NORMAL
CREAT SERPL-MCNC: 1.45 MG/DL (ref 0.7–1.3)
DIFFERENTIAL METHOD BLD: ABNORMAL
EOSINOPHIL # BLD: 0.2 K/UL (ref 0–0.4)
EOSINOPHIL NFR BLD: 4 % (ref 0–7)
ERYTHROCYTE [DISTWIDTH] IN BLOOD BY AUTOMATED COUNT: 14.9 % (ref 11.5–14.5)
GLOBULIN SER CALC-MCNC: 3.7 G/DL (ref 2–4)
GLUCOSE SERPL-MCNC: 103 MG/DL (ref 65–100)
HCT VFR BLD AUTO: 46.1 % (ref 36.6–50.3)
HGB BLD-MCNC: 15 G/DL (ref 12.1–17)
IMM GRANULOCYTES # BLD AUTO: 0 K/UL (ref 0–0.04)
IMM GRANULOCYTES NFR BLD AUTO: 0 % (ref 0–0.5)
LYMPHOCYTES # BLD: 1.1 K/UL (ref 0.8–3.5)
LYMPHOCYTES NFR BLD: 25 % (ref 12–49)
MCH RBC QN AUTO: 29.9 PG (ref 26–34)
MCHC RBC AUTO-ENTMCNC: 32.5 G/DL (ref 30–36.5)
MCV RBC AUTO: 91.8 FL (ref 80–99)
MONOCYTES # BLD: 0.6 K/UL (ref 0–1)
MONOCYTES NFR BLD: 12 % (ref 5–13)
NEUTS SEG # BLD: 2.6 K/UL (ref 1.8–8)
NEUTS SEG NFR BLD: 58 % (ref 32–75)
NRBC # BLD: 0 K/UL (ref 0–0.01)
NRBC BLD-RTO: 0 PER 100 WBC
PLATELET # BLD AUTO: 157 K/UL (ref 150–400)
PMV BLD AUTO: 10.1 FL (ref 8.9–12.9)
POTASSIUM SERPL-SCNC: 3.7 MMOL/L (ref 3.5–5.1)
PROT SERPL-MCNC: 6.8 G/DL (ref 6.4–8.2)
RBC # BLD AUTO: 5.02 M/UL (ref 4.1–5.7)
SAMPLES BEING HELD,HOLD: NORMAL
SODIUM SERPL-SCNC: 142 MMOL/L (ref 136–145)
TROPONIN-HIGH SENSITIVITY: 10 NG/L (ref 0–76)
WBC # BLD AUTO: 4.6 K/UL (ref 4.1–11.1)

## 2022-09-29 PROCEDURE — 93005 ELECTROCARDIOGRAM TRACING: CPT

## 2022-09-29 PROCEDURE — 80053 COMPREHEN METABOLIC PANEL: CPT

## 2022-09-29 PROCEDURE — 84484 ASSAY OF TROPONIN QUANT: CPT

## 2022-09-29 PROCEDURE — 36415 COLL VENOUS BLD VENIPUNCTURE: CPT

## 2022-09-29 PROCEDURE — 99285 EMERGENCY DEPT VISIT HI MDM: CPT

## 2022-09-29 PROCEDURE — 85025 COMPLETE CBC W/AUTO DIFF WBC: CPT

## 2022-09-29 PROCEDURE — 71045 X-RAY EXAM CHEST 1 VIEW: CPT

## 2022-09-29 RX ORDER — DOXYCYCLINE HYCLATE 100 MG
100 TABLET ORAL 2 TIMES DAILY
Qty: 14 TABLET | Refills: 0 | Status: SHIPPED | OUTPATIENT
Start: 2022-09-29 | End: 2022-10-06

## 2022-09-29 NOTE — ED PROVIDER NOTES
History of hypertension, atrial fibrillation, stroke, dementia, heart failure, allergies, asthma, depression. He presents via EMS after staff noted low O2 saturations at his living facility. He denies complaints. No fever, cough, chest pain, dyspnea, vomiting, diarrhea. No weakness. He states that he has been eating well. Past Medical History:   Diagnosis Date    Allergy     seasonal    Arrhythmia     Asthma     Dementia ([address]     Depression     Heart failure (HCC)     Hypertension     Neurological disorder     dementia    Other ill-defined conditions(799.89)     a-fib    Stroke [facility])        Past Surgical History:   Procedure Laterality Date    HX HEENT      cateract surgery         Family History:   Problem Relation Age of Onset    Stroke Mother     Heart Disease Mother     Cancer Father         prostate    Cancer Brother         colon       Social History     Socioeconomic History    Marital status:      Spouse name: Not on file    Number of children: Not on file    Years of education: Not on file    Highest education level: Not on file   Occupational History    Not on file   Tobacco Use    Smoking status: Never    Smokeless tobacco: Never   Substance and Sexual Activity    Alcohol use: No    Drug use: No     Types: Prescription, OTC    Sexual activity: Never   Other Topics Concern    Not on file   Social History Narrative    Not on file     Social Determinants of Health     Financial Resource Strain: Not on file   Food Insecurity: Not on file   Transportation Needs: Not on file   Physical Activity: Not on file   Stress: Not on file   Social Connections: Not on file   Intimate Partner Violence: Not on file   Housing Stability: Not on file         ALLERGIES: No known allergies    Review of Systems   Unable to perform ROS: Dementia     There were no vitals filed for this visit. Physical Exam  Vitals and nursing note reviewed. Constitutional:       Appearance: He is well-developed.    HENT: Head: Normocephalic and atraumatic. Eyes:      Conjunctiva/sclera: Conjunctivae normal.   Neck:      Trachea: No tracheal deviation. Cardiovascular:      Rate and Rhythm: Normal rate and regular rhythm. Heart sounds: Normal heart sounds. No murmur heard. No friction rub. No gallop. Pulmonary:      Effort: Pulmonary effort is normal.      Breath sounds: Normal breath sounds. Abdominal:      Palpations: Abdomen is soft. Tenderness: There is no abdominal tenderness. Musculoskeletal:         General: No deformity. Cervical back: Neck supple. Skin:     General: Skin is warm and dry. Neurological:      Mental Status: He is alert. Comments: Dementia        MDM         Procedures    EKG: Atrial fibrillation; rate of 63; left axis deviation; anterior Q waves; artifact; no ischemic changes noted. Kaitlynn Baker MD  3:30 PM    Progress Note:  Results, treatment, and follow up plan have been discussed with patient/wife. Questions were answered. Kaitlynn Baker MD    Assessment/plan: He presents after being found to have low O2 sats at his nursing home. I suspect they were not getting a good waveform as his fingers are cold. No respiratory distress noted reassuring appearance/exam with stable vital signs. CBC, CMP, EKG, troponin okay. Chest x-ray shows:       1. Unchanged mild cardiomegaly. 2. New mild right pleural effusion. 2. Patchy opacities in both lung bases may be related to airspace disease versus atelectasis. Suspect atelectasis over airspace disease but will cover with doxycycline. Home with PCP follow-up. Return precautions.   Kaitlynn Baker MD

## 2022-09-29 NOTE — PROGRESS NOTES
9/29/22; 1720 -   CM notes CM Consult for discharge transport. [facility]) phone [phone] ETA is TBD.     NURSING TO CALL REPORT: [phone]    CRM: Marcela Levine, MPH, [facility].: [phone] or [phone]

## 2022-09-29 NOTE — ED TRIAGE NOTES
Pt arrives via EMS from assisted living memory care unit c/o SOB. Per EMS, staff at memory care unit could not get good pulse ox reading on pt today and were concerned that his hands were cold and discolored. Pt denies any pain or complaints. EMS administered nebulizer tx en route and placed 20g L AC. Upon arrival, pt's SpO2 is 100% on RA and pt has normal WOB. Clear speech, follows commands, NAD.

## 2022-09-30 VITALS
RESPIRATION RATE: 28 BRPM | DIASTOLIC BLOOD PRESSURE: 103 MMHG | HEART RATE: 70 BPM | OXYGEN SATURATION: 96 % | SYSTOLIC BLOOD PRESSURE: 165 MMHG | TEMPERATURE: 97.5 F

## 2022-09-30 LAB
CALCULATED R AXIS, ECG10: -53 DEGREES
CALCULATED T AXIS, ECG11: 11 DEGREES
DIAGNOSIS, 93000: NORMAL
Q-T INTERVAL, ECG07: 490 MS
QRS DURATION, ECG06: 90 MS
QTC CALCULATION (BEZET), ECG08: 501 MS
VENTRICULAR RATE, ECG03: 63 BPM

## 2022-09-30 NOTE — ED NOTES
The patient left the Emergency Department via [facility] alert and oriented x1 which is patient's baseline and in no acute distress. The patient was encouraged to call or return to the ED for worsening issues or problems and was encouraged to schedule a follow up appointment for continuing care.      The patient verbalized understanding of discharge instructions and prescriptions, all questions were answered. The patient has no further concerns at this time.

## 2022-10-02 ENCOUNTER — HOSPITAL ENCOUNTER (INPATIENT)
Age: 87
LOS: 4 days | Discharge: HOME HEALTH | DRG: 291 | End: 2022-10-06
Attending: STUDENT IN AN ORGANIZED HEALTH CARE EDUCATION/TRAINING PROGRAM | Admitting: FAMILY MEDICINE
Payer: MEDICARE

## 2022-10-02 ENCOUNTER — APPOINTMENT (OUTPATIENT)
Dept: CT IMAGING | Age: 87
DRG: 291 | End: 2022-10-02
Attending: STUDENT IN AN ORGANIZED HEALTH CARE EDUCATION/TRAINING PROGRAM
Payer: MEDICARE

## 2022-10-02 ENCOUNTER — APPOINTMENT (OUTPATIENT)
Dept: GENERAL RADIOLOGY | Age: 87
DRG: 291 | End: 2022-10-02
Attending: STUDENT IN AN ORGANIZED HEALTH CARE EDUCATION/TRAINING PROGRAM
Payer: MEDICARE

## 2022-10-02 DIAGNOSIS — R26.2 UNABLE TO AMBULATE: ICD-10-CM

## 2022-10-02 DIAGNOSIS — J90 PLEURAL EFFUSION: ICD-10-CM

## 2022-10-02 DIAGNOSIS — R09.02 HYPOXIA: ICD-10-CM

## 2022-10-02 DIAGNOSIS — R60.9 PERIPHERAL EDEMA: ICD-10-CM

## 2022-10-02 DIAGNOSIS — R06.02 SOB (SHORTNESS OF BREATH): Primary | ICD-10-CM

## 2022-10-02 PROBLEM — I48.91 A-FIB (HCC): Status: ACTIVE | Noted: 2022-10-02

## 2022-10-02 PROBLEM — R79.89 ELEVATED SERUM CREATININE: Status: ACTIVE | Noted: 2022-10-02

## 2022-10-02 PROBLEM — R53.1 GENERALIZED WEAKNESS: Status: ACTIVE | Noted: 2022-10-02

## 2022-10-02 PROBLEM — I50.9 ACUTE CHF (CONGESTIVE HEART FAILURE) (HCC): Status: ACTIVE | Noted: 2022-10-02

## 2022-10-02 PROBLEM — R60.0 EDEMA OF BOTH LOWER EXTREMITIES: Status: ACTIVE | Noted: 2022-10-02

## 2022-10-02 LAB
ALBUMIN SERPL-MCNC: 3.1 G/DL (ref 3.5–5)
ALBUMIN/GLOB SERPL: 0.8 {RATIO} (ref 1.1–2.2)
ALP SERPL-CCNC: 129 U/L (ref 45–117)
ALT SERPL-CCNC: 25 U/L (ref 12–78)
ANION GAP SERPL CALC-SCNC: 2 MMOL/L (ref 5–15)
AST SERPL-CCNC: 19 U/L (ref 15–37)
BASOPHILS # BLD: 0 K/UL (ref 0–0.1)
BASOPHILS NFR BLD: 1 % (ref 0–1)
BILIRUB SERPL-MCNC: 1.2 MG/DL (ref 0.2–1)
BNP SERPL-MCNC: ABNORMAL PG/ML
BUN SERPL-MCNC: 17 MG/DL (ref 6–20)
BUN/CREAT SERPL: 13 (ref 12–20)
CALCIUM SERPL-MCNC: 9 MG/DL (ref 8.5–10.1)
CHLORIDE SERPL-SCNC: 109 MMOL/L (ref 97–108)
CO2 SERPL-SCNC: 30 MMOL/L (ref 21–32)
COMMENT, HOLDF: NORMAL
CREAT SERPL-MCNC: 1.32 MG/DL (ref 0.7–1.3)
DIFFERENTIAL METHOD BLD: ABNORMAL
EOSINOPHIL # BLD: 0.2 K/UL (ref 0–0.4)
EOSINOPHIL NFR BLD: 5 % (ref 0–7)
ERYTHROCYTE [DISTWIDTH] IN BLOOD BY AUTOMATED COUNT: 14.7 % (ref 11.5–14.5)
FLUAV RNA SPEC QL NAA+PROBE: NOT DETECTED
FLUBV RNA SPEC QL NAA+PROBE: NOT DETECTED
GLOBULIN SER CALC-MCNC: 3.7 G/DL (ref 2–4)
GLUCOSE SERPL-MCNC: 87 MG/DL (ref 65–100)
HCT VFR BLD AUTO: 45.7 % (ref 36.6–50.3)
HGB BLD-MCNC: 15.1 G/DL (ref 12.1–17)
IMM GRANULOCYTES # BLD AUTO: 0 K/UL (ref 0–0.04)
IMM GRANULOCYTES NFR BLD AUTO: 0 % (ref 0–0.5)
LYMPHOCYTES # BLD: 0.9 K/UL (ref 0.8–3.5)
LYMPHOCYTES NFR BLD: 19 % (ref 12–49)
MCH RBC QN AUTO: 29.7 PG (ref 26–34)
MCHC RBC AUTO-ENTMCNC: 33 G/DL (ref 30–36.5)
MCV RBC AUTO: 90 FL (ref 80–99)
MONOCYTES # BLD: 0.7 K/UL (ref 0–1)
MONOCYTES NFR BLD: 14 % (ref 5–13)
NEUTS SEG # BLD: 2.9 K/UL (ref 1.8–8)
NEUTS SEG NFR BLD: 61 % (ref 32–75)
NRBC # BLD: 0 K/UL (ref 0–0.01)
NRBC BLD-RTO: 0 PER 100 WBC
PLATELET # BLD AUTO: 144 K/UL (ref 150–400)
PMV BLD AUTO: 9.7 FL (ref 8.9–12.9)
POTASSIUM SERPL-SCNC: 3.9 MMOL/L (ref 3.5–5.1)
PROT SERPL-MCNC: 6.8 G/DL (ref 6.4–8.2)
RBC # BLD AUTO: 5.08 M/UL (ref 4.1–5.7)
SAMPLES BEING HELD,HOLD: NORMAL
SARS-COV-2, COV2: NOT DETECTED
SODIUM SERPL-SCNC: 141 MMOL/L (ref 136–145)
TROPONIN-HIGH SENSITIVITY: 9 NG/L (ref 0–76)
WBC # BLD AUTO: 4.7 K/UL (ref 4.1–11.1)

## 2022-10-02 PROCEDURE — 74011250636 HC RX REV CODE- 250/636: Performed by: STUDENT IN AN ORGANIZED HEALTH CARE EDUCATION/TRAINING PROGRAM

## 2022-10-02 PROCEDURE — 87636 SARSCOV2 & INF A&B AMP PRB: CPT

## 2022-10-02 PROCEDURE — 99285 EMERGENCY DEPT VISIT HI MDM: CPT

## 2022-10-02 PROCEDURE — 71250 CT THORAX DX C-: CPT

## 2022-10-02 PROCEDURE — 80053 COMPREHEN METABOLIC PANEL: CPT

## 2022-10-02 PROCEDURE — 83880 ASSAY OF NATRIURETIC PEPTIDE: CPT

## 2022-10-02 PROCEDURE — 71046 X-RAY EXAM CHEST 2 VIEWS: CPT

## 2022-10-02 PROCEDURE — 96374 THER/PROPH/DIAG INJ IV PUSH: CPT

## 2022-10-02 PROCEDURE — 85025 COMPLETE CBC W/AUTO DIFF WBC: CPT

## 2022-10-02 PROCEDURE — 36415 COLL VENOUS BLD VENIPUNCTURE: CPT

## 2022-10-02 PROCEDURE — 93005 ELECTROCARDIOGRAM TRACING: CPT

## 2022-10-02 PROCEDURE — 65270000046 HC RM TELEMETRY

## 2022-10-02 PROCEDURE — 84484 ASSAY OF TROPONIN QUANT: CPT

## 2022-10-02 RX ORDER — FUROSEMIDE 10 MG/ML
40 INJECTION INTRAMUSCULAR; INTRAVENOUS ONCE
Status: COMPLETED | OUTPATIENT
Start: 2022-10-02 | End: 2022-10-02

## 2022-10-02 RX ADMIN — SODIUM CHLORIDE, POTASSIUM CHLORIDE, SODIUM LACTATE AND CALCIUM CHLORIDE 1000 ML: 600; 310; 30; 20 INJECTION, SOLUTION INTRAVENOUS at 18:57

## 2022-10-02 RX ADMIN — FUROSEMIDE 40 MG: 10 INJECTION, SOLUTION INTRAMUSCULAR; INTRAVENOUS at 18:57

## 2022-10-02 NOTE — ED TRIAGE NOTES
Pt from [address] [place] [address] mata; O2 70% on RA, placed on nonrebreather per EMS.

## 2022-10-02 NOTE — ED TRIAGE NOTES
Pt from [employer] memory care mata, per staff oxygen was reported at 70%. EMS stated that they had difficulty obtaining a good pleth due to circulation issues, put pt on nonrebreather. Pt in no visible distress currently. Denies CP. 100% O2 sat on RA. Follows commands. Recently discharged with pna.

## 2022-10-02 NOTE — ED PROVIDER NOTES
81-year-old male history of CHF (although family does not recall history of this), A. fib which is well controlled, dementia presenting today secondary to shortness of breath. He was here recently for increased work of breathing/cough and diagnosed with pneumonia, put on doxycycline. Today he was having increased work of breathing and had saturations in the 70s at nursing home facility. He was placed on a nonrebreather with improvement in sats. Also noted to have markedly increasing bilateral lower extremity edema as well as fatigue. Today family reports that he was unable to even stand with his walker which is atypical for him. I asked the patient if he is having any issues he tells me \"no\". He does have a history of dementia therefore history is limited.     Full history, physical exam, and ROS unable to be obtained due to:  dementia         Past Medical History:   Diagnosis Date    Allergy     seasonal    Arrhythmia     Asthma     Dementia ([facility]     Depression     Heart failure ([facility] [facility]     Hypertension     Neurological disorder     dementia    Other ill-defined conditions(799.89)     a-fib    Stroke [facility])        Past Surgical History:   Procedure Laterality Date    HX HEENT      cateract surgery         Family History:   Problem Relation Age of Onset    Stroke Mother     Heart Disease Mother     Cancer Father         prostate    Cancer Brother         colon       Social History     Socioeconomic History    Marital status:      Spouse name: Not on file    Number of children: Not on file    Years of education: Not on file    Highest education level: Not on file   Occupational History    Not on file   Tobacco Use    Smoking status: Never    Smokeless tobacco: Never   Substance and Sexual Activity    Alcohol use: No    Drug use: No     Types: Prescription, OTC    Sexual activity: Never   Other Topics Concern    Not on file   Social History Narrative    Not on file     Social Determinants of Health Financial Resource Strain: Not on file   Food Insecurity: Not on file   Transportation Needs: Not on file   Physical Activity: Not on file   Stress: Not on file   Social Connections: Not on file   Intimate Partner Violence: Not on file   Housing Stability: Not on file         ALLERGIES: No known allergies    Review of Systems   Unable to perform ROS: Dementia     Vitals:    10/02/22 1350 10/02/22 1506 10/02/22 1521   BP: (!) 160/97 (!) 158/109 (!) 153/89   Pulse: (!) 55 (!) 58 61   Resp: 19 20 21   SpO2: 100% 95%             Physical Exam  Vitals and nursing note reviewed. Constitutional:       General: He is not in acute distress. Appearance: He is well-developed. HENT:      Head: Normocephalic and atraumatic. Eyes:      Conjunctiva/sclera: Conjunctivae normal.      Pupils: Pupils are equal, round, and reactive to light. Cardiovascular:      Rate and Rhythm: Normal rate and regular rhythm. Heart sounds: Normal heart sounds. No murmur heard. No friction rub. No gallop. Comments: Equal radial, dp, and pt pulses  Pulmonary:      Effort: Pulmonary effort is normal. No respiratory distress. Breath sounds: Examination of the right-middle field reveals rales. Examination of the left-middle field reveals rales. Examination of the right-lower field reveals rales. Examination of the left-lower field reveals rales. Rales present. No wheezing. Abdominal:      General: Bowel sounds are normal. There is no distension. Palpations: Abdomen is soft. Tenderness: There is no abdominal tenderness. There is no guarding or rebound. Musculoskeletal:         General: Normal range of motion. Cervical back: Normal range of motion and neck supple. Right lower leg: Edema present. Left lower leg: Edema present. Skin:     General: Skin is dry. Capillary Refill: Capillary refill takes less than 2 seconds. Findings: No rash.       Comments: Cool upper extremities  Warm lower extremities   Neurological:      Mental Status: He is alert. Comments: Awake and alert        MDM     Amount and/or Complexity of Data Reviewed  Decide to obtain previous medical records or to obtain history from someone other than the patient: yes      ED Course as of 10/02/22 1814   Sun Oct 02, 2022   1432 EKG time 2:23 PM  A. fib slow ventricular response rate of 58, left axis deviation, narrow QRS, prolonged QTC with incomplete right bundle branch block, no ST elevations or depressions. 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 SOB (shortness of breath)    2. Hypoxia    3. Peripheral edema    4. Pleural effusion    5. Unable to ambulate        COVID-19 Suspicion:  no  Sepsis present:  no  Reassessment needed: no  Code Status:  Full Code  Readmission: no  Isolation Requirements:  no  Recommended Level of Care:  telemetry  Department:[facility] Adult ED - 21   Other: The patient is an 81-year-old male presenting today with shortness of breath and hypoxia. Here O2 is improving but apparently he was in the 70s at the nursing home. He has rales on exam and has significant peripheral edema of the legs. Family does not note a history of CHF, he is getting Lasix here. They are also worried about his wellbeing in his assisted living facility as he is having difficulty even standing with his walker today.   I am requesting admission for further management.   Madhav Saucedo, DO

## 2022-10-03 LAB
CALCULATED R AXIS, ECG10: -58 DEGREES
CALCULATED T AXIS, ECG11: -8 DEGREES
DIAGNOSIS, 93000: NORMAL
Q-T INTERVAL, ECG07: 478 MS
QRS DURATION, ECG06: 94 MS
QTC CALCULATION (BEZET), ECG08: 469 MS
VENTRICULAR RATE, ECG03: 58 BPM

## 2022-10-03 PROCEDURE — 94762 N-INVAS EAR/PLS OXIMTRY CONT: CPT

## 2022-10-03 PROCEDURE — 94760 N-INVAS EAR/PLS OXIMETRY 1: CPT

## 2022-10-03 PROCEDURE — 74011250636 HC RX REV CODE- 250/636: Performed by: HOSPITALIST

## 2022-10-03 PROCEDURE — 74011250637 HC RX REV CODE- 250/637: Performed by: FAMILY MEDICINE

## 2022-10-03 PROCEDURE — 77010033678 HC OXYGEN DAILY

## 2022-10-03 PROCEDURE — 74011000250 HC RX REV CODE- 250: Performed by: FAMILY MEDICINE

## 2022-10-03 PROCEDURE — 74011250637 HC RX REV CODE- 250/637: Performed by: INTERNAL MEDICINE

## 2022-10-03 PROCEDURE — 65270000046 HC RM TELEMETRY

## 2022-10-03 RX ORDER — DOXYCYCLINE HYCLATE 100 MG
100 TABLET ORAL 2 TIMES DAILY
Status: DISCONTINUED | OUTPATIENT
Start: 2022-10-03 | End: 2022-10-06 | Stop reason: HOSPADM

## 2022-10-03 RX ORDER — ACETAMINOPHEN 325 MG/1
650 TABLET ORAL
Status: DISCONTINUED | OUTPATIENT
Start: 2022-10-03 | End: 2022-10-06 | Stop reason: HOSPADM

## 2022-10-03 RX ORDER — FINASTERIDE 5 MG/1
5 TABLET, FILM COATED ORAL DAILY
Status: DISCONTINUED | OUTPATIENT
Start: 2022-10-03 | End: 2022-10-06 | Stop reason: HOSPADM

## 2022-10-03 RX ORDER — ONDANSETRON 2 MG/ML
4 INJECTION INTRAMUSCULAR; INTRAVENOUS
Status: DISCONTINUED | OUTPATIENT
Start: 2022-10-03 | End: 2022-10-06 | Stop reason: HOSPADM

## 2022-10-03 RX ORDER — MEMANTINE HYDROCHLORIDE 10 MG/1
10 TABLET ORAL EVERY 12 HOURS
Status: DISCONTINUED | OUTPATIENT
Start: 2022-10-03 | End: 2022-10-06 | Stop reason: HOSPADM

## 2022-10-03 RX ORDER — SPIRONOLACTONE 25 MG/1
25 TABLET ORAL DAILY
Status: DISCONTINUED | OUTPATIENT
Start: 2022-10-03 | End: 2022-10-06 | Stop reason: HOSPADM

## 2022-10-03 RX ORDER — ACETAMINOPHEN 650 MG/1
650 SUPPOSITORY RECTAL
Status: DISCONTINUED | OUTPATIENT
Start: 2022-10-03 | End: 2022-10-06 | Stop reason: HOSPADM

## 2022-10-03 RX ORDER — QUETIAPINE FUMARATE 25 MG/1
12.5 TABLET, FILM COATED ORAL
Status: DISCONTINUED | OUTPATIENT
Start: 2022-10-03 | End: 2022-10-06 | Stop reason: HOSPADM

## 2022-10-03 RX ORDER — MELATONIN
1000 DAILY
Status: DISCONTINUED | OUTPATIENT
Start: 2022-10-03 | End: 2022-10-06 | Stop reason: HOSPADM

## 2022-10-03 RX ORDER — ONDANSETRON 4 MG/1
4 TABLET, ORALLY DISINTEGRATING ORAL
Status: DISCONTINUED | OUTPATIENT
Start: 2022-10-03 | End: 2022-10-06 | Stop reason: HOSPADM

## 2022-10-03 RX ORDER — SODIUM CHLORIDE 0.9 % (FLUSH) 0.9 %
5-40 SYRINGE (ML) INJECTION EVERY 8 HOURS
Status: DISCONTINUED | OUTPATIENT
Start: 2022-10-03 | End: 2022-10-06 | Stop reason: HOSPADM

## 2022-10-03 RX ORDER — FUROSEMIDE 10 MG/ML
40 INJECTION INTRAMUSCULAR; INTRAVENOUS DAILY
Status: DISCONTINUED | OUTPATIENT
Start: 2022-10-03 | End: 2022-10-05

## 2022-10-03 RX ORDER — POLYETHYLENE GLYCOL 3350 17 G/17G
17 POWDER, FOR SOLUTION ORAL DAILY PRN
Status: DISCONTINUED | OUTPATIENT
Start: 2022-10-03 | End: 2022-10-06 | Stop reason: HOSPADM

## 2022-10-03 RX ORDER — BUPROPION HYDROCHLORIDE 150 MG/1
150 TABLET, EXTENDED RELEASE ORAL DAILY
Status: DISCONTINUED | OUTPATIENT
Start: 2022-10-03 | End: 2022-10-06 | Stop reason: HOSPADM

## 2022-10-03 RX ORDER — TAMSULOSIN HYDROCHLORIDE 0.4 MG/1
0.4 CAPSULE ORAL DAILY
Status: DISCONTINUED | OUTPATIENT
Start: 2022-10-03 | End: 2022-10-06 | Stop reason: HOSPADM

## 2022-10-03 RX ORDER — SODIUM CHLORIDE 0.9 % (FLUSH) 0.9 %
5-40 SYRINGE (ML) INJECTION AS NEEDED
Status: DISCONTINUED | OUTPATIENT
Start: 2022-10-03 | End: 2022-10-06 | Stop reason: HOSPADM

## 2022-10-03 RX ADMIN — TAMSULOSIN HYDROCHLORIDE 0.4 MG: 0.4 CAPSULE ORAL at 10:57

## 2022-10-03 RX ADMIN — SODIUM CHLORIDE, PRESERVATIVE FREE 10 ML: 5 INJECTION INTRAVENOUS at 23:43

## 2022-10-03 RX ADMIN — BUPROPION HYDROCHLORIDE 150 MG: 150 TABLET, EXTENDED RELEASE ORAL at 10:57

## 2022-10-03 RX ADMIN — SODIUM CHLORIDE, PRESERVATIVE FREE 10 ML: 5 INJECTION INTRAVENOUS at 15:43

## 2022-10-03 RX ADMIN — Medication 1000 UNITS: at 10:57

## 2022-10-03 RX ADMIN — SODIUM CHLORIDE, PRESERVATIVE FREE 10 ML: 5 INJECTION INTRAVENOUS at 05:28

## 2022-10-03 RX ADMIN — MEMANTINE HYDROCHLORIDE 10 MG: 10 TABLET ORAL at 23:37

## 2022-10-03 RX ADMIN — DOXYCYCLINE HYCLATE 100 MG: 100 TABLET, COATED ORAL at 10:56

## 2022-10-03 RX ADMIN — QUETIAPINE FUMARATE 12.5 MG: 25 TABLET ORAL at 23:37

## 2022-10-03 RX ADMIN — FUROSEMIDE 40 MG: 10 INJECTION, SOLUTION INTRAVENOUS at 15:36

## 2022-10-03 RX ADMIN — MEMANTINE HYDROCHLORIDE 10 MG: 10 TABLET ORAL at 10:57

## 2022-10-03 RX ADMIN — DOXYCYCLINE HYCLATE 100 MG: 100 TABLET, COATED ORAL at 19:25

## 2022-10-03 RX ADMIN — EMPAGLIFLOZIN 10 MG: 10 TABLET, FILM COATED ORAL at 15:36

## 2022-10-03 RX ADMIN — APIXABAN 2.5 MG: 2.5 TABLET, FILM COATED ORAL at 05:28

## 2022-10-03 RX ADMIN — APIXABAN 2.5 MG: 2.5 TABLET, FILM COATED ORAL at 23:38

## 2022-10-03 RX ADMIN — Medication 1 CAPSULE: at 10:57

## 2022-10-03 RX ADMIN — FINASTERIDE 5 MG: 5 TABLET, FILM COATED ORAL at 10:57

## 2022-10-03 RX ADMIN — SPIRONOLACTONE 25 MG: 25 TABLET ORAL at 15:36

## 2022-10-03 NOTE — DISCHARGE INSTRUCTIONS
Please bring this form with you to show your primary care provider at your follow-up appointment. Primary care provider:   [unfilled]    Discharging provider:  Carito Guevara MD    You have been admitted to the hospital with the following diagnoses:  Hypoxia [R09.02]  Acute CH[facility] (congest[facility] failure) ([facility] [I50.9]  Elevated serum creatinine [R79.89]  Edema of both lower extremities [R60.0]  A-fib ([facility] [I48.91]  Generalized weakness [R53.1]  Difficulty walking [R26.2]    FOLLOW-UP CARE RECOMMENDATIONS:    APPOINTMENTS:  Follow-up with primary care provider,  @WES@  -  Please call [unfilled] shortly after discharge to set up an appointment to be seen in  1 week    Cardiology in 1-2 weeks     FOLLOW-UP TESTS recommended: bmp in 1 week     PENDING TEST RESULTS:  At the time of your discharge the following test results are still pending: none   Please make sure you review these results with your outpatient follow-up provider(s). SYMPTOMS to watch for: chest pain, shortness of breath, fever, chills, nausea, vomiting, diarrhea, change in mentation, falling, weakness, bleeding. DIET/what to eat:  Cardiac Diet    ACTIVITY:  Activity as tolerated    What to do if new or unexpected symptoms occur? If you experience any of the above symptoms (or should other concerns or questions arise after discharge) please call your primary care physician. Return to the emergency room if you cannot get hold of your doctor. It is very important that you keep your follow-up appointment(s). Please bring discharge papers, medication list (and/or medication bottles) to your follow-up appointments for review by your outpatient provider(s). Please check the list of medications and be sure it includes every medication (even non-prescription medications) that your provider wants you to take. It is important that you take the medication exactly as they are prescribed.    Keep your medication in the bottles provided by the pharmacist and keep a list of the medication names, dosages, and times to be taken in your wallet. Do not take other medications without consulting your doctor. If you have any questions about your medications or other instructions, please talk to your nurse or care provider before you leave the hospital.    I understand that if any problems occur once I am at home I am to contact my physician. These instructions were explained to me and I had the opportunity to ask questions. Avoiding Triggers With Heart Failure: Care Instructions  Your Care Instructions     Triggers are anything that make your heart failure flare up. A flare-up is also called \"sudden heart failure\" or \"acute heart failure. \" When you have a flare-up, fluid builds up in your lungs, and you have problems breathing. You might need to go to the hospital. By watching for changes in your condition and avoiding triggers, you can prevent heart failure flare-ups. Follow-up care is a key part of your treatment and safety. Be sure to make and go to all appointments, and call your doctor if you are having problems. It's also a good idea to know your test results and keep a list of the medicines you take. How can you care for yourself at home? Watch for changes in your weight and condition  Weigh yourself without clothing at the same time each day. Record your weight. Call your doctor if you have sudden weight gain, such as more than 2 to 3 pounds in a day or 5 pounds in a week. (Your doctor may suggest a different range of weight gain.) A sudden weight gain may mean that your heart failure is getting worse. Keep a daily record of your symptoms. Write down any changes in how you feel, such as new shortness of breath, cough, or problems eating. Also record if your ankles are more swollen than usual and if you feel more tired than usual. Note anything that you ate or did that could have triggered these changes.   Limit sodium  Sodium causes your body to hold on to extra water. This may cause your heart failure symptoms to get worse. People get most of their sodium from processed foods. Fast food and restaurant meals also tend to be very high in sodium. Your doctor may suggest that you limit sodium. Your doctor can tell you how much sodium is right for you. This includes limiting sodium in cooked and packaged foods. Read food labels on cans and food packages. They tell you how much sodium you get in one serving. Check the serving size. If you eat more than one serving, you are getting more sodium. Be aware that sodium can come in forms other than salt, including monosodium glutamate (MSG), sodium citrate, and sodium bicarbonate (baking soda). MSG is often added to Asian food. You can sometimes ask for food without MSG or salt. Slowly reducing salt will help you adjust to the taste. Take the salt shaker off the table. Flavor your food with garlic, lemon juice, onion, vinegar, herbs, and spices instead of salt. Do not use soy sauce, steak sauce, onion salt, garlic salt, mustard, or ketchup on your food, unless it is labeled \"low-sodium\" or \"low-salt. \"  Make your own salad dressings, sauces, and ketchup without adding salt. Use fresh or frozen ingredients, instead of canned ones, whenever you can. Choose low-sodium canned goods. Eat less processed food and food from restaurants, including fast food. Exercise as directed  Moderate, regular exercise is very good for your heart. It improves your blood flow and helps control your weight. But too much exercise can stress your heart and cause a heart failure flare-up. Check with your doctor before you start an exercise program.  Walking is an easy way to get exercise. Start out slowly. Gradually increase the length and pace of your walk. Swimming, riding a bike, and using a treadmill are also good forms of exercise. When you exercise, watch for signs that your heart is working too hard.  You are pushing yourself too hard if you cannot talk while you are exercising. If you become short of breath or dizzy or have chest pain, stop, sit down, and rest.  Do not exercise when you do not feel well. Take medicines correctly  Take your medicines exactly as prescribed. Call your doctor if you think you are having a problem with your medicine. Make a list of all the medicines you take. Include those prescribed to you by other doctors and any over-the-counter medicines, vitamins, or supplements you take. Take this list with you when you go to any doctor. Take your medicines at the same time every day. It may help you to post a list of all the medicines you take every day and what time of day you take them. Make taking your medicine as simple as you can. Plan times to take your medicines when you are doing other things, such as eating a meal or getting ready for bed. This will make it easier to remember to take your medicines. Get organized. Use helpful tools, such as daily or weekly pill containers. When should you call for help? Call 911  if you have symptoms of sudden heart failure such as: You have severe trouble breathing. You cough up pink, foamy mucus. You have a new irregular or rapid heartbeat. Call your doctor now or seek immediate medical care if:    You have new or increased shortness of breath. You are dizzy or lightheaded, or you feel like you may faint. You have sudden weight gain, such as more than 2 to 3 pounds in a day or 5 pounds in a week. (Your doctor may suggest a different range of weight gain.)     You have increased swelling in your legs, ankles, or feet. You are suddenly so tired or weak that you cannot do your usual activities. Watch closely for changes in your health, and be sure to contact your doctor if you develop new symptoms. Where can you learn more?   Go to http://www.gray.com/  Enter V089 in the search box to learn more about \"Avoiding Triggers With Heart Failure: Care Instructions. \"  Current as of: January 10, 2022               Content Version: 13.2  © 4093-5130 [employer]. Care instructions adapted under license by [employer] (which disclaims liability or warranty for this information). If you have questions about a medical condition or this instruction, always ask your healthcare professional. [address] any warranty or liability for your use of this information.

## 2022-10-03 NOTE — PROGRESS NOTES
Patient/family seen: YES       Informed patient/family of BPCI-A Bundle Program. Also advised of potential outreach by Care Transitions Team.    Bundle Payment Care Improvement Beneficiary Letter Delivered to Beneficiary or Representative:YES.  Date BCPI -A was given:10/03/22

## 2022-10-03 NOTE — H&P
[address] [facility] Adult  Hospitalist Group  History and Physical    Date of Service:  10/2/2022  Primary Care Provider: Zoraida Norton MD  Source of information: The patient and Chart review    Chief Complaint:  patient does not provide      History of Presenting Illness:   Benito Holley is a 81 y.o. male with past medical history of asthma, dementia, atrial fibrillation, stroke, depression presented to the emergency department from [employer] memory care unit with reported hypoxia. Patient denies any shortness of breath however he reportedly was hypoxic at the nursing home with O2 saturation = 70% on room air. MS was called to the scene, reportedly had difficult time obtaining a good pleth tracing. Patient was placed on 100% oxygen non-rebreather face mask transported to the ED. Patient is similar presentation to the ED on 9/29/2029 with low O2 saturations/hypoxia. Work-up included chest x-ray showing mild cardiomegaly, mild right pleural effusion, and patchy opacities of bilateral lung bases questionable for airspace disease versus atelectasis. Patient was discharged home with digoxin 100 mg p.o. twice daily x7-day prescription. On arrival emergency department today, initial vital signs blood pressure 160/97, heart rate 55, respirate 19, O2 saturations 100%. Chest x-ray portable was limited exam.  CT chest without IV contrast showed small to moderate bilateral pleural effusions, cardiomegaly, trace pericardial effusion, extensive calcification of the LAD and moderate calcification of right coronary artery, hepatic nodularity, age-indeterminate T4 and L2 compression fractures and new L2 compression fracture. ED initially ordered lactated Ringer's 1000 mL IV fluid bolus x1. However proBNP = 14,303. Patient was given Lasix 40 mg IV x1 dose for diuresis.   12 EKG showed atrial fibrillation with slow ventricular response, left axis deviation, incomplete right bundle branch block, and ST and T wave changes in the area and left anterior leads at 58 beats a minute. Patient had high-sensitivity troponin =  9. Patient is now seen for admission to the hospitalist service for continued evaluation and treatments. On arrival at the bedside, nursing staff and just recently finished cleaning the patient as he had large amount of fecal and urinary incontinence. At current, patient is confused and limited historian but he denies any shortness of breath or chest pain. REVIEW OF SYSTEMS:  Unable to obtain a comprehensive review of systems due to dementia and patient is not answering questions appropriately. Past Medical History:   Diagnosis Date    Allergy     seasonal    Arrhythmia     Asthma     Dementia ([facility] [address]     Depression     Heart failure ([facility] [address]     Hypertension     Neurological disorder     dementia    Other ill-defined conditions(799.89)     a-fib    Stroke [facility])       Past Surgical History:   Procedure Laterality Date    HX HEENT      cateract surgery       Medications:  Prior to Admission medications    Medication Sig Start Date End Date Taking? Authorizing Provider   doxycycline (VIBRA-TABS) 100 mg tablet Take 1 Tablet by mouth two (2) times a day for 7 days. 9/29/22 10/6/22  Jorge L Anton MD   buPROPion XL (WELLBUTRIN XL) 150 mg tablet Take 1 Tablet by mouth daily. 3/8/22   Linda Spann MD   finasteride (PROSCAR) 5 mg tablet Take 5 mg by mouth daily. Provider, Historical   L.acid,para-B. bifidum-S.therm (RISAQUAD) 8 billion cell cap cap Take 1 Capsule by mouth daily. Provider, Historical   cholecalciferol (VITAMIN D3) (1000 Units /25 mcg) tablet Take 1,000 Units by mouth daily. Provider, Historical   tamsulosin (FLOMAX) 0.4 mg capsule Take 0.4 mg by mouth daily. Provider, Historical   apixaban (ELIQUIS) 2.5 mg tablet Take 2.5 mg by mouth every twelve (12) hours. Provider, Historical   memantine (NAMENDA) 10 mg tablet Take 10 mg by mouth every twelve (12) hours. Provider, Historical   metoprolol tartrate (LOPRESSOR) 25 mg tablet Take 25 mg by mouth every twelve (12) hours every twelve (12) hours. Provider, Historical   QUEtiapine (SEROqueL) 25 mg tablet Take 12.5 mg by mouth nightly. Provider, Historical       Allergies: Allergies   Allergen Reactions    No Known Allergies Other (comments)      Family History   Problem Relation Age of Onset    Stroke Mother     Heart Disease Mother     Cancer Father         prostate    Cancer Brother         colon      Social History:  reports that he has never smoked. He has never used smokeless tobacco. He reports that he does not drink alcohol and does not use drugs. Objective:   Visit Vitals  BP (!) 153/89   Pulse 61   Resp 21   SpO2 95%           PHYSICAL EXAM:   General:  Patient is in no acute respiratory distress. Head:  Normocephalic, without obvious abnormality, atraumatic   Eyes:  Conjunctivae/corneas clear. PERRL, EOMs intact   E/N/M/T: Nares normal. Septum midline.  No nasal drainage or sinus tenderness  Tongue midline/ non-edematous  Clear oropharynx   Neck: Normal appearance and movements, symmetrical, trachea midline  No palpable adenopathy  No thyroid enlargement, tenderness or nodules  No carotid bruit   No JVD  Trachea midline   Lungs:   Symmetrical chest expansion and respiratory effort  Clear to auscultation bilaterally   Chest wall:  No tenderness or deformity   Heart:  Regular rate and rhythm   Normal S1 and S2; no murmur, click, rub or gallop   Abdomen:   Soft, no tenderness  No rebound, guarding, or rigidity  Non-distended   Bowel sounds normal  No masses or hepatosplenomegaly  No hernias present   Back: No costovertebral angle tenderness  No step-off deformity   Extremities: Extremities normal, atraumatic  No cyanosis   Non-pitting BLE edema     Vascular/  Pulses: 2+ radial/ 1+ DP bilateral pulses   Integument/  Skin: Stage 2 right hip pressure ulcer  Rash/ erythema on buttocks and sacrum  Chronic venous stasis discoloration/ erythema of both legs and feet  Warm and dry   Musculo-      skeletal: Gait not tested  No calf tenderness   Neuro: GCS 13 (E4 V3 M6). Follows some but not all commands. Minimally moves all extremities x 4 (mostly lying in flexion contracture of BUE/ BLE in left lateral decubitus position). Generalized weakness of BUE/ BLE. No slurred speech. No facial droop. Sensation grossly intact. Psych: Alert, oriented x 2 to person and place only (stated name and \"hospital\"). Otherwise confused. Data Review: All diagnostic labs and studies have been reviewed. Abnormal Labs Reviewed   CBC WITH AUTOMATED DIFF - Abnormal; Notable for the following components:       Result Value    RDW 14.7 (*)     PLATELET 673 (*)     MONOCYTES 14 (*)     All other components within normal limits   METABOLIC PANEL, COMPREHENSIVE - Abnormal; Notable for the following components:    Chloride 109 (*)     Anion gap 2 (*)     Creatinine 1.32 (*)     GFR est non-AA 52 (*)     Bilirubin, total 1.2 (*)     Alk. phosphatase 129 (*)     Albumin 3.1 (*)     A-G Ratio 0.8 (*)     All other components within normal limits   NT-PRO BNP - Abnormal; Notable for the following components:    NT pro-BNP 14,303 (*)     All other components within normal limits       All Micro Results       Procedure Component Value Units Date/Time    COVID-19 WITH INFLUENZA A/B [[id number]] Collected: 10/02/22 1959    Order Status: Completed Specimen: Nasopharyngeal Updated: 10/02/22 2036     SARS-CoV-2 by PCR Not detected        Comment: Not Detected results do not preclude SARS-CoV-2 infection and should not be used as the sole basis for patient management decisions. Results must be combined with clinical observations, patient history, and epidemiological information.         Influenza A by PCR Not detected        Influenza B by PCR Not detected        Comment: Testing was performed using tarik Kaylin SARS-CoV-2 and Influenza A/B nucleic acid assay. This test is a multiplex Real-Time Reverse Transcriptase Polymerase Chain Reaction (RT-PCR) based in vitro diagnostic test intended for the qualitative detection of nucleic acids from SARS-CoV-2, Influenza A, and Influenza B in nasopharyngeal and nasal swab specimens for use under the FDA's Emergency Use Authorization (EUA) only. Fact sheet for Patients: FindDrives.pl  Fact sheet for Healthcare Providers: FindDrives.pl                 IMAGING:   CT CHEST WO CONT   Final Result   1. Small to moderate, bilateral pleural effusions. 2. Cardiomegaly. 3. Age-indeterminate T4 and L2 compression fractures. L2 is new from 3/4/2022. XR CHEST PA LAT   Final Result      Limited study. Bibasilar nonspecific opacities. No significant change since 3   days ago. ECG/ECHO:    Results for orders placed or performed during the hospital encounter of 09/29/22   EKG, 12 LEAD, INITIAL   Result Value Ref Range    Ventricular Rate 63 BPM    QRS Duration 90 ms    Q-T Interval 490 ms    QTC Calculation (Bezet) 501 ms    Calculated R Axis -53 degrees    Calculated T Axis 11 degrees    Diagnosis       Atrial fibrillation  Left axis deviation  Possible  Anterior infarct , age undetermined  Possible inferior infarct  Prolonged QT  When compared with ECG of 02-MAY-2022 20:07,  Anterior infarct is now present  Confirmed by Breonna May ([id number]) on 9/30/2022 7:59:34 AM          Assessment/ Plan:   Given the patient's current clinical presentation, there is a high level of concern for decompensation if discharged from the emergency department. Complex decision making was performed, which includes reviewing the patient's available past medical records, laboratory results, and imaging studies.     Active Problems:    Hypoxia  -Does not appear to be in acute respiratory distress; not short of breath  -Placed on continuous pulse oximetry monitoring -Provide supplemental oxygen if needed but currently 90% on room air      Acute CHF (congestive heart failure), unspecified  -Evidence of peripheral edema  -CT chest shows cardiomegaly, bilateral pleural effusions, trace pericardial effusion  -proBNP 14,303  -Needs further cardiac work-up  -Needs diuresis: Order Lasix 40 mg IV daily  -Strict I's and O's and daily weights and fluid status closely  -Order to do echocardiogram in a.m.  -Reviewed last documented to echocardiogram from 11/8/2015 showing normal left ventricular stock ejection fraction 55% to 60% with mild to moderate wall thickness.  -Hold beta-blocker for now due to bradycardia with slow ventricular response  -consider for ACEI/ARB but ordered yet due to patient's elevated creatinine  -consult cardiologist for further recommendations/ treatments    3. A-fib with slow ventricular response  -continue telemetry monitoring  -on anticoagulation therapy with Eliquis; resume the same    4. Difficulty walking   -Generalized weakness  -Consult PT/OT for ambulation/strengthening    5. Elevated serum creatinine   -Creatinine 1.32, GFR 52  -Repeat renal panel in a.m.  -Order UA    6. Edema bilateral lower extremities  -Keep legs elevated at rest  -Order venous duplex about lower extremities    7. Dementia  -Continue with neurochecks  -Continue Namenda    8. Pneumonia  -Presumed diagnosis based on chest x-ray in ER on 9/29/2022; patient has been on doxycycline 100 mg p.o. twice daily x7 days for the same. Current CT chest without IV contrast does not show current evidence of pneumonia. However may complete prescribed course of doxycycline based on prior chest x-ray findings. 9.  Fecal incontinence  -Bladder incontinence  -Continue with skin care checks.       10.  Stage II right hip pressure ulcer  -Versus traumatic abrasion to the right hip with resulting open wound  -Treatment of same; turn patient every 2 hours  -Continue with dressing changes  -Consult wound care team                   DIET: No diet orders on file   ISOLATION PRECAUTIONS: There are currently no Active Isolations  CODE STATUS: FULL CODE  DVT PROPHYLAXIS: Eliquis  FUNCTIONAL STATUS PRIOR TO HOSPITALIZATION: Fully active and ambulatory; able to carry on all self-care without restriction. Ambulatory status/function: Ambulates with assistance:  Walker   EARLY MOBILITY ASSESSMENT: Recommend an assessment from physical therapy and/or occupational therapy  ANTICIPATED DISCHARGE: Greater than 48 hours. ANTICIPATED DISPOSITION: Inpatient / SNF Hospice  EMERGENCY CONTACT/SURROGATE DECISION MAKER: Jose Zamora [phone]    CRITICAL CARE WAS PERFORMED FOR THIS ENCOUNTER: NO.      Signed By: Tenzin Jean-Baptiste MD     October 2, 2022         Please note that this dictation may have been completed with Dragon, the [employer] voice recognition software. Quite often unanticipated grammatical, syntax, homophones, and other interpretive errors are inadvertently transcribed by the computer software. Please disregard these errors. Please excuse any errors that have escaped final proofreading.

## 2022-10-03 NOTE — PATIENT INSTRUCTIONS
Periodontal Abscess: Care Instructions  Your Care Instructions    A periodontal abscess is a pocket of pus in the tissues of the gum. It looks like a small red ball pushing out of the swollen gum. An abscess can occur with serious gum disease (periodontitis), which causes the gums to pull away from the teeth. This leaves deep pockets where bacteria can grow. If tartar builds up too much, or if food gets stuck in the pockets, pus forms. If the pus can't drain, it forms an abscess. An abscess can cause a fever and a throbbing pain in nearby teeth. It can also cause long-term damage to your teeth and gums. The teeth may get loose and fall out. The infection can spread to another part of your body. In most cases, your dentist will give you antibiotics to stop the infection. He or she may need to cut open (xavier) the abscess so that the infection can drain. This should relieve your pain. You may also need more dental treatment, such as tooth removal or oral surgery to fix bone damage caused by the abscess. Follow-up care is a key part of your treatment and safety. Be sure to make and go to all appointments, and call your doctor if you are having problems. It's also a good idea to know your test results and keep a list of the medicines you take. How can you care for yourself at home? · Reduce pain and swelling in your face and jaw by putting ice or a cold pack on the outside of your cheek for 10 to 20 minutes at a time. Put a thin cloth between the ice and your skin. · Be safe with medicines. Read and follow all instructions on the label. ¨ If the doctor gave you a prescription medicine for pain, take it as prescribed. ¨ If you are not taking a prescription pain medicine, ask your doctor if you can take an over-the-counter medicine. · Take your antibiotics as directed. Do not stop taking them just because you feel better. You need to take the full course of antibiotics.   To prevent periodontal abscess  · Brush and floss every day, and have regular dental checkups. · Eat a healthy diet, and avoid sugary foods and drinks. · Do not smoke or use spit tobacco. Tobacco use slows your ability to heal. It also increases your risk for gum disease and cancer of the mouth and throat. If you need help quitting, talk to your doctor about stop-smoking programs and medicines. These can increase your chances of quitting for good. When should you call for help? Call 911 anytime you think you may need emergency care. For example, call if:    · You have trouble breathing.    Call your doctor now or seek immediate medical care if:    · You have new or worse symptoms of infection, such as:  ¨ Increased pain, swelling, warmth, or redness. ¨ Red streaks leading from the area. ¨ Pus draining from the area. ¨ A fever.    Watch closely for changes in your health, and be sure to contact your doctor if:    · You do not get better as expected. Where can you learn more? Go to http://rosey-wilmer.info/. Enter O179 in the search box to learn more about \"Periodontal Abscess: Care Instructions. \"  Current as of: May 12, 2017  Content Version: 11.7  © 6318-5338 [employer]. Care instructions adapted under license by [employer] (which disclaims liability or warranty for this information). If you have questions about a medical condition or this instruction, always ask your healthcare professional. [place] [address] any warranty or liability for your use of this information. 67 yo f w PMH psoriatic arthritis, CLL, remote MRSA infection, bipolar disorder, OA, HTN, anemia, h/o PE (not on AC), obesity s/p alexandria-en-y bypass with revision, ?Eagle syndrome, p/w purulent cellulitis of RLE s/p failed 2+ weeks of outpatient oral antibiotics with likely small underlying collection on imaging, admitted to medicine for further mgmt.    #Purulent cellulitis  -recently drained but failed outpatient doxy  and clinda. WBC higher than normal and febrile at home. Has multiple fluid collection on RLE as seen on US, but today looks more stable, non-erythematous, and not significantly warmer than LLE  -will c/w Vanc BID for now till cultures come back. if leukocytosis and erythema improves, will switch to augmentin + doxy. If clinically worsens, will have to discuss with ID regarding continual IV abx and +/- repeat debridement     d/w Dr. Vargas and Dr. Jennifer Caruso MD  [facility] Division of Hospital Medicine  Available via MS Teams  Pager: [phone] 65 yo f w PMH psoriatic arthritis, CLL, remote MRSA infection, bipolar disorder, OA, HTN, anemia, h/o PE (not on AC), obesity s/p alexandria-en-y bypass with revision, ?Eagle syndrome, p/w purulent cellulitis of RLE s/p failed 2+ weeks of outpatient oral antibiotics.    Now improved on iv Vanco. Per ID, d/c abx. D/c home w f/u.     D/c time 40 mins. 67 yo f w PMH psoriatic arthritis, CLL, remote MRSA infection, bipolar disorder, OA, HTN, anemia, h/o PE (not on AC), obesity s/p alexandria-en-y bypass with revision, ?Eagle syndrome, p/w purulent cellulitis of RLE s/p failed 2+ weeks of outpatient oral antibiotics with likely small underlying collection on imaging, admitted to medicine for further mgmt.    #Purulent cellulitis  -recently drained but failed outpatient doxy and clinda. WBC higher than normal and febrile at home. Has multiple fluid collection on RLE as seen on US and now remains somewhat erythematous  -CT w/o any abscess formation  -ID consult appreciated: c/w Vanc BID, f/u procal, Immunoglobulin, re-assess    d/w Dr. Sam Caruso MD  [facility] Division of Hospital Medicine  Available via MS Teams  Pager: [phone]. 65 yo f w PMH psoriatic arthritis, CLL, remote MRSA infection, bipolar disorder, OA, HTN, anemia, h/o PE (not on AC), obesity s/p alexandria-en-y bypass with revision, ?Eagle syndrome, p/w purulent cellulitis of RLE s/p failed 2+ weeks of outpatient oral antibiotics with likely small underlying collection on imaging, admitted to medicine for further mgmt.    #Purulent cellulitis  -recently drained but failed outpatient doxy  and clinda. WBC higher than normal and febrile at home. Has multiple fluid collection on RLE as seen on US and today more erythematous.  -will c/w Vanc BID for now  -ID consulted  - will get CT LE w/ contrast and get surgical evaluation for I&D    d/w Dr. Jennifer Caruso MD  [facility] Division of Hospital Medicine  Available via MS Teams  Pager: [phone]. 65 yo f w PMH psoriatic arthritis, CLL, remote MRSA infection, bipolar disorder, OA, HTN, anemia, h/o PE (not on AC), obesity s/p alexandria-en-y bypass with revision, ?Eagle syndrome, p/w purulent cellulitis of RLE s/p failed 2+ weeks of outpatient oral antibiotics with likely small underlying collection on imaging, admitted to medicine for further mgmt.    #Purulent cellulitis  -recently drained but failed outpatient doxy and clinda. WBC higher than normal and febrile at home. Has multiple fluid collection on RLE as seen on US and now remains somewhat erythematous  -CT w/o any abscess formation  -ID consult appreciated: c/w Vanc BID, f/u procal, Immunoglobulin (to elucidate leukocytosis is from CLL or infection), re-assess on Monday    updates son at bedside  d/w Dr. Sam Caruso MD  [facility] Division of Hospital Medicine  Available via MS Teams  Pager: [phone]. 65 yo f w PMH psoriatic arthritis, CLL, remote MRSA infection, bipolar disorder, OA, HTN, anemia, h/o PE (not on AC), obesity s/p alexandria-en-y bypass with revision, ?Eagle syndrome, p/w purulent cellulitis of RLE s/p failed 2+ weeks of outpatient oral antibiotics with likely small underlying collection on imaging, admitted to medicine for further mgmt.    #Purulent cellulitis  -recently drained but failed outpatient doxy and clinda. WBC higher than normal and febrile at home. Has multiple fluid collection on RLE as seen on US and now remains somewhat erythematous  -CT w/o any abscess formation  -ID consult appreciated: c/w Vanc BID, f/u procal, Immunoglobulin (to elucidate leukocytosis is from CLL or infection), re-assess on Monday    #Cough  -has some cough, rhinorrhea, "congestion" overnight, but has remained afebrile, without hypoxia and lungs are clear  -obtain RVP  -no need for CXR at this time     d/w Dr. Sam Caruso MD  [facility] Division of Hospital Medicine  Available via MS Teams  Pager: [phone]

## 2022-10-03 NOTE — PROGRESS NOTES
2343: Patient refused for RN to conduct an Admission assessment. RN explained to the patient the importance of an assessment. Patient still refused. 0308: RN attempted to go into patients room to draw blood. Patient is extremely confused and agitated. Patient stated, \" Mccleary Tereza not drawing that blood from me, I will punch you in the face. \" Will pass onto day shift.

## 2022-10-03 NOTE — CONSULTS
CARDIOLOGY CONSULT                  Subjective:    Date of  Admission:   Admission type:Emergency    Deejay Davila MD     This is a 81 yom with AF, HTN, significnat dementia who is here as he was hypoxic in his NH. He was having O2 sats in the 70s per report and was transported here. He has been more weak. He is typically able to walk with a walker but has been unable to do so. He is unable to give a history. Satted at 100% on RA upon arrival. ED workup showed an elevated BNP and normal troponin. CXR with edema. Patient Active Problem List   Diagnosis Code    Acute bronchitis J20.9    Edema extremities R60.0    HTN (hypertension) I10    Atrial fibrillation ([address] I48.91    Depression F32. 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 ([facility]) G30.1, F02.80    History of CVA (cerebrovascular accident) Z86.73    CKD (chronic kidney disease) stage 3, GFR 30-59 ml/min ([facility]) N18.30    Simple chronic bronchitis ([facility]) J41.0    Urinary tract infection N39.0    Pyelonephritis N12    UTI (urinary tract infection) N39.0    Urine retention R33.9    Chronic obstructive pulmonary disease ([facility]) J44.9    SBO (small bowel obstruction) ([facility]) K56.609    A-fib ([facility]) I48.91    Difficulty walking R26.2    Hypoxia R09.02    Generalized weakness R53.1    Elevated serum creatinine R79.89    Acute CHF (congestive heart failure) ([facility]) I50.9    Edema of both lower extremities R60.0        Past Medical History:   Diagnosis Date    Allergy     seasonal    Arrhythmia     Asthma     Dementia ([address]     Depression     Heart failure ([address] Hypertension     Neurological disorder     dementia    Other ill-defined conditions(105.40)     a-fib    Stroke [facility])       Past Surgical History:   Procedure Laterality Date    HX HEENT      cateract surgery      Family History   Problem Relation Age of Onset    Stroke Mother     Heart Disease Mother     Cancer Father         prostate    Cancer Brother         colon      Social History     Socioeconomic History    Marital status:      Spouse name: Not on file    Number of children: Not on file    Years of education: Not on file    Highest education level: Not on file   Occupational History    Not on file   Tobacco Use    Smoking status: Never    Smokeless tobacco: Never   Substance and Sexual Activity    Alcohol use: No    Drug use: No     Types: Prescription, OTC    Sexual activity: Never   Other Topics Concern    Not on file   Social History Narrative    Not on file     Social Determinants of Health     Financial Resource Strain: Not on file   Food Insecurity: Not on file   Transportation Needs: Not on file   Physical Activity: Not on file   Stress: Not on file   Social Connections: Not on file   Intimate Partner Violence: Not on file   Housing Stability: Not on file        Current Facility-Administered Medications   Medication Dose Route Frequency    apixaban (ELIQUIS) tablet 2.5 mg  2.5 mg Oral Q12H    buPROPion SR (WELLBUTRIN SR) tablet 150 mg  150 mg Oral DAILY    cholecalciferol (VITAMIN D3) (1000 Units /25 mcg) tablet 1,000 Units  1,000 Units Oral DAILY    doxycycline (VIBRA-TABS) tablet 100 mg  100 mg Oral BID    finasteride (PROSCAR) tablet 5 mg  5 mg Oral DAILY    L.acidophilus-paracasei-S.thermophil-bifidobacter (RISAQUAD) 8 billion cell capsule  1 Capsule Oral DAILY    memantine (NAMENDA) tablet 10 mg  10 mg Oral Q12H    QUEtiapine (SEROquel) tablet 12.5 mg  12.5 mg Oral QHS    tamsulosin (FLOMAX) capsule 0.4 mg  0.4 mg Oral DAILY    sodium chloride (NS) flush 5-40 mL  5-40 mL IntraVENous Q8H sodium chloride (NS) flush 5-40 mL  5-40 mL IntraVENous PRN    acetaminophen (TYLENOL) tablet 650 mg  650 mg Oral Q6H PRN    Or    acetaminophen (TYLENOL) suppository 650 mg  650 mg Rectal Q6H PRN    polyethylene glycol (MIRALAX) packet 17 g  17 g Oral DAILY PRN    ondansetron (ZOFRAN ODT) tablet 4 mg  4 mg Oral Q8H PRN    Or    ondansetron (ZOFRAN) injection 4 mg  4 mg IntraVENous Q6H PRN             Review of Symptoms:    UTO 2/2 dementia      Physical Exam    BP (!) 176/89 (BP 1 Location: Left upper arm, BP Patient Position: Lying right side; At rest)   Pulse (!) 59   Temp 98.6 °F (37 °C)   Resp 14   Wt 63.8 kg (140 lb 9.6 oz)   SpO2 98%   BMI 20.17 kg/m²      NAD  Skin warm and dry  Nl conjunctiva  Oropharynx without exudate. Neck supple  Lungs decreased  Irreg  Abdomen soft and non tender  Pulses 2+ radials  +BETTY  Neuro:  Grossly intact  Appropriate       Cardiographics    Telemetry: AFIB  ECG: atrial fibrillation  Echocardiogram: pending      Assessment: This is a 81 yom with MMP, significant dementia transferred for hypoxia and consulted for CHF. Recommend conservative management given level of dementia and lack of distress    Plan:    Continue diuresis  Added spironolactone and Jardiance  Labs as best the pt will allow  Echo has been ordered.   OK with chin BB, it can likely be restarted at some point    Please call with questions

## 2022-10-03 NOTE — PROGRESS NOTES
[address] [facility] Adult  Hospitalist Group                                                                                          Hospitalist Progress Note  Frances Lopez MD  Answering service: [phone] OR [phone] from in house phone        Date of Service:  10/3/2022  NAME:  Olivia Ludwig  :  1935  MRN:  [id number]      Admission Summary:   Olivia Ludwig is a 81 y.o. male with past medical history of asthma, dementia, atrial fibrillation, stroke, depression presented to the emergency department from [facility] memory care unit with reported hypoxia. Patient denies any shortness of breath however he reportedly was hypoxic at the nursing home with O2 saturation = 70% on room air. EMS was called to the scene, reportedly had difficult time obtaining a good pleth tracing. Patient was placed on 100% oxygen non-rebreather face mask transported to the ED. Patient is similar presentation to the ED on 2029 with low O2 saturations/hypoxia. Work-up included chest x-ray showing mild cardiomegaly, mild right pleural effusion, and patchy opacities of bilateral lung bases questionable for airspace disease versus atelectasis. Patient was discharged home with 100 mg p.o. doxycycline twice daily x7-day prescription. On arrival emergency department today, initial vital signs blood pressure 160/97, heart rate 55, respirate 19, O2 saturations 100%. Chest x-ray portable was limited exam.  CT chest without IV contrast showed small to moderate bilateral pleural effusions, cardiomegaly, trace pericardial effusion, extensive calcification of the LAD and moderate calcification of right coronary artery, hepatic nodularity, age-indeterminate T4 and L2 compression fractures and new L2 compression fracture. ED initially ordered lactated Ringer's 1000 mL IV fluid bolus x1. However proBNP = 14,303. Patient was given Lasix 40 mg IV x1 dose for diuresis.   12 EKG showed atrial fibrillation with slow ventricular response, left axis deviation, incomplete right bundle branch block, and ST and T wave changes in the area and left anterior leads at 58 beats a minute. Interval history / Subjective:   Dementia, no conversation,   No hypoxia at this moment      Assessment & Plan:      1. Acute hypoxia resp failure: likely due to bilat pleural effusion, atelectasis, no pna , should aspiration precuation   2. Acute on chronic CHF: echo pending  Cardiologist consutled  Diuresis as long renal tolerate : lasix 40mg daily   Added spironolactone and Jardiance  OK with holdiung BB, it can likely be restarted at some point    3. Dementia:   Supportive care  Aspiration precaution     4. A-fib with slow ventricular response  -continue telemetry monitoring  -on anticoagulation therapy with Eliquis; resume the same  -hold BB for now      5. likely CKD 3,   Cr baseline around 1.38      6. Edema bilateral lower extremities  -Keep legs elevated at rest  -Order venous duplex about lower extremities  -lasix 40mg daily, aldactone      8. Recent Pneumonia  -Presumed diagnosis based on chest x-ray in ER on 9/29/2022; patient has been on doxycycline 100 mg p.o. twice daily x7 days for the same. Current CT chest without IV contrast does not show current evidence of pneumonia. However may complete prescribed course of doxycycline based on prior chest x-ray findings. 9.  Fecal incontinence  -Bladder incontinence  -Continue with skin care checks.        10.  Stage II right hip pressure ulcer  -Versus traumatic abrasion to the right hip with resulting open wound  -Treatment of same; turn patient every 2 hours  -Continue with dressing changes  -Consult wound care team             Code status: full   Prophylaxis: eliquis   Care Plan discussed with: son updated bedside, RN, CM , cards   Anticipated Disposition:  dementia unit      Hospital Problems  Date Reviewed: 9/13/2017            Codes Class Noted POA    A-fib ([facility] ICD-10-CM: I48.91  ICD-9-CM: 427.31  10/2/2022 Unknown        Difficulty walking ICD-10-CM: R26.2  ICD-9-CM: 719.7  10/2/2022 Unknown        Hypoxia ICD-10-CM: R09.02  ICD-9-CM: 799.02  10/2/2022 Unknown        Generalized weakness ICD-10-CM: R53.1  ICD-9-CM: 780.79  10/2/2022 Unknown        Elevated serum creatinine ICD-10-CM: R79.89  ICD-9-CM: 790.99  10/2/2022 Unknown        Acute CHF (congestive heart failure) ([facility]) ICD-10-CM: I50.9  ICD-9-CM: 428.0  10/2/2022 Unknown        Edema of both lower extremities ICD-10-CM: R60.0  ICD-9-CM: 782.3  10/2/2022 Unknown             Review of Systems:   A comprehensive review of systems was negative except for that written in the HPI. Vital Signs:    Last 24hrs VS reviewed since prior progress note. Most recent are:  Visit Vitals  BP (!) 169/93   Pulse 66   Temp 97.7 °F (36.5 °C)   Resp 19   Wt 63.8 kg (140 lb 9.6 oz)   SpO2 96%   BMI 20.17 kg/m²       No intake or output data in the 24 hours ending 10/03/22 1428     Physical Examination:     I had a face to face encounter with this patient and independently examined them on 10/3/2022 as outlined below:          Constitutional:  Awake, demented, no converation. ENT:  Oral mucosa moist, oropharynx benign. Resp:  Decrease bs both lower lung    CV:  Regular rhythm, normal rate, no murmurs, gallops, rubs    GI:  Soft, non distended, non tender. normoactive bowel sounds, no hepatosplenomegaly     Musculoskeletal:  +edema, warm, 2+ pulses throughout    Neurologic:  Moves all extremities.   AAOx3, CN II-XII reviewed            Data Review:    Review and/or order of clinical lab test      Labs:     Recent Labs     10/02/22  1417   WBC 4.7   HGB 15.1   HCT 45.7   *     Recent Labs     10/02/22  1417      K 3.9   *   CO2 30   BUN 17   CREA 1.32*   GLU 87   CA 9.0     Recent Labs     10/02/22  1417   ALT 25   *   TBILI 1.2*   TP 6.8   ALB 3.1*   GLOB 3.7     No results for input(s): INR, PTP, APTT, INREXT in the last 72 hours. No results for input(s): FE, TIBC, PSAT, FERR in the last 72 hours. Lab Results   Component Value Date/Time    Folate 10.0 11/09/2015 01:30 AM      No results for input(s): PH, PCO2, PO2 in the last 72 hours. No results for input(s): CPK, CKNDX, TROIQ in the last 72 hours.     No lab exists for component: CPKMB  No results found for: CHOL, CHOLX, CHLST, CHOLV, HDL, HDLP, LDL, LDLC, DLDLP, TGLX, TRIGL, TRIGP, CHHD, CHHDX  Lab Results   Component Value Date/Time    Glucose (POC) 76 03/06/2022 11:35 AM    Glucose (POC) 87 05/01/2017 12:01 AM    Glucose (POC) 146 (H) 12/07/2016 02:59 PM     Lab Results   Component Value Date/Time    Color YELLOW/STRAW 05/02/2022 09:11 PM    Appearance HAZY (A) 05/02/2022 09:11 PM    Specific gravity 1.020 05/02/2022 09:11 PM    Specific gravity 1.014 09/12/2017 01:52 PM    pH (UA) 6.0 05/02/2022 09:11 PM    Protein Negative 05/02/2022 09:11 PM    Glucose Negative 05/02/2022 09:11 PM    Ketone Negative 05/02/2022 09:11 PM    Bilirubin Negative 05/02/2022 09:11 PM    Urobilinogen 1.0 05/02/2022 09:11 PM    Nitrites Negative 05/02/2022 09:11 PM    Leukocyte Esterase Negative 05/02/2022 09:11 PM    Epithelial cells FEW 05/02/2022 09:11 PM    Bacteria Negative 05/02/2022 09:11 PM    WBC 0-4 05/02/2022 09:11 PM    RBC 0-5 05/02/2022 09:11 PM         Medications Reviewed:     Current Facility-Administered Medications   Medication Dose Route Frequency    apixaban (ELIQUIS) tablet 2.5 mg  2.5 mg Oral Q12H    buPROPion SR (WELLBUTRIN SR) tablet 150 mg  150 mg Oral DAILY    cholecalciferol (VITAMIN D3) (1000 Units /25 mcg) tablet 1,000 Units  1,000 Units Oral DAILY    doxycycline (VIBRA-TABS) tablet 100 mg  100 mg Oral BID    finasteride (PROSCAR) tablet 5 mg  5 mg Oral DAILY    L.acidophilus-paracasei-S.thermophil-bifidobacter (RISAQUAD) 8 billion cell capsule  1 Capsule Oral DAILY    memantine (NAMENDA) tablet 10 mg  10 mg Oral Q12H    QUEtiapine (SEROquel) tablet 12.5 mg 12.5 mg Oral QHS    tamsulosin (FLOMAX) capsule 0.4 mg  0.4 mg Oral DAILY    sodium chloride (NS) flush 5-40 mL  5-40 mL IntraVENous Q8H    sodium chloride (NS) flush 5-40 mL  5-40 mL IntraVENous PRN    acetaminophen (TYLENOL) tablet 650 mg  650 mg Oral Q6H PRN    Or    acetaminophen (TYLENOL) suppository 650 mg  650 mg Rectal Q6H PRN    polyethylene glycol (MIRALAX) packet 17 g  17 g Oral DAILY PRN    ondansetron (ZOFRAN ODT) tablet 4 mg  4 mg Oral Q8H PRN    Or    ondansetron (ZOFRAN) injection 4 mg  4 mg IntraVENous Q6H PRN    spironolactone (ALDACTONE) tablet 25 mg  25 mg Oral DAILY    empagliflozin (JARDIANCE) tablet 10 mg  10 mg Oral DAILY     ______________________________________________________________________  EXPECTED LENGTH OF STAY: - - -  ACTUAL LENGTH OF STAY:          1                 Nadine Lizarraga MD

## 2022-10-04 PROCEDURE — 74011250636 HC RX REV CODE- 250/636: Performed by: HOSPITALIST

## 2022-10-04 PROCEDURE — 65270000046 HC RM TELEMETRY

## 2022-10-04 PROCEDURE — 74011000250 HC RX REV CODE- 250: Performed by: FAMILY MEDICINE

## 2022-10-04 PROCEDURE — 74011250637 HC RX REV CODE- 250/637: Performed by: FAMILY MEDICINE

## 2022-10-04 PROCEDURE — 74011250637 HC RX REV CODE- 250/637: Performed by: INTERNAL MEDICINE

## 2022-10-04 RX ORDER — METOPROLOL TARTRATE 25 MG/1
25 TABLET, FILM COATED ORAL EVERY 12 HOURS
Status: DISCONTINUED | OUTPATIENT
Start: 2022-10-04 | End: 2022-10-06 | Stop reason: HOSPADM

## 2022-10-04 RX ADMIN — DOXYCYCLINE HYCLATE 100 MG: 100 TABLET, COATED ORAL at 10:20

## 2022-10-04 RX ADMIN — FINASTERIDE 5 MG: 5 TABLET, FILM COATED ORAL at 10:20

## 2022-10-04 RX ADMIN — MEMANTINE HYDROCHLORIDE 10 MG: 10 TABLET ORAL at 10:20

## 2022-10-04 RX ADMIN — SODIUM CHLORIDE, PRESERVATIVE FREE 10 ML: 5 INJECTION INTRAVENOUS at 17:36

## 2022-10-04 RX ADMIN — SODIUM CHLORIDE, PRESERVATIVE FREE 10 ML: 5 INJECTION INTRAVENOUS at 10:21

## 2022-10-04 RX ADMIN — DOXYCYCLINE HYCLATE 100 MG: 100 TABLET, COATED ORAL at 17:36

## 2022-10-04 RX ADMIN — MEMANTINE HYDROCHLORIDE 10 MG: 10 TABLET ORAL at 20:47

## 2022-10-04 RX ADMIN — METOPROLOL TARTRATE 25 MG: 25 TABLET, FILM COATED ORAL at 20:47

## 2022-10-04 RX ADMIN — Medication 1 CAPSULE: at 10:20

## 2022-10-04 RX ADMIN — APIXABAN 2.5 MG: 2.5 TABLET, FILM COATED ORAL at 10:21

## 2022-10-04 RX ADMIN — SPIRONOLACTONE 25 MG: 25 TABLET ORAL at 10:21

## 2022-10-04 RX ADMIN — TAMSULOSIN HYDROCHLORIDE 0.4 MG: 0.4 CAPSULE ORAL at 10:21

## 2022-10-04 RX ADMIN — Medication 1000 UNITS: at 10:20

## 2022-10-04 RX ADMIN — EMPAGLIFLOZIN 10 MG: 10 TABLET, FILM COATED ORAL at 10:21

## 2022-10-04 RX ADMIN — QUETIAPINE FUMARATE 12.5 MG: 25 TABLET ORAL at 20:47

## 2022-10-04 RX ADMIN — SODIUM CHLORIDE, PRESERVATIVE FREE 10 ML: 5 INJECTION INTRAVENOUS at 21:18

## 2022-10-04 RX ADMIN — BUPROPION HYDROCHLORIDE 150 MG: 150 TABLET, EXTENDED RELEASE ORAL at 10:20

## 2022-10-04 RX ADMIN — METOPROLOL TARTRATE 25 MG: 25 TABLET, FILM COATED ORAL at 12:29

## 2022-10-04 RX ADMIN — FUROSEMIDE 40 MG: 10 INJECTION, SOLUTION INTRAVENOUS at 10:20

## 2022-10-04 RX ADMIN — APIXABAN 2.5 MG: 2.5 TABLET, FILM COATED ORAL at 20:47

## 2022-10-04 NOTE — PROGRESS NOTES
Bedside and Verbal shift change report given to Sveta Murillo RN (oncoming nurse) by Ren Canada (offgoing nurse). Report included the following information SBAR, Kardex, Intake/Output, MAR, and Recent Results.

## 2022-10-04 NOTE — PROGRESS NOTES
[address] [facility] Adult  Hospitalist Group                                                                                          Hospitalist Progress Note  Farheen Becker MD  Answering service: [phone] from in house phone        Date of Service:  10/4/2022  NAME:  Nathan Ordoñez  :  1935  MRN:  [id number]      Admission Summary:   Nathan Ordoñez is a 81 y.o. male with past medical history of asthma, dementia, atrial fibrillation, stroke, depression presented to the emergency department from [employer] memory care unit with reported hypoxia. Patient denies any shortness of breath however he reportedly was hypoxic at the nursing home with O2 saturation = 70% on room air. EMS was called to the scene, reportedly had difficult time obtaining a good pleth tracing. Patient was placed on 100% oxygen non-rebreather face mask transported to the ED. Patient is similar presentation to the ED on 2029 with low O2 saturations/hypoxia. Work-up included chest x-ray showing mild cardiomegaly, mild right pleural effusion, and patchy opacities of bilateral lung bases questionable for airspace disease versus atelectasis. Patient was discharged home with 100 mg p.o. doxycycline twice daily x7-day prescription. On arrival emergency department today, initial vital signs blood pressure 160/97, heart rate 55, respirate 19, O2 saturations 100%. Chest x-ray portable was limited exam.  CT chest without IV contrast showed small to moderate bilateral pleural effusions, cardiomegaly, trace pericardial effusion, extensive calcification of the LAD and moderate calcification of right coronary artery, hepatic nodularity, age-indeterminate T4 and L2 compression fractures and new L2 compression fracture. ED initially ordered lactated Ringer's 1000 mL IV fluid bolus x1. However proBNP = 14,303. Patient was given Lasix 40 mg IV x1 dose for diuresis.   12 EKG showed atrial fibrillation with slow ventricular response, left axis deviation, incomplete right bundle branch block, and ST and T wave changes in the area and left anterior leads at 58 beats a minute. Interval history / Subjective:   Dementia, talking   No hypoxia at this moment , per wife breathing better      Assessment & Plan:      1. Acute hypoxia resp failure: likely due to bilat pleural effusion, atelectasis, no pna , should aspiration precuation   2. Acute on chronic CHF: likely diastolic, echo canceled by cardiologist: will not add much to the management   Diuresis as long renal tolerate : lasix 40mg daily   Added spironolactone and Jardiance  Resume BB     3. Dementia:   Supportive care  Aspiration precaution     4. A-fib with slow ventricular response  -continue telemetry monitoring  -on anticoagulation therapy with Eliquis; resume the same  -resume bb      5. likely CKD 3,   Cr baseline around 1.38      6. Edema bilateral lower extremities  -Keep legs elevated at rest  -Order venous duplex about lower extremities  -lasix 40mg daily, aldactone      8. Recent Pneumonia  -Presumed diagnosis based on chest x-ray in ER on 9/29/2022; patient has been on doxycycline 100 mg p.o. twice daily x7 days for the same. Current CT chest without IV contrast does not show current evidence of pneumonia. However may complete prescribed course of doxycycline based on prior chest x-ray findings. 9.  Fecal incontinence  -Bladder incontinence  -Continue with skin care checks.        10.  Stage II right hip pressure ulcer  -Versus traumatic abrasion to the right hip with resulting open wound  -Treatment of same; turn patient every 2 hours  -Continue with dressing changes  -Consult wound care team             Code status: full   Prophylaxis: eliquis   Care Plan discussed with: wife updated bedside, RN, CM , cards   Anticipated Disposition:  possible return back to dementia unit in 1-2 days      Hospital Problems  Date Reviewed: 9/13/2017 Codes Class Noted POA    A-fib ([facility] [address] ICD-10-CM: I48.91  ICD-9-CM: 427.31  10/2/2022 Unknown        Difficulty walking ICD-10-CM: R26.2  ICD-9-CM: 719.7  10/2/2022 Unknown        Hypoxia ICD-10-CM: R09.02  ICD-9-CM: 799.02  10/2/2022 Unknown        Generalized weakness ICD-10-CM: R53.1  ICD-9-CM: 780.79  10/2/2022 Unknown        Elevated serum creatinine ICD-10-CM: R79.89  ICD-9-CM: 790.99  10/2/2022 Unknown        Acute CHF (congestive heart failure) ([facility]CC) ICD-10-CM: I50.9  ICD-9-CM: 428.0  10/2/2022 Unknown        Edema of both lower extremities ICD-10-CM: R60.0  ICD-9-CM: 782.3  10/2/2022 Unknown           Review of Systems:   A comprehensive review of systems was negative except for that written in the HPI. Vital Signs:    Last 24hrs VS reviewed since prior progress note. Most recent are:  Visit Vitals  BP (!) 185/106   Pulse 76   Temp 97.6 °F (36.4 °C)   Resp 20   Wt 66.1 kg (145 lb 12.8 oz)   SpO2 97%   BMI 20.92 kg/m²         Intake/Output Summary (Last 24 hours) at 10/4/2022 1502  Last data filed at 10/4/2022 1350  Gross per 24 hour   Intake 604 ml   Output --   Net 604 ml          Physical Examination:     I had a face to face encounter with this patient and independently examined them on 10/4/2022 as outlined below:          Constitutional:  Awake, demented, having simple conversation    ENT:  Oral mucosa moist, oropharynx benign. Resp:  Decrease bs both lower lung    CV:  Regular rhythm, normal rate, no murmurs, gallops, rubs    GI:  Soft, non distended, non tender. normoactive bowel sounds, no hepatosplenomegaly     Musculoskeletal:  +edema, warm, 2+ pulses throughout    Neurologic:  Moves all extremities.   AAOx3, CN II-XII reviewed            Data Review:    Review and/or order of clinical lab test      Labs:     Recent Labs     10/02/22  1417   WBC 4.7   HGB 15.1   HCT 45.7   *       Recent Labs     10/02/22  1417      K 3.9   *   CO2 30   BUN 17   CREA 1.32*   GLU 87   CA 9.0       Recent Labs     10/02/22  1417   ALT 25   *   TBILI 1.2*   TP 6.8   ALB 3.1*   GLOB 3.7       No results for input(s): INR, PTP, APTT, INREXT, INREXT in the last 72 hours. No results for input(s): FE, TIBC, PSAT, FERR in the last 72 hours. Lab Results   Component Value Date/Time    Folate 10.0 11/09/2015 01:30 AM        No results for input(s): PH, PCO2, PO2 in the last 72 hours. No results for input(s): CPK, CKNDX, TROIQ in the last 72 hours.     No lab exists for component: CPKMB  No results found for: CHOL, CHOLX, CHLST, CHOLV, HDL, HDLP, LDL, LDLC, DLDLP, TGLX, TRIGL, TRIGP, CHHD, CHHDX  Lab Results   Component Value Date/Time    Glucose (POC) 76 03/06/2022 11:35 AM    Glucose (POC) 87 05/01/2017 12:01 AM    Glucose (POC) 146 (H) 12/07/2016 02:59 PM     Lab Results   Component Value Date/Time    Color YELLOW/STRAW 05/02/2022 09:11 PM    Appearance HAZY (A) 05/02/2022 09:11 PM    Specific gravity 1.020 05/02/2022 09:11 PM    Specific gravity 1.014 09/12/2017 01:52 PM    pH (UA) 6.0 05/02/2022 09:11 PM    Protein Negative 05/02/2022 09:11 PM    Glucose Negative 05/02/2022 09:11 PM    Ketone Negative 05/02/2022 09:11 PM    Bilirubin Negative 05/02/2022 09:11 PM    Urobilinogen 1.0 05/02/2022 09:11 PM    Nitrites Negative 05/02/2022 09:11 PM    Leukocyte Esterase Negative 05/02/2022 09:11 PM    Epithelial cells FEW 05/02/2022 09:11 PM    Bacteria Negative 05/02/2022 09:11 PM    WBC 0-4 05/02/2022 09:11 PM    RBC 0-5 05/02/2022 09:11 PM         Medications Reviewed:     Current Facility-Administered Medications   Medication Dose Route Frequency    metoprolol tartrate (LOPRESSOR) tablet 25 mg  25 mg Oral Q12H    apixaban (ELIQUIS) tablet 2.5 mg  2.5 mg Oral Q12H    buPROPion SR (WELLBUTRIN SR) tablet 150 mg  150 mg Oral DAILY    cholecalciferol (VITAMIN D3) (1000 Units /25 mcg) tablet 1,000 Units  1,000 Units Oral DAILY    doxycycline (VIBRA-TABS) tablet 100 mg  100 mg Oral BID    finasteride (PROSCAR) tablet 5 mg  5 mg Oral DAILY    L.acidophilus-paracasei-S.thermophil-bifidobacter (RISAQUAD) 8 billion cell capsule  1 Capsule Oral DAILY    memantine (NAMENDA) tablet 10 mg  10 mg Oral Q12H    QUEtiapine (SEROquel) tablet 12.5 mg  12.5 mg Oral QHS    tamsulosin (FLOMAX) capsule 0.4 mg  0.4 mg Oral DAILY    sodium chloride (NS) flush 5-40 mL  5-40 mL IntraVENous Q8H    sodium chloride (NS) flush 5-40 mL  5-40 mL IntraVENous PRN    acetaminophen (TYLENOL) tablet 650 mg  650 mg Oral Q6H PRN    Or    acetaminophen (TYLENOL) suppository 650 mg  650 mg Rectal Q6H PRN    polyethylene glycol (MIRALAX) packet 17 g  17 g Oral DAILY PRN    ondansetron (ZOFRAN ODT) tablet 4 mg  4 mg Oral Q8H PRN    Or    ondansetron (ZOFRAN) injection 4 mg  4 mg IntraVENous Q6H PRN    spironolactone (ALDACTONE) tablet 25 mg  25 mg Oral DAILY    empagliflozin (JARDIANCE) tablet 10 mg  10 mg Oral DAILY    furosemide (LASIX) injection 40 mg  40 mg IntraVENous DAILY     ______________________________________________________________________  EXPECTED LENGTH OF STAY: 3d 0h  ACTUAL LENGTH OF STAY:          2                 Gaby Ron MD

## 2022-10-04 NOTE — PROGRESS NOTES
Cardiology Progress Note  10/4/2022     Admit Date: 10/2/2022  Admit Diagnosis: Hypoxia [R09.02]  Acute CHF (congestive heart failure) ([address] [I50.9]  Elevated serum creatinine [R79.89]  Edema of both lower extremities [R60.0]  A-fib (HCC) [I48.91]  Generalized weakness [R53.1]  Difficulty walking [R26.2]  CC: none currently    Assessment:   Active Problems:    A-fib ([address] (10/2/2022)      Difficulty walking (10/2/2022)      Hypoxia (10/2/2022)      Generalized weakness (10/2/2022)      Elevated serum creatinine (10/2/2022)      Acute CHF (congestive heart failure) (HCC) (10/2/2022)      Edema of both lower extremities (10/2/2022)      Plan:     Continue diuresis  Labs as pt will allow  Added back metoprolol  Cancelled echo  agitation and was not likely to add much information  Continue other cardiac meds    Signing off, please call with questions. Pt does not need office F/U    Subjective:      Camille Moe Sawyerresting comfortably this AM      Objective:    Physical Exam:  Overall VSSAF;  Visit Vitals  BP (!) 159/79 (BP 1 Location: Left arm, BP Patient Position: At rest)   Pulse 74   Temp 97.8 °F (36.6 °C)   Resp 16   Wt 66.1 kg (145 lb 12.8 oz)   SpO2 93%   BMI 20.92 kg/m²     Temp (24hrs), Av.5 °F (36.4 °C), Min:97 °F (36.1 °C), Max:97.8 °F (36.6 °C)    Patient Vitals for the past 8 hrs:   Pulse   10/04/22 1028 74   10/04/22 1021 70   10/04/22 0600 74   10/04/22 0400 66    Patient Vitals for the past 8 hrs:   Resp   10/04/22 1021 16    Patient Vitals for the past 8 hrs:   BP   10/04/22 1021 (!) 159/79      10/02 1901 - 10/04 0700  In: 250 [P.O.:250]  Out: -       General Appearance: Well developed, well nourished, no acute distress. Ears/Nose/Mouth/Throat:   Normal MM; anicteric. JVP: WNL   Resp:   Lungs clear to auscultation bilaterally. Nl resp effort. Cardiovascular:  RRR, S1, S2 normal, no new murmur. No gallop or rub. Abdomen:   Soft, non-tender, bowel sounds are present.    Extremities: No edema bilaterally. Skin:  Neuro: Warm and dry. Asleep                         Data Review:     Telemetry independently reviewed :   AFIB       ECG independently reviewed:  AF  Labs: No results found for this or any previous visit (from the past 24 hour(s)).    Current medications reviewed       Truman Dee MD

## 2022-10-04 NOTE — PROGRESS NOTES
1100: Pt very confused and agitated. Bed was completely wet. Attempted to get pt cleaned up. At one point pt got aggressive and said \"ok that's enough, im going to punch you! \" And raised his fist at [address] and [employer].

## 2022-10-04 NOTE — PROGRESS NOTES
Pt was cooperative at the beginning of shift. Pt refused to allow staff to take v/s and refused to draw blood for AM labs. When trying to turn and clean pt, he also stated, \"that's enough, that's enough, I'm going to punch you! \"

## 2022-10-04 NOTE — PROGRESS NOTES
Bedside and Verbal shift change report given to Jacky Shelton RN (oncoming nurse) by Micaela Goldberg RN (offgoing nurse). Report included the following information SBAR, Kardex, ED Summary, Intake/Output, MAR, Med Rec Status, and Cardiac Rhythm A-fib .

## 2022-10-05 LAB
ALBUMIN SERPL-MCNC: 2.8 G/DL (ref 3.5–5)
ALBUMIN/GLOB SERPL: 0.8 {RATIO} (ref 1.1–2.2)
ALP SERPL-CCNC: 118 U/L (ref 45–117)
ALT SERPL-CCNC: 20 U/L (ref 12–78)
ANION GAP SERPL CALC-SCNC: 6 MMOL/L (ref 5–15)
AST SERPL-CCNC: 16 U/L (ref 15–37)
BASOPHILS # BLD: 0 K/UL (ref 0–0.1)
BASOPHILS NFR BLD: 1 % (ref 0–1)
BILIRUB SERPL-MCNC: 1.4 MG/DL (ref 0.2–1)
BUN SERPL-MCNC: 15 MG/DL (ref 6–20)
BUN/CREAT SERPL: 12 (ref 12–20)
CALCIUM SERPL-MCNC: 8.6 MG/DL (ref 8.5–10.1)
CHLORIDE SERPL-SCNC: 103 MMOL/L (ref 97–108)
CO2 SERPL-SCNC: 34 MMOL/L (ref 21–32)
CREAT SERPL-MCNC: 1.21 MG/DL (ref 0.7–1.3)
DIFFERENTIAL METHOD BLD: ABNORMAL
EOSINOPHIL # BLD: 0.2 K/UL (ref 0–0.4)
EOSINOPHIL NFR BLD: 4 % (ref 0–7)
ERYTHROCYTE [DISTWIDTH] IN BLOOD BY AUTOMATED COUNT: 14.4 % (ref 11.5–14.5)
GLOBULIN SER CALC-MCNC: 3.4 G/DL (ref 2–4)
GLUCOSE SERPL-MCNC: 83 MG/DL (ref 65–100)
HCT VFR BLD AUTO: 47.6 % (ref 36.6–50.3)
HGB BLD-MCNC: 16.2 G/DL (ref 12.1–17)
IMM GRANULOCYTES # BLD AUTO: 0 K/UL (ref 0–0.04)
IMM GRANULOCYTES NFR BLD AUTO: 0 % (ref 0–0.5)
LYMPHOCYTES # BLD: 1.2 K/UL (ref 0.8–3.5)
LYMPHOCYTES NFR BLD: 22 % (ref 12–49)
MAGNESIUM SERPL-MCNC: 2 MG/DL (ref 1.6–2.4)
MCH RBC QN AUTO: 30 PG (ref 26–34)
MCHC RBC AUTO-ENTMCNC: 34 G/DL (ref 30–36.5)
MCV RBC AUTO: 88.1 FL (ref 80–99)
MONOCYTES # BLD: 0.7 K/UL (ref 0–1)
MONOCYTES NFR BLD: 14 % (ref 5–13)
NEUTS SEG # BLD: 3.1 K/UL (ref 1.8–8)
NEUTS SEG NFR BLD: 59 % (ref 32–75)
NRBC # BLD: 0 K/UL (ref 0–0.01)
NRBC BLD-RTO: 0 PER 100 WBC
PHOSPHATE SERPL-MCNC: 3.1 MG/DL (ref 2.6–4.7)
PLATELET # BLD AUTO: 156 K/UL (ref 150–400)
PMV BLD AUTO: 10.1 FL (ref 8.9–12.9)
POTASSIUM SERPL-SCNC: 3.4 MMOL/L (ref 3.5–5.1)
PROT SERPL-MCNC: 6.2 G/DL (ref 6.4–8.2)
RBC # BLD AUTO: 5.4 M/UL (ref 4.1–5.7)
SODIUM SERPL-SCNC: 143 MMOL/L (ref 136–145)
WBC # BLD AUTO: 5.3 K/UL (ref 4.1–11.1)

## 2022-10-05 PROCEDURE — 97162 PT EVAL MOD COMPLEX 30 MIN: CPT

## 2022-10-05 PROCEDURE — 83735 ASSAY OF MAGNESIUM: CPT

## 2022-10-05 PROCEDURE — 74011000250 HC RX REV CODE- 250: Performed by: FAMILY MEDICINE

## 2022-10-05 PROCEDURE — 36415 COLL VENOUS BLD VENIPUNCTURE: CPT

## 2022-10-05 PROCEDURE — 65270000046 HC RM TELEMETRY

## 2022-10-05 PROCEDURE — 80053 COMPREHEN METABOLIC PANEL: CPT

## 2022-10-05 PROCEDURE — 85025 COMPLETE CBC W/AUTO DIFF WBC: CPT

## 2022-10-05 PROCEDURE — 97530 THERAPEUTIC ACTIVITIES: CPT

## 2022-10-05 PROCEDURE — 74011250637 HC RX REV CODE- 250/637: Performed by: INTERNAL MEDICINE

## 2022-10-05 PROCEDURE — 74011250637 HC RX REV CODE- 250/637: Performed by: FAMILY MEDICINE

## 2022-10-05 PROCEDURE — 74011250636 HC RX REV CODE- 250/636: Performed by: HOSPITALIST

## 2022-10-05 PROCEDURE — 84100 ASSAY OF PHOSPHORUS: CPT

## 2022-10-05 PROCEDURE — 94760 N-INVAS EAR/PLS OXIMETRY 1: CPT

## 2022-10-05 PROCEDURE — 74011250637 HC RX REV CODE- 250/637: Performed by: HOSPITALIST

## 2022-10-05 RX ORDER — FUROSEMIDE 40 MG/1
40 TABLET ORAL DAILY
Status: DISCONTINUED | OUTPATIENT
Start: 2022-10-06 | End: 2022-10-06 | Stop reason: HOSPADM

## 2022-10-05 RX ORDER — POTASSIUM CHLORIDE 750 MG/1
40 TABLET, FILM COATED, EXTENDED RELEASE ORAL
Status: COMPLETED | OUTPATIENT
Start: 2022-10-05 | End: 2022-10-05

## 2022-10-05 RX ADMIN — FUROSEMIDE 40 MG: 10 INJECTION, SOLUTION INTRAVENOUS at 08:05

## 2022-10-05 RX ADMIN — SPIRONOLACTONE 25 MG: 25 TABLET ORAL at 08:05

## 2022-10-05 RX ADMIN — POTASSIUM CHLORIDE 40 MEQ: 750 TABLET, FILM COATED, EXTENDED RELEASE ORAL at 18:38

## 2022-10-05 RX ADMIN — DOXYCYCLINE HYCLATE 100 MG: 100 TABLET, COATED ORAL at 08:05

## 2022-10-05 RX ADMIN — MEMANTINE HYDROCHLORIDE 10 MG: 10 TABLET ORAL at 21:52

## 2022-10-05 RX ADMIN — EMPAGLIFLOZIN 10 MG: 10 TABLET, FILM COATED ORAL at 08:05

## 2022-10-05 RX ADMIN — METOPROLOL TARTRATE 25 MG: 25 TABLET, FILM COATED ORAL at 21:52

## 2022-10-05 RX ADMIN — Medication 1000 UNITS: at 08:05

## 2022-10-05 RX ADMIN — METOPROLOL TARTRATE 25 MG: 25 TABLET, FILM COATED ORAL at 08:05

## 2022-10-05 RX ADMIN — APIXABAN 2.5 MG: 2.5 TABLET, FILM COATED ORAL at 21:51

## 2022-10-05 RX ADMIN — QUETIAPINE FUMARATE 12.5 MG: 25 TABLET ORAL at 21:52

## 2022-10-05 RX ADMIN — BUPROPION HYDROCHLORIDE 150 MG: 150 TABLET, EXTENDED RELEASE ORAL at 08:04

## 2022-10-05 RX ADMIN — APIXABAN 2.5 MG: 2.5 TABLET, FILM COATED ORAL at 08:05

## 2022-10-05 RX ADMIN — DOXYCYCLINE HYCLATE 100 MG: 100 TABLET, COATED ORAL at 21:52

## 2022-10-05 RX ADMIN — SODIUM CHLORIDE, PRESERVATIVE FREE 10 ML: 5 INJECTION INTRAVENOUS at 21:52

## 2022-10-05 RX ADMIN — Medication 1 CAPSULE: at 08:05

## 2022-10-05 RX ADMIN — TAMSULOSIN HYDROCHLORIDE 0.4 MG: 0.4 CAPSULE ORAL at 08:04

## 2022-10-05 RX ADMIN — SODIUM CHLORIDE, PRESERVATIVE FREE 10 ML: 5 INJECTION INTRAVENOUS at 16:13

## 2022-10-05 RX ADMIN — FINASTERIDE 5 MG: 5 TABLET, FILM COATED ORAL at 08:05

## 2022-10-05 RX ADMIN — MEMANTINE HYDROCHLORIDE 10 MG: 10 TABLET ORAL at 08:05

## 2022-10-05 NOTE — PROGRESS NOTES
Problem: Mobility Impaired (Adult and Pediatric)  Goal: *Acute Goals and Plan of Care (Insert Text)  Description: FUNCTIONAL STATUS PRIOR TO ADMISSION: Patient was modified independent using a rolling walker for functional mobility. HOME SUPPORT PRIOR TO ADMISSION: Patient lived at [facility]    Physical Therapy Goals  Initiated 10/5/2022  1. Patient will move from supine to sit and sit to supine  in bed with supervision/set-up within 7 day(s). 2.  Patient will transfer from bed to chair and chair to bed with minimal assistance using the least restrictive device within 7 day(s). 3.  Patient will perform sit to stand with minimal assistance/contact guard assist within 7 day(s). 4.  Patient will ambulate with minimal assistance/contact guard assist for 30 feet with the least restrictive device within 7 day(s). Outcome: Progressing Towards Goal   PHYSICAL THERAPY EVALUATION  Patient: Deedee Meigs (75 y.o. male)  Date: 10/5/2022  Primary Diagnosis: Hypoxia [R09.02]  Acute CHF (congestive heart failure) ([facility] [I50.9]  Elevated serum creatinine [R79.89]  Edema of both lower extremities [R60.0]  A-fib (HCC) [I48.91]  Generalized weakness [R53.1]  Difficulty walking [R26.2]       Precautions:   Fall      ASSESSMENT  Based on the objective data described below, the patient presents with generalized weakness, decreased functional mobility, impaired balance and gait, baseline history of dementia s/p admission on 10/2 with SOB and hypoxia. Pt received sidelying in bed and found to be incontinent. Pt oriented to self only. Pt required CGA-min A supine to sit EOB, sit<>stand with RW with min A. Pt tolerated side stepping with RW with min A and total A for pericare in standing. Pt assisted back to supine position with all needs met.  Pt will likely benefit from returning to a familiar environment upon discharge if they are able to provide current level of assist (min A transfers and gait training with RW). If they are unable to provide current level of assist, recommend SNF placement. Current Level of Function Impacting Discharge (mobility/balance): min A sit<>stand and side stepping with RW    Functional Outcome Measure: The patient scored Total Score: 15/28 on the Tinetti outcome measure which is indicative of high fall risk. Other factors to consider for discharge:      Patient will benefit from skilled therapy intervention to address the above noted impairments. PLAN :  Recommendations and Planned Interventions: bed mobility training, transfer training, gait training, therapeutic exercises, neuromuscular re-education, patient and family training/education, and therapeutic activities      Frequency/Duration: Patient will be followed by physical therapy:  3 times a week to address goals. Recommendation for discharge: (in order for the patient to meet his/her long term goals)  To be determined: Return to prior setting versus SNF    This discharge recommendation:  Has not yet been discussed the attending provider and/or case management    IF patient discharges home will need the following DME: to be determined (TBD)         SUBJECTIVE:   Patient stated Mark  thank you. Pelon thank you. Patria Felty    OBJECTIVE DATA SUMMARY:   HISTORY:    Past Medical History:   Diagnosis Date    Allergy     seasonal    Arrhythmia     Asthma     Dementia ([facility]     Depression     Heart failure ([facility]     Hypertension     Neurological disorder     dementia    Other ill-defined conditions(799.89)     a-fib    Stroke [facility])      Past Surgical History:   Procedure Laterality Date    HX HEENT      cateract surgery       Personal factors and/or comorbidities impacting plan of care:     Home Situation  Home Environment: Assisted living (memory care unit)  One/Two Story Residence: One story  Living Alone: No  Support Systems: Child(riri), Assisted Living  Patient Expects to be Discharged to[de-identified] Assisted Living  Current DME Used/Available at Home: Walker, rolling    EXAMINATION/PRESENTATION/DECISION MAKING:   Critical Behavior:  Neurologic State: Alert, Confused  Orientation Level: Oriented to person, Disoriented to time, Disoriented to situation, Disoriented to place  Cognition: Follows commands     Hearing:     Skin:    Edema:   Range Of Motion:  AROM: Within functional limits                       Strength:    Strength: Generally decreased, functional                    Tone & Sensation:                  Sensation: Intact               Coordination:     Vision:      Functional Mobility:  Bed Mobility:  Rolling: Stand-by assistance  Supine to Sit: Contact guard assistance;Minimum assistance  Sit to Supine: Contact guard assistance     Transfers:  Sit to Stand: Minimum assistance  Stand to Sit: Contact guard assistance                       Balance:   Sitting: Impaired  Sitting - Static: Fair (occasional)  Sitting - Dynamic: Fair (occasional)  Standing: Impaired; With support  Standing - Static: Constant support; Fair  Standing - Dynamic : Constant support; Fair  Ambulation/Gait Training:  Distance (ft): 2 Feet (ft)  Assistive Device: Gait belt;Walker, rolling  Ambulation - Level of Assistance: Minimal assistance        Gait Abnormalities: Decreased step clearance        Base of Support: Narrowed     Speed/Josie: Pace decreased (<100 feet/min)  Step Length: Right shortened;Left shortened          Functional Measure:  Tinetti test:    Sitting Balance: 1  Arises: 1  Attempts to Rise: 1  Immediate Standing Balance: 1  Standing Balance: 1  Nudged: 0  Eyes Closed: 0  Turn 360 Degrees - Continuous/Discontinuous: 0  Turn 360 Degrees - Steady/Unsteady: 0  Sitting Down: 1  Balance Score: 6 Balance total score  Indication of Gait: 1  R Step Length/Height: 1  L Step Length/Height: 1  R Foot Clearance: 1  L Foot Clearance: 1  Step Symmetry: 1  Step Continuity: 1  Path: 1  Trunk: 0  Walking Time: 1  Gait Score: 9 Gait total score  Total Score: 15/28 Overall total score         Tinetti Tool Score Risk of Falls  <19 = High Fall Risk  19-24 = Moderate Fall Risk  25-28 = Low Fall Risk  Tinetti ME. Performance-Oriented Assessment of Mobility Problems in Elderly Patients. [address]; [id number]. (Scoring Description: PT Bulletin Feb. 10, 1993)    Older adults: Hao Magaña et al, 2009; n = 1000 [facility] elderly evaluated with ABC, MICHELLE, ADL, and IADL)  · Mean MICHELLE score for males aged 69-68 years = 26.21(3.40)  · Mean MICHELLE score for females age 69-68 years = 25.16(4.30)  · Mean MICHELLE score for males over 80 years = 23.29(6.02)  · Mean MICHELLE score for females over 80 years = 17.20(8.32)           Based on the above components, the patient evaluation is determined to be of the following complexity level: MEDIUM    Pain Rating:  Pt denied pain    Activity Tolerance:   Fair      After treatment patient left in no apparent distress:   Supine in bed, Call bell within reach, Bed / chair alarm activated, and Side rails x 3    COMMUNICATION/EDUCATION:   The patients plan of care was discussed with: Registered nurse. Patient is unable to participate in goal setting and plan of care.     Thank you for this referral.  Wil Osullivan, PT, DPT   Time Calculation: 19 mins

## 2022-10-05 NOTE — PROGRESS NOTES
Bedside and Verbal shift change report given to Wood Escobar RN (oncoming nurse) by Karina Jesus RN (offgoing nurse). Report included the following information SBAR, Kardex, MAR, and Recent Results.

## 2022-10-05 NOTE — PROGRESS NOTES
Physician Progress Note      Josie Bahena  CSN #:                  [id number]  :                       1935  ADMIT DATE:       10/2/2022 1:35 PM  [address] [place] DATE:  RESPONDING  PROVIDER #:        PONCHO STERLING MD          QUERY TEXT:    Patient admitted with Acute CHF, and noted to have low O2 Sats at SNF prior to admission. Noted documentation of Acute Respiratory Failure in Progress Note from Oct 3, however there is also documentation of No Resp Distress, with pt maintaining O2 Sats greater than 91 percent on room air. In order to support the diagnosis of acute respiratory failure, please include additional clinical indicators in your documentation that reflect and increased work of breathing. Or please document if the diagnosis of acute respiratory failure has been ruled out after further study. The medical record reflects the following:  Risk Factors: hx of CHF, recent PNA  Clinical Indicators: pt noted to have O2 Sat of 70% at SNF and was placed on NRB. At the time of admission, is noted to have O2 Sat 100% on room air. There is documentation per the ER Physician of 'no acute resp distress', and documentation in the H&P that pt does not appear to be in acute resp distress. Pt is maintaining O2 Sats >91% on room air. A/C Hypoxic Resp Failure is documented in the 10/3 Progress Note.   Treatment: Lasix IV for diuresis    Acute Respiratory Failure Clinical Indicators per [employer] Training Guide and Quick Reference Guide:  pO2 < 60 mmHg or SpO2 (pulse oximetry) < 91% breathing room air  pCO2 > 50 and pH < 7.35  P/F ratio (pO2 / FIO2) < 300  pO2 decrease or pCO2 increase by 10 mmHg from baseline (if known)  Supplemental oxygen of 40% or more  Presence of respiratory distress, tachypnea, dyspnea, shortness of breath, wheezing  Unable to speak in complete sentences  Use of accessory muscles to breathe  Extreme anxiety and feeling of impending doom  Tripod position  Confusion/altered mental status/obtunded    Thank you,  Gia Buck RN  Clinical Documentation  [phone], or via [employer]  Options provided:  -- Acute Respiratory Failure as evidenced by, Please document evidence. -- Acute Respiratory Failure ruled out after study  -- Other - I will add my own diagnosis  -- Disagree - Not applicable / Not valid  -- Disagree - Clinically unable to determine / Unknown  -- Refer to Clinical Documentation Reviewer    PROVIDER RESPONSE TEXT:    This patient is in acute respiratory failure as evidenced by 70% sats on RM    Query created by: Rome Benedict on 10/4/2022 12:27 PM      QUERY TEXT:    Pt has CHF documented. Last Echo on record from 2015 with EF 55-60 percent. If possible, please document in progress notes and discharge summary further specificity regarding the type and acuity of CHF:    The medical record reflects the following:  Risk Factors: HTN, A-Fib  Clinical Indicators: admitted with increased SOB and cough, fatigue and weakness, and noted to have low O2 Sat at SNF prior to admission. Pt also with edema of BLE, and rales on exam. BNP 70183, with CT Chest showing cardiomegaly with enlarged heart and bilateral pleural effusions. EF 55-60% per last Echo on record form 2015. Repeat was ordered however pt too agitated to cooperate. A/C CHF is documented. Treatment: Lasix IV, Aldactone, Lopressor, 2 Gm Na diet, IO, daily weight, Cardiology consult    Thank you,  Gia Buck RN  Clinical Documentation  [phone], or via [employer]  Options provided:  -- Acute on Chronic Systolic CHF/HFrEF  -- Acute on Chronic Diastolic CHF/HFpEF  -- Acute on Chronic Systolic and Diastolic CHF  -- Other - I will add my own diagnosis  -- Disagree - Not applicable / Not valid  -- Disagree - Clinically unable to determine / Unknown  -- Refer to Clinical Documentation Reviewer    PROVIDER RESPONSE TEXT:    This patient is in acute on chronic diastolic CHF/HFpEF.     Query created Meredith Lora on 10/4/2022 12:35 PM      QUERY TEXT:    Pt has dementia documented. Per Nsg documentation, pt has been aggressive, and agitated. If possible, please document in the progress notes and discharge summary if you are evaluating and / or treating any of the following: The medical record reflects the following:  Risk Factors: dementia  Clinical Indicators: admitted with SOB, increased fatigue and weakness with increased difficulty ambulating. Per Nsg documentation, pt has been agitated and aggressive with staff. Treatment: continued on home meds of Namenda and Seroquel    Thank you,  Sandie Wei RN  Clinical Documentation  [phone], or via [address] provided:  -- Dementia without behavioral disturbance  -- Dementia with behavioral disturbance  -- Other - I will add my own diagnosis  -- Disagree - Not applicable / Not valid  -- Disagree - Clinically unable to determine / Unknown  -- Refer to Clinical Documentation Reviewer    PROVIDER RESPONSE TEXT:    This patient has dementia without behavioral disturbances.     Query created by: Nitin Lora on 10/5/2022 6:52 AM      Electronically signed by:  Michael STERLING MD 10/5/2022 4:25 PM

## 2022-10-05 NOTE — ACP (ADVANCE CARE PLANNING)
Advance Care Planning Note    Name: Benito Holley  YOB: 1935  MRN: [id number]  Admission Date: 10/2/2022  1:35 PM    Date of discussion: 10/5/2022    Active Diagnoses:    Hospital Problems  Date Reviewed: 9/13/2017            Codes Class Noted POA    A-fib ([facility] ICD-10-CM: I48.91  ICD-9-CM: 427.31  10/2/2022 Unknown        Difficulty walking ICD-10-CM: R26.2  ICD-9-CM: 719.7  10/2/2022 Unknown        Hypoxia ICD-10-CM: R09.02  ICD-9-CM: 799.02  10/2/2022 Unknown        Generalized weakness ICD-10-CM: R53.1  ICD-9-CM: 780.79  10/2/2022 Unknown        Elevated serum creatinine ICD-10-CM: R79.89  ICD-9-CM: 790.99  10/2/2022 Unknown        Acute CHF (congestive heart failure) ([facility] ICD-10-CM: I50.9  ICD-9-CM: 428.0  10/2/2022 Unknown        Edema of both lower extremities ICD-10-CM: R60.0  ICD-9-CM: 782.3  10/2/2022 Unknown            These active diagnoses are of sufficient risk that focused discussion on advance care planning is indicated in order to allow the patient to thoughtfully consider personal goals of care, and if situations arise that prevent the ability to personally give input, to ensure appropriate representation of their personal desires for different levels and aggressiveness of care. Discussion:     Persons present and participating in discussion: Rohan Menendez Wife Shyanne Saldaña MD     Discussion: CPR/ACLS/Cardioversion/Intubation/BiPAP/Renal failure requiring dialysis  Full Code, no feeding tube. Time Spent:     Total time spent face-to-face in education and discussion: 16  minutes. Edita Lindo MD  10/5/2022  5:00 PM    Patient's emergency contacts:  Extended Emergency Contact Information  Primary Emergency Contact: Gin Mcelroy, [address] Phone: [phone]  Mobile Phone: [phone]  Relation: Spouse   needed?  No  Secondary Emergency Contact: Jose Biggs  Home Phone: [phone]  Relation: Son   needed?  No

## 2022-10-05 NOTE — PROGRESS NOTES
[facility] Adult  Hospitalist Group                                                                                          Hospitalist Progress Note  Jaciel Malik MD  Answering service: [phone] from in house phone        Date of Service:  10/5/2022  NAME:  Felicita Matias  :  1935  MRN:  [id number]      Admission Summary:   Felicita Matias is a 81 y.o. male with past medical history of asthma, dementia, atrial fibrillation, stroke, depression presented to the emergency department from [facility] care unit with reported hypoxia. Patient denies any shortness of breath however he reportedly was hypoxic at the nursing home with O2 saturation = 70% on room air. EMS was called to the scene, reportedly had difficult time obtaining a good pleth tracing. Patient was placed on 100% oxygen non-rebreather face mask transported to the ED. Patient is similar presentation to the ED on 2029 with low O2 saturations/hypoxia. Work-up included chest x-ray showing mild cardiomegaly, mild right pleural effusion, and patchy opacities of bilateral lung bases questionable for airspace disease versus atelectasis. Patient was discharged home with 100 mg p.o. doxycycline twice daily x7-day prescription. On arrival emergency department today, initial vital signs blood pressure 160/97, heart rate 55, respirate 19, O2 saturations 100%. Chest x-ray portable was limited exam.  CT chest without IV contrast showed small to moderate bilateral pleural effusions, cardiomegaly, trace pericardial effusion, extensive calcification of the LAD and moderate calcification of right coronary artery, hepatic nodularity, age-indeterminate T4 and L2 compression fractures and new L2 compression fracture. ED initially ordered lactated Ringer's 1000 mL IV fluid bolus x1. However proBNP = 14,303. Patient was given Lasix 40 mg IV x1 dose for diuresis.   12 EKG showed atrial fibrillation with slow ventricular response, left axis deviation, incomplete right bundle branch block, and ST and T wave changes in the area and left anterior leads at 58 beats a minute. Interval history / Subjective:   Dementia, talking , sitting up eating meals with assistance   No hypoxia at this moment , per wife breathing better      Assessment & Plan:      1. Acute hypoxia resp failure: likely due to bilat pleural effusion, atelectasis, no pna , should aspiration precuation   2. Acute on chronic CHF: likely diastolic, echo canceled by cardiologist: will not add much to the management   Diuresis as long renal tolerate : lasix 40mg daily , change to po   Added spironolactone and Jardiance  Resume BB     3. Dementia:   Supportive care  Aspiration precaution     4. A-fib with slow ventricular response  -continue telemetry monitoring  -on anticoagulation therapy with Eliquis; resume the same  -resume bb      5. likely CKD 3,   Cr baseline around 1.38      6. Edema bilateral lower extremities  -Keep legs elevated at rest  -Order venous duplex about lower extremities  -lasix 40mg daily, aldactone      8. Recent Pneumonia  -Presumed diagnosis based on chest x-ray in ER on 9/29/2022; patient has been on doxycycline 100 mg p.o. twice daily x7 days for the same. Current CT chest without IV contrast does not show current evidence of pneumonia. However may complete prescribed course of doxycycline based on prior chest x-ray findings. 9.  Fecal incontinence  -Bladder incontinence  -Continue with skin care checks.        10.  Stage II right hip pressure ulcer  -Versus traumatic abrasion to the right hip with resulting open wound  -Treatment of same; turn patient every 2 hours  -Continue with dressing changes  -Consult wound care team             Code status: full   Prophylaxis: eliquis   Care Plan discussed with: wife updated bedside, RN, CM , cards   Anticipated Disposition:  return back [facility] dementia unit tomorrow   Had a long discuss with wife POA regarding pt's code status, goal of care. Wife said pt's dementia is remaninig current condition for 4 years. She is aware his prognosis, she would like to keep full code for now. But clearly said no feeding tube in future. She declined palliative care consult. Hospital Problems  Date Reviewed: 9/13/2017            Codes Class Noted POA    A-fib [facility]) ICD-10-CM: I48.91  ICD-9-CM: 427.31  10/2/2022 Unknown        Difficulty walking ICD-10-CM: R26.2  ICD-9-CM: 719.7  10/2/2022 Unknown        Hypoxia ICD-10-CM: R09.02  ICD-9-CM: 799.02  10/2/2022 Unknown        Generalized weakness ICD-10-CM: R53.1  ICD-9-CM: 780.79  10/2/2022 Unknown        Elevated serum creatinine ICD-10-CM: R79.89  ICD-9-CM: 790.99  10/2/2022 Unknown        Acute CHF (congestive heart failure) (HCC) ICD-10-CM: I50.9  ICD-9-CM: 428.0  10/2/2022 Unknown        Edema of both lower extremities ICD-10-CM: R60.0  ICD-9-CM: 782.3  10/2/2022 Unknown         Review of Systems:   A comprehensive review of systems was negative except for that written in the HPI. Vital Signs:    Last 24hrs VS reviewed since prior progress note. Most recent are:  Visit Vitals  BP (!) 151/80 (BP 1 Location: Right upper arm, BP Patient Position: At rest)   Pulse 60   Temp 97.7 °F (36.5 °C)   Resp 15   Ht 5' 10\" (1.778 m)   Wt 59.6 kg (131 lb 4.8 oz)   SpO2 97%   BMI 18.84 kg/m²       No intake or output data in the 24 hours ending 10/05/22 1626       Physical Examination:     I had a face to face encounter with this patient and independently examined them on 10/5/2022 as outlined below:          Constitutional:  Awake, demented, having simple conversation    ENT:  Oral mucosa moist, oropharynx benign. Resp:  Decrease bs both lower lung    CV:  Regular rhythm, normal rate, no murmurs, gallops, rubs    GI:  Soft, non distended, non tender.  normoactive bowel sounds, no hepatosplenomegaly     Musculoskeletal:  +edema, warm, 2+ pulses throughout Neurologic:  Moves all extremities. AAOx3, CN II-XII reviewed            Data Review:    Review and/or order of clinical lab test      Labs:     Recent Labs     10/05/22  0343   WBC 5.3   HGB 16.2   HCT 47.6          Recent Labs     10/05/22  0343      K 3.4*      CO2 34*   BUN 15   CREA 1.21   GLU 83   CA 8.6   MG 2.0   PHOS 3.1       Recent Labs     10/05/22  0343   ALT 20   *   TBILI 1.4*   TP 6.2*   ALB 2.8*   GLOB 3.4       No results for input(s): INR, PTP, APTT, INREXT, INREXT in the last 72 hours. No results for input(s): FE, TIBC, PSAT, FERR in the last 72 hours. Lab Results   Component Value Date/Time    Folate 10.0 11/09/2015 01:30 AM        No results for input(s): PH, PCO2, PO2 in the last 72 hours. No results for input(s): CPK, CKNDX, TROIQ in the last 72 hours.     No lab exists for component: CPKMB  No results found for: CHOL, CHOLX, CHLST, CHOLV, HDL, HDLP, LDL, LDLC, DLDLP, TGLX, TRIGL, TRIGP, CHHD, CHHDX  Lab Results   Component Value Date/Time    Glucose (POC) 76 03/06/2022 11:35 AM    Glucose (POC) 87 05/01/2017 12:01 AM    Glucose (POC) 146 (H) 12/07/2016 02:59 PM     Lab Results   Component Value Date/Time    Color YELLOW/STRAW 05/02/2022 09:11 PM    Appearance HAZY (A) 05/02/2022 09:11 PM    Specific gravity 1.020 05/02/2022 09:11 PM    Specific gravity 1.014 09/12/2017 01:52 PM    pH (UA) 6.0 05/02/2022 09:11 PM    Protein Negative 05/02/2022 09:11 PM    Glucose Negative 05/02/2022 09:11 PM    Ketone Negative 05/02/2022 09:11 PM    Bilirubin Negative 05/02/2022 09:11 PM    Urobilinogen 1.0 05/02/2022 09:11 PM    Nitrites Negative 05/02/2022 09:11 PM    Leukocyte Esterase Negative 05/02/2022 09:11 PM    Epithelial cells FEW 05/02/2022 09:11 PM    Bacteria Negative 05/02/2022 09:11 PM    WBC 0-4 05/02/2022 09:11 PM    RBC 0-5 05/02/2022 09:11 PM         Medications Reviewed:     Current Facility-Administered Medications   Medication Dose Route Frequency metoprolol tartrate (LOPRESSOR) tablet 25 mg  25 mg Oral Q12H    apixaban (ELIQUIS) tablet 2.5 mg  2.5 mg Oral Q12H    buPROPion SR (WELLBUTRIN SR) tablet 150 mg  150 mg Oral DAILY    cholecalciferol (VITAMIN D3) (1000 Units /25 mcg) tablet 1,000 Units  1,000 Units Oral DAILY    doxycycline (VIBRA-TABS) tablet 100 mg  100 mg Oral BID    finasteride (PROSCAR) tablet 5 mg  5 mg Oral DAILY    L.acidophilus-paracasei-S.thermophil-bifidobacter (RISAQUAD) 8 billion cell capsule  1 Capsule Oral DAILY    memantine (NAMENDA) tablet 10 mg  10 mg Oral Q12H    QUEtiapine (SEROquel) tablet 12.5 mg  12.5 mg Oral QHS    tamsulosin (FLOMAX) capsule 0.4 mg  0.4 mg Oral DAILY    sodium chloride (NS) flush 5-40 mL  5-40 mL IntraVENous Q8H    sodium chloride (NS) flush 5-40 mL  5-40 mL IntraVENous PRN    acetaminophen (TYLENOL) tablet 650 mg  650 mg Oral Q6H PRN    Or    acetaminophen (TYLENOL) suppository 650 mg  650 mg Rectal Q6H PRN    polyethylene glycol (MIRALAX) packet 17 g  17 g Oral DAILY PRN    ondansetron (ZOFRAN ODT) tablet 4 mg  4 mg Oral Q8H PRN    Or    ondansetron (ZOFRAN) injection 4 mg  4 mg IntraVENous Q6H PRN    spironolactone (ALDACTONE) tablet 25 mg  25 mg Oral DAILY    empagliflozin (JARDIANCE) tablet 10 mg  10 mg Oral DAILY    furosemide (LASIX) injection 40 mg  40 mg IntraVENous DAILY     ______________________________________________________________________  EXPECTED LENGTH OF STAY: 3d 0h  ACTUAL LENGTH OF STAY:          3                 Jaciel Malik MD

## 2022-10-05 NOTE — PROGRESS NOTES
Problem: Discharge Planning  Goal: *Discharge to safe environment  Outcome: Progressing Towards Goal     Problem: Falls - Risk of  Goal: *Absence of Falls  Description: Document Henriquez Blades Fall Risk and appropriate interventions in the flowsheet.   Outcome: Progressing Towards Goal  Note: Fall Risk Interventions:       Mentation Interventions: Adequate sleep, hydration, pain control, Bed/chair exit alarm, Door open when patient unattended    Medication Interventions: Teach patient to arise slowly, Patient to call before getting OOB    Elimination Interventions: Bed/chair exit alarm, Call light in reach, Toileting schedule/hourly rounds              Problem: Patient Education: Go to Patient Education Activity  Goal: Patient/Family Education  Outcome: Progressing Towards Goal

## 2022-10-05 NOTE — PROGRESS NOTES
Comprehensive Nutrition Assessment    Type and Reason for Visit: Initial    Nutrition Recommendations/Plan:   Will modify diet consistency to Easy to [place]'s order Ensure Enlive and Magic Cup ONS       Malnutrition Assessment:  Malnutrition Status:  Severe malnutrition (10/05/22 1525)    Context:  Chronic illness     Findings of the 6 clinical characteristics of malnutrition:   Energy Intake:  75% or less est energy requirements for 1 month or longer  Weight Loss:  Greater than 10% over 6 months     Body Fat Loss:  Severe body fat loss, Triceps   Muscle Mass Loss:  Severe muscle mass loss, Temples (temporalis), Clavicles (pectoralis &deltoids)  Fluid Accumulation:  Unable to assess,     Strength:  Not performed        Nutrition Assessment:    82 yo male admitted for hypoxia, acute CHF. PMHx: dementia, CVA, HTN, CHF. Spoke with pt and family member at bedside. Family provided all of the information, stating that pt ate all meatloaf and 1/3 mashed potatoes last night for dinner; some eggs and juice for breakfast; only dessert for lunch today. She reported pt normally has a good appetite. He has been living at a memory center for the last 4 years and if he is not eating well they start giving him Ensure and she states they have not had to give that to him recently, making her believe he has been eating okay. Discussed additional ONS options, will try vanilla Magic Cup as he loves ice-cream.  Pt does have some difficulty chewing tougher meats. Family member thinks he will do better with an Easy to [employer]. Weight hx in EMR indicates weight loss of 21% over last 5 months. Pt with severe muscle and fat wasting. Family states he has been losing weight since moving into the memory center and was told that is part of \"his disease\" process. Labs reviewed.        Nutritionally Significant Medications:  Probiotic, Jardiance, Lasix, Vit D3, Aldactone      Estimated Daily Nutrient Needs:  Energy Requirements Based On: Kcal/kg  Weight Used for Energy Requirements: Current  Energy (kcal/day): 1472-0037 (30-35 kcals/kg)  Weight Used for Protein Requirements: Current  Protein (g/day): 89 (1.5 gm/kg)  Method Used for Fluid Requirements: 1 ml/kcal  Fluid (ml/day): 4276    Nutrition Related Findings:   Edema: none  Last BM:  (unknown), Loose    Wounds: None      Current Nutrition Therapies:  Diet: Low Fat/Low Chol/GERBER  Supplements: none  Meal intake: Patient Vitals for the past 168 hrs:   % Diet Eaten   10/04/22 1350 26 - 50%   10/04/22 0941 26 - 50%     Supplement intake: No data found. Nutrition Support: none      Anthropometric Measures:  Height: 5' 10\" (177.8 cm)  Ideal Body Weight (IBW): 166 lbs (75 kg)     Current Body Wt:  59.5 kg (131 lb 2.8 oz), 79 % IBW.  Bed scale  Current BMI (kg/m2): 18.8        Weight Adjustment: No adjustment                 BMI Category: Underweight (BMI less than 22) age over 72    Wt Readings from Last 10 Encounters:   10/05/22 59.6 kg (131 lb 4.8 oz)   05/02/22 76 kg (167 lb 8.8 oz)   03/04/22 74.8 kg (165 lb)   08/13/18 80.3 kg (177 lb)   05/21/18 86.2 kg (190 lb)   09/25/17 86.2 kg (190 lb)   09/14/17 80.9 kg (178 lb 4.8 oz)   09/06/17 86.2 kg (190 lb)   06/14/17 89.4 kg (197 lb)   05/04/17 92.7 kg (204 lb 6.4 oz)           Nutrition Diagnosis:   Inadequate oral intake related to inadequate protein-energy intake as evidenced by intake 26-50%    Nutrition Interventions:   Food and/or Nutrient Delivery: Modify current diet, Start oral nutrition supplement  Nutrition Education/Counseling: No recommendations at this time  Coordination of Nutrition Care: Continue to monitor while inpatient       Goals:     Goals: other (specify)  Specify Other Goals: PO intakes > 75% meals + ONS over next 5-7 days    Nutrition Monitoring and Evaluation:   Behavioral-Environmental Outcomes: None identified  Food/Nutrient Intake Outcomes: Food and nutrient intake, Supplement intake  Physical Signs/Symptoms Outcomes: Biochemical data, GI status, Weight    Discharge Planning:    Continue current diet, Continue oral nutrition supplement    Mikayla Loaiza RD  Available via [employer]

## 2022-10-06 VITALS
OXYGEN SATURATION: 97 % | TEMPERATURE: 97.9 F | HEIGHT: 70 IN | BODY MASS INDEX: 19.2 KG/M2 | RESPIRATION RATE: 20 BRPM | DIASTOLIC BLOOD PRESSURE: 89 MMHG | SYSTOLIC BLOOD PRESSURE: 142 MMHG | WEIGHT: 134.1 LBS | HEART RATE: 63 BPM

## 2022-10-06 LAB
ALBUMIN SERPL-MCNC: 2.8 G/DL (ref 3.5–5)
ALBUMIN/GLOB SERPL: 0.9 {RATIO} (ref 1.1–2.2)
ALP SERPL-CCNC: 112 U/L (ref 45–117)
ALT SERPL-CCNC: 17 U/L (ref 12–78)
ANION GAP SERPL CALC-SCNC: 4 MMOL/L (ref 5–15)
AST SERPL-CCNC: 17 U/L (ref 15–37)
BILIRUB SERPL-MCNC: 1.3 MG/DL (ref 0.2–1)
BUN SERPL-MCNC: 21 MG/DL (ref 6–20)
BUN/CREAT SERPL: 17 (ref 12–20)
CALCIUM SERPL-MCNC: 9.2 MG/DL (ref 8.5–10.1)
CHLORIDE SERPL-SCNC: 104 MMOL/L (ref 97–108)
CO2 SERPL-SCNC: 32 MMOL/L (ref 21–32)
CREAT SERPL-MCNC: 1.23 MG/DL (ref 0.7–1.3)
GLOBULIN SER CALC-MCNC: 3.2 G/DL (ref 2–4)
GLUCOSE SERPL-MCNC: 89 MG/DL (ref 65–100)
MAGNESIUM SERPL-MCNC: 1.8 MG/DL (ref 1.6–2.4)
PHOSPHATE SERPL-MCNC: 3.7 MG/DL (ref 2.6–4.7)
POTASSIUM SERPL-SCNC: 3.8 MMOL/L (ref 3.5–5.1)
PROT SERPL-MCNC: 6 G/DL (ref 6.4–8.2)
SODIUM SERPL-SCNC: 140 MMOL/L (ref 136–145)

## 2022-10-06 PROCEDURE — 83735 ASSAY OF MAGNESIUM: CPT

## 2022-10-06 PROCEDURE — 80053 COMPREHEN METABOLIC PANEL: CPT

## 2022-10-06 PROCEDURE — 36415 COLL VENOUS BLD VENIPUNCTURE: CPT

## 2022-10-06 PROCEDURE — 74011250637 HC RX REV CODE- 250/637: Performed by: FAMILY MEDICINE

## 2022-10-06 PROCEDURE — 74011250637 HC RX REV CODE- 250/637: Performed by: HOSPITALIST

## 2022-10-06 PROCEDURE — 84100 ASSAY OF PHOSPHORUS: CPT

## 2022-10-06 PROCEDURE — 74011250637 HC RX REV CODE- 250/637: Performed by: INTERNAL MEDICINE

## 2022-10-06 RX ORDER — FUROSEMIDE 40 MG/1
40 TABLET ORAL DAILY
Qty: 30 TABLET | Refills: 0 | Status: ON HOLD | OUTPATIENT
Start: 2022-10-06 | End: 2022-10-12 | Stop reason: SDUPTHER

## 2022-10-06 RX ORDER — SPIRONOLACTONE 25 MG/1
25 TABLET ORAL DAILY
Qty: 30 TABLET | Refills: 0 | Status: ON HOLD | OUTPATIENT
Start: 2022-10-07 | End: 2022-10-12 | Stop reason: SDUPTHER

## 2022-10-06 RX ADMIN — EMPAGLIFLOZIN 10 MG: 10 TABLET, FILM COATED ORAL at 08:17

## 2022-10-06 RX ADMIN — BUPROPION HYDROCHLORIDE 150 MG: 150 TABLET, EXTENDED RELEASE ORAL at 08:17

## 2022-10-06 RX ADMIN — APIXABAN 2.5 MG: 2.5 TABLET, FILM COATED ORAL at 08:17

## 2022-10-06 RX ADMIN — SPIRONOLACTONE 25 MG: 25 TABLET ORAL at 08:17

## 2022-10-06 RX ADMIN — METOPROLOL TARTRATE 25 MG: 25 TABLET, FILM COATED ORAL at 08:17

## 2022-10-06 RX ADMIN — MEMANTINE HYDROCHLORIDE 10 MG: 10 TABLET ORAL at 08:17

## 2022-10-06 RX ADMIN — FUROSEMIDE 40 MG: 40 TABLET ORAL at 08:17

## 2022-10-06 RX ADMIN — Medication 1 CAPSULE: at 08:17

## 2022-10-06 RX ADMIN — DOXYCYCLINE HYCLATE 100 MG: 100 TABLET, COATED ORAL at 08:17

## 2022-10-06 RX ADMIN — TAMSULOSIN HYDROCHLORIDE 0.4 MG: 0.4 CAPSULE ORAL at 08:17

## 2022-10-06 RX ADMIN — FINASTERIDE 5 MG: 5 TABLET, FILM COATED ORAL at 08:17

## 2022-10-06 RX ADMIN — Medication 1000 UNITS: at 08:17

## 2022-10-06 NOTE — PROGRESS NOTES
Problem: Discharge Planning  Goal: *Discharge to safe environment  10/6/2022 1511 by Chuckie Garrett RN  Outcome: Resolved/Met  10/6/2022 1149 by Chuckie Garrett RN  Outcome: Progressing Towards Goal     Problem: Falls - Risk of  Goal: *Absence of Falls  Description: Document Roxy Catherine Fall Risk and appropriate interventions in the flowsheet. 10/6/2022 1511 by Chuckie Garrett RN  Outcome: Resolved/Met  Note: Fall Risk Interventions:  Mobility Interventions: Bed/chair exit alarm, Patient to call before getting OOB    Mentation Interventions: Adequate sleep, hydration, pain control, Bed/chair exit alarm, Door open when patient unattended    Medication Interventions: Bed/chair exit alarm, Utilize gait belt for transfers/ambulation    Elimination Interventions: Bed/chair exit alarm, Call light in reach           10/6/2022 1149 by Chuckie Garrett RN  Outcome: Progressing Towards Goal  Note: Fall Risk Interventions:  Mobility Interventions: Bed/chair exit alarm, Patient to call before getting OOB    Mentation Interventions: Adequate sleep, hydration, pain control, Bed/chair exit alarm, Door open when patient unattended    Medication Interventions: Bed/chair exit alarm, Utilize gait belt for transfers/ambulation    Elimination Interventions: Bed/chair exit alarm, Call light in reach              Problem: Patient Education: Go to Patient Education Activity  Goal: Patient/Family Education  Outcome: Resolved/Met     Problem: Pressure Injury - Risk of  Goal: *Prevention of pressure injury  Description: Document Jose Juan Scale and appropriate interventions in the flowsheet.   10/6/2022 1511 by Chuckie Garrett RN  Outcome: Resolved/Met  Note: Pressure Injury Interventions:  Sensory Interventions: Assess need for specialty bed, Minimize linen layers, Maintain/enhance activity level    Moisture Interventions: Absorbent underpads, Check for incontinence Q2 hours and as needed, Apply protective barrier, creams and emollients    Activity Interventions: Increase time out of bed, PT/OT evaluation, Pressure redistribution bed/mattress(bed type)    Mobility Interventions: HOB 30 degrees or less, PT/OT evaluation, Pressure redistribution bed/mattress (bed type)    Nutrition Interventions: Document food/fluid/supplement intake    Friction and Shear Interventions: HOB 30 degrees or less, Minimize layers             10/6/2022 1149 by Marika Joseph RN  Outcome: Progressing Towards Goal  Note: Pressure Injury Interventions:  Sensory Interventions: Assess need for specialty bed, Minimize linen layers, Maintain/enhance activity level    Moisture Interventions: Absorbent underpads, Check for incontinence Q2 hours and as needed, Apply protective barrier, creams and emollients    Activity Interventions: Increase time out of bed, PT/OT evaluation, Pressure redistribution bed/mattress(bed type)    Mobility Interventions: HOB 30 degrees or less, PT/OT evaluation, Pressure redistribution bed/mattress (bed type)    Nutrition Interventions: Document food/fluid/supplement intake    Friction and Shear Interventions: HOB 30 degrees or less, Minimize layers                Problem: Patient Education: Go to Patient Education Activity  Goal: Patient/Family Education  10/6/2022 1511 by Marika Joseph RN  Outcome: Resolved/Met  10/6/2022 1149 by Marika Joseph RN  Outcome: Progressing Towards Goal     Problem: Patient Education: Go to Patient Education Activity  Goal: Patient/Family Education  Outcome: Resolved/Met

## 2022-10-06 NOTE — PROGRESS NOTES
Problem: Discharge Planning  Goal: *Discharge to safe environment  Outcome: Progressing Towards Goal     Problem: Falls - Risk of  Goal: *Absence of Falls  Description: Document Lizbeth Storey Fall Risk and appropriate interventions in the flowsheet. Outcome: Progressing Towards Goal  Note: Fall Risk Interventions:  Mobility Interventions: Bed/chair exit alarm, Patient to call before getting OOB    Mentation Interventions: Adequate sleep, hydration, pain control, Bed/chair exit alarm, Door open when patient unattended    Medication Interventions: Bed/chair exit alarm, Utilize gait belt for transfers/ambulation    Elimination Interventions: Bed/chair exit alarm, Call light in reach              Problem: Pressure Injury - Risk of  Goal: *Prevention of pressure injury  Description: Document Jose Juan Scale and appropriate interventions in the flowsheet.   Outcome: Progressing Towards Goal  Note: Pressure Injury Interventions:  Sensory Interventions: Assess need for specialty bed, Minimize linen layers, Maintain/enhance activity level    Moisture Interventions: Absorbent underpads, Check for incontinence Q2 hours and as needed, Apply protective barrier, creams and emollients    Activity Interventions: Increase time out of bed, PT/OT evaluation, Pressure redistribution bed/mattress(bed type)    Mobility Interventions: HOB 30 degrees or less, PT/OT evaluation, Pressure redistribution bed/mattress (bed type)    Nutrition Interventions: Document food/fluid/supplement intake    Friction and Shear Interventions: HOB 30 degrees or less, Minimize layers                Problem: Patient Education: Go to Patient Education Activity  Goal: Patient/Family Education  Outcome: Progressing Towards Goal

## 2022-10-06 NOTE — PROGRESS NOTES
Transition of Care  RUR 13% Low  Disposition Return to [address] [place] [address]  DME Walker/wheelchair  Transportation [facility] phone [phone], 3:30 pm  Follow Up PCP, Specialist    CM attempted phone call to DON at [facility] regarding anticipated DC today. CM left message with receptionisit for Nevaeh Ravi [phone]. CM faxed medicals to facility at [phone]. CM met with wife to discuss plan. Medicare pt has received, reviewed, and signed 2nd IM letter informing them of their right to appeal the discharge. Signed copy has been placed on pt bedside chart. CM to monitor. Murtaza Medrano MS    1:00 CM attempted phone call to Assistant Jesus Parker to coordinate patient DC. CM will await return phone call. Murtaza Medrano MS    1:42 CM received phone call from Nevaeh Ravi, assistant María Miles [phone] stating that the patient is okay to return. Nurse to call report to [phone], memory care unit. Nurse updated.    Murtaza Medrano, MS

## 2022-10-06 NOTE — PROGRESS NOTES
1525 Attempted to call report.  No answer from facility   1538 Report called to [facility]

## 2022-10-06 NOTE — PROGRESS NOTES
Spiritual Care Partner Volunteer visited patient at [address] in [address] [facility] on 10/6/2022   Documented by: For additional spiritual care, please contact the  on-call at [phone]). Rev.  Dominick Marion MDiv, MS, [facility]  Staff

## 2022-10-06 NOTE — DISCHARGE SUMMARY
Discharge Summary     Patient:  Benito Holley       MRN: [id number]       YOB: 1935       Age: 81 y.o. Date of admission:  10/2/2022    Date of discharge:  10/6/2022    Primary care provider: Dr. Zoraida [place] MD    Admitting provider:  Corona Castanon MD    Discharging provider:  Mabel Vogt [address] [phone]. If unavailable, call [phone] and ask the  to page the triage hospitalist.    Consultations  IP CONSULT TO HOSPITALIST  IP CONSULT TO CARDIOLOGY    Procedures  * No surgery found *    Discharge destination: [facility]. The patient is stable for discharge. Admission diagnosis  Hypoxia [R09.02]  Acute CHF (congestive heart failure) ([facility] [address] [I50.9]  Elevated serum creatinine [R79.89]  Edema of both lower extremities [R60.0]  A-fib (HCC) [I48.91]  Generalized weakness [R53.1]  Difficulty walking [R26.2]    Current Discharge Medication List        START taking these medications    Details   spironolactone (ALDACTONE) 25 mg tablet Take 1 Tablet by mouth daily. Qty: 30 Tablet, Refills: 0  Start date: 10/7/2022      empagliflozin (JARDIANCE) 10 mg tablet Take 1 Tablet by mouth daily. Qty: 30 Tablet, Refills: 0  Start date: 10/7/2022      furosemide (LASIX) 40 mg tablet Take 1 Tablet by mouth daily. Qty: 30 Tablet, Refills: 0  Start date: 10/6/2022           CONTINUE these medications which have NOT CHANGED    Details   buPROPion XL (WELLBUTRIN XL) 150 mg tablet Take 1 Tablet by mouth daily. Qty: 30 Tablet, Refills: 0      finasteride (PROSCAR) 5 mg tablet Take 5 mg by mouth daily. L.acid,para-B. bifidum-S.therm (RISAQUAD) 8 billion cell cap cap Take 1 Capsule by mouth daily. cholecalciferol (VITAMIN D3) (1000 Units /25 mcg) tablet Take 1,000 Units by mouth daily.       tamsulosin (FLOMAX) 0.4 mg capsule Take 0.4 mg by mouth daily.      apixaban (ELIQUIS) 2.5 mg tablet Take 2.5 mg by mouth every twelve (12) hours. memantine (NAMENDA) 10 mg tablet Take 10 mg by mouth every twelve (12) hours. metoprolol tartrate (LOPRESSOR) 25 mg tablet Take 25 mg by mouth every twelve (12) hours every twelve (12) hours. QUEtiapine (SEROqueL) 25 mg tablet Take 12.5 mg by mouth nightly. STOP taking these medications       doxycycline (VIBRA-TABS) 100 mg tablet Comments:   Reason for Stopping: Follow-up Information       Follow up With Specialties Details Why Contact Info    Felty, Harman Balls, [phone] [id number] [phone]      Tom Willams, [facility] Vascular Surgery, Cardiovascular Disease Physician Follow up in 1 week(s)  [address]  [phone]              Final discharge diagnoses and brief hospital course  Bryan Crowe is a 81 y.o. male with past medical history of asthma, dementia, atrial fibrillation, stroke, depression presented to the emergency department from [facility] memory care unit with reported hypoxia. Patient denies any shortness of breath however he reportedly was hypoxic at the nursing home with O2 saturation = 70% on room air. EMS was called to the scene, reportedly had difficult time obtaining a good pleth tracing. Patient was placed on 100% oxygen non-rebreather face mask transported to the ED. Patient is similar presentation to the ED on 9/29/2029 with low O2 saturations/hypoxia. Work-up included chest x-ray showing mild cardiomegaly, mild right pleural effusion, and patchy opacities of bilateral lung bases questionable for airspace disease versus atelectasis. Patient was discharged home with 100 mg p.o. doxycycline twice daily x7-day prescription.   On arrival emergency department today, initial vital signs blood pressure 160/97, heart rate 55, respirate 19, O2 saturations 100%.  Chest x-ray portable was limited exam.  CT chest without IV contrast showed small to moderate bilateral pleural effusions, cardiomegaly, trace pericardial effusion, extensive calcification of the LAD and moderate calcification of right coronary artery, hepatic nodularity, age-indeterminate T4 and L2 compression fractures and new L2 compression fracture. ED initially ordered lactated Ringer's 1000 mL IV fluid bolus x1. However proBNP = 14,303. Patient was given Lasix 40 mg IV x1 dose for diuresis. 12 EKG showed atrial fibrillation with slow ventricular response, left axis deviation, incomplete right bundle branch block, and ST and T wave changes in the area and left anterior leads at 58 beats a minute. Acute hypoxia resp failure: resolved   - likely due to bilat pleural effusion vs aspiration precuation   - saturating well on RA     Acute on chronic diastolic CHF:    Edema bilateral lower extremities  -  echo canceled by cardiologist: will not add much to the management   -  c/w lasix 40mg daily, spironolactone and Jardiance, metoprolol      Dementia:   - Supportive care  - c/w namenda       A-fib with slow ventricular response  -on anticoagulation therapy with Eliquis;  -c/w BB     CKD 3 - Cr baseline      Recent Pneumonia  -Presumed diagnosis based on chest x-ray in ER on 9/29/2022; patient has been on doxycycline 100 mg p.o. twice daily x7 days for the same. Current CT chest without IV contrast does not show current evidence of pneumonia. However may complete prescribed course of doxycycline based on prior chest x-ray findings.      Stage II right hip pressure ulcer  -Versus traumatic abrasion to the right hip with resulting open wound  -Treatment of same; turn patient every 2 hours  -Continue with dressing changes  - wound care team    BPH - c/w flomax, finasteride   Depression - c/w seroquel, wellbutrin     Discharge recommendations:  PCP in 1 week   Cardiology in 1-2 weeks   BMP in 1 week Discharge pan discussed with patient and his wife at bedside. She understood and agreed         Physical examination at discharge  Visit Vitals  BP (!) 157/93 (BP 1 Location: Left upper arm, BP Patient Position: At rest)   Pulse (!) 57   Temp 98.3 °F (36.8 °C)   Resp 19   Ht 5' 10\" (1.778 m)   Wt 60.8 kg (134 lb 1.6 oz)   SpO2 96%   BMI 19.24 kg/m²                                                 Constitutional:  NAD   ENT:  Oral mucosa moist, oropharynx benign. Resp:  Decrease bs both lower lung    CV:  Regular rhythm, normal rate, no murmurs, gallops, rubs    GI:  Soft, non distended, non tender. normoactive bowel sounds, no hepatosplenomegaly     Musculoskeletal:  +edema, warm, 2+ pulses throughout    Neurologic:  Moves all extremities. dementia        Pertinent imaging studies:    Per EMR     Recent Labs     10/05/22  0343   WBC 5.3   HGB 16.2   HCT 47.6        Recent Labs     10/06/22  0211 10/05/22  0343    143   K 3.8 3.4*    103   CO2 32 34*   BUN 21* 15   CREA 1.23 1.21   GLU 89 83   CA 9.2 8.6   MG 1.8 2.0   PHOS 3.7 3.1     Recent Labs     10/06/22  0211 10/05/22  0343    118*   TP 6.0* 6.2*   ALB 2.8* 2.8*   GLOB 3.2 3.4     No results for input(s): INR, PTP, APTT, INREXT in the last 72 hours. No results for input(s): FE, TIBC, PSAT, FERR in the last 72 hours. No results for input(s): PH, PCO2, PO2 in the last 72 hours. No results for input(s): CPK, CKMB in the last 72 hours.     No lab exists for component: TROPONINI  No components found for: [place]    Chronic Diagnoses:    Problem List as of 10/6/2022 Date Reviewed: 9/13/2017            Codes Class Noted - Resolved    A-fib [facility]) ICD-10-CM: I48.91  ICD-9-CM: 427.31  10/2/2022 - Present        Difficulty walking ICD-10-CM: R26.2  ICD-9-CM: 719.7  10/2/2022 - Present        Hypoxia ICD-10-CM: R09.02  ICD-9-CM: 799.02  10/2/2022 - Present        Generalized weakness ICD-10-CM: R53.1  ICD-9-CM: 780.79  10/2/2022 - Present Elevated serum creatinine ICD-10-CM: R79.89  ICD-9-CM: 790.99  10/2/2022 - Present        Acute CHF (congestive heart failure) (HCC) ICD-10-CM: I50.9  ICD-9-CM: 428.0  10/2/2022 - Present        Edema of both lower extremities ICD-10-CM: R60.0  ICD-9-CM: 782.3  10/2/2022 - Present        SBO (small bowel obstruction) ([facility] ICD-10-CM: H65.199  ICD-9-CM: 560.9  3/4/2022 - Present        Chronic obstructive pulmonary disease ([facility] ICD-10-CM: J44.9  ICD-9-CM: 496  10/19/2017 - Present        Urine retention ICD-10-CM: R33.9  ICD-9-CM: 788.20  9/29/2017 - Present        Urinary tract infection ICD-10-CM: N39.0  ICD-9-CM: 599.0  9/12/2017 - Present        Pyelonephritis ICD-10-CM: N12  ICD-9-CM: 590.80  9/12/2017 - Present        UTI (urinary tract infection) ICD-10-CM: N39.0  ICD-9-CM: 599.0  9/12/2017 - Present        History of CVA (cerebrovascular accident) (Chronic) ICD-10-CM: A29.09  ICD-9-CM: V12.54  5/4/2017 - Present        CKD (chronic kidney disease) stage 3, GFR 30-59 ml/min (HCC) (Chronic) ICD-10-CM: N18.30  ICD-9-CM: 585.3  5/4/2017 - Present        Simple chronic bronchitis (HCC) (Chronic) ICD-10-CM: J41.0  ICD-9-CM: 491.0  5/4/2017 - Present        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              Chronic tension-type headache, not intractable ICD-10-CM: J65.183  ICD-9-CM: 339.12  11/8/2015 - Present        Gait apraxia of elderly ICD-10-CM: R48.2  ICD-9-CM: 784.69  11/8/2015 - Present        Spondylolysis of cervical region ICD-10-CM: M43.02  ICD-9-CM: 756.19  11/8/2015 - Present        Cervical spondyloarthritis ICD-10-CM: O59.020  ICD-9-CM: 721.0 11/8/2015 - Present        Cerebral thrombosis with cerebral infarction [facility]) ICD-10-CM: I63.30  ICD-9-CM: 434.01  11/8/2015 - Present        Cerebral infarction due to stenosis of carotid artery ([facility] ICD-10-CM: P06.693  ICD-9-CM: 433.11  11/8/2015 - Present        Depression ICD-10-CM: F32. A  ICD-9-CM: 377  7/7/2015 - Present        Acute bronchitis ICD-10-CM: J20.9  ICD-9-CM: 466.0  7/19/2013 - Present        Edema extremities ICD-10-CM: R60.0  ICD-9-CM: 782.3  7/19/2013 - Present        HTN (hypertension) ICD-10-CM: I10  ICD-9-CM: 401.9  7/19/2013 - Present        Atrial fibrillation ([facility] ICD-10-CM: I48.91  ICD-9-CM: 427.31  7/19/2013 - Present    Overview Signed 7/19/2013  1:21 PM by Ger dubon. RESOLVED: Supratherapeutic INR ICD-10-CM: R79.1  ICD-9-CM: 790.92  11/7/2015 - 2/9/2017           Time spent on discharge related activities today greater than 30 minutes.       Signed:  Alexandre Wade MD                 Hospitalist, Internal Medicine      Cc: Roxane Chavarria MD

## 2022-10-06 NOTE — PROGRESS NOTES
Bedside and Verbal shift change report given to Scarlett Payan RN (oncoming nurse) by Shruthi Mcginnis RN (offgoing nurse). Report included the following information SBAR, Kardex, ED Summary, Procedure Summary, Intake/Output, MAR, Recent Results, and Cardiac Rhythm A fib .

## 2022-10-06 NOTE — PROGRESS NOTES
I have reviewed discharge instructions with the patient and spouse. The spouse verbalized understanding.

## 2022-10-07 ENCOUNTER — APPOINTMENT (OUTPATIENT)
Dept: CT IMAGING | Age: 87
DRG: 683 | End: 2022-10-07
Attending: STUDENT IN AN ORGANIZED HEALTH CARE EDUCATION/TRAINING PROGRAM
Payer: MEDICARE

## 2022-10-07 ENCOUNTER — APPOINTMENT (OUTPATIENT)
Dept: GENERAL RADIOLOGY | Age: 87
DRG: 683 | End: 2022-10-07
Attending: STUDENT IN AN ORGANIZED HEALTH CARE EDUCATION/TRAINING PROGRAM
Payer: MEDICARE

## 2022-10-07 ENCOUNTER — HOSPITAL ENCOUNTER (INPATIENT)
Age: 87
LOS: 5 days | Discharge: HOME | DRG: 683 | End: 2022-10-12
Attending: STUDENT IN AN ORGANIZED HEALTH CARE EDUCATION/TRAINING PROGRAM | Admitting: FAMILY MEDICINE
Payer: MEDICARE

## 2022-10-07 ENCOUNTER — PATIENT OUTREACH (OUTPATIENT)
Dept: CASE MANAGEMENT | Age: 87
End: 2022-10-07

## 2022-10-07 DIAGNOSIS — R55 SYNCOPE AND COLLAPSE: Primary | ICD-10-CM

## 2022-10-07 DIAGNOSIS — R41.82 ALTERED MENTAL STATUS, UNSPECIFIED ALTERED MENTAL STATUS TYPE: ICD-10-CM

## 2022-10-07 LAB
ALBUMIN SERPL-MCNC: 2.8 G/DL (ref 3.5–5)
ALBUMIN/GLOB SERPL: 0.8 {RATIO} (ref 1.1–2.2)
ALP SERPL-CCNC: 109 U/L (ref 45–117)
ALT SERPL-CCNC: 18 U/L (ref 12–78)
ANION GAP SERPL CALC-SCNC: 4 MMOL/L (ref 5–15)
APPEARANCE UR: CLEAR
AST SERPL-CCNC: 14 U/L (ref 15–37)
BACTERIA URNS QL MICRO: NEGATIVE /HPF
BILIRUB SERPL-MCNC: 1.3 MG/DL (ref 0.2–1)
BILIRUB UR QL: NEGATIVE
BNP SERPL-MCNC: 3742 PG/ML
BUN SERPL-MCNC: 33 MG/DL (ref 6–20)
BUN/CREAT SERPL: 20 (ref 12–20)
CALCIUM SERPL-MCNC: 8.7 MG/DL (ref 8.5–10.1)
CHLORIDE SERPL-SCNC: 101 MMOL/L (ref 97–108)
CO2 SERPL-SCNC: 34 MMOL/L (ref 21–32)
COLOR UR: ABNORMAL
CREAT SERPL-MCNC: 1.66 MG/DL (ref 0.7–1.3)
EPITH CASTS URNS QL MICRO: ABNORMAL /LPF
ERYTHROCYTE [DISTWIDTH] IN BLOOD BY AUTOMATED COUNT: 14.4 % (ref 11.5–14.5)
GLOBULIN SER CALC-MCNC: 3.7 G/DL (ref 2–4)
GLUCOSE SERPL-MCNC: 113 MG/DL (ref 65–100)
GLUCOSE UR STRIP.AUTO-MCNC: 500 MG/DL
HCT VFR BLD AUTO: 48 % (ref 36.6–50.3)
HGB BLD-MCNC: 15.7 G/DL (ref 12.1–17)
HGB UR QL STRIP: NEGATIVE
KETONES UR QL STRIP.AUTO: ABNORMAL MG/DL
LEUKOCYTE ESTERASE UR QL STRIP.AUTO: NEGATIVE
MCH RBC QN AUTO: 29.3 PG (ref 26–34)
MCHC RBC AUTO-ENTMCNC: 32.7 G/DL (ref 30–36.5)
MCV RBC AUTO: 89.6 FL (ref 80–99)
NITRITE UR QL STRIP.AUTO: NEGATIVE
NRBC # BLD: 0 K/UL (ref 0–0.01)
NRBC BLD-RTO: 0 PER 100 WBC
PH UR STRIP: 5 [PH] (ref 5–8)
PLATELET # BLD AUTO: 173 K/UL (ref 150–400)
PMV BLD AUTO: 10.2 FL (ref 8.9–12.9)
POTASSIUM SERPL-SCNC: 3.6 MMOL/L (ref 3.5–5.1)
PROT SERPL-MCNC: 6.5 G/DL (ref 6.4–8.2)
PROT UR STRIP-MCNC: ABNORMAL MG/DL
RBC # BLD AUTO: 5.36 M/UL (ref 4.1–5.7)
RBC #/AREA URNS HPF: ABNORMAL /HPF (ref 0–5)
SODIUM SERPL-SCNC: 139 MMOL/L (ref 136–145)
SP GR UR REFRACTOMETRY: 1.02 (ref 1–1.03)
TROPONIN-HIGH SENSITIVITY: 8 NG/L (ref 0–76)
UROBILINOGEN UR QL STRIP.AUTO: 1 EU/DL (ref 0.2–1)
WBC # BLD AUTO: 5.2 K/UL (ref 4.1–11.1)
WBC URNS QL MICRO: ABNORMAL /HPF (ref 0–4)

## 2022-10-07 PROCEDURE — 99285 EMERGENCY DEPT VISIT HI MDM: CPT

## 2022-10-07 PROCEDURE — 81001 URINALYSIS AUTO W/SCOPE: CPT

## 2022-10-07 PROCEDURE — 70450 CT HEAD/BRAIN W/O DYE: CPT

## 2022-10-07 PROCEDURE — 65270000046 HC RM TELEMETRY

## 2022-10-07 PROCEDURE — 84484 ASSAY OF TROPONIN QUANT: CPT

## 2022-10-07 PROCEDURE — 71045 X-RAY EXAM CHEST 1 VIEW: CPT

## 2022-10-07 PROCEDURE — 74011250636 HC RX REV CODE- 250/636: Performed by: STUDENT IN AN ORGANIZED HEALTH CARE EDUCATION/TRAINING PROGRAM

## 2022-10-07 PROCEDURE — 85027 COMPLETE CBC AUTOMATED: CPT

## 2022-10-07 PROCEDURE — 96361 HYDRATE IV INFUSION ADD-ON: CPT

## 2022-10-07 PROCEDURE — 83880 ASSAY OF NATRIURETIC PEPTIDE: CPT

## 2022-10-07 PROCEDURE — 80053 COMPREHEN METABOLIC PANEL: CPT

## 2022-10-07 PROCEDURE — 96360 HYDRATION IV INFUSION INIT: CPT

## 2022-10-07 PROCEDURE — 36415 COLL VENOUS BLD VENIPUNCTURE: CPT

## 2022-10-07 RX ORDER — CETIRIZINE HYDROCHLORIDE 10 MG/1
1 CAPSULE, LIQUID FILLED ORAL
COMMUNITY

## 2022-10-07 RX ORDER — MONTELUKAST SODIUM 10 MG/1
10 TABLET ORAL
COMMUNITY

## 2022-10-07 RX ADMIN — SODIUM CHLORIDE 250 ML: 9 INJECTION, SOLUTION INTRAVENOUS at 21:01

## 2022-10-07 NOTE — PROGRESS NOTES
Care Transitions Initial Call    Call within 2 business days of discharge: Yes     Patient: Olivia Ludwig Patient : 1935 MRN: [id number]    Last Discharge  [address]       Date Complaint Diagnosis Description Type Department Provider    10/2/22 Shortness of Breath SOB (shortness of breath) . .. ED to Hosp-Admission (Discharged) (ADMIT) Patience Perez MD; Marilee Solano... Was this an external facility discharge? No   Challenges to be reviewed by the provider   Additional needs identified to be addressed with provider: yes   10/2/22 14:17 10/5/22 03:43 10/6/22 02:11   Potassium 3.9 3.4 (L) 3.8   (L): Data is abnormally low         Method of communication with provider : none    Discussed COVID-19 related testing which was available at this time. Test results were negative. Patient informed of results, if available? no     Advance Care Planning:   Does patient have an Advance Directive: decision makers updated, not on file. Patient with Dementia    Inpatient Readmission Risk score: Unplanned Readmit Risk Score: 13.5    Was this a readmission? no   Patient stated reason for the admission:     Patients top risk factors for readmission: medical condition-COPD, CVA, Afib, CKD, DM, HF    Interventions to address risk factors:   Care Transition Nurse (CTN) contacted the  [address] [place] [address] at [email or web address]  by telephone to perform post hospital discharge assessment. Verified name and  with  [place]  as identifiers. Provided introduction to self, and explanation of the CTN role. CTN reviewed discharge instructions, medical action plan and red flags with  Cassandra Gray  who verbalized understanding. Were discharge instructions available to patient? yes. Reviewed appropriate site of care based on symptoms and resources available to patient including: PCP and Specialist.  Cassandra Gray  given an opportunity to ask questions and does not have any further questions or concerns at this time.  Medication reconciliation was performed with  Melvina Green , who verbalizes understanding of administration of home medications. Referral to Pharm D needed: no     Initial HF note    Do you have a Scale:    yes  How often do you weigh:  monthly, asked if they can ask PCP for daily weight order and low sodium diet    Echo done on: 11/8/2015 EF 55-60%  Type of HF:   HFpEF     Cardiac Device present:  none       Heart Failure Medications: Betablocker, Diuretic, Anticoagulant      Home Health/Outpatient orders at discharge: none      Durable Medical Equipment ordered at discharge: None  Was patient discharged with a pulse oximeter? no    Discussed follow-up appointments. If no appointment was previously scheduled, appointment scheduling offered: yes. Is follow up appointment scheduled within 7 days of discharge? yes. [facility] follow up appointment(s): No future appointments. Non[facility] follow up appointment(s): Patient to see Dr Ailin Maya MD at [facility] on Thursdays or can also see NP Shital Carlin on Mon and Wed. Plan for follow-up call in 7-10 days based on severity of symptoms and risk factors. CTN provided contact information for future needs. Goals Addressed                   This Visit's Progress     Attends follow-up appointments as directed. 10/07/22   Called and spoke to [id number] [facility] at [facility]. Patient has Dr Tiago Mendez who is there to see patients on Thursdays and then HIEU Damon NP who is there on Mon and Wed. It does not sound like patient was seen Thursday. They have a book that lists patient who need to be seen so hopefully will be seen Monday. When I asked about Cards appt Melvina Green stated that patient family would need to schedule that and take patient there. Called Mrs Cassidy Rouse and she stated that the hard thing for them is transport for patient. Suggested they they try virtual appt with Cardiology.   Patient wife wants to discuss with sons to see what they think about making an appt, in person or virtual or at all. Mrs Ramirez Vazquez given # for Dr Horacio Bacon to call when she makes a decision. Monitor if patient has been seen by Provider at [facility] and if family has made a decision on Cards appt. Gerard Rodas MSN, RN, CCM / Care Transition Nurse / [phone]        Prevent complications post hospitalization. 10/07/22   Called and spoke to [employer] at [facility]. Patient is doing well, is up with use of rollator. Patient is not on a low sodium diet and gets weighted monthly. Patient also had suggestion of repeat BMP in 1 week. Hospitalist   I asked if we can get order for low sodium diet and daily weights. Amadeo Alexander will put this information for consideration for PCP team along with recommendation of repeat BMP. Monitor for SOB, cough, activity tolerance, get order for daily weight, low sodium diet and repeat BMP? Monitor glucose levels and (R) hip ulcer on next call.   Gerard Rodas MSN, RN, CCM / Care Transition Nurse / [phone]

## 2022-10-08 ENCOUNTER — APPOINTMENT (OUTPATIENT)
Dept: ULTRASOUND IMAGING | Age: 87
DRG: 683 | End: 2022-10-08
Attending: FAMILY MEDICINE
Payer: MEDICARE

## 2022-10-08 LAB
ANION GAP SERPL CALC-SCNC: 3 MMOL/L (ref 5–15)
BUN SERPL-MCNC: 30 MG/DL (ref 6–20)
BUN/CREAT SERPL: 22 (ref 12–20)
CALCIUM SERPL-MCNC: 9 MG/DL (ref 8.5–10.1)
CALCULATED R AXIS, ECG10: -65 DEGREES
CALCULATED T AXIS, ECG11: 75 DEGREES
CHLORIDE SERPL-SCNC: 104 MMOL/L (ref 97–108)
CO2 SERPL-SCNC: 35 MMOL/L (ref 21–32)
COMMENT, HOLDF: NORMAL
CREAT SERPL-MCNC: 1.37 MG/DL (ref 0.7–1.3)
DIAGNOSIS, 93000: NORMAL
ERYTHROCYTE [DISTWIDTH] IN BLOOD BY AUTOMATED COUNT: 14.4 % (ref 11.5–14.5)
GLUCOSE SERPL-MCNC: 90 MG/DL (ref 65–100)
HCT VFR BLD AUTO: 48.6 % (ref 36.6–50.3)
HGB BLD-MCNC: 15.8 G/DL (ref 12.1–17)
MAGNESIUM SERPL-MCNC: 2.1 MG/DL (ref 1.6–2.4)
MCH RBC QN AUTO: 29.3 PG (ref 26–34)
MCHC RBC AUTO-ENTMCNC: 32.5 G/DL (ref 30–36.5)
MCV RBC AUTO: 90.2 FL (ref 80–99)
NRBC # BLD: 0 K/UL (ref 0–0.01)
NRBC BLD-RTO: 0 PER 100 WBC
PLATELET # BLD AUTO: 155 K/UL (ref 150–400)
PMV BLD AUTO: 10.1 FL (ref 8.9–12.9)
POTASSIUM SERPL-SCNC: 4.1 MMOL/L (ref 3.5–5.1)
Q-T INTERVAL, ECG07: 462 MS
QRS DURATION, ECG06: 96 MS
QTC CALCULATION (BEZET), ECG08: 472 MS
RBC # BLD AUTO: 5.39 M/UL (ref 4.1–5.7)
SAMPLES BEING HELD,HOLD: NORMAL
SODIUM SERPL-SCNC: 142 MMOL/L (ref 136–145)
VENTRICULAR RATE, ECG03: 63 BPM
WBC # BLD AUTO: 5.7 K/UL (ref 4.1–11.1)

## 2022-10-08 PROCEDURE — 74011000250 HC RX REV CODE- 250: Performed by: FAMILY MEDICINE

## 2022-10-08 PROCEDURE — 83735 ASSAY OF MAGNESIUM: CPT

## 2022-10-08 PROCEDURE — 80048 BASIC METABOLIC PNL TOTAL CA: CPT

## 2022-10-08 PROCEDURE — 85027 COMPLETE CBC AUTOMATED: CPT

## 2022-10-08 PROCEDURE — 36415 COLL VENOUS BLD VENIPUNCTURE: CPT

## 2022-10-08 PROCEDURE — 74011250637 HC RX REV CODE- 250/637: Performed by: FAMILY MEDICINE

## 2022-10-08 PROCEDURE — 65270000046 HC RM TELEMETRY

## 2022-10-08 RX ORDER — METOPROLOL TARTRATE 25 MG/1
25 TABLET, FILM COATED ORAL EVERY 12 HOURS
Status: DISCONTINUED | OUTPATIENT
Start: 2022-10-08 | End: 2022-10-12 | Stop reason: HOSPADM

## 2022-10-08 RX ORDER — ONDANSETRON 4 MG/1
4 TABLET, ORALLY DISINTEGRATING ORAL
Status: DISCONTINUED | OUTPATIENT
Start: 2022-10-08 | End: 2022-10-12 | Stop reason: HOSPADM

## 2022-10-08 RX ORDER — SODIUM CHLORIDE 0.9 % (FLUSH) 0.9 %
5-40 SYRINGE (ML) INJECTION AS NEEDED
Status: DISCONTINUED | OUTPATIENT
Start: 2022-10-08 | End: 2022-10-12 | Stop reason: HOSPADM

## 2022-10-08 RX ORDER — POLYETHYLENE GLYCOL 3350 17 G/17G
17 POWDER, FOR SOLUTION ORAL DAILY PRN
Status: DISCONTINUED | OUTPATIENT
Start: 2022-10-08 | End: 2022-10-12 | Stop reason: HOSPADM

## 2022-10-08 RX ORDER — QUETIAPINE FUMARATE 25 MG/1
12.5 TABLET, FILM COATED ORAL
Status: DISCONTINUED | OUTPATIENT
Start: 2022-10-08 | End: 2022-10-12 | Stop reason: HOSPADM

## 2022-10-08 RX ORDER — SODIUM CHLORIDE 9 MG/ML
75 INJECTION, SOLUTION INTRAVENOUS CONTINUOUS
Status: DISCONTINUED | OUTPATIENT
Start: 2022-10-08 | End: 2022-10-08

## 2022-10-08 RX ORDER — FINASTERIDE 5 MG/1
5 TABLET, FILM COATED ORAL DAILY
Status: DISCONTINUED | OUTPATIENT
Start: 2022-10-08 | End: 2022-10-12 | Stop reason: HOSPADM

## 2022-10-08 RX ORDER — ONDANSETRON 2 MG/ML
4 INJECTION INTRAMUSCULAR; INTRAVENOUS
Status: DISCONTINUED | OUTPATIENT
Start: 2022-10-08 | End: 2022-10-12 | Stop reason: HOSPADM

## 2022-10-08 RX ORDER — SPIRONOLACTONE 25 MG/1
25 TABLET ORAL DAILY
Status: DISCONTINUED | OUTPATIENT
Start: 2022-10-08 | End: 2022-10-08

## 2022-10-08 RX ORDER — MEMANTINE HYDROCHLORIDE 10 MG/1
10 TABLET ORAL EVERY 12 HOURS
Status: DISCONTINUED | OUTPATIENT
Start: 2022-10-08 | End: 2022-10-12 | Stop reason: HOSPADM

## 2022-10-08 RX ORDER — SODIUM CHLORIDE 0.9 % (FLUSH) 0.9 %
5-40 SYRINGE (ML) INJECTION EVERY 8 HOURS
Status: DISCONTINUED | OUTPATIENT
Start: 2022-10-08 | End: 2022-10-12 | Stop reason: HOSPADM

## 2022-10-08 RX ORDER — BUPROPION HYDROCHLORIDE 150 MG/1
150 TABLET, EXTENDED RELEASE ORAL DAILY
Status: DISCONTINUED | OUTPATIENT
Start: 2022-10-08 | End: 2022-10-12 | Stop reason: HOSPADM

## 2022-10-08 RX ORDER — MELATONIN
1000 DAILY
Status: DISCONTINUED | OUTPATIENT
Start: 2022-10-08 | End: 2022-10-12 | Stop reason: HOSPADM

## 2022-10-08 RX ORDER — ACETAMINOPHEN 650 MG/1
650 SUPPOSITORY RECTAL
Status: DISCONTINUED | OUTPATIENT
Start: 2022-10-08 | End: 2022-10-12 | Stop reason: HOSPADM

## 2022-10-08 RX ORDER — TAMSULOSIN HYDROCHLORIDE 0.4 MG/1
0.4 CAPSULE ORAL DAILY
Status: DISCONTINUED | OUTPATIENT
Start: 2022-10-08 | End: 2022-10-12 | Stop reason: HOSPADM

## 2022-10-08 RX ORDER — ACETAMINOPHEN 325 MG/1
650 TABLET ORAL
Status: DISCONTINUED | OUTPATIENT
Start: 2022-10-08 | End: 2022-10-12 | Stop reason: HOSPADM

## 2022-10-08 RX ADMIN — MEMANTINE HYDROCHLORIDE 10 MG: 10 TABLET ORAL at 10:15

## 2022-10-08 RX ADMIN — APIXABAN 2.5 MG: 2.5 TABLET, FILM COATED ORAL at 21:20

## 2022-10-08 RX ADMIN — QUETIAPINE FUMARATE 12.5 MG: 25 TABLET ORAL at 21:19

## 2022-10-08 RX ADMIN — METOPROLOL TARTRATE 25 MG: 25 TABLET, FILM COATED ORAL at 21:20

## 2022-10-08 RX ADMIN — APIXABAN 2.5 MG: 2.5 TABLET, FILM COATED ORAL at 10:15

## 2022-10-08 RX ADMIN — SODIUM CHLORIDE, PRESERVATIVE FREE 10 ML: 5 INJECTION INTRAVENOUS at 17:43

## 2022-10-08 RX ADMIN — METOPROLOL TARTRATE 25 MG: 25 TABLET, FILM COATED ORAL at 10:15

## 2022-10-08 RX ADMIN — Medication 1000 UNITS: at 10:15

## 2022-10-08 RX ADMIN — FINASTERIDE 5 MG: 5 TABLET, FILM COATED ORAL at 10:15

## 2022-10-08 RX ADMIN — TAMSULOSIN HYDROCHLORIDE 0.4 MG: 0.4 CAPSULE ORAL at 10:15

## 2022-10-08 RX ADMIN — SODIUM CHLORIDE, PRESERVATIVE FREE 10 ML: 5 INJECTION INTRAVENOUS at 06:24

## 2022-10-08 RX ADMIN — SODIUM CHLORIDE, PRESERVATIVE FREE 10 ML: 5 INJECTION INTRAVENOUS at 03:00

## 2022-10-08 RX ADMIN — BUPROPION HYDROCHLORIDE 150 MG: 150 TABLET, EXTENDED RELEASE ORAL at 10:15

## 2022-10-08 RX ADMIN — MEMANTINE HYDROCHLORIDE 10 MG: 10 TABLET ORAL at 21:20

## 2022-10-08 NOTE — PROGRESS NOTES
[facility]  Hospitalist Group                                                                                          Hospitalist Progress Note  Regla Sharp [place]  Answering service: [phone] from in house phone        Date of Service:  10/8/2022  NAME:  Olivia Ludwig  :  1935  MRN:  [id number]      Admission Summary:   Olivia Ludwig is a 81 y.o. male with a pmhx asthma, HTN, atrial fibrillation, past stroke, and dementia who presents with syncopal episode and altered mental status. He presents from [facility]. He was just hospitalized from 10/2-10/6 for acute hypoxic respiratory failure 2/2 b/l pleural effusion, and possible aspiration. Per hospital record, patient became  unresponsive at his facility for an unknown amount of time, and EMS was called. Family reports more confusion over the past several days. Interval history / Subjective:   Patient was sleeping in the bed.  Pt was roused but participation was limited due to patient's dementia     Assessment & Plan:      #syncope  - Etiology unclear, may have been dehydrated 2/2 elevated creatinine, and BUN while taking lasix  - Check orthostatics   - Echo ordered  - PT  - BG WNL  - Daily BMP     #Elevated creatinine  - creatinine 1.6 (bsl1.3), BUN 33  - Renal US ordered  - s/p 250cc normal saline in ED  - Hold home lasix, and spironolactone     #Urinary retention  - Home flomax, finasteride     #Atrial Fibrillation  - Continue low dose eliquis,   - metoprolol     #Dementia  - from [facility]  - continue namenda     #Depression  - continue wellbutrin     Code status: Full  Prophylaxis: [address] discussed with:patient, will update LNOK  Anticipated Disposition: SNF     Hospital Problems  Date Reviewed: 2017            Codes Class Noted POA    Syncope ICD-10-CM: R55  ICD-9-CM: 780.2  10/7/2022 Unknown             Review of Systems:   A comprehensive review of systems was negative except for that written in the HPI. Vital Signs:    Last 24hrs VS reviewed since prior progress note. Most recent are:  Visit Vitals  /80   Pulse (!) 53   Temp 97.8 °F (36.6 °C)   Resp 19   SpO2 99%       No intake or output data in the 24 hours ending 10/08/22 0945     Physical Examination:     I had a face to face encounter with this patient and independently examined them on 10/8/2022 as outlined below:          Constitutional:  No acute distress, cooperative, pleasant    HENT: Normocephalic, atraumatic, PEERL Oral mucosa moist, oropharynx benign. Resp:  CTA bilaterally. No wheezing/rhonchi/rales. No accessory muscle use. CV:  Irregularly irregular, normal rate, no murmurs, gallops, rubs    GI:  Soft, non distended, non tender. normoactive bowel sounds    Musculoskeletal:  No edema, warm, 2+ pulses radial pulses    Neurologic:  Psych:  Moves all extremities. Oriented to self and location, no focal deficit  Mood and affect appropriate for patient condition            Data Review:    Review and/or order of clinical lab test  Review and/or order of tests in the radiology section of CPT  Review and/or order of tests in the medicine section of CPT      Labs:     Recent Labs     10/08/22  0323 10/07/22  2005   WBC 5.7 5.2   HGB 15.8 15.7   HCT 48.6 48.0    173     Recent Labs     10/08/22  0322 10/07/22  2005 10/06/22  0211    139 140   K 4.1 3.6 3.8    101 104   CO2 35* 34* 32   BUN 30* 33* 21*   CREA 1.37* 1.66* 1.23   GLU 90 113* 89   CA 9.0 8.7 9.2   MG 2.1  --  1.8   PHOS  --   --  3.7     Recent Labs     10/07/22  2005 10/06/22  0211   ALT 18 17    112   TBILI 1.3* 1.3*   TP 6.5 6.0*   ALB 2.8* 2.8*   GLOB 3.7 3.2     No results for input(s): INR, PTP, APTT, INREXT in the last 72 hours. No results for input(s): FE, TIBC, PSAT, FERR in the last 72 hours.    Lab Results   Component Value Date/Time    Folate 10.0 11/09/2015 01:30 AM      No results for input(s): PH, PCO2, PO2 in the last 72 hours. No results for input(s): CPK, CKNDX, TROIQ in the last 72 hours.     No lab exists for component: CPKMB  No results found for: CHOL, CHOLX, CHLST, CHOLV, HDL, HDLP, LDL, LDLC, DLDLP, TGLX, TRIGL, TRIGP, CHHD, CHHDX  Lab Results   Component Value Date/Time    Glucose (POC) 76 03/06/2022 11:35 AM    Glucose (POC) 87 05/01/2017 12:01 AM    Glucose (POC) 146 (H) 12/07/2016 02:59 PM     Lab Results   Component Value Date/Time    Color DARK YELLOW 10/07/2022 09:27 PM    Appearance CLEAR 10/07/2022 09:27 PM    Specific gravity 1.024 10/07/2022 09:27 PM    Specific gravity 1.020 05/02/2022 09:11 PM    pH (UA) 5.0 10/07/2022 09:27 PM    Protein TRACE (A) 10/07/2022 09:27 PM    Glucose 500 (A) 10/07/2022 09:27 PM    Ketone TRACE (A) 10/07/2022 09:27 PM    Bilirubin Negative 10/07/2022 09:27 PM    Urobilinogen 1.0 10/07/2022 09:27 PM    Nitrites Negative 10/07/2022 09:27 PM    Leukocyte Esterase Negative 10/07/2022 09:27 PM    Epithelial cells FEW 10/07/2022 09:27 PM    Bacteria Negative 10/07/2022 09:27 PM    WBC 0-4 10/07/2022 09:27 PM    RBC 0-5 10/07/2022 09:27 PM         Medications Reviewed:     Current Facility-Administered Medications   Medication Dose Route Frequency    sodium chloride (NS) flush 5-40 mL  5-40 mL IntraVENous Q8H    sodium chloride (NS) flush 5-40 mL  5-40 mL IntraVENous PRN    acetaminophen (TYLENOL) tablet 650 mg  650 mg Oral Q6H PRN    Or    acetaminophen (TYLENOL) suppository 650 mg  650 mg Rectal Q6H PRN    polyethylene glycol (MIRALAX) packet 17 g  17 g Oral DAILY PRN    ondansetron (ZOFRAN ODT) tablet 4 mg  4 mg Oral Q8H PRN    Or    ondansetron (ZOFRAN) injection 4 mg  4 mg IntraVENous Q6H PRN    apixaban (ELIQUIS) tablet 2.5 mg  2.5 mg Oral Q12H    buPROPion SR (WELLBUTRIN SR) tablet 150 mg  150 mg Oral DAILY    cholecalciferol (VITAMIN D3) (1000 Units /25 mcg) tablet 1,000 Units  1,000 Units Oral DAILY    finasteride (PROSCAR) tablet 5 mg  5 mg Oral DAILY    memantine (NAMENDA) tablet 10 mg  10 mg Oral Q12H    metoprolol tartrate (LOPRESSOR) tablet 25 mg  25 mg Oral Q12H    QUEtiapine (SEROquel) tablet 12.5 mg  12.5 mg Oral QHS    tamsulosin (FLOMAX) capsule 0.4 mg  0.4 mg Oral DAILY     Current Outpatient Medications   Medication Sig    montelukast (Singulair) 10 mg tablet Take 10 mg by mouth nightly. Cetirizine (ZyrTEC) 10 mg cap Take 1 Capsule by mouth nightly. spironolactone (ALDACTONE) 25 mg tablet Take 1 Tablet by mouth daily. empagliflozin (JARDIANCE) 10 mg tablet Take 1 Tablet by mouth daily. furosemide (LASIX) 40 mg tablet Take 1 Tablet by mouth daily. buPROPion XL (WELLBUTRIN XL) 150 mg tablet Take 1 Tablet by mouth daily. finasteride (PROSCAR) 5 mg tablet Take 5 mg by mouth daily. L.acid,para-B. bifidum-S.therm (RISAQUAD) 8 billion cell cap cap Take 1 Capsule by mouth daily. cholecalciferol (VITAMIN D3) (1000 Units /25 mcg) tablet Take 1,000 Units by mouth daily. tamsulosin (FLOMAX) 0.4 mg capsule Take 0.4 mg by mouth daily. apixaban (ELIQUIS) 2.5 mg tablet Take 2.5 mg by mouth every twelve (12) hours. memantine (NAMENDA) 10 mg tablet Take 10 mg by mouth every twelve (12) hours. metoprolol tartrate (LOPRESSOR) 25 mg tablet Take 25 mg by mouth every twelve (12) hours every twelve (12) hours.     QUEtiapine (SEROquel) 25 mg tablet Take 12.5 mg by mouth nightly.     ______________________________________________________________________  EXPECTED LENGTH OF STAY: - - -  ACTUAL LENGTH OF STAY:          1                 Kandi Orts [place], PA-C

## 2022-10-08 NOTE — H&P
[facility] Adult  Hospitalist Group  History and Physical    Date of Service:  10/8/2022  Primary Care Provider: Aleksandra Alfredo MD  Source of information: Chart review    Chief Complaint: Irregular Heart Beat      History of Presenting Illness:   Hilary Hickman is a 81 y.o. male with a pmhx asthma, HTN, atrial fibrillation, past stroke, and dementia who presents with syncopal episode and altered mental status. He presents from [facility]. He was just hospitalized from 10/2-10/6 for acute hypoxic respiratory failure 2/2 b/l pleural effusion, and possible aspiration. Per hospital record, patient became  unresponsive at his facility for an unknown amount of time, and EMS was called. Family reports more confusion over the past several days. In the ED, his HR was lowe noral with min 54 bpm.  Other VSS. Labs showed BuN 33, creatinine 1.66 (b/l 1.2-1.3). CT head was without acute intracranial findings. CXR showed mild pulmonary edema. EKG showed a-fib at 63 bpm.    In the ED, he received 250cc normal saline     REVIEW OF SYSTEMS:  Review of systems not obtained due to patient factors. Past Medical History:   Diagnosis Date    Allergy     seasonal    Arrhythmia     Asthma     Dementia ([facility] [address]     Depression     Heart failure ([facility] [address]     Hypertension     Neurological disorder     dementia    Other ill-defined conditions(799.89)     a-fib    Stroke [facility])       Past Surgical History:   Procedure Laterality Date    HX HEENT      cateract surgery     Prior to Admission medications    Medication Sig Start Date End Date Taking? Authorizing Provider   montelukast (Singulair) 10 mg tablet Take 10 mg by mouth nightly. Provider, Historical   Cetirizine (ZyrTEC) 10 mg cap Take 1 Capsule by mouth nightly. Provider, Historical   spironolactone (ALDACTONE) 25 mg tablet Take 1 Tablet by mouth daily.  10/7/22   Ludwin Trevizo MD   empagliflozin (JARDIANCE) 10 mg tablet Take 1 Tablet by mouth daily. 10/7/22   Kylee Faulkner MD   furosemide (LASIX) 40 mg tablet Take 1 Tablet by mouth daily. 10/6/22   Kylee Faulkner MD   buPROPion XL (WELLBUTRIN XL) 150 mg tablet Take 1 Tablet by mouth daily. 3/8/22   Regla Aden MD   finasteride (PROSCAR) 5 mg tablet Take 5 mg by mouth daily. Provider, Historical   L.acid,para-B. bifidum-S.therm (RISAQUAD) 8 billion cell cap cap Take 1 Capsule by mouth daily. Provider, Historical   cholecalciferol (VITAMIN D3) (1000 Units /25 mcg) tablet Take 1,000 Units by mouth daily. Provider, Historical   tamsulosin (FLOMAX) 0.4 mg capsule Take 0.4 mg by mouth daily. Provider, Historical   apixaban (ELIQUIS) 2.5 mg tablet Take 2.5 mg by mouth every twelve (12) hours. Provider, Historical   memantine (NAMENDA) 10 mg tablet Take 10 mg by mouth every twelve (12) hours. Provider, Historical   metoprolol tartrate (LOPRESSOR) 25 mg tablet Take 25 mg by mouth every twelve (12) hours every twelve (12) hours. Provider, Historical   QUEtiapine (SEROquel) 25 mg tablet Take 12.5 mg by mouth nightly. Provider, Historical     Allergies   Allergen Reactions    No Known Allergies Other (comments)      Family History   Problem Relation Age of Onset    Stroke Mother     Heart Disease Mother     Cancer Father         prostate    Cancer Brother         colon      Social History:  reports that he has never smoked. He has never used smokeless tobacco. He reports that he does not drink alcohol and does not use drugs. Family and social history were personally reviewed, all pertinent and relevant details are outlined as above.     Objective:   Visit Vitals  BP (!) 144/79   Pulse 62   Resp 14   SpO2 97%           PHYSICAL EXAM:   General: Alert x oriented to self only, awake, no acute distress, resting in bed, pleasant male, appears to be stated age  HEENT: PEERL, EOMI, moist mucus membranes  Neck: Supple, no JVD, no meningeal signs  Chest: Clear to auscultation bilaterally   CVS: irregularly irregular, S1 S2 heard, no murmurs/rubs/gallops  Abd: Soft, non-tender, non-distended, +bowel sounds   Ext: No clubbing, no cyanosis, no edema  Neuro/Psych: Pleasant mood and affect, CN 2-12 grossly intact, sensory grossly within normal limit, Strength 5/5 in all extremities  Cap refill: Brisk, less than 3 seconds  Pulses: 2+radial pulses  Skin: Warm, dry, without rashes or lesions    Data Review: All diagnostic labs and studies have been reviewed. Abnormal Labs Reviewed   METABOLIC PANEL, COMPREHENSIVE - Abnormal; Notable for the following components:       Result Value    CO2 34 (*)     Anion gap 4 (*)     Glucose 113 (*)     BUN 33 (*)     Creatinine 1.66 (*)     eGFR 40 (*)     Bilirubin, total 1.3 (*)     AST (SGOT) 14 (*)     Albumin 2.8 (*)     A-G Ratio 0.8 (*)     All other components within normal limits   URINALYSIS W/ RFLX MICROSCOPIC - Abnormal; Notable for the following components:    Protein TRACE (*)     Glucose 500 (*)     Ketone TRACE (*)     All other components within normal limits   NT-PRO BNP - Abnormal; Notable for the following components:    NT pro-BNP 3,742 (*)     All other components within normal limits   METABOLIC PANEL, BASIC - Abnormal; Notable for the following components:    CO2 35 (*)     Anion gap 3 (*)     BUN 30 (*)     Creatinine 1.37 (*)     BUN/Creatinine ratio 22 (*)     eGFR 51 (*)     All other components within normal limits       All Micro Results       None            IMAGING:   XR CHEST PORT   Final Result      Probable mild pulmonary edema and small left pleural effusion. CT HEAD WO CONT   Final Result   No acute intracranial hemorrhage or infarct.                ECG/ECHO:    Results for orders placed or performed during the hospital encounter of 10/02/22   EKG, 12 LEAD, INITIAL   Result Value Ref Range    Ventricular Rate 63 BPM    QRS Duration 96 ms    Q-T Interval 462 ms    QTC Calculation (Bezet) 472 ms    Calculated R Axis -65 degrees    Calculated T Axis 75 degrees    Diagnosis       Atrial fibrillation  Left axis deviation  Minimal voltage criteria for LVH, may be normal variant ( R in aVL )  Inferior infarct (cited on or before 07-OCT-2022)  Anterior infarct (cited on or before 07-OCT-2022)  Abnormal ECG  When compared with ECG of 02-OCT-2022 14:23,  Incomplete right bundle branch block is no longer present          Assessment:   Given the patient's current clinical presentation, there is a high level of concern for decompensation if discharged from the emergency department. Complex decision making was performed, which includes reviewing the patient's available past medical records, laboratory results, and imaging studies. Active Problems:    Syncope (10/7/2022)      Plan:     #syncope  -etiology unclear, may have been dehydrated 2/2 elevated creatinine, and BUN and taking lasix  -check orthostatics, and echo  -hx a-fib, monitor on tele  -PT    #Elevated creatinine  -creatinine 1.6 (b/l 1.3), BUN 33  -check renal US to r/o obstruction  -continue home proscar and flomax  -rec'd 250cc normal saline in ED  -hold home lasix, and spironolactone     #Atrial Fibrillation  -continue low dose eliquis, and metop    #Dementia  -from [facility]  -continue namenda    #Depression  -continue wellbutrin      DIET: ADULT DIET Regular; 5 carb choices (75 gm/meal); Low Fat/Low Chol/High Fiber/2 gm Na; Low Sodium (2 gm)   ISOLATION PRECAUTIONS: There are currently no Active Isolations  CODE STATUS: Full Code   DVT PROPHYLAXIS: Eliquis  ANTICIPATED DISCHARGE: 24-48 hours. ANTICIPATED DISPOSITION: [facility]  EMERGENCY CONTACT/SURROGATE DECISION MAKER: Kelsea Maki (sunil)      Signed By: Jhon Payan MD     October 8, 2022         Please note that this dictation may have been completed with Dragon, the [employer] voice recognition software.   Quite often unanticipated grammatical, syntax, homophones, and other interpretive errors are inadvertently transcribed by the computer software. Please disregard these errors. Please excuse any errors that have escaped final proofreading.

## 2022-10-08 NOTE — ROUTINE PROCESS
Received report from midnight tech, Ultrasound tech attempted to do bedside imaging patient is combative and refusing ultrasound

## 2022-10-08 NOTE — PROGRESS NOTES
Physical Therapy  10/8/2022    Orders received, chart reviewed. Noted Ultrasound Tech attempted bedside imaging at 0745 and patient combative. Will follow up later vs tomorrow pending patient's appropriateness/safety. Thank you.   Farooq Merlos, PT, DPT

## 2022-10-08 NOTE — ED PROVIDER NOTES
71-year-old male with history of dementia, HTN, CHF, stroke presents to the ED with chief complaint of syncopal episode at nursing home. Per EMS they were called for to the nursing home when the patient became unresponsive. They state that patient was responsive when they arrived, they are unsure how long he had been unconscious for. Of note, patient just discharged from the hospital after 4-day admission for hypoxia, difficulty breathing, pleural effusion. Per family, patient has been more confused than normal over the past 2 days. Patient denies any chest pain, difficulty breathing, abdominal pain, or any other symptoms. Made of history limited by patient's dementia and lack of alternative source of history for event today. The history is provided by the patient, the EMS personnel and a relative.    Palpitations        Past Medical History:   Diagnosis Date    Allergy     seasonal    Arrhythmia     Asthma     Dementia ([facility] [address]     Depression     Heart failure (HCC)     Hypertension     Neurological disorder     dementia    Other ill-defined conditions(799.89)     a-fib    Stroke [facility])        Past Surgical History:   Procedure Laterality Date    HX HEENT      cateract surgery         Family History:   Problem Relation Age of Onset    Stroke Mother     Heart Disease Mother     Cancer Father         prostate    Cancer Brother         colon       Social History     Socioeconomic History    Marital status:      Spouse name: Not on file    Number of children: Not on file    Years of education: Not on file    Highest education level: Not on file   Occupational History    Not on file   Tobacco Use    Smoking status: Never    Smokeless tobacco: Never   Substance and Sexual Activity    Alcohol use: No    Drug use: No     Types: Prescription, OTC    Sexual activity: Never   Other Topics Concern    Not on file   Social History Narrative    Not on file     Social Determinants of Health     Financial Resource Strain: Not on file   Food Insecurity: Not on file   Transportation Needs: Not on file   Physical Activity: Not on file   Stress: Not on file   Social Connections: Not on file   Intimate Partner Violence: Not on file   Housing Stability: Not on file         ALLERGIES: No known allergies    Review of Systems   Unable to perform ROS: Dementia   Cardiovascular:  Positive for palpitations. There were no vitals filed for this visit. Physical Exam  Constitutional:       General: He is not in acute distress. Appearance: He is well-developed. HENT:      Head: Normocephalic and atraumatic. Eyes:      Conjunctiva/sclera: Conjunctivae normal.      Pupils: Pupils are equal, round, and reactive to light. Neck:      Trachea: No tracheal deviation. Cardiovascular:      Rate and Rhythm: Bradycardia present. Rhythm irregular. Heart sounds: No murmur heard. No friction rub. No gallop. Pulmonary:      Effort: No respiratory distress. Breath sounds: Normal breath sounds. Abdominal:      General: Bowel sounds are normal. There is no distension. Palpations: Abdomen is soft. Tenderness: There is no abdominal tenderness. Musculoskeletal:         General: No deformity. Cervical back: Neck supple. Skin:     General: Skin is warm and dry. Neurological:      Mental Status: He is alert. Mental status is at baseline. Comments: +dementia at baseline        MDM  Number of Diagnoses or Management Options  Altered mental status, unspecified altered mental status type  Syncope and collapse  Diagnosis management comments: 79-year-old male presenting after witnessed syncopal episode at nursing home. Currently seems to be at his baseline with known A. fib, otherwise stable vital signs. Will obtain head CT, chest x-ray, basic labs, urinalysis. Dispo includes intracranial abnormality, UTI, dehydration.   Given age, comorbidities, witnessed syncopal episode we will plan on admission to the hospital for further work-up. Amount and/or Complexity of Data Reviewed  Clinical lab tests: ordered and reviewed  Tests in the radiology section of CPT®: ordered and reviewed      ED Course as of 10/07/22 2242   Fri Oct 07, 2022   1954 ED EKG interpretation:7:55 PM  Rhythm: atrial fib;  Rate (approx.): 63; Axis: left axis deviation; QRS interval: normal ; ST/T wave: non-specific changes. [JW]      ED Course User Index  [JW] Benny Gan MD       Procedures    10:45 PM    Admission Note:  Patient is being admitted to the hospital, Service: Hospitalist.  The results of their tests and reasons for their admission have been discussed with them and available family. They convey agreement and understanding for the need to be admitted and for their admission diagnosis. LABORATORY TESTS:  Labs Reviewed   METABOLIC PANEL, COMPREHENSIVE - Abnormal; Notable for the following components:       Result Value    CO2 34 (*)     Anion gap 4 (*)     Glucose 113 (*)     BUN 33 (*)     Creatinine 1.66 (*)     eGFR 40 (*)     Bilirubin, total 1.3 (*)     AST (SGOT) 14 (*)     Albumin 2.8 (*)     A-G Ratio 0.8 (*)     All other components within normal limits   URINALYSIS W/ RFLX MICROSCOPIC - Abnormal; Notable for the following components:    Protein TRACE (*)     Glucose 500 (*)     Ketone TRACE (*)     All other components within normal limits   NT-PRO BNP - Abnormal; Notable for the following components:    NT pro-BNP 3,742 (*)     All other components within normal limits   CBC W/O DIFF   TROPONIN-HIGH SENSITIVITY   *UA&MICRO CHARGE BAT       IMAGING RESULTS:  CT HEAD WO CONT    Result Date: 10/7/2022  INDICATION: Syncope. Exam: Noncontrast CT of the brain is performed with 5 mm collimation. CT dose reduction was achieved to the use of a standardized protocol tailored for this examination and automatic exposure control for dose modulation. Adaptive statistical iterative reconstruction (ASIR) was utilized. Direct comparison is made to prior CT dated May 2022. FINDINGS: There is no acute intracranial hemorrhage, mass, mass effect or herniation. There is age-appropriate diffuse cortical atrophy with ex vacuo dilatation of the ventricular system. There are stable periventricular hypodensities consistent with chronic microvascular ischemic changes. There is no evidence of acute territorial infarct. The mastoid air cells are well pneumatized. The visualized paranasal sinuses are normal.     No acute intracranial hemorrhage or infarct. XR CHEST PORT    Result Date: 10/7/2022  EXAM:  XR CHEST PORT INDICATION: Syncope COMPARISON: 10/2/2022 TECHNIQUE: Upright portable chest AP view FINDINGS: Heart size is enlarged. Mild vascular clarity suggestive of mild pulmonary edema. Small left pleural effusion and left lower lobe volume loss. The visualized bones and upper abdomen are age-appropriate. Probable mild pulmonary edema and small left pleural effusion. MEDICATIONS GIVEN:  Medications   sodium chloride 0.9 % bolus infusion 250 mL (250 mL IntraVENous New Bag 10/7/22 2101)       IMPRESSION:  1. Syncope and collapse    2.  Altered mental status, unspecified altered mental status type        PLAN:  - Admit to hospitalist    CONDITION:  stable      Signed By: Linda Price MD     October 7, 2022

## 2022-10-08 NOTE — ED NOTES
Verbal shift change report given to EDIS Romano (oncoming nurse) by Ariadna Vernon RN (offgoing nurse). Report included the following information SBAR, Kardex, ED Summary, and Recent Results.

## 2022-10-09 ENCOUNTER — APPOINTMENT (OUTPATIENT)
Dept: NON INVASIVE DIAGNOSTICS | Age: 87
DRG: 683 | End: 2022-10-09
Attending: FAMILY MEDICINE
Payer: MEDICARE

## 2022-10-09 ENCOUNTER — APPOINTMENT (OUTPATIENT)
Dept: ULTRASOUND IMAGING | Age: 87
DRG: 683 | End: 2022-10-09
Attending: FAMILY MEDICINE
Payer: MEDICARE

## 2022-10-09 LAB
ANION GAP SERPL CALC-SCNC: 3 MMOL/L (ref 5–15)
BASOPHILS # BLD: 0.1 K/UL (ref 0–0.1)
BASOPHILS NFR BLD: 1 % (ref 0–1)
BUN SERPL-MCNC: 22 MG/DL (ref 6–20)
BUN/CREAT SERPL: 18 (ref 12–20)
CALCIUM SERPL-MCNC: 8.8 MG/DL (ref 8.5–10.1)
CHLORIDE SERPL-SCNC: 107 MMOL/L (ref 97–108)
CO2 SERPL-SCNC: 33 MMOL/L (ref 21–32)
CREAT SERPL-MCNC: 1.22 MG/DL (ref 0.7–1.3)
DIFFERENTIAL METHOD BLD: ABNORMAL
ECHO AO ROOT DIAM: 3.8 CM
ECHO AO ROOT INDEX: 2.17 CM/M2
ECHO AR MAX VEL PISA: 3.9 M/S
ECHO AV PEAK GRADIENT: 5 MMHG
ECHO AV PEAK VELOCITY: 1.1 M/S
ECHO AV REGURGITANT PHT: 1420 MILLISECOND
ECHO AV VELOCITY RATIO: 0.64
ECHO EST RA PRESSURE: 15 MMHG
ECHO LA DIAMETER INDEX: 2.34 CM/M2
ECHO LA DIAMETER: 4.1 CM
ECHO LA TO AORTIC ROOT RATIO: 1.08
ECHO LV E' SEPTAL VELOCITY: 6 CM/S
ECHO LV EDV A2C: 103 ML
ECHO LV EDV A4C: 90 ML
ECHO LV EDV BP: 101 ML (ref 67–155)
ECHO LV EDV INDEX A4C: 51 ML/M2
ECHO LV EDV INDEX BP: 58 ML/M2
ECHO LV EDV NDEX A2C: 59 ML/M2
ECHO LV EJECTION FRACTION A2C: 43 %
ECHO LV EJECTION FRACTION A4C: 37 %
ECHO LV EJECTION FRACTION BIPLANE: 37 % (ref 55–100)
ECHO LV ESV A2C: 58 ML
ECHO LV ESV A4C: 57 ML
ECHO LV ESV BP: 64 ML (ref 22–58)
ECHO LV ESV INDEX A2C: 33 ML/M2
ECHO LV ESV INDEX A4C: 33 ML/M2
ECHO LV ESV INDEX BP: 37 ML/M2
ECHO LV FRACTIONAL SHORTENING: 15 % (ref 28–44)
ECHO LV INTERNAL DIMENSION DIASTOLE INDEX: 2.69 CM/M2
ECHO LV INTERNAL DIMENSION DIASTOLIC: 4.7 CM (ref 4.2–5.9)
ECHO LV INTERNAL DIMENSION SYSTOLIC INDEX: 2.29 CM/M2
ECHO LV INTERNAL DIMENSION SYSTOLIC: 4 CM
ECHO LV IVSD: 0.9 CM (ref 0.6–1)
ECHO LV MASS 2D: 132.3 G (ref 88–224)
ECHO LV MASS INDEX 2D: 75.6 G/M2 (ref 49–115)
ECHO LV POSTERIOR WALL DIASTOLIC: 0.8 CM (ref 0.6–1)
ECHO LV RELATIVE WALL THICKNESS RATIO: 0.34
ECHO LVOT PEAK GRADIENT: 2 MMHG
ECHO LVOT PEAK VELOCITY: 0.7 M/S
ECHO MV A VELOCITY: 0.38 M/S
ECHO MV AREA PHT: 4.9 CM2
ECHO MV E DECELERATION TIME (DT): 154.6 MS
ECHO MV E VELOCITY: 0.71 M/S
ECHO MV E/A RATIO: 1.87
ECHO MV E/E' SEPTAL: 11.83
ECHO MV PRESSURE HALF TIME (PHT): 44.8 MS
ECHO MV REGURGITANT PEAK GRADIENT: 117 MMHG
ECHO MV REGURGITANT PEAK VELOCITY: 5.4 M/S
ECHO MV REGURGITANT VTIA: 217 CM
ECHO PV MAX VELOCITY: 0.5 M/S
ECHO PV PEAK GRADIENT: 1 MMHG
ECHO RIGHT VENTRICULAR SYSTOLIC PRESSURE (RVSP): 54 MMHG
ECHO RV FREE WALL PEAK S': 5 CM/S
ECHO RV TAPSE: 0.5 CM (ref 1.7–?)
ECHO TV REGURGITANT MAX VELOCITY: 3.11 M/S
ECHO TV REGURGITANT PEAK GRADIENT: 39 MMHG
EOSINOPHIL # BLD: 0.3 K/UL (ref 0–0.4)
EOSINOPHIL NFR BLD: 4 % (ref 0–7)
ERYTHROCYTE [DISTWIDTH] IN BLOOD BY AUTOMATED COUNT: 14.1 % (ref 11.5–14.5)
GLUCOSE SERPL-MCNC: 79 MG/DL (ref 65–100)
HCT VFR BLD AUTO: 49 % (ref 36.6–50.3)
HGB BLD-MCNC: 15.9 G/DL (ref 12.1–17)
IMM GRANULOCYTES # BLD AUTO: 0 K/UL (ref 0–0.04)
IMM GRANULOCYTES NFR BLD AUTO: 1 % (ref 0–0.5)
LYMPHOCYTES # BLD: 1.3 K/UL (ref 0.8–3.5)
LYMPHOCYTES NFR BLD: 17 % (ref 12–49)
MCH RBC QN AUTO: 29.3 PG (ref 26–34)
MCHC RBC AUTO-ENTMCNC: 32.4 G/DL (ref 30–36.5)
MCV RBC AUTO: 90.2 FL (ref 80–99)
MONOCYTES # BLD: 0.7 K/UL (ref 0–1)
MONOCYTES NFR BLD: 10 % (ref 5–13)
NEUTS SEG # BLD: 5.3 K/UL (ref 1.8–8)
NEUTS SEG NFR BLD: 67 % (ref 32–75)
NRBC # BLD: 0 K/UL (ref 0–0.01)
NRBC BLD-RTO: 0 PER 100 WBC
PLATELET # BLD AUTO: 161 K/UL (ref 150–400)
PMV BLD AUTO: 9.9 FL (ref 8.9–12.9)
POTASSIUM SERPL-SCNC: 4 MMOL/L (ref 3.5–5.1)
RBC # BLD AUTO: 5.43 M/UL (ref 4.1–5.7)
SODIUM SERPL-SCNC: 143 MMOL/L (ref 136–145)
WBC # BLD AUTO: 7.7 K/UL (ref 4.1–11.1)

## 2022-10-09 PROCEDURE — 80048 BASIC METABOLIC PNL TOTAL CA: CPT

## 2022-10-09 PROCEDURE — 74011250637 HC RX REV CODE- 250/637: Performed by: FAMILY MEDICINE

## 2022-10-09 PROCEDURE — 65270000046 HC RM TELEMETRY

## 2022-10-09 PROCEDURE — 36415 COLL VENOUS BLD VENIPUNCTURE: CPT

## 2022-10-09 PROCEDURE — 76770 US EXAM ABDO BACK WALL COMP: CPT

## 2022-10-09 PROCEDURE — 93306 TTE W/DOPPLER COMPLETE: CPT

## 2022-10-09 PROCEDURE — 74011000250 HC RX REV CODE- 250: Performed by: FAMILY MEDICINE

## 2022-10-09 PROCEDURE — 85025 COMPLETE CBC W/AUTO DIFF WBC: CPT

## 2022-10-09 PROCEDURE — 97161 PT EVAL LOW COMPLEX 20 MIN: CPT

## 2022-10-09 RX ADMIN — METOPROLOL TARTRATE 25 MG: 25 TABLET, FILM COATED ORAL at 20:45

## 2022-10-09 RX ADMIN — MEMANTINE HYDROCHLORIDE 10 MG: 10 TABLET ORAL at 09:53

## 2022-10-09 RX ADMIN — SODIUM CHLORIDE, PRESERVATIVE FREE 10 ML: 5 INJECTION INTRAVENOUS at 14:00

## 2022-10-09 RX ADMIN — APIXABAN 2.5 MG: 2.5 TABLET, FILM COATED ORAL at 09:53

## 2022-10-09 RX ADMIN — SODIUM CHLORIDE, PRESERVATIVE FREE 10 ML: 5 INJECTION INTRAVENOUS at 05:07

## 2022-10-09 RX ADMIN — SODIUM CHLORIDE, PRESERVATIVE FREE 10 ML: 5 INJECTION INTRAVENOUS at 20:46

## 2022-10-09 RX ADMIN — BUPROPION HYDROCHLORIDE 150 MG: 150 TABLET, EXTENDED RELEASE ORAL at 09:52

## 2022-10-09 RX ADMIN — METOPROLOL TARTRATE 25 MG: 25 TABLET, FILM COATED ORAL at 09:52

## 2022-10-09 RX ADMIN — MEMANTINE HYDROCHLORIDE 10 MG: 10 TABLET ORAL at 20:44

## 2022-10-09 RX ADMIN — APIXABAN 2.5 MG: 2.5 TABLET, FILM COATED ORAL at 20:45

## 2022-10-09 RX ADMIN — FINASTERIDE 5 MG: 5 TABLET, FILM COATED ORAL at 09:53

## 2022-10-09 RX ADMIN — TAMSULOSIN HYDROCHLORIDE 0.4 MG: 0.4 CAPSULE ORAL at 09:52

## 2022-10-09 RX ADMIN — QUETIAPINE FUMARATE 12.5 MG: 25 TABLET ORAL at 20:45

## 2022-10-09 RX ADMIN — Medication 1000 UNITS: at 09:53

## 2022-10-09 NOTE — PROGRESS NOTES
Problem: Mobility Impaired (Adult and Pediatric)  Goal: *Acute Goals and Plan of Care (Insert Text)  Description: FUNCTIONAL STATUS PRIOR TO ADMISSION: Patient was using a rollator walker vs WC as primary means of mobility at supervision level. Lives at [facility] per wife. HOME SUPPORT PRIOR TO ADMISSION: The patient lived at [facility] per wife. Physical Therapy Goals  Initiated 10/9/2022  1. Patient will move from supine to sit and sit to supine  in bed with moderate assistance  within 7 day(s). 2.  Patient will transfer from bed to chair and chair to bed with moderate assistance  using the least restrictive device within 7 day(s). 3.  Patient will perform sit to stand with moderate assistance  within 7 day(s). 4.  Patient will ambulate with moderate assistance  for 25 feet with the least restrictive device within 7 day(s). Outcome: Progressing Towards Goal     PHYSICAL THERAPY EVALUATION  Patient: Emily Arora (89 y.o. male)  Date: 10/9/2022  Primary Diagnosis: Syncope [R55]       Precautions:   Fall    ASSESSMENT  Based on the objective data described below, the patient presents with increased drowsiness, limited participation, and overall significant limitations in functional independence. Patient's wife present and sitting in bedside chair. She reports that patient used a rollator walker prior to recent hospitalization (10/2-10/6) and using a WC since return to [facility]. Patient very drowsy today with limited participation. Patient encouraged to attempt sitting EOB, but ultimately refusing to sit stating \"I want to take a nap. \"  Patient's wife states that he is \"usually more alert and following commands. \"      Current Level of Function Impacting Discharge (mobility/balance): bed mobility (rolling) max A; patient very drowsy and limited participation; refusing to sit EOB    Functional Outcome Measure:   The patient scored 5 on the Barthel outcome measure which is indicative of dependence for all functional task and ADLs. Other factors to consider for discharge:      Patient will benefit from skilled therapy intervention to address the above noted impairments. PLAN :  Recommendations and Planned Interventions: bed mobility training, transfer training, therapeutic exercises, patient and family training/education, and therapeutic activities      Frequency/Duration: Patient will be followed by physical therapy:  3 times a week to address goals. Recommendation for discharge: (in order for the patient to meet his/her long term goals)  Therapy up to 5 days/week in SNF setting    This discharge recommendation:  Has not yet been discussed the attending provider and/or case management    IF patient discharges home will need the following DME: to be determined (TBD)         SUBJECTIVE:   Patient stated Oh ok.     OBJECTIVE DATA SUMMARY:   HISTORY:    Past Medical History:   Diagnosis Date    Allergy     seasonal    Arrhythmia     Asthma     Dementia ([facility] [address]     Depression     Heart failure (HCC)     Hypertension     Neurological disorder     dementia    Other ill-defined conditions(799.89)     a-fib    Stroke [facility])      Past Surgical History:   Procedure Laterality Date    HX HEENT      cateract surgery       Personal factors and/or comorbidities impacting plan of care: Dementia; lives in memory unit at [facility] per wife    Home Situation  One/Two Story Residence: One story  Living Alone: No  Support Systems: [place], Other (Comment)  Patient Expects to be Discharged to[de-identified] Other:  Current DME Used/Available at Home: Walker, rolling, Wheelchair, [employer] Hawks, rollator    EXAMINATION/PRESENTATION/DECISION MAKING:   Critical Behavior:  Neurologic State: Alert  Orientation Level: Oriented to person  Cognition: Follows commands  Safety/Judgement: Awareness of environment  Hearing:   Auditory  Auditory Impairment: None    Range Of Motion:  AROM: Grossly decreased, non-functional   B LEs        Strength:    Strength: Grossly decreased, non-functional   B LEs           Tone & Sensation:   Tone: Normal              Sensation: Intact               Coordination:  Coordination: Grossly decreased, non-functional    Functional Mobility:  Bed Mobility:  Rolling: Maximum assistance  Supine to Sit:  (pt refusing to sit)        Transfers:   Unable to assess; pt refusing to sit                  Therapeutic Exercises: Ankle pumps x 10 reps    Functional Measure:  Barthel Index:    Bathin  Bladder: 0  Bowels: 0  Groomin  Dressin  Feedin  Mobility: 0  Stairs: 0  Toilet Use: 0  Transfer (Bed to Chair and Back): 0  Total: 5/100       The Barthel ADL Index: Guidelines  1. The index should be used as a record of what a patient does, not as a record of what a patient could do. 2. The main aim is to establish degree of independence from any help, physical or verbal, however minor and for whatever reason. 3. The need for supervision renders the patient not independent. 4. A patient's performance should be established using the best available evidence. Asking the patient, friends/relatives and nurses are the usual sources, but direct observation and common sense are also important. However direct testing is not needed. 5. Usually the patient's performance over the preceding 24-48 hours is important, but occasionally longer periods will be relevant. 6. Middle categories imply that the patient supplies over 50 per cent of the effort. 7. Use of aids to be independent is allowed. Score Interpretation (from [address] [address])    Independent   60-79 Minimally independent   40-59 Partially dependent   20-39 Very dependent   <20 Totally dependent     -Sincere Perkins., Barthel, D.W. (1965). Functional evaluation: the Barthel Index. [address] [facility] ([address] (1997).  The Barthel activities of daily living index: self-reporting versus actual performance in the old (> or = 75 years). Journal of [address] [place] [address] 45(5), [facility], ELIESER, Sridevi Everett., Melany Ashley. (1999). Measuring the change in disability after inpatient rehabilitation; comparison of the responsiveness of the Barthel Index and Functional [place] Measure. Journal of Neurology, Neurosurgery, and Psychiatry, 66(4), 521-540. KISHOR Koo, MAMTA Joshi, & Jeet Chu M.A. (2004) Assessment of post-stroke quality of life in cost-effectiveness studies: The usefulness of the Barthel Index and the EuroQoL-5D. Quality of Life Research, 15, 160-69           Physical Therapy Evaluation Charge Determination   History Examination Presentation Decision-Making   MEDIUM  Complexity : 1-2 comorbidities / personal factors will impact the outcome/ POC  MEDIUM Complexity : 3 Standardized tests and measures addressing body structure, function, activity limitation and / or participation in recreation  MEDIUM Complexity : Evolving with changing characteristics  MEDIUM Complexity : FOTO score of 26-74      Based on the above components, the patient evaluation is determined to be of the following complexity level: MEDIUM    Pain Rating:  No pain reported    Activity Tolerance:   Poor; refusing to sit EOB; very drowsy    After treatment patient left in no apparent distress:   Supine in bed, Call bell within reach, and Caregiver / family present    COMMUNICATION/EDUCATION:   The patients plan of care was discussed with: Registered nurse. Patient is unable to participate in goal setting and plan of care.     Thank you for this referral.  Jacey Maria, PT   Time Calculation: 25 mins

## 2022-10-09 NOTE — PROGRESS NOTES
SHILPI- Return to [address] [place] [address]      Reason for Readmission:   syncope           RUR Score/Risk Level:   15%      PCP: First and Last name:     Name of Practice:    Are you a current patient: Yes/No:    Approximate date of last visit:    Can you participate in a virtual visit with your PCP:     Is a Care Conference indicated: not at this time. Did you attend your follow up appointment (s): If not, why not:Pt saw the house physician at [facility]. Resources/supports as identified by patient/family:   Pt's Wife. Top Challenges facing patient (as identified by patient/family and CM): Finances/Medication cost?       Transportation        Support system or lack thereof? Living arrangements? Self-care/ADLs/Cognition? Current Advanced Directive/Advance Care Plan:             Plan for utilizing home health:   TBD             Transition of Care Plan:    Based on readmission, the patient's previous Plan of Care   has been evaluated and/or modified. The current Transition of Care Plan is:    CM met with this pt and his wife at his bedside. Per the wife, this pt lives at [facility] in [place] [address]. She said that he has been there for about a year and she wants him to return there at d/c. CM will need to call [facility] tomorrow, as they may want some clinicals. Will follow.  [address]    Readmission Assessment  Number of days since last admission?: 1-7 days  Previous disposition: Other (comment) ([facility] ([facility]))  Who is being interviewed?: Caregiver  What was the patient's/caregiver's perception as to why they think they needed to return back to the hospital?: Other (Comment) (medically necessary)  Did you visit your Primary Care Physician after you left the hospital, before you returned this time?: No  Why weren't you able to visit your PCP?: Did not have an appointment  Did you see a specialist, such as Cardiac, Pulmonary, Orthopedic Physician, etc. after you left the hospital?: No  Who advised the patient to return to the hospital?: Caregiver  Does the patient report anything that got in the way of taking their medications?: No  In our efforts to provide the best possible care to you and others like you, can you think of anything that we could have done to help you after you left the hospital the first time, so that you might not have needed to return so soon?: Other (Comment)         Medicare pt has received, reviewed, and signed 1st IM letter informing them of their right to appeal the discharge. Signed copy has been placed on pt bedside chart.

## 2022-10-09 NOTE — PROGRESS NOTES
@46 - Received report from [facility]    @1410 - Bedside and Verbal shift change report given to Justo Andersen RN (oncoming nurse) by Sudhir Freeman RN (offgoing nurse). Report included the following information SBAR, Kardex, Intake/Output, MAR, Recent Results, and Cardiac Rhythm A-Fib.

## 2022-10-09 NOTE — PROGRESS NOTES
[facility] Adult  Hospitalist Group                                                                                          Hospitalist Progress Note  Dia Mendez, [place]  Answering service: [phone] from in house phone        Date of Service:  10/9/2022  NAME:  Carol Block  :  1935  MRN:  [id number]      Admission Summary:   Carol Block is a 81 y.o. male with a pmhx asthma, HTN, atrial fibrillation, past stroke, and dementia who presents with syncopal episode and altered mental status. He presents from [facility]. He was just hospitalized from 10/2-10/6 for acute hypoxic respiratory failure 2/2 b/l pleural effusion, and possible aspiration. Per hospital record, patient became  unresponsive at his facility for an unknown amount of time, and EMS was called. Family reports more confusion over the past several days. Interval history / Subjective:   Spoke with wife at bedside afternoon of 10/9, she believes the syncopal event was due to aspiration while eating. Today the patient is awake alert and oriented, he denies any complaints     Assessment & Plan:      #syncope  - Etiology unclear,  dehydration 2/2 elevated creatinine/ BUN while taking lasix.  VS aspiration event  - PT  - BG WNL  - Daily BMP  - Consider SLP involvement     #Elevated creatinine  - creatinine 1.6 (bsl1.3), BUN 33  - Renal US: no evidence of hydronephrosis, increased echogenicity of renal cortices consistent with medical renal disease  - s/p 250cc normal saline in ED  - Hold home lasix, and spironolactone      #Urinary retention  - Home flomax, finasteride     #Atrial Fibrillation  - Continue low dose eliquis,   - metoprolol     #Dementia  - from [facility]  - continue namenda     #Depression  - continue wellbutrin     Code status: Full  Prophylaxis: [address] discussed with: patient, will update Tutu Higginbotham when they come to bedside  Anticipated Disposition: [facility]     Hospital Problems  Date Reviewed: 9/13/2017            Codes Class Noted POA    Syncope ICD-10-CM: R55  ICD-9-CM: 780.2  10/7/2022 Unknown             Review of Systems:   A comprehensive review of systems was negative except for that written in the HPI. Vital Signs:    Last 24hrs VS reviewed since prior progress note. Most recent are:  Visit Vitals  BP (!) 145/76 (BP 1 Location: Left upper arm, BP Patient Position: At rest)   Pulse 72   Temp 97.4 °F (36.3 °C)   Resp 20   Wt 60 kg (132 lb 4.4 oz)   SpO2 96%   BMI 18.98 kg/m²         Intake/Output Summary (Last 24 hours) at 10/9/2022 0947  Last data filed at 10/8/2022 2121  Gross per 24 hour   Intake 360 ml   Output --   Net 360 ml        Physical Examination:     I had a face to face encounter with this patient and independently examined them on 10/9/2022 as outlined below:          Constitutional:  No acute distress, cooperative, pleasant    HENT: Normocephalic, atraumatic, PEERL Oral mucosa moist, oropharynx benign. Resp:  CTA bilaterally. No wheezing/rhonchi/rales. No accessory muscle use. CV:  Irregularly irregular, normal rate, no murmurs, gallops, rubs    GI:  Soft, non distended, non tender. normoactive bowel sounds    Musculoskeletal:  No edema, warm, 2+ pulses radial pulses    Neurologic:  Psych:  Moves all extremities.   AAOx3 today, no focal deficit  Mood pleasant, affect appropriate             Data Review:    Review and/or order of clinical lab test  Review and/or order of tests in the radiology section of CPT  Review and/or order of tests in the medicine section of CPT      Labs:     Recent Labs     10/09/22  0528 10/08/22  0323   WBC 7.7 5.7   HGB 15.9 15.8   HCT 49.0 48.6    155     Recent Labs     10/09/22  0528 10/08/22  0322 10/07/22  2005    142 139   K 4.0 4.1 3.6    104 101   CO2 33* 35* 34*   BUN 22* 30* 33*   CREA 1.22 1.37* 1.66*   GLU 79 90 113*   CA 8.8 9.0 8.7   MG  --  2.1  -- Recent Labs     10/07/22  2005   ALT 18      TBILI 1.3*   TP 6.5   ALB 2.8*   GLOB 3.7     No results for input(s): INR, PTP, APTT, INREXT in the last 72 hours. No results for input(s): FE, TIBC, PSAT, FERR in the last 72 hours. Lab Results   Component Value Date/Time    Folate 10.0 11/09/2015 01:30 AM      No results for input(s): PH, PCO2, PO2 in the last 72 hours. No results for input(s): CPK, CKNDX, TROIQ in the last 72 hours.     No lab exists for component: CPKMB  No results found for: CHOL, CHOLX, CHLST, CHOLV, HDL, HDLP, LDL, LDLC, DLDLP, TGLX, TRIGL, TRIGP, CHHD, CHHDX  Lab Results   Component Value Date/Time    Glucose (POC) 76 03/06/2022 11:35 AM    Glucose (POC) 87 05/01/2017 12:01 AM    Glucose (POC) 146 (H) 12/07/2016 02:59 PM     Lab Results   Component Value Date/Time    Color DARK YELLOW 10/07/2022 09:27 PM    Appearance CLEAR 10/07/2022 09:27 PM    Specific gravity 1.024 10/07/2022 09:27 PM    Specific gravity 1.020 05/02/2022 09:11 PM    pH (UA) 5.0 10/07/2022 09:27 PM    Protein TRACE (A) 10/07/2022 09:27 PM    Glucose 500 (A) 10/07/2022 09:27 PM    Ketone TRACE (A) 10/07/2022 09:27 PM    Bilirubin Negative 10/07/2022 09:27 PM    Urobilinogen 1.0 10/07/2022 09:27 PM    Nitrites Negative 10/07/2022 09:27 PM    Leukocyte Esterase Negative 10/07/2022 09:27 PM    Epithelial cells FEW 10/07/2022 09:27 PM    Bacteria Negative 10/07/2022 09:27 PM    WBC 0-4 10/07/2022 09:27 PM    RBC 0-5 10/07/2022 09:27 PM         Medications Reviewed:     Current Facility-Administered Medications   Medication Dose Route Frequency    sodium chloride (NS) flush 5-40 mL  5-40 mL IntraVENous Q8H    sodium chloride (NS) flush 5-40 mL  5-40 mL IntraVENous PRN    acetaminophen (TYLENOL) tablet 650 mg  650 mg Oral Q6H PRN    Or    acetaminophen (TYLENOL) suppository 650 mg  650 mg Rectal Q6H PRN    polyethylene glycol (MIRALAX) packet 17 g  17 g Oral DAILY PRN    ondansetron (ZOFRAN ODT) tablet 4 mg  4 mg Oral Q8H PRN    Or    ondansetron (ZOFRAN) injection 4 mg  4 mg IntraVENous Q6H PRN    apixaban (ELIQUIS) tablet 2.5 mg  2.5 mg Oral Q12H    buPROPion SR (WELLBUTRIN SR) tablet 150 mg  150 mg Oral DAILY    cholecalciferol (VITAMIN D3) (1000 Units /25 mcg) tablet 1,000 Units  1,000 Units Oral DAILY    finasteride (PROSCAR) tablet 5 mg  5 mg Oral DAILY    memantine (NAMENDA) tablet 10 mg  10 mg Oral Q12H    metoprolol tartrate (LOPRESSOR) tablet 25 mg  25 mg Oral Q12H    QUEtiapine (SEROquel) tablet 12.5 mg  12.5 mg Oral QHS    tamsulosin (FLOMAX) capsule 0.4 mg  0.4 mg Oral DAILY     ______________________________________________________________________  EXPECTED LENGTH OF STAY: - - -  ACTUAL LENGTH OF STAY:          2                 Rochelle SilverigramCAMRON

## 2022-10-09 NOTE — PROGRESS NOTES
10/08/22 2114   Vital Signs   Temp 97.6 °F (36.4 °C)   Pulse (Heart Rate) 62   Resp Rate 20   O2 Sat (%) 98 %   Level of Consciousness 0   /78   MAP (Calculated) 96   MEWS Score 1   Recheck vitals, placed pulse ox on ear, fingers with poor circulation.

## 2022-10-09 NOTE — PROGRESS NOTES
Bedside shift change report given to [address]  (oncoming nurse) by Smitha Hayes  (offgoing nurse). Report included the following information SBAR, Kardex, MAR, and Recent Results.

## 2022-10-09 NOTE — PROGRESS NOTES
Problem: Falls - Risk of  Goal: *Absence of Falls  Description: Document Alex Abram Fall Risk and appropriate interventions in the flowsheet.   Outcome: Progressing Towards Goal  Note: Fall Risk Interventions:  Mobility Interventions: Patient to call before getting OOB    Mentation Interventions: Door open when patient unattended         Elimination Interventions: Call light in reach

## 2022-10-09 NOTE — PROGRESS NOTES
Bedside and Verbal shift change report given to Inga Villar (oncoming nurse) by Derrell RN (offgoing nurse). Report included the following information SBAR, Kardex, Intake/Output, and MAR.

## 2022-10-10 LAB
ANION GAP SERPL CALC-SCNC: 4 MMOL/L (ref 5–15)
BASOPHILS # BLD: 0 K/UL (ref 0–0.1)
BASOPHILS NFR BLD: 1 % (ref 0–1)
BUN SERPL-MCNC: 22 MG/DL (ref 6–20)
BUN/CREAT SERPL: 18 (ref 12–20)
CALCIUM SERPL-MCNC: 8.7 MG/DL (ref 8.5–10.1)
CHLORIDE SERPL-SCNC: 107 MMOL/L (ref 97–108)
CO2 SERPL-SCNC: 28 MMOL/L (ref 21–32)
CREAT SERPL-MCNC: 1.21 MG/DL (ref 0.7–1.3)
DIFFERENTIAL METHOD BLD: ABNORMAL
EOSINOPHIL # BLD: 0.3 K/UL (ref 0–0.4)
EOSINOPHIL NFR BLD: 4 % (ref 0–7)
ERYTHROCYTE [DISTWIDTH] IN BLOOD BY AUTOMATED COUNT: 14.1 % (ref 11.5–14.5)
GLUCOSE SERPL-MCNC: 80 MG/DL (ref 65–100)
HCT VFR BLD AUTO: 50.6 % (ref 36.6–50.3)
HGB BLD-MCNC: 16.3 G/DL (ref 12.1–17)
IMM GRANULOCYTES # BLD AUTO: 0 K/UL (ref 0–0.04)
IMM GRANULOCYTES NFR BLD AUTO: 0 % (ref 0–0.5)
LYMPHOCYTES # BLD: 1.2 K/UL (ref 0.8–3.5)
LYMPHOCYTES NFR BLD: 18 % (ref 12–49)
MCH RBC QN AUTO: 29.4 PG (ref 26–34)
MCHC RBC AUTO-ENTMCNC: 32.2 G/DL (ref 30–36.5)
MCV RBC AUTO: 91.2 FL (ref 80–99)
MONOCYTES # BLD: 0.8 K/UL (ref 0–1)
MONOCYTES NFR BLD: 11 % (ref 5–13)
NEUTS SEG # BLD: 4.6 K/UL (ref 1.8–8)
NEUTS SEG NFR BLD: 66 % (ref 32–75)
NRBC # BLD: 0 K/UL (ref 0–0.01)
NRBC BLD-RTO: 0 PER 100 WBC
PLATELET # BLD AUTO: 166 K/UL (ref 150–400)
PMV BLD AUTO: 10 FL (ref 8.9–12.9)
POTASSIUM SERPL-SCNC: 4 MMOL/L (ref 3.5–5.1)
RBC # BLD AUTO: 5.55 M/UL (ref 4.1–5.7)
SODIUM SERPL-SCNC: 139 MMOL/L (ref 136–145)
WBC # BLD AUTO: 7 K/UL (ref 4.1–11.1)

## 2022-10-10 PROCEDURE — 94760 N-INVAS EAR/PLS OXIMETRY 1: CPT

## 2022-10-10 PROCEDURE — 83735 ASSAY OF MAGNESIUM: CPT

## 2022-10-10 PROCEDURE — 74011250637 HC RX REV CODE- 250/637: Performed by: FAMILY MEDICINE

## 2022-10-10 PROCEDURE — 80048 BASIC METABOLIC PNL TOTAL CA: CPT

## 2022-10-10 PROCEDURE — 36415 COLL VENOUS BLD VENIPUNCTURE: CPT

## 2022-10-10 PROCEDURE — 85025 COMPLETE CBC W/AUTO DIFF WBC: CPT

## 2022-10-10 PROCEDURE — 77010033678 HC OXYGEN DAILY

## 2022-10-10 PROCEDURE — 65270000046 HC RM TELEMETRY

## 2022-10-10 PROCEDURE — 74011000250 HC RX REV CODE- 250: Performed by: FAMILY MEDICINE

## 2022-10-10 RX ORDER — SPIRONOLACTONE 25 MG/1
12.5 TABLET ORAL DAILY
Status: DISCONTINUED | OUTPATIENT
Start: 2022-10-11 | End: 2022-10-12 | Stop reason: HOSPADM

## 2022-10-10 RX ORDER — FUROSEMIDE 20 MG/1
20 TABLET ORAL DAILY
Status: DISCONTINUED | OUTPATIENT
Start: 2022-10-11 | End: 2022-10-12 | Stop reason: HOSPADM

## 2022-10-10 RX ADMIN — MEMANTINE HYDROCHLORIDE 10 MG: 10 TABLET ORAL at 21:41

## 2022-10-10 RX ADMIN — APIXABAN 2.5 MG: 2.5 TABLET, FILM COATED ORAL at 21:41

## 2022-10-10 RX ADMIN — SODIUM CHLORIDE, PRESERVATIVE FREE 10 ML: 5 INJECTION INTRAVENOUS at 21:40

## 2022-10-10 RX ADMIN — BUPROPION HYDROCHLORIDE 150 MG: 150 TABLET, EXTENDED RELEASE ORAL at 09:55

## 2022-10-10 RX ADMIN — METOPROLOL TARTRATE 25 MG: 25 TABLET, FILM COATED ORAL at 21:41

## 2022-10-10 RX ADMIN — FINASTERIDE 5 MG: 5 TABLET, FILM COATED ORAL at 09:56

## 2022-10-10 RX ADMIN — QUETIAPINE FUMARATE 12.5 MG: 25 TABLET ORAL at 21:41

## 2022-10-10 RX ADMIN — Medication 1000 UNITS: at 09:56

## 2022-10-10 RX ADMIN — MEMANTINE HYDROCHLORIDE 10 MG: 10 TABLET ORAL at 09:56

## 2022-10-10 RX ADMIN — METOPROLOL TARTRATE 25 MG: 25 TABLET, FILM COATED ORAL at 09:56

## 2022-10-10 RX ADMIN — TAMSULOSIN HYDROCHLORIDE 0.4 MG: 0.4 CAPSULE ORAL at 09:57

## 2022-10-10 RX ADMIN — APIXABAN 2.5 MG: 2.5 TABLET, FILM COATED ORAL at 09:55

## 2022-10-10 RX ADMIN — SODIUM CHLORIDE, PRESERVATIVE FREE 10 ML: 5 INJECTION INTRAVENOUS at 05:02

## 2022-10-10 NOTE — PROGRESS NOTES
[facility] Adult  Hospitalist Group                                                                                          Hospitalist Progress Note  Kranthi Hawkins PA-C  Answering service: [phone] from in house phone        Date of Service:  10/10/2022  NAME:  Gautam Phelps  :  1935  MRN:  [id number]      Admission Summary:   Gautam Phelps is a 81 y.o. male with a pmhx asthma, HTN, atrial fibrillation, past stroke, and dementia who presents with syncopal episode and altered mental status. He presents from [facility]. He was just hospitalized from 10/2-10/6 for acute hypoxic respiratory failure 2/2 b/l pleural effusion, and possible aspiration. Per hospital record, patient became  unresponsive at his facility for an unknown amount of time, and EMS was called. Family reports more confusion over the past several days. Interval history / Subjective:   Patient denies any acute complaints. Wife is at the bedside and we discussed his hospitalization to date and interventions. All questions and concerns addressed to best of my ability. We discussed his TTE together and she discusses that previous doctors have told her there is nothing that can be done. Discussed medical management and optimization. Discussed approaching medically stable for discharge. All questions and concerns addressed. The wife wanted to address Code Status today and says she \"needs to complete Durable DNR for him. \" See ACP note from 10/10. She does not wish for him to be Full Code at this time and we discussed Code Status, different options, and DNR/DNI in great detail.       Assessment & Plan:     #syncope  - Etiology unclear,  dehydration 2/2 elevated creatinine/ BUN while taking lasix VS aspiration event  - PT  - BG WNL  - Daily BMP  - Consider SLP involvement     HFrEF, EF 35-40%  -- Patient currently compensated on exam.   -- Restart low dose Spirinolactone and Lasix  -- On beta blocker. Will need to consider addition of ACE/ARB based on response to Spironolactone/Lasix  -- Restart Jardiance on discharge    #Elevated creatinine  #EDEN on CKD on admission  - Renal US: no evidence of hydronephrosis, increased echogenicity of renal cortices consistent with medical renal disease  - s/p 250cc normal saline in ED  -- Renal function improved     #Urinary retention  - Home flomax, finasteride     #Atrial Fibrillation  - Continue low dose eliquis,   - metoprolol     #Dementia  - from [facility]. Per wife on 10/10/22 at baseline  - continue namenda     #Depression  - continue wellbutrin     Code status: DNR/DNI  Prophylaxis: Apixaban  Care Plan discussed with: Pt, Wife, RN  Anticipated Disposition: TBD     Hospital Problems  Date Reviewed: 9/13/2017            Codes Class Noted POA    Syncope ICD-10-CM: R55  ICD-9-CM: 780.2  10/7/2022 Unknown             Review of Systems:   A comprehensive review of systems was negative except for that written in the HPI. Vital Signs:    Last 24hrs VS reviewed since prior progress note. Most recent are:  Visit Vitals  /70   Pulse 66   Temp 97.3 °F (36.3 °C)   Resp 18   Ht 5' 10\" (1.778 m)   Wt 60 kg (132 lb 4.4 oz)   SpO2 99%   BMI 18.98 kg/m²       No intake or output data in the 24 hours ending 10/10/22 1315     Physical Examination:     I had a face to face encounter with this patient and independently examined them on 10/10/2022 as outlined below:          Constitutional:  No acute distress, frail   ENT:  Oral mucosa dry   Resp:  CTA bilaterally. No wheezing/rhonchi/rales. No accessory muscle use. CV:  Regular rhythm, normal rate, no murmurs appreciated    GI:  Soft, non distended, non tender. Musculoskeletal:  No edema, warm    Neurologic:  Moves all extremities spontaneously. Oriented to name and location but not year.  PERRL, face symmetric            Data Review:    Review and/or order of clinical lab test  Review and/or order of tests in the radiology section of CPT  Review and/or order of tests in the medicine section of CPT      Labs:     Recent Labs     10/10/22  0216 10/09/22  0528   WBC 7.0 7.7   HGB 16.3 15.9   HCT 50.6* 49.0    161     Recent Labs     10/10/22  0216 10/09/22  0528 10/08/22  0322    143 142   K 4.0 4.0 4.1    107 104   CO2 28 33* 35*   BUN 22* 22* 30*   CREA 1.21 1.22 1.37*   GLU 80 79 90   CA 8.7 8.8 9.0   MG  --   --  2.1     Recent Labs     10/07/22  2005   ALT 18      TBILI 1.3*   TP 6.5   ALB 2.8*   GLOB 3.7     No results for input(s): INR, PTP, APTT, INREXT in the last 72 hours. No results for input(s): FE, TIBC, PSAT, FERR in the last 72 hours. Lab Results   Component Value Date/Time    Folate 10.0 11/09/2015 01:30 AM      No results for input(s): PH, PCO2, PO2 in the last 72 hours. No results for input(s): CPK, CKNDX, TROIQ in the last 72 hours.     No lab exists for component: CPKMB  No results found for: CHOL, CHOLX, CHLST, CHOLV, HDL, HDLP, LDL, LDLC, DLDLP, TGLX, TRIGL, TRIGP, CHHD, CHHDX  Lab Results   Component Value Date/Time    Glucose (POC) 76 03/06/2022 11:35 AM    Glucose (POC) 87 05/01/2017 12:01 AM    Glucose (POC) 146 (H) 12/07/2016 02:59 PM     Lab Results   Component Value Date/Time    Color DARK YELLOW 10/07/2022 09:27 PM    Appearance CLEAR 10/07/2022 09:27 PM    Specific gravity 1.024 10/07/2022 09:27 PM    Specific gravity 1.020 05/02/2022 09:11 PM    pH (UA) 5.0 10/07/2022 09:27 PM    Protein TRACE (A) 10/07/2022 09:27 PM    Glucose 500 (A) 10/07/2022 09:27 PM    Ketone TRACE (A) 10/07/2022 09:27 PM    Bilirubin Negative 10/07/2022 09:27 PM    Urobilinogen 1.0 10/07/2022 09:27 PM    Nitrites Negative 10/07/2022 09:27 PM    Leukocyte Esterase Negative 10/07/2022 09:27 PM    Epithelial cells FEW 10/07/2022 09:27 PM    Bacteria Negative 10/07/2022 09:27 PM    WBC 0-4 10/07/2022 09:27 PM    RBC 0-5 10/07/2022 09:27 PM         Medications Reviewed: Current Facility-Administered Medications   Medication Dose Route Frequency    [START ON 10/11/2022] spironolactone (ALDACTONE) tablet 12.5 mg  12.5 mg Oral DAILY    [START ON 10/11/2022] furosemide (LASIX) tablet 20 mg  20 mg Oral DAILY    sodium chloride (NS) flush 5-40 mL  5-40 mL IntraVENous Q8H    sodium chloride (NS) flush 5-40 mL  5-40 mL IntraVENous PRN    acetaminophen (TYLENOL) tablet 650 mg  650 mg Oral Q6H PRN    Or    acetaminophen (TYLENOL) suppository 650 mg  650 mg Rectal Q6H PRN    polyethylene glycol (MIRALAX) packet 17 g  17 g Oral DAILY PRN    ondansetron (ZOFRAN ODT) tablet 4 mg  4 mg Oral Q8H PRN    Or    ondansetron (ZOFRAN) injection 4 mg  4 mg IntraVENous Q6H PRN    apixaban (ELIQUIS) tablet 2.5 mg  2.5 mg Oral Q12H    buPROPion SR (WELLBUTRIN SR) tablet 150 mg  150 mg Oral DAILY    cholecalciferol (VITAMIN D3) (1000 Units /25 mcg) tablet 1,000 Units  1,000 Units Oral DAILY    finasteride (PROSCAR) tablet 5 mg  5 mg Oral DAILY    memantine (NAMENDA) tablet 10 mg  10 mg Oral Q12H    metoprolol tartrate (LOPRESSOR) tablet 25 mg  25 mg Oral Q12H    QUEtiapine (SEROquel) tablet 12.5 mg  12.5 mg Oral QHS    tamsulosin (FLOMAX) capsule 0.4 mg  0.4 mg Oral DAILY     ______________________________________________________________________  EXPECTED LENGTH OF STAY: - - -  ACTUAL LENGTH OF STAY:          3                 Kranthi Hawkins PA-C

## 2022-10-10 NOTE — PROGRESS NOTES
Problem: Falls - Risk of  Goal: *Absence of Falls  Description: Document Margarita Skill Fall Risk and appropriate interventions in the flowsheet.   Outcome: Progressing Towards Goal  Note: Fall Risk Interventions:  Mobility Interventions: Patient to call before getting OOB    Mentation Interventions: Door open when patient unattended         Elimination Interventions: Call light in reach

## 2022-10-10 NOTE — ACP (ADVANCE CARE PLANNING)
Advance Care Planning     Advance Care Planning (ACP) Physician/NP/PA Conversation      Date of Conversation: 10/7/2022  Conducted with: Healthcare Decision Maker: Named in Advance Directive or Healthcare Power of [employer] Decision Maker:     Primary Decision Maker: Hernan Dotson - Spouse - [phone]    Secondary Decision Maker: Abbey Gupta - [phone]    Secondary Decision Maker: Krunal Griffith - Other Relative - [phone]  Click here to complete [address] including selection of the [address] Relationship (ie \"Primary\")    Today we discussed Code Status and clinical status with his wife/POA, China Alexander. Aliza Lim notes she is the POA/HCP for him. Her and her family has been discussing his Code Status and wants him changed to DNR/DNI. We defined DNR/DNI and what this means and she notes this aligns with his current goals. Care Preferences:    Hospitalization: \"If your health worsens and it becomes clear that your chance of recovery is unlikely, what would be your preference regarding hospitalization? \"  The patient would prefer hospitalization. Ventilation: \"If you were unable to breathe on your own and your chance of recovery was unlikely, what would be your preference about the use of a ventilator (breathing machine) if it was available to you? \"   The patient would NOT desire the use of a ventilator. Resuscitation: \"In the event your heart stopped as a result of an underlying serious health condition, would you want attempts to be made to restart your heart, or would you prefer a natural death? \"   No, do NOT attempt to resuscitate.     A durable DNR was completed with the wife and placed in the patient's chart      Length of Voluntary ACP Conversation in minutes:  20 minutes    Kranthi Hawkins PA-C

## 2022-10-11 PROBLEM — I50.20 HFREF (HEART FAILURE WITH REDUCED EJECTION FRACTION) (HCC): Status: ACTIVE | Noted: 2022-10-11

## 2022-10-11 LAB
ANION GAP SERPL CALC-SCNC: 4 MMOL/L (ref 5–15)
BASOPHILS # BLD: 0.1 K/UL (ref 0–0.1)
BASOPHILS NFR BLD: 1 % (ref 0–1)
BUN SERPL-MCNC: 19 MG/DL (ref 6–20)
BUN/CREAT SERPL: 17 (ref 12–20)
CALCIUM SERPL-MCNC: 8.4 MG/DL (ref 8.5–10.1)
CHLORIDE SERPL-SCNC: 105 MMOL/L (ref 97–108)
CO2 SERPL-SCNC: 27 MMOL/L (ref 21–32)
CREAT SERPL-MCNC: 1.14 MG/DL (ref 0.7–1.3)
DIFFERENTIAL METHOD BLD: ABNORMAL
EOSINOPHIL # BLD: 0.2 K/UL (ref 0–0.4)
EOSINOPHIL NFR BLD: 3 % (ref 0–7)
ERYTHROCYTE [DISTWIDTH] IN BLOOD BY AUTOMATED COUNT: 14.2 % (ref 11.5–14.5)
GLUCOSE SERPL-MCNC: 77 MG/DL (ref 65–100)
HCT VFR BLD AUTO: 51.2 % (ref 36.6–50.3)
HGB BLD-MCNC: 16.6 G/DL (ref 12.1–17)
IMM GRANULOCYTES # BLD AUTO: 0 K/UL (ref 0–0.04)
IMM GRANULOCYTES NFR BLD AUTO: 0 % (ref 0–0.5)
LYMPHOCYTES # BLD: 1.4 K/UL (ref 0.8–3.5)
LYMPHOCYTES NFR BLD: 20 % (ref 12–49)
MAGNESIUM SERPL-MCNC: 2.5 MG/DL (ref 1.6–2.4)
MCH RBC QN AUTO: 29.6 PG (ref 26–34)
MCHC RBC AUTO-ENTMCNC: 32.4 G/DL (ref 30–36.5)
MCV RBC AUTO: 91.3 FL (ref 80–99)
MONOCYTES # BLD: 0.7 K/UL (ref 0–1)
MONOCYTES NFR BLD: 10 % (ref 5–13)
NEUTS SEG # BLD: 4.6 K/UL (ref 1.8–8)
NEUTS SEG NFR BLD: 66 % (ref 32–75)
NRBC # BLD: 0 K/UL (ref 0–0.01)
NRBC BLD-RTO: 0 PER 100 WBC
PLATELET # BLD AUTO: 154 K/UL (ref 150–400)
PMV BLD AUTO: 10.3 FL (ref 8.9–12.9)
POTASSIUM SERPL-SCNC: 4.1 MMOL/L (ref 3.5–5.1)
RBC # BLD AUTO: 5.61 M/UL (ref 4.1–5.7)
SODIUM SERPL-SCNC: 136 MMOL/L (ref 136–145)
WBC # BLD AUTO: 7 K/UL (ref 4.1–11.1)

## 2022-10-11 PROCEDURE — 74011250637 HC RX REV CODE- 250/637: Performed by: NURSE PRACTITIONER

## 2022-10-11 PROCEDURE — 94760 N-INVAS EAR/PLS OXIMETRY 1: CPT

## 2022-10-11 PROCEDURE — 65270000046 HC RM TELEMETRY

## 2022-10-11 PROCEDURE — 74011000250 HC RX REV CODE- 250: Performed by: FAMILY MEDICINE

## 2022-10-11 PROCEDURE — 74011250637 HC RX REV CODE- 250/637: Performed by: FAMILY MEDICINE

## 2022-10-11 PROCEDURE — 74011250637 HC RX REV CODE- 250/637: Performed by: PHYSICIAN ASSISTANT

## 2022-10-11 RX ORDER — LOSARTAN POTASSIUM 25 MG/1
25 TABLET ORAL DAILY
Status: DISCONTINUED | OUTPATIENT
Start: 2022-10-11 | End: 2022-10-12 | Stop reason: HOSPADM

## 2022-10-11 RX ADMIN — LOSARTAN POTASSIUM 25 MG: 25 TABLET, FILM COATED ORAL at 12:55

## 2022-10-11 RX ADMIN — MEMANTINE HYDROCHLORIDE 10 MG: 10 TABLET ORAL at 21:10

## 2022-10-11 RX ADMIN — Medication 1000 UNITS: at 09:30

## 2022-10-11 RX ADMIN — APIXABAN 2.5 MG: 2.5 TABLET, FILM COATED ORAL at 21:10

## 2022-10-11 RX ADMIN — APIXABAN 2.5 MG: 2.5 TABLET, FILM COATED ORAL at 09:29

## 2022-10-11 RX ADMIN — FUROSEMIDE 20 MG: 20 TABLET ORAL at 09:30

## 2022-10-11 RX ADMIN — SODIUM CHLORIDE, PRESERVATIVE FREE 10 ML: 5 INJECTION INTRAVENOUS at 14:00

## 2022-10-11 RX ADMIN — BUPROPION HYDROCHLORIDE 150 MG: 150 TABLET, EXTENDED RELEASE ORAL at 09:29

## 2022-10-11 RX ADMIN — SPIRONOLACTONE 12.5 MG: 25 TABLET ORAL at 09:29

## 2022-10-11 RX ADMIN — METOPROLOL TARTRATE 25 MG: 25 TABLET, FILM COATED ORAL at 21:10

## 2022-10-11 RX ADMIN — FINASTERIDE 5 MG: 5 TABLET, FILM COATED ORAL at 09:30

## 2022-10-11 RX ADMIN — METOPROLOL TARTRATE 25 MG: 25 TABLET, FILM COATED ORAL at 09:30

## 2022-10-11 RX ADMIN — QUETIAPINE FUMARATE 12.5 MG: 25 TABLET ORAL at 21:10

## 2022-10-11 RX ADMIN — SODIUM CHLORIDE, PRESERVATIVE FREE 10 ML: 5 INJECTION INTRAVENOUS at 03:35

## 2022-10-11 RX ADMIN — SODIUM CHLORIDE, PRESERVATIVE FREE 10 ML: 5 INJECTION INTRAVENOUS at 21:10

## 2022-10-11 RX ADMIN — MEMANTINE HYDROCHLORIDE 10 MG: 10 TABLET ORAL at 09:30

## 2022-10-11 RX ADMIN — TAMSULOSIN HYDROCHLORIDE 0.4 MG: 0.4 CAPSULE ORAL at 09:29

## 2022-10-11 NOTE — PROGRESS NOTES
[facility] [facility] Adult  Hospitalist Group                                                                                          Hospitalist Progress Note  Zachary Hernandez NP  Answering service: [phone] from in house phone        Date of Service:  10/11/2022  NAME:  Kwasi Villa  :  1935  MRN:  [id number]      Admission Summary:   Kwasi Villa is a 81 y.o. male with a pmhx asthma, HTN, atrial fibrillation, past stroke, and dementia who presents with syncopal episode and altered mental status. He presents from [facility]. He was just hospitalized from 10/2-10/6 for acute hypoxic respiratory failure 2/2 b/l pleural effusion, and possible aspiration. Per hospital record, patient became  unresponsive at his facility for an unknown amount of time, and EMS was called. Family reports more confusion over the past several days. Interval history / Subjective:        I saw the patient this morning on rounds.   No complaints the moment of the encounter    Assessment & Plan:     Syncope  Likely due to dehydration while taking furosemide  Stable  Attending back to normal       Chronic systolic congestive heart failure  Ejection fraction 35 to 40%  Not in exacerbation  Continuing furosemide  Continuing spironolactone  Continuing metoprolol  Will start losartan        EDEN on CKD on admission  Renal US: no evidence of hydronephrosis, increased echogenicity of renal cortices consistent with medical renal disease  Bolus of normal saline  Has since resolved         Urinary retention  Continuing tamsulosin  Continuing finasteride         Atrial Fibrillation  Heart rate currently controlled  Continue metoprolol  Continue apixaban         Dementia  From [employer] memory care  Patient at baseline  Continue memantine           Code status: DNR/DNI  Prophylaxis: Apixaban  Care Plan discussed with: Patient, nurse  Anticipated Disposition: Likely discharge later today versus tomorrow     Hospital Problems  Date Reviewed: 9/13/2017            Codes Class Noted POA    Syncope ICD-10-CM: R55  ICD-9-CM: 780.2  10/7/2022 Unknown           Review of Systems:   A comprehensive review of systems was negative except for that written in the HPI. Vital Signs:    Last 24hrs VS reviewed since prior progress note. Most recent are:  Visit Vitals  BP (!) 157/83 (BP 1 Location: Right upper arm, BP Patient Position: Lying left side)   Pulse (!) 54   Temp 97.6 °F (36.4 °C)   Resp 18   Ht 5' 10\" (1.778 m)   Wt 61.3 kg (135 lb 2.3 oz)   SpO2 98%   BMI 19.39 kg/m²       No intake or output data in the 24 hours ending 10/11/22 0751     Physical Examination:     I had a face to face encounter with this patient and independently examined them on 10/11/2022 as outlined below:          Constitutional:  No acute distress, frail   ENT:  Oral mucosa dry   Resp:  CTA bilaterally. No wheezing/rhonchi/rales. No accessory muscle use. CV:  Regular rhythm, normal rate, no murmurs appreciated    GI:  Soft, non distended, non tender. Musculoskeletal:  No edema, warm    Neurologic:  Moves all extremities spontaneously. Oriented to name and location but not year. PERRL, face symmetric            Data Review:    Review and/or order of clinical lab test  Review and/or order of tests in the radiology section of CPT  Review and/or order of tests in the medicine section of CPT      Labs:     Recent Labs     10/10/22  2343 10/10/22  0216   WBC 7.0 7.0   HGB 16.6 16.3   HCT 51.2* 50.6*    166       Recent Labs     10/10/22  2343 10/10/22  0216 10/09/22  0528    139 143   K 4.1 4.0 4.0    107 107   CO2 27 28 33*   BUN 19 22* 22*   CREA 1.14 1.21 1.22   GLU 77 80 79   CA 8.4* 8.7 8.8   MG 2.5*  --   --        No results for input(s): ALT, AP, TBIL, TBILI, TP, ALB, GLOB, GGT, AML, LPSE in the last 72 hours.     No lab exists for component: SGOT, GPT, AMYP, HLPSE    No results for input(s): INR, PTP, APTT, INREXT, INREXT in the last 72 hours. No results for input(s): FE, TIBC, PSAT, FERR in the last 72 hours. Lab Results   Component Value Date/Time    Folate 10.0 11/09/2015 01:30 AM        No results for input(s): PH, PCO2, PO2 in the last 72 hours. No results for input(s): CPK, CKNDX, TROIQ in the last 72 hours.     No lab exists for component: CPKMB  No results found for: CHOL, CHOLX, CHLST, CHOLV, HDL, HDLP, LDL, LDLC, DLDLP, TGLX, TRIGL, TRIGP, CHHD, CHHDX  Lab Results   Component Value Date/Time    Glucose (POC) 76 03/06/2022 11:35 AM    Glucose (POC) 87 05/01/2017 12:01 AM    Glucose (POC) 146 (H) 12/07/2016 02:59 PM     Lab Results   Component Value Date/Time    Color DARK YELLOW 10/07/2022 09:27 PM    Appearance CLEAR 10/07/2022 09:27 PM    Specific gravity 1.024 10/07/2022 09:27 PM    Specific gravity 1.020 05/02/2022 09:11 PM    pH (UA) 5.0 10/07/2022 09:27 PM    Protein TRACE (A) 10/07/2022 09:27 PM    Glucose 500 (A) 10/07/2022 09:27 PM    Ketone TRACE (A) 10/07/2022 09:27 PM    Bilirubin Negative 10/07/2022 09:27 PM    Urobilinogen 1.0 10/07/2022 09:27 PM    Nitrites Negative 10/07/2022 09:27 PM    Leukocyte Esterase Negative 10/07/2022 09:27 PM    Epithelial cells FEW 10/07/2022 09:27 PM    Bacteria Negative 10/07/2022 09:27 PM    WBC 0-4 10/07/2022 09:27 PM    RBC 0-5 10/07/2022 09:27 PM         Medications Reviewed:     Current Facility-Administered Medications   Medication Dose Route Frequency    spironolactone (ALDACTONE) tablet 12.5 mg  12.5 mg Oral DAILY    furosemide (LASIX) tablet 20 mg  20 mg Oral DAILY    sodium chloride (NS) flush 5-40 mL  5-40 mL IntraVENous Q8H    sodium chloride (NS) flush 5-40 mL  5-40 mL IntraVENous PRN    acetaminophen (TYLENOL) tablet 650 mg  650 mg Oral Q6H PRN    Or    acetaminophen (TYLENOL) suppository 650 mg  650 mg Rectal Q6H PRN    polyethylene glycol (MIRALAX) packet 17 g  17 g Oral DAILY PRN    ondansetron (ZOFRAN ODT) tablet 4 mg  4 mg Oral Q8H PRN Or    ondansetron (ZOFRAN) injection 4 mg  4 mg IntraVENous Q6H PRN    apixaban (ELIQUIS) tablet 2.5 mg  2.5 mg Oral Q12H    buPROPion SR (WELLBUTRIN SR) tablet 150 mg  150 mg Oral DAILY    cholecalciferol (VITAMIN D3) (1000 Units /25 mcg) tablet 1,000 Units  1,000 Units Oral DAILY    finasteride (PROSCAR) tablet 5 mg  5 mg Oral DAILY    memantine (NAMENDA) tablet 10 mg  10 mg Oral Q12H    metoprolol tartrate (LOPRESSOR) tablet 25 mg  25 mg Oral Q12H    QUEtiapine (SEROquel) tablet 12.5 mg  12.5 mg Oral QHS    tamsulosin (FLOMAX) capsule 0.4 mg  0.4 mg Oral DAILY     ______________________________________________________________________  EXPECTED LENGTH OF STAY: - - -  ACTUAL LENGTH OF STAY:          4                 Heather Acevedo NP

## 2022-10-11 NOTE — PROGRESS NOTES
Transition of Care Plan  RUR- 15% Moderate Risk  DISPOSITION: The disposition plan is home with family assistance. Patient has been recommended for SNF. To transition back to the [facility] - pending review. F/U with PCP/Specialist    Transport: [facility] - [phone], scheduled for    Patient admitted from the [address] [facility]. Patient has been recommended for SNF. CM to follow up with DON or NS at the [address] regarding being discharged back to facility tomorrow upon arrival. CM to follow up with patient/patient spouse to confirm correct location, not documented or provided in the initial assessment.     Mindy Muller MSW, CRM, LMHP-e  Available in [employer]

## 2022-10-11 NOTE — PROGRESS NOTES
Problem: Falls - Risk of  Goal: *Absence of Falls  Description: Document Cherylle Cockayne Fall Risk and appropriate interventions in the flowsheet.   Outcome: Progressing Towards Goal  Note: Fall Risk Interventions:  Mobility Interventions: Patient to call before getting OOB    Mentation Interventions: Door open when patient unattended         Elimination Interventions: Call light in reach

## 2022-10-12 VITALS
TEMPERATURE: 97.5 F | RESPIRATION RATE: 16 BRPM | HEART RATE: 81 BPM | DIASTOLIC BLOOD PRESSURE: 88 MMHG | OXYGEN SATURATION: 96 % | HEIGHT: 70 IN | SYSTOLIC BLOOD PRESSURE: 143 MMHG | BODY MASS INDEX: 19.36 KG/M2 | WEIGHT: 135.2 LBS

## 2022-10-12 LAB
ANION GAP SERPL CALC-SCNC: 4 MMOL/L (ref 5–15)
BASOPHILS # BLD: 0 K/UL (ref 0–0.1)
BASOPHILS NFR BLD: 0 % (ref 0–1)
BUN SERPL-MCNC: 17 MG/DL (ref 6–20)
BUN/CREAT SERPL: 18 (ref 12–20)
CALCIUM SERPL-MCNC: 8.4 MG/DL (ref 8.5–10.1)
CHLORIDE SERPL-SCNC: 105 MMOL/L (ref 97–108)
CO2 SERPL-SCNC: 28 MMOL/L (ref 21–32)
CREAT SERPL-MCNC: 0.97 MG/DL (ref 0.7–1.3)
DIFFERENTIAL METHOD BLD: NORMAL
EOSINOPHIL # BLD: 0.2 K/UL (ref 0–0.4)
EOSINOPHIL NFR BLD: 4 % (ref 0–7)
ERYTHROCYTE [DISTWIDTH] IN BLOOD BY AUTOMATED COUNT: 14.1 % (ref 11.5–14.5)
GLUCOSE SERPL-MCNC: 80 MG/DL (ref 65–100)
HCT VFR BLD AUTO: 47.8 % (ref 36.6–50.3)
HGB BLD-MCNC: 15.7 G/DL (ref 12.1–17)
IMM GRANULOCYTES # BLD AUTO: 0 K/UL (ref 0–0.04)
IMM GRANULOCYTES NFR BLD AUTO: 0 % (ref 0–0.5)
LYMPHOCYTES # BLD: 1.1 K/UL (ref 0.8–3.5)
LYMPHOCYTES NFR BLD: 20 % (ref 12–49)
MAGNESIUM SERPL-MCNC: 2 MG/DL (ref 1.6–2.4)
MCH RBC QN AUTO: 29.3 PG (ref 26–34)
MCHC RBC AUTO-ENTMCNC: 32.8 G/DL (ref 30–36.5)
MCV RBC AUTO: 89.2 FL (ref 80–99)
MONOCYTES # BLD: 0.6 K/UL (ref 0–1)
MONOCYTES NFR BLD: 11 % (ref 5–13)
NEUTS SEG # BLD: 3.5 K/UL (ref 1.8–8)
NEUTS SEG NFR BLD: 65 % (ref 32–75)
NRBC # BLD: 0 K/UL (ref 0–0.01)
NRBC BLD-RTO: 0 PER 100 WBC
PLATELET # BLD AUTO: 163 K/UL (ref 150–400)
PMV BLD AUTO: 10.2 FL (ref 8.9–12.9)
POTASSIUM SERPL-SCNC: 3.9 MMOL/L (ref 3.5–5.1)
RBC # BLD AUTO: 5.36 M/UL (ref 4.1–5.7)
SODIUM SERPL-SCNC: 137 MMOL/L (ref 136–145)
WBC # BLD AUTO: 5.4 K/UL (ref 4.1–11.1)

## 2022-10-12 PROCEDURE — 85025 COMPLETE CBC W/AUTO DIFF WBC: CPT

## 2022-10-12 PROCEDURE — 80048 BASIC METABOLIC PNL TOTAL CA: CPT

## 2022-10-12 PROCEDURE — 83735 ASSAY OF MAGNESIUM: CPT

## 2022-10-12 PROCEDURE — 36415 COLL VENOUS BLD VENIPUNCTURE: CPT

## 2022-10-12 PROCEDURE — 74011250637 HC RX REV CODE- 250/637: Performed by: NURSE PRACTITIONER

## 2022-10-12 PROCEDURE — 74011250637 HC RX REV CODE- 250/637: Performed by: PHYSICIAN ASSISTANT

## 2022-10-12 PROCEDURE — 74011250637 HC RX REV CODE- 250/637: Performed by: FAMILY MEDICINE

## 2022-10-12 RX ORDER — FUROSEMIDE 40 MG/1
20 TABLET ORAL DAILY
Qty: 30 TABLET | Refills: 0 | Status: SHIPPED | OUTPATIENT
Start: 2022-10-12

## 2022-10-12 RX ORDER — SPIRONOLACTONE 25 MG/1
12.5 TABLET ORAL DAILY
Qty: 30 TABLET | Refills: 0 | Status: SHIPPED
Start: 2022-10-12

## 2022-10-12 RX ORDER — LOSARTAN POTASSIUM 25 MG/1
25 TABLET ORAL DAILY
Qty: 30 TABLET | Refills: 0 | Status: SHIPPED
Start: 2022-10-13 | End: 2022-11-12

## 2022-10-12 RX ADMIN — BUPROPION HYDROCHLORIDE 150 MG: 150 TABLET, EXTENDED RELEASE ORAL at 09:21

## 2022-10-12 RX ADMIN — METOPROLOL TARTRATE 25 MG: 25 TABLET, FILM COATED ORAL at 09:21

## 2022-10-12 RX ADMIN — Medication 1000 UNITS: at 09:21

## 2022-10-12 RX ADMIN — TAMSULOSIN HYDROCHLORIDE 0.4 MG: 0.4 CAPSULE ORAL at 09:21

## 2022-10-12 RX ADMIN — SPIRONOLACTONE 12.5 MG: 25 TABLET ORAL at 09:21

## 2022-10-12 RX ADMIN — APIXABAN 2.5 MG: 2.5 TABLET, FILM COATED ORAL at 09:21

## 2022-10-12 RX ADMIN — FUROSEMIDE 20 MG: 20 TABLET ORAL at 09:21

## 2022-10-12 RX ADMIN — MEMANTINE HYDROCHLORIDE 10 MG: 10 TABLET ORAL at 09:21

## 2022-10-12 RX ADMIN — LOSARTAN POTASSIUM 25 MG: 25 TABLET, FILM COATED ORAL at 09:21

## 2022-10-12 RX ADMIN — FINASTERIDE 5 MG: 5 TABLET, FILM COATED ORAL at 09:21

## 2022-10-12 NOTE — DISCHARGE INSTRUCTIONS
Discharge SNF/Rehab Instructions/LTAC       PATIENT ID: Robbie Rodriguez  MRN: [id number]   YOB: 1935    DATE OF ADMISSION: 10/7/2022  DATE OF DISCHARGE: 10/12/2022    PRIMARY CARE PROVIDER:Vikram Palacio MD      ATTENDING PHYSICIAN: Tiffany Flores MD  DISCHARGING PROVIDER: Vijay Dotson NP     To contact this individual call [phone] and ask the  to page. If unavailable ask to be transferred the Adult Hospitalist Department. CONSULTATIONS: NA    PROCEDURES/SURGERIES: * No surgery found *    ADMITTING DIAGNOSES & HOSPITAL COURSE:   Robbie Rodriguez is a 81 y.o. male with a pmhx asthma, HTN, atrial fibrillation, past stroke, and dementia who presents with syncopal episode and altered mental status. He presents from [facility]. He was just hospitalized from 10/2-10/6 for acute hypoxic respiratory failure 2/2 b/l pleural effusion, and possible aspiration. Per hospital record, patient became  unresponsive at his facility for an unknown amount of time, and EMS was called. Family reports more confusion over the past several days.           DISCHARGE DIAGNOSES / PLAN:      Syncope  Likely due to dehydration while taking furosemide  Stable  No further episodes of syncope        Chronic systolic congestive heart failure  Ejection fraction 35 to 40%  Not in exacerbation  Continuing furosemide  Continuing metoprolol  Continue losartan           EDEN on CKD on admission  Renal US: no evidence of hydronephrosis, increased echogenicity of renal cortices consistent with medical renal disease  Has since resolved           Urinary retention  Continuing tamsulosin  Continuing finasteride           Atrial Fibrillation  Heart rate currently controlled  Continue metoprolol  Continue apixaban           Dementia  From [facility] memory care  Patient at baseline  Continue memantine              Code status: DNR/DNI  Prophylaxis: Apixaban       PENDING TEST RESULTS:   At the time of discharge the following test results are still pending: na    FOLLOW UP APPOINTMENTS:    Follow-up Information       Follow up With Specialties Details Why Contact Richie Baez MD [facility]   [address] [facility] [address] [phone]              ADDITIONAL CARE RECOMMENDATIONS:     DIET: Regular Diet      TUBE FEEDING INSTRUCTIONS: na    OXYGEN / BiPAP SETTINGS: na    ACTIVITY: Activity as tolerated    WOUND CARE: na    EQUIPMENT needed: TBD      DISCHARGE MEDICATIONS:   See Medication Reconciliation Form      NOTIFY YOUR PHYSICIAN FOR ANY OF THE FOLLOWING:   Fever over 101 degrees for 24 hours. Chest pain, shortness of breath, fever, chills, nausea, vomiting, diarrhea, change in mentation, falling, weakness, bleeding. Severe pain or pain not relieved by medications. Or, any other signs or symptoms that you may have questions about.     DISPOSITION:    Home With:   OT  PT  HH  RN      x SNF/Inpatient Rehab/LTAC    Independent/assisted living    Hospice    Other:       PATIENT CONDITION AT DISCHARGE:     Functional status    Poor    x Deconditioned     Independent      Cognition     Lucid     Forgetful    x Dementia      Catheters/lines (plus indication)    Mckeon     PICC     PEG    x None      Code status     Full code    x DNR      PHYSICAL EXAMINATION AT DISCHARGE:   Refer to Progress Note      CHRONIC MEDICAL DIAGNOSES:  [unfilled]          Signed:   Johana Britt NP  10/12/2022  11:27 AM

## 2022-10-12 NOTE — PROGRESS NOTES
Physician Progress Note      Josie Bahena  CSN #:                  [id number]  :                       1935  ADMIT DATE:       10/7/2022 7:45 PM  [address] [place] DATE:        10/12/2022 1:31 PM  RESPONDING  PROVIDER #:        Aurea POE NP        QUERY TEXT:    Stage of Chronic Kidney Disease: Please provide further specificity, if known. Clinical indicators include: ckd, bun  Options provided:  -- Chronic kidney disease stage 1  -- Chronic kidney disease stage 2  -- Chronic kidney disease stage 3  -- Chronic kidney disease stage 3a  -- Chronic kidney disease stage 3b  -- Chronic kidney disease stage 4  -- Chronic kidney disease stage 5  -- Chronic kidney disease stage 5, requiring dialysis  -- End stage renal disease  -- Other - I will add my own diagnosis  -- Disagree - Not applicable / Not valid  -- Disagree - Clinically Unable to determine / Unknown        PROVIDER RESPONSE TEXT:    The patient has chronic kidney disease stage 1.       Electronically signed by:  Bon Morfin NP 10/12/2022 3:32 PM

## 2022-10-12 NOTE — DISCHARGE SUMMARY
Discharge Summary       PATIENT ID: Angel Moya  MRN: [id number]   YOB: 1935    DATE OF ADMISSION: 10/7/2022  7:45 PM    DATE OF DISCHARGE: 10/12/22   PRIMARY CARE PROVIDER: Adilson Carpenter MD     ATTENDING PHYSICIAN: Klaudia Correia MD  DISCHARGING PROVIDER: Nick Templeton NP    To contact this individual call [phone] and ask the  to page. If unavailable ask to be transferred the Adult Hospitalist Department. CONSULTATIONS: None    PROCEDURES/SURGERIES: * No surgery found *    ADMITTING DIAGNOSES & HOSPITAL COURSE:   Angel Moya is a 81 y.o. male with a pmhx asthma, HTN, atrial fibrillation, past stroke, and dementia who presents with syncopal episode and altered mental status. He presents from [facility]. He was just hospitalized from 10/2-10/6 for acute hypoxic respiratory failure 2/2 b/l pleural effusion, and possible aspiration. Per hospital record, patient became  unresponsive at his facility for an unknown amount of time, and EMS was called. Family reports more confusion over the past several days. DISCHARGE DIAGNOSES / PLAN:      Syncope  Likely due to dehydration while taking furosemide  Stable  No further episodes of syncope        Chronic systolic congestive heart failure  Ejection fraction 35 to 40%  I spoke with cardiology, continue current regimen.  No need for further work up  Not in exacerbation  Continuing furosemide  Continuing metoprolol  Continue losartan  Continue spironolactone           EDEN on CKD on admission  Renal US: no evidence of hydronephrosis, increased echogenicity of renal cortices consistent with medical renal disease  Has since resolved           Urinary retention  Continuing tamsulosin  Continuing finasteride           Atrial Fibrillation  Heart rate currently controlled  Continue metoprolol  Continue apixaban           Dementia  From [facility] memory care  Patient at baseline  Continue memantine              Code status: DNR/DNI  Prophylaxis: Apixaban       PENDING TEST RESULTS:   At the time of discharge the following test results are still pending:     FOLLOW UP APPOINTMENTS:    Follow-up Information       Follow up With Specialties Details Why Contact Nohelia Baez MD [facility]   [address] [address]  [phone]               ADDITIONAL CARE RECOMMENDATIONS:     DIET: Regular Diet      ACTIVITY: Activity as tolerated    WOUND CARE: na    EQUIPMENT needed: TBD      DISCHARGE MEDICATIONS:  Current Discharge Medication List        START taking these medications    Details   losartan (COZAAR) 25 mg tablet Take 1 Tablet by mouth daily for 30 days. Qty: 30 Tablet, Refills: 0  Start date: 10/13/2022, End date: 11/12/2022           CONTINUE these medications which have CHANGED    Details   furosemide (LASIX) 40 mg tablet Take 0.5 Tablets by mouth daily. Qty: 30 Tablet, Refills: 0  Start date: 10/12/2022      spironolactone (ALDACTONE) 25 mg tablet Take 0.5 Tablets by mouth daily. Qty: 30 Tablet, Refills: 0  Start date: 10/12/2022           CONTINUE these medications which have NOT CHANGED    Details   montelukast (Singulair) 10 mg tablet Take 10 mg by mouth nightly. Cetirizine (ZyrTEC) 10 mg cap Take 1 Capsule by mouth nightly. empagliflozin (JARDIANCE) 10 mg tablet Take 1 Tablet by mouth daily. Qty: 30 Tablet, Refills: 0  Start date: 10/7/2022      buPROPion XL (WELLBUTRIN XL) 150 mg tablet Take 1 Tablet by mouth daily. Qty: 30 Tablet, Refills: 0      finasteride (PROSCAR) 5 mg tablet Take 5 mg by mouth daily. L.acid,para-B. bifidum-S.therm (RISAQUAD) 8 billion cell cap cap Take 1 Capsule by mouth daily. cholecalciferol (VITAMIN D3) (1000 Units /25 mcg) tablet Take 1,000 Units by mouth daily. tamsulosin (FLOMAX) 0.4 mg capsule Take 0.4 mg by mouth daily. apixaban (ELIQUIS) 2.5 mg tablet Take 2.5 mg by mouth every twelve (12) hours.       memantine (NAMENDA) 10 mg tablet Take 10 mg by mouth every twelve (12) hours. metoprolol tartrate (LOPRESSOR) 25 mg tablet Take 25 mg by mouth every twelve (12) hours every twelve (12) hours. QUEtiapine (SEROquel) 25 mg tablet Take 12.5 mg by mouth nightly. NOTIFY YOUR PHYSICIAN FOR ANY OF THE FOLLOWING:   Fever over 101 degrees for 24 hours. Chest pain, shortness of breath, fever, chills, nausea, vomiting, diarrhea, change in mentation, falling, weakness, bleeding. Severe pain or pain not relieved by medications. Or, any other signs or symptoms that you may have questions about.     DISPOSITION:    Home With:   OT  PT  HH  RN      x Long term SNF/Inpatient Rehab    Independent/assisted living    Hospice    Other:       PATIENT CONDITION AT DISCHARGE:     Functional status    Poor    x Deconditioned     Independent      Cognition     Lucid     Forgetful    x Dementia      Catheters/lines (plus indication)    Mckeon     PICC     PEG    x None      Code status     Full code    x DNR      PHYSICAL EXAMINATION AT DISCHARGE:   Refer to Progress Note      CHRONIC MEDICAL DIAGNOSES:  Problem List as of 10/12/2022 Date Reviewed: 9/13/2017            Codes Class Noted - Resolved    HFrEF (heart failure with reduced ejection fraction) ([facility] ICD-10-CM: I50.20  ICD-9-CM: 428.20  10/11/2022 - Present        Syncope ICD-10-CM: R55  ICD-9-CM: 780.2  10/7/2022 - Present        A-fib ([facility] ICD-10-CM: I48.91  ICD-9-CM: 427.31  10/2/2022 - Present        Difficulty walking ICD-10-CM: R26.2  ICD-9-CM: 719.7  10/2/2022 - Present        Hypoxia ICD-10-CM: R09.02  ICD-9-CM: 799.02  10/2/2022 - Present        Generalized weakness ICD-10-CM: R53.1  ICD-9-CM: 780.79  10/2/2022 - Present        Elevated serum creatinine ICD-10-CM: R79.89  ICD-9-CM: 790.99  10/2/2022 - Present        Acute CHF (congestive heart failure) (HCC) ICD-10-CM: I50.9  ICD-9-CM: 428.0  10/2/2022 - Present        Edema of both lower extremities ICD-10-CM: R60.0  ICD-9-CM: 782.3  10/2/2022 - Present        SBO (small bowel obstruction) ([facility] ICD-10-CM: K56.609  ICD-9-CM: 560.9  3/4/2022 - Present        Chronic obstructive pulmonary disease ([facility] ICD-10-CM: J44.9  ICD-9-CM: 363  10/19/2017 - Present        Urine retention ICD-10-CM: R33.9  ICD-9-CM: 788.20  9/29/2017 - Present        Urinary tract infection ICD-10-CM: N39.0  ICD-9-CM: 599.0  9/12/2017 - Present        Pyelonephritis ICD-10-CM: N12  ICD-9-CM: 590.80  9/12/2017 - Present        UTI (urinary tract infection) ICD-10-CM: N39.0  ICD-9-CM: 599.0  9/12/2017 - Present        History of CVA (cerebrovascular accident) (Chronic) ICD-10-CM: H52.30  ICD-9-CM: V12.54  5/4/2017 - Present        CKD (chronic kidney disease) stage 3, GFR 30-59 ml/min (HCC) (Chronic) ICD-10-CM: N18.30  ICD-9-CM: 585.3  5/4/2017 - Present        Simple chronic bronchitis (HCC) (Chronic) ICD-10-CM: J41.0  ICD-9-CM: 491.0  5/4/2017 - Present        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 ICD-10-CM: G30.1, F02.80  ICD-9-CM: 331.0, 294.10  11/16/2016 - Present        Advanced care planning/counseling discussion ICD-10-CM: Z71.89  ICD-9-CM: V65.49  7/6/2016 - Present    Overview Signed 7/6/2016 12:20 PM by Millicent Meke, DO     Wife is working on advanced directives for him.              Chronic tension-type headache, not intractable ICD-10-CM: X15.752  ICD-9-CM: 339.12  11/8/2015 - Present        Gait apraxia of elderly ICD-10-CM: R48.2  ICD-9-CM: 784.69  11/8/2015 - Present        Spondylolysis of cervical region ICD-10-CM: M43.02  ICD-9-CM: 756.19  11/8/2015 - Present        Cervical spondyloarthritis ICD-10-CM: K45.407  ICD-9-CM: 721.0  11/8/2015 - Present        Cerebral thrombosis with cerebral infarction [facility]) ICD-10-CM: I63.30  ICD-9-CM: 434.01  11/8/2015 - Present        Cerebral infarction due to stenosis of carotid artery ([facility] ICD-10-CM: N24.114  ICD-9-CM: 433.11  11/8/2015 - Present        Depression ICD-10-CM: F31. A  ICD-9-CM: 716  7/7/2015 - Present        Acute bronchitis ICD-10-CM: J20.9  ICD-9-CM: 466.0  7/19/2013 - Present        Edema extremities ICD-10-CM: R60.0  ICD-9-CM: 782.3  7/19/2013 - Present        HTN (hypertension) ICD-10-CM: I10  ICD-9-CM: 401.9  7/19/2013 - Present        Atrial fibrillation ([address] ICD-10-CM: I48.91  ICD-9-CM: 427.31  7/19/2013 - Present    Overview Signed 7/19/2013  1:21 PM by Sangeetha dubon.               RESOLVED: Supratherapeutic INR ICD-10-CM: R79.1  ICD-9-CM: 790.92  11/7/2015 - 2/9/2017           Greater than 30 minutes were spent with the patient on counseling and coordination of care    Signed:   Maida Sanchez NP  10/12/2022  11:25 AM

## 2022-10-12 NOTE — PROGRESS NOTES
Transition of Care Plan  RUR- 15% Moderate Risk  DISPOSITION: The disposition plan is home with family assistance. To transition back to the [facility] - [phone] - accepted patient. F/U with PCP/Specialist    Transport: [facility] - [phone], scheduled for 12:00 noon    [facility]  Address: [address] [place] [address], [address]  Phone: [phone]    CM spoke with assigned NP this morning, who reports and confirms that patient is medically stable for discharge. At 9:40am -  contacted facility to inquire and coordinate discharge for today. CM called the facility- [phone] and transferred to [facility]. [facility] inquired about patient being on 02 as well as if patient were taking any new medications. CM found patients assigned nurse, and patients assigned nurse spoke with [facility]. [facility] confirmed that patient can return back to the the [address] (room [address]) and encouraged CM to schedule transport sooner than later. CM inquired if this facility completed PT/OT services, [facility] reports that PT/OT is provided at the facility and that patient will resume PT/OT services upon arrival.    CM to fax dc summary and progress notes per request of [facility] to : [phone]. At 10:10am -  requested 12:00 noon transport w/ [facility] via all script. At 10:18am -  received response from [facility] to confirm the 12:00 noon transport time. At 12L19:am -  perfect served assigned NP, as the dc is requested by [facility] before patient transitions to facility. At 10:23am -  provided most recent updated to patients assigned nurse at patient at bedside. Once discharge summary is uploaded, cm to fax to [facility] to the fax number provided above. At 11:43am -  faxed discharge summary to [facility] per request.    Call to Report Number: [phone],  ask for [facility]  Room Number: 112  [facility]  time: 12:00 noon  [facility] packet has been placed at bedside chart.   AVS has been updated. Transition of Care Plan to SNF/Rehab    SNF/Rehab Transition:  Patient has been accepted to [facility] and meets criteria for admission. Patient will transported by [facility] and expected to leave at 12:00 noon. Communication to Patient/Family:  Met with patient and (spouse) (identified care giver) and they are agreeable to the transition plan. Communication to SNF/Rehab:  Bedside RN, has been notified to update the transition plan to the facility. Discharge information has been updated on the AVS.       Nursing Please include all hard scripts for controlled substances, med rec and dc summary, and AVS in packet. SNF/Rehab Transition:  Reviewed and confirmed with facility, [facility] they can manage the patient care needs for the following:     Kala Ugarte with (X) only those applicable:    Medication:  [x]  Medications will be available at the facility  []  IV Antibiotics   []  Controlled Substance - hard copy to be sent with patient   []  Weekly Labs   Documents:  [x] Hard RX  [x] MAR  [x] Kardex  [x] AVS  []Transfer Summary  [x]Discharge   Equipment:  []  CPAP/BiPAP  []  Wound Vacuum  []  [place] or Urinary Device  []  PICC/Central Line  []  Nebulizer  []  Ventilator   Treatment:  []Isolation (for MRSA, VRE, etc.)  []Surgical Drain Management  []Tracheostomy Care  []Dressing Changes  []Dialysis with transportation and chair time. []PEG Care  []Oxygen  []Daily Weights for Heart Failure   Dietary:  []Any diet limitations  []Tube Feedings   []Total Parenteral Management (TPN)   Eligible for Medicaid Long Term Services and Supports  Yes:  [] Eligible for medical assistance or will become eligible within 180 days and UAI completed. [] Provider/Patient and/or support system has requested screening. [] UAI copy provided to patient or responsible party. [] UAI unavailable at discharge will send once processed to SNF provider.   [] UAI unavailable at discharged mailed to patient  No: [] Private pay and is not financially eligible for Medicaid within the next 180 days. [] Reside out-of-state. [] A residents of a state owned/operated facility that is licensed  by [facility] and [facility] Services or [facility]  [] Enrollment in [facility] [place] services  [] [address] [place] [address]  [] Patient /Family declines to have screening completed or provide financial information for screening     Financial Resources:  Medicaid    [] Initiated and application pending   [] Full coverage     Advanced Care Plan:  []Surrogate Decision Maker of Care  [x]POA  [x]Communicated Code Status (DNR)      Medicare pt has received, reviewed, and signed 2nd IM letter informing them of their right to appeal the discharge. Signed copy has been placed on pt bedside chart.  CM also documented in document list.    LIBAN Trinidad, CRM, LMHP-e  Available in [facility]

## 2022-10-12 NOTE — PROGRESS NOTES
Problem: Falls - Risk of  Goal: *Absence of Falls  Description: Document Sofia Fothergill Fall Risk and appropriate interventions in the flowsheet.   Outcome: Progressing Towards Goal  Note: Fall Risk Interventions:  Mobility Interventions: Patient to call before getting OOB    Mentation Interventions: Door open when patient unattended         Elimination Interventions: Call light in reach

## 2022-10-12 NOTE — PROGRESS NOTES
[facility] Adult  Hospitalist Group                                                                                          Hospitalist Progress Note  Stanley Vee NP  Answering service: [phone] from in house phone        Date of Service:  10/12/2022  NAME:  Awa Palmer  :  1935  MRN:  [id number]      Admission Summary:   Awa Palmer is a 81 y.o. male with a pmhx asthma, HTN, atrial fibrillation, past stroke, and dementia who presents with syncopal episode and altered mental status. He presents from [facility]. He was just hospitalized from 10/2-10/6 for acute hypoxic respiratory failure 2/2 b/l pleural effusion, and possible aspiration. Per hospital record, patient became  unresponsive at his facility for an unknown amount of time, and EMS was called. Family reports more confusion over the past several days. Interval history / Subjective: The patient this morning on rounds.   No complaints the moment of the encounter      Assessment & Plan:     Syncope  Likely due to dehydration while taking furosemide  Stable  No further episodes of syncope       Chronic systolic congestive heart failure  Ejection fraction 35 to 40%  Not in exacerbation  Continuing furosemide  Continuing spironolactone  Continuing metoprolol  Continue losartan        EDEN on CKD on admission  Renal US: no evidence of hydronephrosis, increased echogenicity of renal cortices consistent with medical renal disease  Bolus of normal saline  Has since resolved         Urinary retention  Continuing tamsulosin  Continuing finasteride         Atrial Fibrillation  Heart rate currently controlled  Continue metoprolol  Continue apixaban         Dementia  From [facility] memory care  Patient at baseline  Continue memantine           Code status: DNR/DNI  Prophylaxis: Apixaban  Care Plan discussed with: Patient, nurse  Anticipated Disposition: Stable for discharge back to [facility] Hospital Problems  Date Reviewed: 9/13/2017            Codes Class Noted POA    Syncope ICD-10-CM: R55  ICD-9-CM: 780.2  10/7/2022 Unknown         Review of Systems:   A comprehensive review of systems was negative except for that written in the HPI. Vital Signs:    Last 24hrs VS reviewed since prior progress note. Most recent are:  Visit Vitals  BP (!) 156/84 (BP 1 Location: Right upper arm, BP Patient Position: Lying left side)   Pulse (!) 56   Temp 97.7 °F (36.5 °C)   Resp 18   Ht 5' 10\" (1.778 m)   Wt 61.3 kg (135 lb 3.2 oz)   SpO2 94%   BMI 19.40 kg/m²         Intake/Output Summary (Last 24 hours) at 10/12/2022 0739  Last data filed at 10/11/2022 0930  Gross per 24 hour   Intake 200 ml   Output --   Net 200 ml          Physical Examination:     I had a face to face encounter with this patient and independently examined them on 10/12/2022 as outlined below:          Constitutional:  No acute distress, frail   ENT:  Oral mucosa dry   Resp:  CTA bilaterally. No wheezing/rhonchi/rales. No accessory muscle use. CV:  Regular rhythm, normal rate, no murmurs appreciated    GI:  Soft, non distended, non tender. Musculoskeletal:  No edema, warm    Neurologic:  Moves all extremities spontaneously. Oriented to name and location but not year. PERRL, face symmetric            Data Review:    Review and/or order of clinical lab test  Review and/or order of tests in the radiology section of CPT  Review and/or order of tests in the medicine section of CPT      Labs:     Recent Labs     10/12/22  0035 10/10/22  2343   WBC 5.4 7.0   HGB 15.7 16.6   HCT 47.8 51.2*    154       Recent Labs     10/12/22  0035 10/10/22  2343 10/10/22  0216    136 139   K 3.9 4.1 4.0    105 107   CO2 28 27 28   BUN 17 19 22*   CREA 0.97 1.14 1.21   GLU 80 77 80   CA 8.4* 8.4* 8.7   MG 2.0 2.5*  --        No results for input(s): ALT, AP, TBIL, TBILI, TP, ALB, GLOB, GGT, AML, LPSE in the last 72 hours.     No lab exists for component: SGOT, GPT, AMYP, HLPSE    No results for input(s): INR, PTP, APTT, INREXT, INREXT in the last 72 hours. No results for input(s): FE, TIBC, PSAT, FERR in the last 72 hours. Lab Results   Component Value Date/Time    Folate 10.0 11/09/2015 01:30 AM        No results for input(s): PH, PCO2, PO2 in the last 72 hours. No results for input(s): CPK, CKNDX, TROIQ in the last 72 hours.     No lab exists for component: CPKMB  No results found for: CHOL, CHOLX, CHLST, CHOLV, HDL, HDLP, LDL, LDLC, DLDLP, TGLX, TRIGL, TRIGP, CHHD, CHHDX  Lab Results   Component Value Date/Time    Glucose (POC) 76 03/06/2022 11:35 AM    Glucose (POC) 87 05/01/2017 12:01 AM    Glucose (POC) 146 (H) 12/07/2016 02:59 PM     Lab Results   Component Value Date/Time    Color DARK YELLOW 10/07/2022 09:27 PM    Appearance CLEAR 10/07/2022 09:27 PM    Specific gravity 1.024 10/07/2022 09:27 PM    Specific gravity 1.020 05/02/2022 09:11 PM    pH (UA) 5.0 10/07/2022 09:27 PM    Protein TRACE (A) 10/07/2022 09:27 PM    Glucose 500 (A) 10/07/2022 09:27 PM    Ketone TRACE (A) 10/07/2022 09:27 PM    Bilirubin Negative 10/07/2022 09:27 PM    Urobilinogen 1.0 10/07/2022 09:27 PM    Nitrites Negative 10/07/2022 09:27 PM    Leukocyte Esterase Negative 10/07/2022 09:27 PM    Epithelial cells FEW 10/07/2022 09:27 PM    Bacteria Negative 10/07/2022 09:27 PM    WBC 0-4 10/07/2022 09:27 PM    RBC 0-5 10/07/2022 09:27 PM         Medications Reviewed:     Current Facility-Administered Medications   Medication Dose Route Frequency    losartan (COZAAR) tablet 25 mg  25 mg Oral DAILY    spironolactone (ALDACTONE) tablet 12.5 mg  12.5 mg Oral DAILY    furosemide (LASIX) tablet 20 mg  20 mg Oral DAILY    sodium chloride (NS) flush 5-40 mL  5-40 mL IntraVENous Q8H    sodium chloride (NS) flush 5-40 mL  5-40 mL IntraVENous PRN    acetaminophen (TYLENOL) tablet 650 mg  650 mg Oral Q6H PRN    Or    acetaminophen (TYLENOL) suppository 650 mg  650 mg Rectal Q6H PRN    polyethylene glycol (MIRALAX) packet 17 g  17 g Oral DAILY PRN    ondansetron (ZOFRAN ODT) tablet 4 mg  4 mg Oral Q8H PRN    Or    ondansetron (ZOFRAN) injection 4 mg  4 mg IntraVENous Q6H PRN    apixaban (ELIQUIS) tablet 2.5 mg  2.5 mg Oral Q12H    buPROPion SR (WELLBUTRIN SR) tablet 150 mg  150 mg Oral DAILY    cholecalciferol (VITAMIN D3) (1000 Units /25 mcg) tablet 1,000 Units  1,000 Units Oral DAILY    finasteride (PROSCAR) tablet 5 mg  5 mg Oral DAILY    memantine (NAMENDA) tablet 10 mg  10 mg Oral Q12H    metoprolol tartrate (LOPRESSOR) tablet 25 mg  25 mg Oral Q12H    QUEtiapine (SEROquel) tablet 12.5 mg  12.5 mg Oral QHS    tamsulosin (FLOMAX) capsule 0.4 mg  0.4 mg Oral DAILY     ______________________________________________________________________  EXPECTED LENGTH OF STAY: 2d 14h  ACTUAL LENGTH OF STAY:          Erick Wheeler NP

## 2022-10-13 ENCOUNTER — PATIENT OUTREACH (OUTPATIENT)
Dept: CASE MANAGEMENT | Age: 87
End: 2022-10-13

## 2022-10-13 NOTE — PROGRESS NOTES
Care Transitions Initial Call    Call within 2 business days of discharge: Yes     Patient: Hilary Hickman Patient : 1935 MRN: [id number]    Last Discharge  Street       Date Complaint Diagnosis Description Type Department Provider    10/7/22 Irregular Heart Beat Syncope and collapse . .. ED to Hosp-Admission (Discharged) (ADMIT) Mario Mitchell MD; Tiny Rivas... Was this an external facility discharge? No     Challenges to be reviewed by the provider   Additional needs identified to be addressed with provider: yes  Daily weights, low sodium diet? Method of communication with provider : none    Discussed COVID-19 related testing which was not done at this time. Test results were not done. Patient informed of results, if available? no     Advance Care Planning:   Does patient have an Advance Directive: decision makers updated. Patient with Dementia    Inpatient Readmission Risk score: Unplanned Readmit Risk Score: 13    Was this a readmission? yes   Patient stated reason for the admission:     Patients top risk factors for readmission: medical condition-COPD, HTN, AFIB, CVA, Syncope, CKD    Interventions to address risk factors: Scheduled appointment with PCP-Patient lives at [facility] and sees Dr Rolo Falcon there. and Assessment and support for treatment adherence and medication management-COPD, HTN, AFIB, CVA, Syncope, CKD      Care Transition Nurse (CTN) contacted the  Randolph at [facility]  by telephone to perform post hospital discharge assessment. Verified name and  with  Eduardo Plate at [facility]  as identifiers. Provided introduction to self, and explanation of the CTN role. Were discharge instructions available to patient? yes. Reviewed appropriate site of care based on symptoms and resources available to patient including: PCP.   Dr Rolo Falcon  on 10/13/22  given an opportunity to ask questions and does not have any further questions or concerns at this time. Medication reconciliation was performed with  Odessa Jeronimo at [facility] , who verbalizes understanding of administration of home medications. Referral to Pharm D needed: no     Home Health/Outpatient orders at discharge: none    Durable Medical Equipment ordered at discharge: None    Was patient discharged with a pulse oximeter? no    Discussed follow-up appointments. If no appointment was previously scheduled, appointment scheduling offered: yes. Is follow up appointment scheduled within 7 days of discharge? yes. [facility] follow up appointment(s): No future appointments. Non-[facility] follow up appointment(s): Dr Josefa Barrera at [facility] to see within 24 hours of D/C and also has a NP who is provider there. Plan for follow-up call in 7-10 days based on severity of symptoms and risk factors. CTN provided contact information for future needs. Goals Addressed                   This Visit's Progress     Attends follow-up appointments as directed. 10/07/22   Called and spoke to [id number] [place] [address] at [facility]. Patient has Dr Jonathan Mueller who is there to see patients on Thursdays and then NP Inga Rodney NP who is there on Mon and Wed. It does not sound like patient was seen Thursday. They have a book that lists patient who need to be seen so hopefully will be seen Monday. When I asked about Cards appt Odessa Jeronimo stated that patient family would need to schedule that and take patient there. Called Mrs Ayden Ravi and she stated that the hard thing for them is transport for patient. Suggested they they try virtual appt with Cardiology. Patient wife wants to discuss with sons to see what they think about making an appt, in person or virtual or at all. Mrs Luca Wheatley given # for Dr Mary Pereira to call when she makes a decision. Monitor if patient has been seen by Provider at [facility] and if family has made a decision on Cards appt.   Yordy Anderson MSN, RN, CCM / Care Transition Nurse / [phone]     10/13/22   Patient is at [facility] and Dr Brett Newby does make rounds on Thursday so should be seen today. Patient family was to decide if they were going to F/U with Cards on last admit, they have made patient a DNR while admitted. Monitor if patient was seen by Dr Brett Newby on next call. Donovan Aranda MSN, RN, CCM / Care Transition Nurse / [phone]          Prevent complications post hospitalization. 10/07/22   Called and spoke to Annalisa Briones at [facility]. Patient is doing well, is up with use of rollator. Patient is not on a low sodium diet and gets weighted monthly. Patient also had suggestion of repeat BMP in 1 week. Hospitalist   I asked if we can get order for low sodium diet and daily weights. Thaddeus Salamanca will put this information for consideration for PCP team along with recommendation of repeat BMP. Monitor for SOB, cough, activity tolerance, get order for daily weight, low sodium diet and repeat BMP? Monitor glucose levels and (R) hip ulcer on next call. Donovan DILLARD, RN, CCM / Care Transition Nurse / [phone]       10/13/22 Patient readmitted 10/7-10/12/22 with Syncope  Called and spoke to Clay WYNNE of [facility]. Patient is doing well. Did not have low sodium diet ordered on this D/C, will need to ask PCP at [facility] for order for Low sodium diet and daily weights. Monitor if we did get order for low sodium diet, daily weight. Patient having any SOB, cough, pedal edema or syncope since last call, PO intake and activity tolerance.     Donovan Aranda MSN, RN, CCM / Care Transition Nurse / [phone]

## 2022-10-21 ENCOUNTER — PATIENT OUTREACH (OUTPATIENT)
Dept: CASE MANAGEMENT | Age: 87
End: 2022-10-21

## 2022-10-21 NOTE — PROGRESS NOTES
Care Transitions Follow Up Call    Challenges to be reviewed by the provider   Additional needs identified to be addressed with provider: yes  Need order for low sodium diet           Method of communication with provider : none    Care Transition Nurse (CTN) contacted the  [facility]  by telephone to follow up. . Verified name and  with  RICCI Yun  as identifiers. Addressed changes since last contact: none  Follow up appointment completed? yes. Was follow up appointment scheduled within 7 days of discharge? yes. [facility] follow up appointment(s): No future appointments. Non-[facility] follow up appointment(s): Facility provider Dr Pia MANCILLA    CTN provided contact information for future needs. Plan for follow-up call in 10-14 days based on severity of symptoms and risk factors. Goals Addressed                   This Visit's Progress     COMPLETED: Attends follow-up appointments as directed. 10/07/22   Called and spoke to [id number] [place] [address] at [facility]. Patient has Dr Emma Rodriguez who is there to see patients on  and then HIEU Day NP who is there on Mon and Wed. It does not sound like patient was seen Thursday. They have a book that lists patient who need to be seen so hopefully will be seen Monday. When I asked about Cards appt Anali Hsieh stated that patient family would need to schedule that and take patient there. Called Mrs Russo Leticia and she stated that the hard thing for them is transport for patient. Suggested they they try virtual appt with Cardiology. Patient wife wants to discuss with sons to see what they think about making an appt, in person or virtual or at all. Mrs Josy Peña given # for Dr Kerry Kasper to call when she makes a decision. Monitor if patient has been seen by Provider at [facility] and if family has made a decision on Cards appt.   Cherie Freeman MSN, RN, [facility] / Care Transition Nurse / [phone]     10/13/22   Patient is at [facility] and Dr Rafaela Diaz does make rounds on Thursday so should be seen today. Patient family was to decide if they were going to F/U with Cards on last admit, they have made patient a DNR while admitted. Monitor if patient was seen by Dr Brandi Senior on next call. Ghada DILLARD, RN, CCM / Care Transition Nurse / [phone]     10/21/22   Patient is seen by Dr Brandi Senior or NP at [facility]. Ghada DILLARD, RN, CCM / Care Transition Nurse / [phone]          Prevent complications post hospitalization. On track     10/07/22   Called and spoke to Ultius Anali at [facility]. Patient is doing well, is up with use of rollator. Patient is not on a low sodium diet and gets weighted monthly. Patient also had suggestion of repeat BMP in 1 week. Hospitalist   I asked if we can get order for low sodium diet and daily weights. Mann Nelson will put this information for consideration for PCP team along with recommendation of repeat BMP. Monitor for SOB, cough, activity tolerance, get order for daily weight, low sodium diet and repeat BMP? Monitor glucose levels and (R) hip ulcer on next call. Ghada DILLARD, RN, CCM / Care Transition Nurse / [phone]       10/13/22 Patient readmitted 10/7-10/12/22 with Syncope  Called and spoke to Clay WYNNE of [facility]. Patient is doing well. Did not have low sodium diet ordered on this D/C, will need to ask PCP at [facility] for order for Low sodium diet and daily weights. Monitor if we did get order for low sodium diet, daily weight. Patient having any SOB, cough, pedal edema or syncope since last call, PO intake and activity tolerance. Ghada DILLARD, RN, CCM / Care Transition Nurse / [phone]     10/21/22   Called and spoke to Mann WYNNE at [facility], patient is doing well. Patient has weekly weights ordered and does not have a low sodium diet ordered. We did get weekly from monthly weights. Mann Nelson is going to ask for order for low sodium diet.   Monitor weekly weights, did we get low sodium diet, any SOB, cough, pedal edema.      Da Ellis MSN, RN, CCM / Care Transition Nurse / [phone]

## 2022-11-07 ENCOUNTER — PATIENT OUTREACH (OUTPATIENT)
Dept: CASE MANAGEMENT | Age: 87
End: 2022-11-07

## 2022-11-11 ENCOUNTER — PATIENT OUTREACH (OUTPATIENT)
Dept: CASE MANAGEMENT | Age: 87
End: 2022-11-11

## 2022-11-11 NOTE — PROGRESS NOTES
Goals Addressed                   This Visit's Progress     Prevent complications post hospitalization. 10/07/22   Called and spoke to [employer] at [facility]. Patient is doing well, is up with use of rollator. Patient is not on a low sodium diet and gets weighted monthly. Patient also had suggestion of repeat BMP in 1 week. Hospitalist   I asked if we can get order for low sodium diet and daily weights. Kush Faust will put this information for consideration for PCP team along with recommendation of repeat BMP. Monitor for SOB, cough, activity tolerance, get order for daily weight, low sodium diet and repeat BMP? Monitor glucose levels and (R) hip ulcer on next call. Warner Coleman MSN, RN, CCM / Care Transition Nurse / [phone]       10/13/22 Patient readmitted 10/7-10/12/22 with Syncope  Called and spoke to Clay WYNNE of [facility]. Patient is doing well. Did not have low sodium diet ordered on this D/C, will need to ask PCP at [facility] for order for Low sodium diet and daily weights. Monitor if we did get order for low sodium diet, daily weight. Patient having any SOB, cough, pedal edema or syncope since last call, PO intake and activity tolerance. Warner Coleman MSN, RN, CCM / Care Transition Nurse / [phone]     10/21/22   Called and spoke to Kush WYNNE at [facility], patient is doing well. Patient has weekly weights ordered and does not have a low sodium diet ordered. We did get weekly from monthly weights. Kush Faust is going to ask for order for low sodium diet. Monitor weekly weights, did we get low sodium diet, any SOB, cough, pedal edema. Warner Coleman MSN, RN, CCM / Care Transition Nurse / [phone]     11/07/22   Care Transitions Outreach Attempt-LMTCB.     Warner Coleman MSN, RN, CCM / Care Transition Nurse / [phone]      11/11/22   Care Transitions Outreach Attempt-  Warner Coleman MSN, RN, CCM / Care Transition Nurse / [phone]

## 2022-11-28 ENCOUNTER — PATIENT OUTREACH (OUTPATIENT)
Dept: CASE MANAGEMENT | Age: 87
End: 2022-11-28

## 2022-11-28 NOTE — PROGRESS NOTES
Care Transitions Follow Up Call    Challenges to be reviewed by the provider   Additional needs identified to be addressed with provider: yes  none           Method of communication with provider : none    Care Transition Nurse (CTN) contacted the  DO(N-Sarah  by telephone to follow up. Verified name and  with  Sarah  as identifiers. Addressed changes since last contact: none  [facility] follow up appointment(s): No future appointments. Non-[facility] follow up appointment(s): Followed by Dr Celia Castro at [id number] [place] [address] provided contact information for future needs. 1 month  based on severity of symptoms and risk factors. Goals Addressed                   This Visit's Progress     Prevent complications post hospitalization. On track     10/07/22   Called and spoke to [employer] at [facility]. Patient is doing well, is up with use of rollator. Patient is not on a low sodium diet and gets weighted monthly. Patient also had suggestion of repeat BMP in 1 week. Hospitalist   I asked if we can get order for low sodium diet and daily weights. Sim Scotty will put this information for consideration for PCP team along with recommendation of repeat BMP. Monitor for SOB, cough, activity tolerance, get order for daily weight, low sodium diet and repeat BMP? Monitor glucose levels and (R) hip ulcer on next call. Antonia Merrill MSN, RN, CCM / Care Transition Nurse / [phone]       10/13/22 Patient readmitted 10/7-10/12/22 with Syncope  Called and spoke to Clay WYNNE of [facility]. Patient is doing well. Did not have low sodium diet ordered on this D/C, will need to ask PCP at [facility] for order for Low sodium diet and daily weights. Monitor if we did get order for low sodium diet, daily weight. Patient having any SOB, cough, pedal edema or syncope since last call, PO intake and activity tolerance.     Antonia Merrill MSN, RN, CCM / Care Transition Nurse / [phone]     10/21/22   Called and spoke to [facility] at [facility], patient is doing well. Patient has weekly weights ordered and does not have a low sodium diet ordered. We did get weekly from monthly weights. Maira Nguyen is going to ask for order for low sodium diet. Monitor weekly weights, did we get low sodium diet, any SOB, cough, pedal edema. Da Ellis MSN, RN, CCM / Care Transition Nurse / [phone]     11/07/22   Care Transitions Outreach Attempt-LMTCB. Da Ellis MSN, RN, CCM / Care Transition Nurse / [phone]      11/11/22   Care Transitions Outreach Attempt-  Da DILLARD, RN, CCM / Care Transition Nurse / [phone]      11/28/22   Called and spoke to [facility], patient is doing well, denies any SOB, cough or pedal edema. Last weight was 146.4 lbs, was seen by NP last week. Remains on low sodium diet. Monitor SOB, cough, pedal, weekly weight, low sodium diet.     Da Ellis MSN, RN, CCM / Care Transition Nurse / [phone]

## 2022-12-22 ENCOUNTER — PATIENT OUTREACH (OUTPATIENT)
Dept: CASE MANAGEMENT | Age: 87
End: 2022-12-22

## 2022-12-22 NOTE — PROGRESS NOTES
Goals Addressed                   This Visit's Progress     Prevent complications post hospitalization. 10/07/22   Called and spoke to [employer] at [facility]. Patient is doing well, is up with use of rollator. Patient is not on a low sodium diet and gets weighted monthly. Patient also had suggestion of repeat BMP in 1 week. Hospitalist   I asked if we can get order for low sodium diet and daily weights. Sandra Newby will put this information for consideration for PCP team along with recommendation of repeat BMP. Monitor for SOB, cough, activity tolerance, get order for daily weight, low sodium diet and repeat BMP? Monitor glucose levels and (R) hip ulcer on next call. Jose Javier MSN, RN, CCM / Care Transition Nurse / [phone]       10/13/22 Patient readmitted 10/7-10/12/22 with Syncope  Called and spoke to Clay WYNNE of [facility]. Patient is doing well. Did not have low sodium diet ordered on this D/C, will need to ask PCP at [facility] for order for Low sodium diet and daily weights. Monitor if we did get order for low sodium diet, daily weight. Patient having any SOB, cough, pedal edema or syncope since last call, PO intake and activity tolerance. Jose DILLARD, RN, CCM / Care Transition Nurse / [phone]     10/21/22   Called and spoke to Sandra WYNNE at [facility], patient is doing well. Patient has weekly weights ordered and does not have a low sodium diet ordered. We did get weekly from monthly weights. Sandra Newby is going to ask for order for low sodium diet. Monitor weekly weights, did we get low sodium diet, any SOB, cough, pedal edema. Jose Javier MSN, RN, CCM / Care Transition Nurse / [phone]     11/07/22   Care Transitions Outreach Attempt-LMTCB.     Jose Javier MSN, RN, CCM / Care Transition Nurse / [phone]      11/11/22   Care Transitions Outreach Attempt-  Jose Javier MSN, RN, CCM / Care Transition Nurse / [phone] 11/28/22   Called and spoke to [facility], patient is doing well, denies any SOB, cough or pedal edema. Last weight was 146.4 lbs, was seen by NP last week. Remains on low sodium diet. Monitor SOB, cough, pedal, weekly weight, low sodium diet. Yordy Anderson MSN, RN, CCM / Care Transition Nurse / [phone]     12/22/22   Care Transitions Outreach Attempt-LMTCB.     Yordy Anderson MSN, RN, CCM / Care Transition Nurse / [phone]

## 2023-01-05 ENCOUNTER — PATIENT OUTREACH (OUTPATIENT)
Dept: CASE MANAGEMENT | Age: 88
End: 2023-01-05

## 2023-01-05 NOTE — PROGRESS NOTES
Patient has graduated from the Bundle program on 1/5/23. Patient lives at [address] care management goals have been completed. Patient was not referred to the [facility] team for further management. Goals Addressed                   This Visit's Progress     COMPLETED: Prevent complications post hospitalization. 10/07/22   Called and spoke to [employer] at [facility]. Patient is doing well, is up with use of rollator. Patient is not on a low sodium diet and gets weighted monthly. Patient also had suggestion of repeat BMP in 1 week. Hospitalist   I asked if we can get order for low sodium diet and daily weights. Dajuan Ledesma will put this information for consideration for PCP team along with recommendation of repeat BMP. Monitor for SOB, cough, activity tolerance, get order for daily weight, low sodium diet and repeat BMP? Monitor glucose levels and (R) hip ulcer on next call. Rob DILLARD, RN, CCM / Care Transition Nurse / [phone]       10/13/22 Patient readmitted 10/7-10/12/22 with Syncope  Called and spoke to Clay WYNNE of [facility]. Patient is doing well. Did not have low sodium diet ordered on this D/C, will need to ask PCP at [facility] for order for Low sodium diet and daily weights. Monitor if we did get order for low sodium diet, daily weight. Patient having any SOB, cough, pedal edema or syncope since last call, PO intake and activity tolerance. Rob DILLARD, RN, CCM / Care Transition Nurse / [phone]     10/21/22   Called and spoke to Dajuan WYNNE at [facility], patient is doing well. Patient has weekly weights ordered and does not have a low sodium diet ordered. We did get weekly from monthly weights. Dajuan Ledesma is going to ask for order for low sodium diet. Monitor weekly weights, did we get low sodium diet, any SOB, cough, pedal edema.      Rob DILLARD, RN, CCM / Care Transition Nurse / [phone]     11/07/22   Care Transitions Outreach Attempt-LMTCB. Baileyp Iris DILLARD, RN, CCM / Care Transition Nurse / [phone]      11/11/22   Care Transitions Outreach Attempt-  Phillip DILLARD, RN, CCM / Care Transition Nurse / [phone]      11/28/22   Called and spoke to [facility], patient is doing well, denies any SOB, cough or pedal edema. Last weight was 146.4 lbs, was seen by NP last week. Remains on low sodium diet. Monitor SOB, cough, pedal, weekly weight, low sodium diet. Skip Iris DILLARD, RN, CCM / Care Transition Nurse / [phone]     12/22/22   Care Transitions Outreach Attempt-LMTCB. Phillip DILLARD RN, CCM / Care Transition Nurse / [phone]      01/05/23   Spoke with  patient remains at [facility], and [facility] the [address] is not available. Phillip DILLARD RN, CCM / Care Transition Nurse / [phone]                     Patient has Care Transition Nurse's contact information for any further questions, concerns, or needs. Patients upcoming visits:  No future appointments.

## 2023-11-28 ENCOUNTER — HOSPITAL ENCOUNTER (EMERGENCY)
Facility: HOSPITAL | Age: 88
Discharge: OTHER INSTITUTION | End: 2023-11-29
Attending: EMERGENCY MEDICINE
Payer: MEDICARE

## 2023-11-28 DIAGNOSIS — N28.9 RENAL INSUFFICIENCY: ICD-10-CM

## 2023-11-28 DIAGNOSIS — R07.9 CHEST PAIN, UNSPECIFIED TYPE: Primary | ICD-10-CM

## 2023-11-28 LAB
ALBUMIN SERPL-MCNC: 3.2 G/DL (ref 3.5–5)
ALBUMIN/GLOB SERPL: 0.8 (ref 1.1–2.2)
ALP SERPL-CCNC: 109 U/L (ref 45–117)
ALT SERPL-CCNC: 18 U/L (ref 12–78)
ANION GAP SERPL CALC-SCNC: 2 MMOL/L (ref 5–15)
AST SERPL-CCNC: 10 U/L (ref 15–37)
BASOPHILS # BLD: 0 K/UL (ref 0–0.1)
BASOPHILS NFR BLD: 1 % (ref 0–1)
BILIRUB SERPL-MCNC: 0.9 MG/DL (ref 0.2–1)
BUN SERPL-MCNC: 18 MG/DL (ref 6–20)
BUN/CREAT SERPL: 13 (ref 12–20)
CALCIUM SERPL-MCNC: 8.6 MG/DL (ref 8.5–10.1)
CHLORIDE SERPL-SCNC: 104 MMOL/L (ref 97–108)
CO2 SERPL-SCNC: 31 MMOL/L (ref 21–32)
COMMENT:: NORMAL
CREAT SERPL-MCNC: 1.34 MG/DL (ref 0.7–1.3)
DIFFERENTIAL METHOD BLD: ABNORMAL
EOSINOPHIL # BLD: 0.3 K/UL (ref 0–0.4)
EOSINOPHIL NFR BLD: 6 % (ref 0–7)
ERYTHROCYTE [DISTWIDTH] IN BLOOD BY AUTOMATED COUNT: 13.4 % (ref 11.5–14.5)
GLOBULIN SER CALC-MCNC: 4.2 G/DL (ref 2–4)
GLUCOSE SERPL-MCNC: 94 MG/DL (ref 65–100)
HCT VFR BLD AUTO: 50.6 % (ref 36.6–50.3)
HGB BLD-MCNC: 16.5 G/DL (ref 12.1–17)
IMM GRANULOCYTES # BLD AUTO: 0 K/UL (ref 0–0.04)
IMM GRANULOCYTES NFR BLD AUTO: 0 % (ref 0–0.5)
LYMPHOCYTES # BLD: 1.1 K/UL (ref 0.8–3.5)
LYMPHOCYTES NFR BLD: 19 % (ref 12–49)
MCH RBC QN AUTO: 29.9 PG (ref 26–34)
MCHC RBC AUTO-ENTMCNC: 32.6 G/DL (ref 30–36.5)
MCV RBC AUTO: 91.8 FL (ref 80–99)
MONOCYTES # BLD: 0.6 K/UL (ref 0–1)
MONOCYTES NFR BLD: 11 % (ref 5–13)
NEUTS SEG # BLD: 3.6 K/UL (ref 1.8–8)
NEUTS SEG NFR BLD: 63 % (ref 32–75)
NRBC # BLD: 0 K/UL (ref 0–0.01)
NRBC BLD-RTO: 0 PER 100 WBC
PLATELET # BLD AUTO: 192 K/UL (ref 150–400)
PMV BLD AUTO: 9.3 FL (ref 8.9–12.9)
POTASSIUM SERPL-SCNC: 4 MMOL/L (ref 3.5–5.1)
PROT SERPL-MCNC: 7.4 G/DL (ref 6.4–8.2)
RBC # BLD AUTO: 5.51 M/UL (ref 4.1–5.7)
SODIUM SERPL-SCNC: 137 MMOL/L (ref 136–145)
SPECIMEN HOLD: NORMAL
TROPONIN I SERPL HS-MCNC: 9 NG/L (ref 0–76)
WBC # BLD AUTO: 5.8 K/UL (ref 4.1–11.1)

## 2023-11-28 PROCEDURE — 80053 COMPREHEN METABOLIC PANEL: CPT

## 2023-11-28 PROCEDURE — 99285 EMERGENCY DEPT VISIT HI MDM: CPT

## 2023-11-28 PROCEDURE — 93005 ELECTROCARDIOGRAM TRACING: CPT | Performed by: EMERGENCY MEDICINE

## 2023-11-28 PROCEDURE — 36415 COLL VENOUS BLD VENIPUNCTURE: CPT

## 2023-11-28 PROCEDURE — 84484 ASSAY OF TROPONIN QUANT: CPT

## 2023-11-28 PROCEDURE — 85025 COMPLETE CBC W/AUTO DIFF WBC: CPT

## 2023-11-28 ASSESSMENT — PAIN - FUNCTIONAL ASSESSMENT: PAIN_FUNCTIONAL_ASSESSMENT: NONE - DENIES PAIN

## 2023-11-29 ENCOUNTER — APPOINTMENT (OUTPATIENT)
Facility: HOSPITAL | Age: 88
End: 2023-11-29
Payer: MEDICARE

## 2023-11-29 VITALS
OXYGEN SATURATION: 97 % | TEMPERATURE: 98 F | SYSTOLIC BLOOD PRESSURE: 161 MMHG | DIASTOLIC BLOOD PRESSURE: 73 MMHG | RESPIRATION RATE: 16 BRPM | HEIGHT: 70 IN | HEART RATE: 57 BPM | WEIGHT: 170 LBS | BODY MASS INDEX: 24.34 KG/M2

## 2023-11-29 LAB
EKG DIAGNOSIS: NORMAL
EKG Q-T INTERVAL: 466 MS
EKG QRS DURATION: 94 MS
EKG QTC CALCULATION (BAZETT): 469 MS
EKG R AXIS: -64 DEGREES
EKG T AXIS: 66 DEGREES
EKG VENTRICULAR RATE: 61 BPM
TROPONIN I SERPL HS-MCNC: 10 NG/L (ref 0–76)

## 2023-11-29 PROCEDURE — 84484 ASSAY OF TROPONIN QUANT: CPT

## 2023-11-29 PROCEDURE — 36415 COLL VENOUS BLD VENIPUNCTURE: CPT

## 2023-11-29 PROCEDURE — 71046 X-RAY EXAM CHEST 2 VIEWS: CPT

## 2023-11-29 NOTE — ED NOTES
0400 Pt discharged by Arminda King MD. [facility] ETA is 0630. This nurse called report to Ross Myers RN at [facility] in the [place].           Haris Choudhary RN  11/29/23 1007

## 2023-11-29 NOTE — ED TRIAGE NOTES
Pt arrives via EMS from The [address] [place] [address] Unit for acute onset of RIGHT sided chest pain that started while staff was preparing the pt for bed. Pt denies CP on arrival.  Denies SOB. No complaints at this time. Glucose 106 in route.

## 2023-11-29 NOTE — ED NOTES
The patient left the Emergency Department via stretcher with [facility], alert and oriented and in no acute distress. The patient was encouraged to call or return to the ED for worsening issues or problems and was encouraged to schedule a follow up appointment for continuing care. The patient verbalized understanding of discharge instructions and prescriptions, all questions were answered. The patient has no further concerns at this time.          Emile Sanchez RN  11/29/23 1206

## 2023-11-30 ENCOUNTER — APPOINTMENT (OUTPATIENT)
Facility: HOSPITAL | Age: 88
End: 2023-11-30
Payer: MEDICARE

## 2023-11-30 ENCOUNTER — HOSPITAL ENCOUNTER (EMERGENCY)
Facility: HOSPITAL | Age: 88
Discharge: HOME | End: 2023-12-01
Attending: EMERGENCY MEDICINE
Payer: MEDICARE

## 2023-11-30 DIAGNOSIS — R07.9 CHEST PAIN, UNSPECIFIED TYPE: Primary | ICD-10-CM

## 2023-11-30 DIAGNOSIS — H10.9 CONJUNCTIVITIS OF LEFT EYE, UNSPECIFIED CONJUNCTIVITIS TYPE: ICD-10-CM

## 2023-11-30 LAB
ALBUMIN SERPL-MCNC: 2.9 G/DL (ref 3.5–5)
ALBUMIN/GLOB SERPL: 0.8 (ref 1.1–2.2)
ALP SERPL-CCNC: 102 U/L (ref 45–117)
ALT SERPL-CCNC: 16 U/L (ref 12–78)
ANION GAP SERPL CALC-SCNC: 1 MMOL/L (ref 5–15)
AST SERPL-CCNC: 11 U/L (ref 15–37)
BASOPHILS # BLD: 0 K/UL (ref 0–0.1)
BASOPHILS NFR BLD: 1 % (ref 0–1)
BILIRUB SERPL-MCNC: 0.6 MG/DL (ref 0.2–1)
BUN SERPL-MCNC: 20 MG/DL (ref 6–20)
BUN/CREAT SERPL: 14 (ref 12–20)
CALCIUM SERPL-MCNC: 8.4 MG/DL (ref 8.5–10.1)
CHLORIDE SERPL-SCNC: 110 MMOL/L (ref 97–108)
CO2 SERPL-SCNC: 33 MMOL/L (ref 21–32)
COMMENT:: NORMAL
CREAT SERPL-MCNC: 1.48 MG/DL (ref 0.7–1.3)
DIFFERENTIAL METHOD BLD: ABNORMAL
EOSINOPHIL # BLD: 0.3 K/UL (ref 0–0.4)
EOSINOPHIL NFR BLD: 5 % (ref 0–7)
ERYTHROCYTE [DISTWIDTH] IN BLOOD BY AUTOMATED COUNT: 13.8 % (ref 11.5–14.5)
GLOBULIN SER CALC-MCNC: 3.7 G/DL (ref 2–4)
GLUCOSE SERPL-MCNC: 75 MG/DL (ref 65–100)
HCT VFR BLD AUTO: 47.8 % (ref 36.6–50.3)
HGB BLD-MCNC: 15.4 G/DL (ref 12.1–17)
IMM GRANULOCYTES # BLD AUTO: 0 K/UL (ref 0–0.04)
IMM GRANULOCYTES NFR BLD AUTO: 1 % (ref 0–0.5)
LYMPHOCYTES # BLD: 1.3 K/UL (ref 0.8–3.5)
LYMPHOCYTES NFR BLD: 20 % (ref 12–49)
MCH RBC QN AUTO: 29.6 PG (ref 26–34)
MCHC RBC AUTO-ENTMCNC: 32.2 G/DL (ref 30–36.5)
MCV RBC AUTO: 91.9 FL (ref 80–99)
MONOCYTES # BLD: 0.7 K/UL (ref 0–1)
MONOCYTES NFR BLD: 11 % (ref 5–13)
NEUTS SEG # BLD: 4.1 K/UL (ref 1.8–8)
NEUTS SEG NFR BLD: 62 % (ref 32–75)
NRBC # BLD: 0 K/UL (ref 0–0.01)
NRBC BLD-RTO: 0 PER 100 WBC
PLATELET # BLD AUTO: 208 K/UL (ref 150–400)
PMV BLD AUTO: 9.4 FL (ref 8.9–12.9)
POTASSIUM SERPL-SCNC: 3.8 MMOL/L (ref 3.5–5.1)
PROT SERPL-MCNC: 6.6 G/DL (ref 6.4–8.2)
RBC # BLD AUTO: 5.2 M/UL (ref 4.1–5.7)
SODIUM SERPL-SCNC: 144 MMOL/L (ref 136–145)
SPECIMEN HOLD: NORMAL
TROPONIN I SERPL HS-MCNC: 9 NG/L (ref 0–76)
WBC # BLD AUTO: 6.4 K/UL (ref 4.1–11.1)

## 2023-11-30 PROCEDURE — 93005 ELECTROCARDIOGRAM TRACING: CPT | Performed by: EMERGENCY MEDICINE

## 2023-11-30 PROCEDURE — 85025 COMPLETE CBC W/AUTO DIFF WBC: CPT

## 2023-11-30 PROCEDURE — 71046 X-RAY EXAM CHEST 2 VIEWS: CPT

## 2023-11-30 PROCEDURE — 80053 COMPREHEN METABOLIC PANEL: CPT

## 2023-11-30 PROCEDURE — 99285 EMERGENCY DEPT VISIT HI MDM: CPT

## 2023-11-30 PROCEDURE — 84484 ASSAY OF TROPONIN QUANT: CPT

## 2023-11-30 PROCEDURE — 71045 X-RAY EXAM CHEST 1 VIEW: CPT

## 2023-11-30 PROCEDURE — 36415 COLL VENOUS BLD VENIPUNCTURE: CPT

## 2023-11-30 ASSESSMENT — PAIN - FUNCTIONAL ASSESSMENT: PAIN_FUNCTIONAL_ASSESSMENT: NONE - DENIES PAIN

## 2023-12-01 VITALS
SYSTOLIC BLOOD PRESSURE: 157 MMHG | DIASTOLIC BLOOD PRESSURE: 97 MMHG | TEMPERATURE: 98.2 F | RESPIRATION RATE: 15 BRPM | OXYGEN SATURATION: 93 % | HEART RATE: 58 BPM

## 2023-12-01 LAB
EKG DIAGNOSIS: NORMAL
EKG Q-T INTERVAL: 472 MS
EKG QRS DURATION: 100 MS
EKG QTC CALCULATION (BAZETT): 438 MS
EKG R AXIS: -72 DEGREES
EKG T AXIS: 53 DEGREES
EKG VENTRICULAR RATE: 52 BPM
TROPONIN I SERPL HS-MCNC: 9 NG/L (ref 0–76)

## 2023-12-01 PROCEDURE — 84484 ASSAY OF TROPONIN QUANT: CPT

## 2023-12-01 PROCEDURE — 36415 COLL VENOUS BLD VENIPUNCTURE: CPT

## 2023-12-01 RX ORDER — ERYTHROMYCIN 5 MG/G
OINTMENT OPHTHALMIC
Qty: 1 G | Refills: 0 | Status: SHIPPED | OUTPATIENT
Start: 2023-12-01

## 2023-12-01 NOTE — ED NOTES
02:40: Patient ready to be discharged. Verified with family that the patient currently resides at [facility] at [place], [place]. 112.  02:42: Nurse called [facility] at [phone]. Call was answered but nurse was told to call the after hours memory care phone ([phone]) as the first number was for the rehabilitation building. 02:45: Nurse called after hours memory care phone to ensure staff would be available to receive patient, no answer. 03:13: Nurse called after hours memory care phone, no answer. 03:30: Nurse called after hours memory care phone, no answer. Left voicemail.  03:32: Nurse called initial phone number again and was told that the after hours memory care nurse would call her back shortly. 04:15: Nurse called after hours memory care phone, no answer. 04:57: Nurse called after hours memory care phone, no answer. Left voicemail again. 05:54: Nurse called after hours memory care phone, no answer. 0167: Nurse called after hours memory care phone, no answer. Third voicemail left.      Ida Gonzáles RN  12/01/23 3377

## 2023-12-01 NOTE — ED TRIAGE NOTES
Pt. Presents with now resolved, mid-sternal and non radiating chest pain. Pt. Is from home, EMS states he was able to stand with his rollator and get into EMS stretcher. Pt. CP resolved PTA without interventions. Pt. Confused and has dementia at baseline, states he does not have any complaints at this time. EKG was obtained on arrival. Pt. Hypertensive X2 on monitor BP, unable to keep arm still.  EMS states pt not hypertensive for them

## 2023-12-01 NOTE — ED PROVIDER NOTES
[facility] EMERGENCY DEP  EMERGENCY DEPARTMENT ENCOUNTER      Pt Name: Clayton Navarro  MRN: [id number]  [address] [facility] [place] 1935  Date of evaluation: 11/30/2023  Provider: Bin Stover MD      HISTORY OF PRESENT ILLNESS      81-year-old male with history of asthma, dementia, hypertension presents to the emergency department with chief complaint of chest pain. He reports he had some chest pain lasted for \"a little while\" earlier today and is now gone. He has a recent evaluation in the emergency department for the same, 2 days ago, this was reassuring. The history is provided by the patient, the EMS personnel and medical records. Nursing Notes were reviewed. REVIEW OF SYSTEMS         Review of Systems        PAST MEDICAL HISTORY     Past Medical History:   Diagnosis Date    Allergy     seasonal    Arrhythmia     Asthma     Dementia ([address])     Depression     Heart failure ([address])     Hypertension     Neurological disorder     dementia    Other ill-defined conditions(799.89)     a-fib    Stroke ([address])          SURGICAL HISTORY       Past Surgical History:   Procedure Laterality Date    HEENT      cateract surgery         CURRENT MEDICATIONS       Previous Medications    APIXABAN (ELIQUIS) 2.5 MG TABS TABLET    Take 2.5 mg by mouth in the morning and 2.5 mg in the evening. BUPROPION (WELLBUTRIN XL) 150 MG EXTENDED RELEASE TABLET    Take 150 mg by mouth daily    CETIRIZINE HCL (ZYRTEC ALLERGY) 10 MG CAPS    Take 1 capsule by mouth nightly    EMPAGLIFLOZIN (JARDIANCE) 10 MG TABLET    Take 10 mg by mouth daily    FINASTERIDE (PROSCAR) 5 MG TABLET    Take 5 mg by mouth daily    FUROSEMIDE (LASIX) 40 MG TABLET    Take 20 mg by mouth daily    MEMANTINE (NAMENDA) 10 MG TABLET    Take 10 mg by mouth in the morning and 10 mg in the evening. METOPROLOL TARTRATE (LOPRESSOR) 25 MG TABLET    Take 25 mg by mouth in the morning and 25 mg in the evening.     MONTELUKAST (SINGULAIR) 10 MG TABLET    Take 10 mg by

## 2024-01-09 ENCOUNTER — APPOINTMENT (OUTPATIENT)
Facility: HOSPITAL | Age: 89
DRG: 640 | End: 2024-01-09
Payer: MEDICARE

## 2024-01-09 ENCOUNTER — HOSPITAL ENCOUNTER (INPATIENT)
Facility: HOSPITAL | Age: 89
LOS: 6 days | Discharge: HOME HEALTH | DRG: 640 | End: 2024-01-15
Attending: EMERGENCY MEDICINE | Admitting: HOSPITALIST
Payer: MEDICARE

## 2024-01-09 DIAGNOSIS — R40.1 STUPOR: Primary | ICD-10-CM

## 2024-01-09 DIAGNOSIS — R55 SYNCOPE AND COLLAPSE: ICD-10-CM

## 2024-01-09 DIAGNOSIS — I95.9 HYPOTENSION, UNSPECIFIED HYPOTENSION TYPE: ICD-10-CM

## 2024-01-09 PROBLEM — R41.82 AMS (ALTERED MENTAL STATUS): Status: ACTIVE | Noted: 2024-01-09

## 2024-01-09 LAB
ALBUMIN SERPL-MCNC: 2.6 G/DL (ref 3.5–5)
ALBUMIN/GLOB SERPL: 0.6 (ref 1.1–2.2)
ALP SERPL-CCNC: 96 U/L (ref 45–117)
ALT SERPL-CCNC: 22 U/L (ref 12–78)
ANION GAP SERPL CALC-SCNC: 6 MMOL/L (ref 5–15)
APPEARANCE UR: CLEAR
AST SERPL-CCNC: 26 U/L (ref 15–37)
BACTERIA URNS QL MICRO: NEGATIVE /HPF
BASOPHILS # BLD: 0 K/UL (ref 0–0.1)
BASOPHILS NFR BLD: 0 % (ref 0–1)
BILIRUB SERPL-MCNC: 1.3 MG/DL (ref 0.2–1)
BILIRUB UR QL: NEGATIVE
BUN SERPL-MCNC: 82 MG/DL (ref 6–20)
BUN/CREAT SERPL: 26 (ref 12–20)
CALCIUM SERPL-MCNC: 9.2 MG/DL (ref 8.5–10.1)
CHLORIDE SERPL-SCNC: 109 MMOL/L (ref 97–108)
CO2 SERPL-SCNC: 28 MMOL/L (ref 21–32)
COLOR UR: ABNORMAL
COMMENT:: NORMAL
COMMENT:: NORMAL
CREAT SERPL-MCNC: 3.16 MG/DL (ref 0.7–1.3)
DIFFERENTIAL METHOD BLD: ABNORMAL
EOSINOPHIL # BLD: 0.1 K/UL (ref 0–0.4)
EOSINOPHIL NFR BLD: 2 % (ref 0–7)
EPITH CASTS URNS QL MICRO: ABNORMAL /LPF
ERYTHROCYTE [DISTWIDTH] IN BLOOD BY AUTOMATED COUNT: 14.5 % (ref 11.5–14.5)
FLUAV RNA SPEC QL NAA+PROBE: NOT DETECTED
FLUBV RNA SPEC QL NAA+PROBE: NOT DETECTED
GLOBULIN SER CALC-MCNC: 4.1 G/DL (ref 2–4)
GLUCOSE SERPL-MCNC: 123 MG/DL (ref 65–100)
GLUCOSE UR STRIP.AUTO-MCNC: 500 MG/DL
HCT VFR BLD AUTO: 44.5 % (ref 36.6–50.3)
HGB BLD-MCNC: 14.8 G/DL (ref 12.1–17)
HGB UR QL STRIP: ABNORMAL
HYALINE CASTS URNS QL MICRO: ABNORMAL /LPF (ref 0–5)
IMM GRANULOCYTES # BLD AUTO: 0.1 K/UL (ref 0–0.04)
IMM GRANULOCYTES NFR BLD AUTO: 1 % (ref 0–0.5)
KETONES UR QL STRIP.AUTO: NEGATIVE MG/DL
LACTATE SERPL-SCNC: 0.9 MMOL/L (ref 0.4–2)
LACTATE SERPL-SCNC: 1.6 MMOL/L (ref 0.4–2)
LACTATE SERPL-SCNC: 3 MMOL/L (ref 0.4–2)
LEUKOCYTE ESTERASE UR QL STRIP.AUTO: ABNORMAL
LYMPHOCYTES # BLD: 0.6 K/UL (ref 0.8–3.5)
LYMPHOCYTES NFR BLD: 12 % (ref 12–49)
MCH RBC QN AUTO: 29.7 PG (ref 26–34)
MCHC RBC AUTO-ENTMCNC: 33.3 G/DL (ref 30–36.5)
MCV RBC AUTO: 89.2 FL (ref 80–99)
MONOCYTES # BLD: 0.5 K/UL (ref 0–1)
MONOCYTES NFR BLD: 9 % (ref 5–13)
NEUTS SEG # BLD: 4 K/UL (ref 1.8–8)
NEUTS SEG NFR BLD: 76 % (ref 32–75)
NITRITE UR QL STRIP.AUTO: NEGATIVE
NRBC # BLD: 0 K/UL (ref 0–0.01)
NRBC BLD-RTO: 0 PER 100 WBC
PH UR STRIP: 5 (ref 5–8)
PLATELET # BLD AUTO: 201 K/UL (ref 150–400)
PMV BLD AUTO: 10.3 FL (ref 8.9–12.9)
POTASSIUM SERPL-SCNC: 3.8 MMOL/L (ref 3.5–5.1)
PROT SERPL-MCNC: 6.7 G/DL (ref 6.4–8.2)
PROT UR STRIP-MCNC: NEGATIVE MG/DL
RBC # BLD AUTO: 4.99 M/UL (ref 4.1–5.7)
RBC #/AREA URNS HPF: ABNORMAL /HPF (ref 0–5)
RBC MORPH BLD: ABNORMAL
SARS-COV-2 RNA RESP QL NAA+PROBE: NOT DETECTED
SODIUM SERPL-SCNC: 143 MMOL/L (ref 136–145)
SP GR UR REFRACTOMETRY: 1.01 (ref 1–1.03)
SPECIMEN HOLD: NORMAL
SPECIMEN HOLD: NORMAL
TROPONIN I SERPL HS-MCNC: 19 NG/L (ref 0–76)
UROBILINOGEN UR QL STRIP.AUTO: 0.2 EU/DL (ref 0.2–1)
WBC # BLD AUTO: 5.3 K/UL (ref 4.1–11.1)
WBC URNS QL MICRO: ABNORMAL /HPF (ref 0–4)

## 2024-01-09 PROCEDURE — 87636 SARSCOV2 & INF A&B AMP PRB: CPT

## 2024-01-09 PROCEDURE — 36415 COLL VENOUS BLD VENIPUNCTURE: CPT

## 2024-01-09 PROCEDURE — 81001 URINALYSIS AUTO W/SCOPE: CPT

## 2024-01-09 PROCEDURE — 51702 INSERT TEMP BLADDER CATH: CPT

## 2024-01-09 PROCEDURE — 6360000002 HC RX W HCPCS: Performed by: EMERGENCY MEDICINE

## 2024-01-09 PROCEDURE — 87086 URINE CULTURE/COLONY COUNT: CPT

## 2024-01-09 PROCEDURE — 76770 US EXAM ABDO BACK WALL COMP: CPT

## 2024-01-09 PROCEDURE — 87040 BLOOD CULTURE FOR BACTERIA: CPT

## 2024-01-09 PROCEDURE — 6370000000 HC RX 637 (ALT 250 FOR IP): Performed by: HOSPITALIST

## 2024-01-09 PROCEDURE — 93005 ELECTROCARDIOGRAM TRACING: CPT | Performed by: EMERGENCY MEDICINE

## 2024-01-09 PROCEDURE — 83605 ASSAY OF LACTIC ACID: CPT

## 2024-01-09 PROCEDURE — 2580000003 HC RX 258: Performed by: HOSPITALIST

## 2024-01-09 PROCEDURE — 2580000003 HC RX 258: Performed by: EMERGENCY MEDICINE

## 2024-01-09 PROCEDURE — 2060000000 HC ICU INTERMEDIATE R&B

## 2024-01-09 PROCEDURE — 99285 EMERGENCY DEPT VISIT HI MDM: CPT

## 2024-01-09 PROCEDURE — 85025 COMPLETE CBC W/AUTO DIFF WBC: CPT

## 2024-01-09 PROCEDURE — 80053 COMPREHEN METABOLIC PANEL: CPT

## 2024-01-09 PROCEDURE — 70450 CT HEAD/BRAIN W/O DYE: CPT

## 2024-01-09 PROCEDURE — 96374 THER/PROPH/DIAG INJ IV PUSH: CPT

## 2024-01-09 PROCEDURE — 71045 X-RAY EXAM CHEST 1 VIEW: CPT

## 2024-01-09 PROCEDURE — 84484 ASSAY OF TROPONIN QUANT: CPT

## 2024-01-09 RX ORDER — BUPROPION HYDROCHLORIDE 150 MG/1
150 TABLET ORAL DAILY
Status: DISCONTINUED | OUTPATIENT
Start: 2024-01-09 | End: 2024-01-09 | Stop reason: RX

## 2024-01-09 RX ORDER — POLYETHYLENE GLYCOL 3350 17 G/17G
17 POWDER, FOR SOLUTION ORAL DAILY PRN
Status: DISCONTINUED | OUTPATIENT
Start: 2024-01-09 | End: 2024-01-15 | Stop reason: HOSPADM

## 2024-01-09 RX ORDER — QUETIAPINE FUMARATE 25 MG/1
12.5 TABLET, FILM COATED ORAL NIGHTLY
Status: DISCONTINUED | OUTPATIENT
Start: 2024-01-09 | End: 2024-01-15 | Stop reason: HOSPADM

## 2024-01-09 RX ORDER — FINASTERIDE 5 MG/1
5 TABLET, FILM COATED ORAL DAILY
Status: DISCONTINUED | OUTPATIENT
Start: 2024-01-09 | End: 2024-01-15 | Stop reason: HOSPADM

## 2024-01-09 RX ORDER — BUPROPION HYDROCHLORIDE 150 MG/1
150 TABLET, EXTENDED RELEASE ORAL DAILY
Status: DISCONTINUED | OUTPATIENT
Start: 2024-01-09 | End: 2024-01-15 | Stop reason: HOSPADM

## 2024-01-09 RX ORDER — MONTELUKAST SODIUM 10 MG/1
10 TABLET ORAL NIGHTLY
Status: DISCONTINUED | OUTPATIENT
Start: 2024-01-09 | End: 2024-01-15 | Stop reason: HOSPADM

## 2024-01-09 RX ORDER — SODIUM CHLORIDE 9 MG/ML
INJECTION, SOLUTION INTRAVENOUS CONTINUOUS
Status: DISCONTINUED | OUTPATIENT
Start: 2024-01-09 | End: 2024-01-11

## 2024-01-09 RX ORDER — TAMSULOSIN HYDROCHLORIDE 0.4 MG/1
0.4 CAPSULE ORAL DAILY
Status: DISCONTINUED | OUTPATIENT
Start: 2024-01-09 | End: 2024-01-15 | Stop reason: HOSPADM

## 2024-01-09 RX ORDER — ONDANSETRON 4 MG/1
4 TABLET, ORALLY DISINTEGRATING ORAL EVERY 8 HOURS PRN
Status: DISCONTINUED | OUTPATIENT
Start: 2024-01-09 | End: 2024-01-15 | Stop reason: HOSPADM

## 2024-01-09 RX ORDER — ENOXAPARIN SODIUM 100 MG/ML
40 INJECTION SUBCUTANEOUS DAILY
Status: DISCONTINUED | OUTPATIENT
Start: 2024-01-10 | End: 2024-01-09

## 2024-01-09 RX ORDER — LOSARTAN POTASSIUM 25 MG/1
25 TABLET ORAL DAILY
COMMUNITY

## 2024-01-09 RX ORDER — ACETAMINOPHEN 650 MG/1
650 SUPPOSITORY RECTAL EVERY 6 HOURS PRN
Status: DISCONTINUED | OUTPATIENT
Start: 2024-01-09 | End: 2024-01-15 | Stop reason: HOSPADM

## 2024-01-09 RX ORDER — ONDANSETRON 2 MG/ML
4 INJECTION INTRAMUSCULAR; INTRAVENOUS EVERY 6 HOURS PRN
Status: DISCONTINUED | OUTPATIENT
Start: 2024-01-09 | End: 2024-01-15 | Stop reason: HOSPADM

## 2024-01-09 RX ORDER — MEMANTINE HYDROCHLORIDE 5 MG/1
10 TABLET ORAL 2 TIMES DAILY
Status: DISCONTINUED | OUTPATIENT
Start: 2024-01-09 | End: 2024-01-15 | Stop reason: HOSPADM

## 2024-01-09 RX ORDER — 0.9 % SODIUM CHLORIDE 0.9 %
1000 INTRAVENOUS SOLUTION INTRAVENOUS ONCE
Status: COMPLETED | OUTPATIENT
Start: 2024-01-09 | End: 2024-01-09

## 2024-01-09 RX ORDER — WATER / MINERAL OIL / WHITE PETROLATUM 16 OZ
CREAM TOPICAL DAILY
COMMUNITY

## 2024-01-09 RX ORDER — POLYVINYL ALCOHOL 14 MG/ML
1 SOLUTION/ DROPS OPHTHALMIC 3 TIMES DAILY
COMMUNITY

## 2024-01-09 RX ORDER — ERYTHROMYCIN 5 MG/G
OINTMENT OPHTHALMIC EVERY 8 HOURS SCHEDULED
Status: DISCONTINUED | OUTPATIENT
Start: 2024-01-09 | End: 2024-01-15 | Stop reason: HOSPADM

## 2024-01-09 RX ORDER — ACETAMINOPHEN 325 MG/1
650 TABLET ORAL EVERY 6 HOURS PRN
Status: DISCONTINUED | OUTPATIENT
Start: 2024-01-09 | End: 2024-01-15 | Stop reason: HOSPADM

## 2024-01-09 RX ORDER — SENNOSIDES A AND B 8.6 MG/1
2 TABLET, FILM COATED ORAL
COMMUNITY

## 2024-01-09 RX ORDER — AMLODIPINE BESYLATE 10 MG/1
10 TABLET ORAL DAILY
Status: ON HOLD | COMMUNITY
End: 2024-01-15

## 2024-01-09 RX ORDER — PANTOPRAZOLE SODIUM 40 MG/1
40 TABLET, DELAYED RELEASE ORAL DAILY
Status: DISCONTINUED | OUTPATIENT
Start: 2024-01-09 | End: 2024-01-15 | Stop reason: HOSPADM

## 2024-01-09 RX ADMIN — ERYTHROMYCIN: 5 OINTMENT OPHTHALMIC at 16:00

## 2024-01-09 RX ADMIN — WATER 1000 MG: 1 INJECTION INTRAMUSCULAR; INTRAVENOUS; SUBCUTANEOUS at 11:36

## 2024-01-09 RX ADMIN — SODIUM CHLORIDE 1000 ML: 9 INJECTION, SOLUTION INTRAVENOUS at 11:36

## 2024-01-09 RX ADMIN — SODIUM CHLORIDE 1000 ML: 9 INJECTION, SOLUTION INTRAVENOUS at 10:12

## 2024-01-09 RX ADMIN — SODIUM CHLORIDE: 9 INJECTION, SOLUTION INTRAVENOUS at 18:53

## 2024-01-09 ASSESSMENT — LIFESTYLE VARIABLES
HOW OFTEN DO YOU HAVE A DRINK CONTAINING ALCOHOL: NEVER
HOW MANY STANDARD DRINKS CONTAINING ALCOHOL DO YOU HAVE ON A TYPICAL DAY: PATIENT DOES NOT DRINK

## 2024-01-09 ASSESSMENT — PAIN - FUNCTIONAL ASSESSMENT: PAIN_FUNCTIONAL_ASSESSMENT: NONE - DENIES PAIN

## 2024-01-09 NOTE — H&P
History and Physical    Date of Service:  1/9/2024  Primary Care Provider: Kylee Tavarez MD  Source of information:   Patient has underlying dementia, confused so unable to give any meaningful history.  History is obtained from review of records and from his wife was present at the bedside during this eval.  Chief Complaint: Altered Mental Status      History of Presenting Illness:   Deon Lui is a 88 y.o. male with underlying dementia and PMH significant for atrial fibrillation, heart failure, hypertension, stroke resident at [facility] unit was sent to the ED for evaluation of being altered.  No report of fever, cough or signs of acute illness.  His wife tells me he has been taken to the ED multiple times recently for chest pain, and most recently  on 12/18/2023 he was diagnosed with pneumonia and discharged from the ED on Augmentin.  On eval in the ED, vital signs stable.  Lab work significant for elevated BUN 82, creatinine 3.16.  His creatinine has fluctuated but mostly under 2 with return to normalcy in the past.  Lactic acid 3.0  CBC unremarkable except left shift, neutrophils 76%  Blood cultures drawn.  UA pending.  Chest x-ray negative  CT head negative.  Treatment in the ED: IV saline bolus initiated.  Given a dose of IV ceftriaxone.    ROS: Unobtainable as patient is confused, has underlying dementia.       REVIEW OF SYSTEMS:  Review of systems not obtained due to patient factors.     Past Medical History:   Diagnosis Date    Allergy     seasonal    Arrhythmia     Asthma     Dementia (HCC)     Depression     Heart failure (HCC)     Hypertension     Neurological disorder     dementia    Other ill-defined conditions(799.89)     a-fib    Stroke (HCC)       Past Surgical History:   Procedure Laterality Date    HEENT      cateract surgery     Prior to Admission medications    Medication Sig Start Date End Date Taking? Authorizing Provider   pantoprazole (PROTONIX) 40 MG

## 2024-01-09 NOTE — ED TRIAGE NOTES
Patient arrives from [facility] with complaints of unresponsive episode that was witnessed by staff. Patient maintained a pulse during this episode and was able to breath on his own. Unclear what patient's baseline mentation is

## 2024-01-09 NOTE — ED PROVIDER NOTES
[facility] EMERGENCY DEP  EMERGENCY DEPARTMENT ENCOUNTER      Pt Name: Deon Lui  MRN: [id number]  Birthdate 1935  Date of evaluation: 1/9/2024  Provider: Jp Payton MD    CHIEF COMPLAINT       Chief Complaint   Patient presents with    Altered Mental Status         HISTORY OF PRESENT ILLNESS    HPI  This is an 88-year-old male who presents with a history of heart failure, atrial fibrillation, hypertension and stroke.  He is a resident at a nursing facility and this morning was noted to have a syncopal episode.  The patient is nonverbal and was sent here for further evaluation.  He had not been ill or having any acute symptoms leading up to this event.  He has been somewhat somnolent since then.  He is arousable and will open his eyes to stimulus.  There is been no report of fever or chills, pain, nausea or vomiting and no stool changes.    Review of External Medical Records:     Nursing Notes were reviewed.    REVIEW OF SYSTEMS       Review of Systems   Reason unable to perform ROS: Dementia.             PAST MEDICAL HISTORY     Past Medical History:   Diagnosis Date    Allergy     seasonal    Arrhythmia     Asthma     Dementia (HCC)     Depression     Heart failure ([facility])     Hypertension     Neurological disorder     dementia    Other ill-defined conditions(799.89)     a-fib    Stroke ([facility])          SURGICAL HISTORY       Past Surgical History:   Procedure Laterality Date    HEENT      cateract surgery         CURRENT MEDICATIONS       Previous Medications    APIXABAN (ELIQUIS) 2.5 MG TABS TABLET    Take 2.5 mg by mouth in the morning and 2.5 mg in the evening.    BUPROPION (WELLBUTRIN XL) 150 MG EXTENDED RELEASE TABLET    Take 150 mg by mouth daily    CETIRIZINE HCL (ZYRTEC ALLERGY) 10 MG CAPS    Take 1 capsule by mouth nightly    EMPAGLIFLOZIN (JARDIANCE) 10 MG TABLET    Take 10 mg by mouth daily    ERYTHROMYCIN (ROMYCIN) 5 MG/GM OPHTHALMIC OINTMENT    Applied affected eye 3 times daily for 5

## 2024-01-09 NOTE — ED NOTES
Patient failed swallow screen due to altered mentation and unable to stay alert for test. MD notified.

## 2024-01-10 LAB
BACTERIA SPEC CULT: NORMAL
SERVICE CMNT-IMP: NORMAL

## 2024-01-10 PROCEDURE — 6360000002 HC RX W HCPCS: Performed by: STUDENT IN AN ORGANIZED HEALTH CARE EDUCATION/TRAINING PROGRAM

## 2024-01-10 PROCEDURE — 2060000000 HC ICU INTERMEDIATE R&B

## 2024-01-10 PROCEDURE — 2580000003 HC RX 258: Performed by: HOSPITALIST

## 2024-01-10 PROCEDURE — 6370000000 HC RX 637 (ALT 250 FOR IP): Performed by: HOSPITALIST

## 2024-01-10 PROCEDURE — 2580000003 HC RX 258: Performed by: STUDENT IN AN ORGANIZED HEALTH CARE EDUCATION/TRAINING PROGRAM

## 2024-01-10 RX ADMIN — METOPROLOL TARTRATE 25 MG: 25 TABLET, FILM COATED ORAL at 10:02

## 2024-01-10 RX ADMIN — QUETIAPINE FUMARATE 12.5 MG: 25 TABLET ORAL at 21:16

## 2024-01-10 RX ADMIN — MONTELUKAST 10 MG: 10 TABLET, FILM COATED ORAL at 21:15

## 2024-01-10 RX ADMIN — PANTOPRAZOLE SODIUM 40 MG: 40 TABLET, DELAYED RELEASE ORAL at 10:09

## 2024-01-10 RX ADMIN — BUPROPION HYDROCHLORIDE 150 MG: 150 TABLET, FILM COATED, EXTENDED RELEASE ORAL at 11:02

## 2024-01-10 RX ADMIN — MEMANTINE 10 MG: 10 TABLET ORAL at 21:16

## 2024-01-10 RX ADMIN — ERYTHROMYCIN: 5 OINTMENT OPHTHALMIC at 04:53

## 2024-01-10 RX ADMIN — ERYTHROMYCIN: 5 OINTMENT OPHTHALMIC at 13:17

## 2024-01-10 RX ADMIN — APIXABAN 2.5 MG: 5 TABLET, FILM COATED ORAL at 10:03

## 2024-01-10 RX ADMIN — APIXABAN 2.5 MG: 5 TABLET, FILM COATED ORAL at 21:16

## 2024-01-10 RX ADMIN — FINASTERIDE 5 MG: 5 TABLET, FILM COATED ORAL at 10:03

## 2024-01-10 RX ADMIN — WATER 1000 MG: 1 INJECTION INTRAMUSCULAR; INTRAVENOUS; SUBCUTANEOUS at 10:09

## 2024-01-10 RX ADMIN — MEMANTINE 10 MG: 10 TABLET ORAL at 10:09

## 2024-01-10 RX ADMIN — SODIUM CHLORIDE: 9 INJECTION, SOLUTION INTRAVENOUS at 21:16

## 2024-01-10 RX ADMIN — TAMSULOSIN HYDROCHLORIDE 0.4 MG: 0.4 CAPSULE ORAL at 10:03

## 2024-01-10 ASSESSMENT — PAIN SCALES - GENERAL: PAINLEVEL_OUTOF10: 0

## 2024-01-10 NOTE — ED NOTES
Patient agitated, uncooperative, and aggressive this morning towards any effort to obtain lab work.

## 2024-01-11 LAB
ANION GAP SERPL CALC-SCNC: 6 MMOL/L (ref 5–15)
BASOPHILS # BLD: 0.1 K/UL (ref 0–0.1)
BASOPHILS NFR BLD: 1 % (ref 0–1)
BUN SERPL-MCNC: 33 MG/DL (ref 6–20)
BUN/CREAT SERPL: 23 (ref 12–20)
CALCIUM SERPL-MCNC: 7.6 MG/DL (ref 8.5–10.1)
CHLORIDE SERPL-SCNC: 123 MMOL/L (ref 97–108)
CO2 SERPL-SCNC: 22 MMOL/L (ref 21–32)
CREAT SERPL-MCNC: 1.43 MG/DL (ref 0.7–1.3)
DIFFERENTIAL METHOD BLD: ABNORMAL
EKG DIAGNOSIS: NORMAL
EKG Q-T INTERVAL: 406 MS
EKG QRS DURATION: 104 MS
EKG QTC CALCULATION (BAZETT): 453 MS
EKG R AXIS: -67 DEGREES
EKG T AXIS: 75 DEGREES
EKG VENTRICULAR RATE: 75 BPM
EOSINOPHIL # BLD: 0.3 K/UL (ref 0–0.4)
EOSINOPHIL NFR BLD: 5 % (ref 0–7)
ERYTHROCYTE [DISTWIDTH] IN BLOOD BY AUTOMATED COUNT: 14.1 % (ref 11.5–14.5)
GLUCOSE SERPL-MCNC: 76 MG/DL (ref 65–100)
HCT VFR BLD AUTO: 41.3 % (ref 36.6–50.3)
HGB BLD-MCNC: 14.2 G/DL (ref 12.1–17)
IMM GRANULOCYTES # BLD AUTO: 0.1 K/UL (ref 0–0.04)
IMM GRANULOCYTES NFR BLD AUTO: 1 % (ref 0–0.5)
LACTATE SERPL-SCNC: 1.1 MMOL/L (ref 0.4–2)
LYMPHOCYTES # BLD: 0.7 K/UL (ref 0.8–3.5)
LYMPHOCYTES NFR BLD: 13 % (ref 12–49)
MCH RBC QN AUTO: 30.3 PG (ref 26–34)
MCHC RBC AUTO-ENTMCNC: 34.4 G/DL (ref 30–36.5)
MCV RBC AUTO: 88.1 FL (ref 80–99)
MONOCYTES # BLD: 0.5 K/UL (ref 0–1)
MONOCYTES NFR BLD: 9 % (ref 5–13)
NEUTS SEG # BLD: 3.7 K/UL (ref 1.8–8)
NEUTS SEG NFR BLD: 71 % (ref 32–75)
NRBC # BLD: 0 K/UL (ref 0–0.01)
NRBC BLD-RTO: 0 PER 100 WBC
PLATELET # BLD AUTO: 104 K/UL (ref 150–400)
PMV BLD AUTO: 9.9 FL (ref 8.9–12.9)
POTASSIUM SERPL-SCNC: 4.1 MMOL/L (ref 3.5–5.1)
RBC # BLD AUTO: 4.69 M/UL (ref 4.1–5.7)
RBC MORPH BLD: ABNORMAL
SODIUM SERPL-SCNC: 151 MMOL/L (ref 136–145)
WBC # BLD AUTO: 5.4 K/UL (ref 4.1–11.1)

## 2024-01-11 PROCEDURE — 36415 COLL VENOUS BLD VENIPUNCTURE: CPT

## 2024-01-11 PROCEDURE — 85025 COMPLETE CBC W/AUTO DIFF WBC: CPT

## 2024-01-11 PROCEDURE — 2060000000 HC ICU INTERMEDIATE R&B

## 2024-01-11 PROCEDURE — 6370000000 HC RX 637 (ALT 250 FOR IP): Performed by: HOSPITALIST

## 2024-01-11 PROCEDURE — 83605 ASSAY OF LACTIC ACID: CPT

## 2024-01-11 PROCEDURE — 2580000003 HC RX 258: Performed by: INTERNAL MEDICINE

## 2024-01-11 PROCEDURE — 80048 BASIC METABOLIC PNL TOTAL CA: CPT

## 2024-01-11 PROCEDURE — 93010 ELECTROCARDIOGRAM REPORT: CPT | Performed by: SPECIALIST

## 2024-01-11 RX ORDER — DEXTROSE AND SODIUM CHLORIDE 5; .2 G/100ML; G/100ML
INJECTION, SOLUTION INTRAVENOUS CONTINUOUS
Status: DISCONTINUED | OUTPATIENT
Start: 2024-01-11 | End: 2024-01-13

## 2024-01-11 RX ADMIN — ERYTHROMYCIN: 5 OINTMENT OPHTHALMIC at 05:32

## 2024-01-11 RX ADMIN — APIXABAN 2.5 MG: 5 TABLET, FILM COATED ORAL at 21:29

## 2024-01-11 RX ADMIN — MONTELUKAST 10 MG: 10 TABLET, FILM COATED ORAL at 21:29

## 2024-01-11 RX ADMIN — APIXABAN 2.5 MG: 5 TABLET, FILM COATED ORAL at 09:07

## 2024-01-11 RX ADMIN — MEMANTINE 10 MG: 10 TABLET ORAL at 21:29

## 2024-01-11 RX ADMIN — ERYTHROMYCIN: 5 OINTMENT OPHTHALMIC at 21:29

## 2024-01-11 RX ADMIN — METOPROLOL TARTRATE 25 MG: 25 TABLET, FILM COATED ORAL at 09:07

## 2024-01-11 RX ADMIN — DEXTROSE AND SODIUM CHLORIDE: 5; 200 INJECTION, SOLUTION INTRAVENOUS at 12:54

## 2024-01-11 RX ADMIN — ERYTHROMYCIN: 5 OINTMENT OPHTHALMIC at 15:03

## 2024-01-11 RX ADMIN — BUPROPION HYDROCHLORIDE 150 MG: 150 TABLET, FILM COATED, EXTENDED RELEASE ORAL at 09:07

## 2024-01-11 RX ADMIN — FINASTERIDE 5 MG: 5 TABLET, FILM COATED ORAL at 09:07

## 2024-01-11 RX ADMIN — MEMANTINE 10 MG: 10 TABLET ORAL at 09:07

## 2024-01-11 RX ADMIN — QUETIAPINE FUMARATE 12.5 MG: 25 TABLET ORAL at 21:29

## 2024-01-11 RX ADMIN — PANTOPRAZOLE SODIUM 40 MG: 40 TABLET, DELAYED RELEASE ORAL at 09:07

## 2024-01-11 RX ADMIN — TAMSULOSIN HYDROCHLORIDE 0.4 MG: 0.4 CAPSULE ORAL at 09:07

## 2024-01-11 ASSESSMENT — PAIN SCALES - GENERAL
PAINLEVEL_OUTOF10: 0
PAINLEVEL_OUTOF10: 0

## 2024-01-11 NOTE — CARE COORDINATION
Care Management Initial Assessment       RUR: 19% Moderate  Readmission? No  1st IM letter given?   1st  letter given: NA    Patient presented to the ED on 1/9/24 with altered mental status. He has a history of arrhythmia, dementia, depression, hear failure, hypertension, a-fib, and stroke. CM reached patient's wife via room phone to complete initial assessment. Patient has been a resident of [facility] at the [place] for almost 2 years. He typically ambulates with a walker. CM placed call to [facility] and talked with HIRAL Glover, [phone], ext [phone]/Fax [phone]. Jess informed patient usually walks around with walker independently and engages in activities but within the last three days prior to coming to the hospital, patient had not been walking around per usual and overall was not himself.       01/11/24 2428   Service Assessment   Patient Orientation Unable to Assess   History Provided By Spouse  (Lexus Lui, wife, [phone])   Accompanied By/Relationship Wife   Support Systems Spouse/Significant Other;Children  (Wife and son Wes)   PCP Verified by CM Yes  (Cynthia Vuong)   Last Visit to PCP Within last 3 months   Prior Functional Level Assistance with the following:;Bathing;Dressing;Toileting   Current Functional Level Assistance with the following:;Bathing;Dressing;Toileting   Can patient return to prior living arrangement Yes  ([facility])   Financial Resources Medicare   Community Resources Assisted Living  ([facility] [place])   Social/Functional History   Type of Home Assisted living   Home Equipment Walker, rolling   Receives Help From Family;Other (comment)  ([facility])   ADL Assistance Needs assistance   Ambulation Assistance Needs assistance   Discharge Planning   Type of Residence Assisted living   Living Arrangements Other (Comment)  ([facility], [facility])   Current Services

## 2024-01-12 LAB
ANION GAP SERPL CALC-SCNC: 7 MMOL/L (ref 5–15)
BUN SERPL-MCNC: 23 MG/DL (ref 6–20)
BUN/CREAT SERPL: 18 (ref 12–20)
CALCIUM SERPL-MCNC: 7.8 MG/DL (ref 8.5–10.1)
CHLORIDE SERPL-SCNC: 115 MMOL/L (ref 97–108)
CO2 SERPL-SCNC: 27 MMOL/L (ref 21–32)
CREAT SERPL-MCNC: 1.26 MG/DL (ref 0.7–1.3)
ERYTHROCYTE [DISTWIDTH] IN BLOOD BY AUTOMATED COUNT: 13.5 % (ref 11.5–14.5)
GLUCOSE SERPL-MCNC: 108 MG/DL (ref 65–100)
HCT VFR BLD AUTO: 43.2 % (ref 36.6–50.3)
HGB BLD-MCNC: 14.5 G/DL (ref 12.1–17)
MAGNESIUM SERPL-MCNC: 1.5 MG/DL (ref 1.6–2.4)
MCH RBC QN AUTO: 29.7 PG (ref 26–34)
MCHC RBC AUTO-ENTMCNC: 33.6 G/DL (ref 30–36.5)
MCV RBC AUTO: 88.5 FL (ref 80–99)
NRBC # BLD: 0 K/UL (ref 0–0.01)
NRBC BLD-RTO: 0 PER 100 WBC
PHOSPHATE SERPL-MCNC: 2 MG/DL (ref 2.6–4.7)
PLATELET # BLD AUTO: 141 K/UL (ref 150–400)
PMV BLD AUTO: 9.4 FL (ref 8.9–12.9)
POTASSIUM SERPL-SCNC: 2.7 MMOL/L (ref 3.5–5.1)
RBC # BLD AUTO: 4.88 M/UL (ref 4.1–5.7)
SODIUM SERPL-SCNC: 149 MMOL/L (ref 136–145)
WBC # BLD AUTO: 5.9 K/UL (ref 4.1–11.1)

## 2024-01-12 PROCEDURE — 6360000002 HC RX W HCPCS

## 2024-01-12 PROCEDURE — 51702 INSERT TEMP BLADDER CATH: CPT

## 2024-01-12 PROCEDURE — 2580000003 HC RX 258: Performed by: INTERNAL MEDICINE

## 2024-01-12 PROCEDURE — 6370000000 HC RX 637 (ALT 250 FOR IP)

## 2024-01-12 PROCEDURE — 84100 ASSAY OF PHOSPHORUS: CPT

## 2024-01-12 PROCEDURE — 36415 COLL VENOUS BLD VENIPUNCTURE: CPT

## 2024-01-12 PROCEDURE — 97165 OT EVAL LOW COMPLEX 30 MIN: CPT

## 2024-01-12 PROCEDURE — 6370000000 HC RX 637 (ALT 250 FOR IP): Performed by: HOSPITALIST

## 2024-01-12 PROCEDURE — 6370000000 HC RX 637 (ALT 250 FOR IP): Performed by: INTERNAL MEDICINE

## 2024-01-12 PROCEDURE — 85027 COMPLETE CBC AUTOMATED: CPT

## 2024-01-12 PROCEDURE — 97161 PT EVAL LOW COMPLEX 20 MIN: CPT

## 2024-01-12 PROCEDURE — 83735 ASSAY OF MAGNESIUM: CPT

## 2024-01-12 PROCEDURE — 2060000000 HC ICU INTERMEDIATE R&B

## 2024-01-12 PROCEDURE — 97535 SELF CARE MNGMENT TRAINING: CPT

## 2024-01-12 PROCEDURE — 51798 US URINE CAPACITY MEASURE: CPT

## 2024-01-12 PROCEDURE — 97530 THERAPEUTIC ACTIVITIES: CPT

## 2024-01-12 PROCEDURE — 80048 BASIC METABOLIC PNL TOTAL CA: CPT

## 2024-01-12 RX ORDER — POTASSIUM CHLORIDE 750 MG/1
40 TABLET, FILM COATED, EXTENDED RELEASE ORAL ONCE
Status: COMPLETED | OUTPATIENT
Start: 2024-01-12 | End: 2024-01-12

## 2024-01-12 RX ORDER — MAGNESIUM SULFATE IN WATER 40 MG/ML
2000 INJECTION, SOLUTION INTRAVENOUS ONCE
Status: COMPLETED | OUTPATIENT
Start: 2024-01-12 | End: 2024-01-12

## 2024-01-12 RX ADMIN — MEMANTINE 10 MG: 10 TABLET ORAL at 09:45

## 2024-01-12 RX ADMIN — QUETIAPINE FUMARATE 12.5 MG: 25 TABLET ORAL at 20:44

## 2024-01-12 RX ADMIN — MEMANTINE 10 MG: 10 TABLET ORAL at 20:44

## 2024-01-12 RX ADMIN — BUPROPION HYDROCHLORIDE 150 MG: 150 TABLET, FILM COATED, EXTENDED RELEASE ORAL at 09:45

## 2024-01-12 RX ADMIN — DIBASIC SODIUM PHOSPHATE, MONOBASIC POTASSIUM PHOSPHATE AND MONOBASIC SODIUM PHOSPHATE 2 TABLET: 852; 155; 130 TABLET ORAL at 20:43

## 2024-01-12 RX ADMIN — ERYTHROMYCIN: 5 OINTMENT OPHTHALMIC at 05:36

## 2024-01-12 RX ADMIN — DEXTROSE AND SODIUM CHLORIDE: 5; 200 INJECTION, SOLUTION INTRAVENOUS at 14:48

## 2024-01-12 RX ADMIN — DEXTROSE AND SODIUM CHLORIDE: 5; 200 INJECTION, SOLUTION INTRAVENOUS at 03:01

## 2024-01-12 RX ADMIN — MONTELUKAST 10 MG: 10 TABLET, FILM COATED ORAL at 20:44

## 2024-01-12 RX ADMIN — MAGNESIUM SULFATE HEPTAHYDRATE 2000 MG: 40 INJECTION, SOLUTION INTRAVENOUS at 04:45

## 2024-01-12 RX ADMIN — POTASSIUM CHLORIDE 40 MEQ: 750 TABLET, FILM COATED, EXTENDED RELEASE ORAL at 12:42

## 2024-01-12 RX ADMIN — FINASTERIDE 5 MG: 5 TABLET, FILM COATED ORAL at 09:45

## 2024-01-12 RX ADMIN — POTASSIUM BICARBONATE 40 MEQ: 782 TABLET, EFFERVESCENT ORAL at 04:45

## 2024-01-12 RX ADMIN — DIBASIC SODIUM PHOSPHATE, MONOBASIC POTASSIUM PHOSPHATE AND MONOBASIC SODIUM PHOSPHATE 2 TABLET: 852; 155; 130 TABLET ORAL at 12:42

## 2024-01-12 RX ADMIN — APIXABAN 2.5 MG: 5 TABLET, FILM COATED ORAL at 20:43

## 2024-01-12 RX ADMIN — TAMSULOSIN HYDROCHLORIDE 0.4 MG: 0.4 CAPSULE ORAL at 09:45

## 2024-01-12 RX ADMIN — ERYTHROMYCIN: 5 OINTMENT OPHTHALMIC at 12:43

## 2024-01-12 RX ADMIN — DIBASIC SODIUM PHOSPHATE, MONOBASIC POTASSIUM PHOSPHATE AND MONOBASIC SODIUM PHOSPHATE 1 TABLET: 852; 155; 130 TABLET ORAL at 04:45

## 2024-01-12 RX ADMIN — PANTOPRAZOLE SODIUM 40 MG: 40 TABLET, DELAYED RELEASE ORAL at 09:45

## 2024-01-12 RX ADMIN — APIXABAN 2.5 MG: 5 TABLET, FILM COATED ORAL at 09:45

## 2024-01-12 RX ADMIN — ERYTHROMYCIN: 5 OINTMENT OPHTHALMIC at 21:00

## 2024-01-12 ASSESSMENT — PAIN SCALES - GENERAL: PAINLEVEL_OUTOF10: 0

## 2024-01-12 NOTE — CARE COORDINATION
Transition of Care Plan:    RUR: 19%  Prior Level of Functioning: requires assistance   Disposition: SNF, referral sent to [facility]   If SNF or IPR: SNF  Date FOC offered: 1/12/24  Date FOC received: 1/12/24  Accepting facility: pending  Date authorization started with reference number: n/a  Date authorization received and expires: n/a  Follow up appointments: to be scheduled  DME needed: none  Transportation at discharge: stretcher   IM/IMM Medicare/ letter given: 2nd given today   Caregiver Contact: wife Lexus Lui [phone]  Discharge Caregiver contacted prior to discharge? Yes   Care Conference needed? no  Barriers to discharge: n/a        CM spoke with patient's spouse and provided FOC. Spouse would like patient to go to [facility], referral sent awaiting response.        2:13 PM  CM placed call to the [facility] [phone] and spoke with admissions coordinator, Trinh who stated she will check referral and call CM back.           Medicare pt has received, reviewed, and signed 2nd IM letter informing them of their right to appeal the discharge.  Signed copy has been placed on pt bedside chart.        Emelyn Anderson  Care Manager  [phone]

## 2024-01-13 LAB
ANION GAP SERPL CALC-SCNC: 5 MMOL/L (ref 5–15)
ANION GAP SERPL CALC-SCNC: 6 MMOL/L (ref 5–15)
BUN SERPL-MCNC: 11 MG/DL (ref 6–20)
BUN SERPL-MCNC: 13 MG/DL (ref 6–20)
BUN/CREAT SERPL: 10 (ref 12–20)
BUN/CREAT SERPL: 13 (ref 12–20)
CALCIUM SERPL-MCNC: 7.1 MG/DL (ref 8.5–10.1)
CALCIUM SERPL-MCNC: 7.3 MG/DL (ref 8.5–10.1)
CHLORIDE SERPL-SCNC: 107 MMOL/L (ref 97–108)
CHLORIDE SERPL-SCNC: 109 MMOL/L (ref 97–108)
CO2 SERPL-SCNC: 25 MMOL/L (ref 21–32)
CO2 SERPL-SCNC: 28 MMOL/L (ref 21–32)
CREAT SERPL-MCNC: 1.02 MG/DL (ref 0.7–1.3)
CREAT SERPL-MCNC: 1.05 MG/DL (ref 0.7–1.3)
GLUCOSE SERPL-MCNC: 102 MG/DL (ref 65–100)
GLUCOSE SERPL-MCNC: 115 MG/DL (ref 65–100)
MAGNESIUM SERPL-MCNC: 1.6 MG/DL (ref 1.6–2.4)
PHOSPHATE SERPL-MCNC: 2.2 MG/DL (ref 2.6–4.7)
POTASSIUM SERPL-SCNC: 2.7 MMOL/L (ref 3.5–5.1)
POTASSIUM SERPL-SCNC: 4.4 MMOL/L (ref 3.5–5.1)
SODIUM SERPL-SCNC: 138 MMOL/L (ref 136–145)
SODIUM SERPL-SCNC: 142 MMOL/L (ref 136–145)

## 2024-01-13 PROCEDURE — 2580000003 HC RX 258: Performed by: INTERNAL MEDICINE

## 2024-01-13 PROCEDURE — 84100 ASSAY OF PHOSPHORUS: CPT

## 2024-01-13 PROCEDURE — 83735 ASSAY OF MAGNESIUM: CPT

## 2024-01-13 PROCEDURE — 6370000000 HC RX 637 (ALT 250 FOR IP): Performed by: HOSPITALIST

## 2024-01-13 PROCEDURE — 80048 BASIC METABOLIC PNL TOTAL CA: CPT

## 2024-01-13 PROCEDURE — 36415 COLL VENOUS BLD VENIPUNCTURE: CPT

## 2024-01-13 PROCEDURE — 6360000002 HC RX W HCPCS: Performed by: INTERNAL MEDICINE

## 2024-01-13 PROCEDURE — 2060000000 HC ICU INTERMEDIATE R&B

## 2024-01-13 PROCEDURE — 6370000000 HC RX 637 (ALT 250 FOR IP): Performed by: INTERNAL MEDICINE

## 2024-01-13 RX ORDER — POTASSIUM CHLORIDE 750 MG/1
40 TABLET, FILM COATED, EXTENDED RELEASE ORAL EVERY 4 HOURS
Status: COMPLETED | OUTPATIENT
Start: 2024-01-13 | End: 2024-01-13

## 2024-01-13 RX ORDER — MAGNESIUM SULFATE IN WATER 40 MG/ML
4000 INJECTION, SOLUTION INTRAVENOUS ONCE
Status: COMPLETED | OUTPATIENT
Start: 2024-01-13 | End: 2024-01-13

## 2024-01-13 RX ADMIN — FINASTERIDE 5 MG: 5 TABLET, FILM COATED ORAL at 09:21

## 2024-01-13 RX ADMIN — ERYTHROMYCIN: 5 OINTMENT OPHTHALMIC at 14:27

## 2024-01-13 RX ADMIN — APIXABAN 2.5 MG: 5 TABLET, FILM COATED ORAL at 09:27

## 2024-01-13 RX ADMIN — ERYTHROMYCIN: 5 OINTMENT OPHTHALMIC at 20:58

## 2024-01-13 RX ADMIN — MONTELUKAST 10 MG: 10 TABLET, FILM COATED ORAL at 20:57

## 2024-01-13 RX ADMIN — DEXTROSE AND SODIUM CHLORIDE: 5; 200 INJECTION, SOLUTION INTRAVENOUS at 11:10

## 2024-01-13 RX ADMIN — APIXABAN 2.5 MG: 5 TABLET, FILM COATED ORAL at 20:57

## 2024-01-13 RX ADMIN — BUPROPION HYDROCHLORIDE 150 MG: 150 TABLET, FILM COATED, EXTENDED RELEASE ORAL at 09:21

## 2024-01-13 RX ADMIN — MEMANTINE 10 MG: 10 TABLET ORAL at 20:58

## 2024-01-13 RX ADMIN — TAMSULOSIN HYDROCHLORIDE 0.4 MG: 0.4 CAPSULE ORAL at 09:27

## 2024-01-13 RX ADMIN — MAGNESIUM SULFATE IN WATER FOR 4000 MG: 40 INJECTION INTRAVENOUS at 09:22

## 2024-01-13 RX ADMIN — ERYTHROMYCIN: 5 OINTMENT OPHTHALMIC at 06:01

## 2024-01-13 RX ADMIN — DIBASIC SODIUM PHOSPHATE, MONOBASIC POTASSIUM PHOSPHATE AND MONOBASIC SODIUM PHOSPHATE 2 TABLET: 852; 155; 130 TABLET ORAL at 14:27

## 2024-01-13 RX ADMIN — POTASSIUM CHLORIDE 40 MEQ: 750 TABLET, FILM COATED, EXTENDED RELEASE ORAL at 12:30

## 2024-01-13 RX ADMIN — PANTOPRAZOLE SODIUM 40 MG: 40 TABLET, DELAYED RELEASE ORAL at 09:21

## 2024-01-13 RX ADMIN — DIBASIC SODIUM PHOSPHATE, MONOBASIC POTASSIUM PHOSPHATE AND MONOBASIC SODIUM PHOSPHATE 2 TABLET: 852; 155; 130 TABLET ORAL at 20:57

## 2024-01-13 RX ADMIN — MEMANTINE 10 MG: 10 TABLET ORAL at 09:22

## 2024-01-13 RX ADMIN — QUETIAPINE FUMARATE 12.5 MG: 25 TABLET ORAL at 20:57

## 2024-01-13 RX ADMIN — DEXTROSE AND SODIUM CHLORIDE: 5; 200 INJECTION, SOLUTION INTRAVENOUS at 00:54

## 2024-01-13 RX ADMIN — POTASSIUM CHLORIDE 40 MEQ: 750 TABLET, FILM COATED, EXTENDED RELEASE ORAL at 09:22

## 2024-01-13 RX ADMIN — DIBASIC SODIUM PHOSPHATE, MONOBASIC POTASSIUM PHOSPHATE AND MONOBASIC SODIUM PHOSPHATE 2 TABLET: 852; 155; 130 TABLET ORAL at 09:21

## 2024-01-13 ASSESSMENT — PAIN SCALES - GENERAL: PAINLEVEL_OUTOF10: 0

## 2024-01-14 LAB
ALBUMIN SERPL-MCNC: 2 G/DL (ref 3.5–5)
ANION GAP SERPL CALC-SCNC: 5 MMOL/L (ref 5–15)
BACTERIA SPEC CULT: NORMAL
BACTERIA SPEC CULT: NORMAL
BUN SERPL-MCNC: 10 MG/DL (ref 6–20)
BUN/CREAT SERPL: 9 (ref 12–20)
CALCIUM SERPL-MCNC: 7.2 MG/DL (ref 8.5–10.1)
CHLORIDE SERPL-SCNC: 108 MMOL/L (ref 97–108)
CO2 SERPL-SCNC: 28 MMOL/L (ref 21–32)
CREAT SERPL-MCNC: 1.08 MG/DL (ref 0.7–1.3)
GLUCOSE SERPL-MCNC: 91 MG/DL (ref 65–100)
MAGNESIUM SERPL-MCNC: 2.3 MG/DL (ref 1.6–2.4)
PHOSPHATE SERPL-MCNC: 2.8 MG/DL (ref 2.6–4.7)
POTASSIUM SERPL-SCNC: 3.4 MMOL/L (ref 3.5–5.1)
SERVICE CMNT-IMP: NORMAL
SERVICE CMNT-IMP: NORMAL
SODIUM SERPL-SCNC: 141 MMOL/L (ref 136–145)

## 2024-01-14 PROCEDURE — 1200000000 HC SEMI PRIVATE

## 2024-01-14 PROCEDURE — 80069 RENAL FUNCTION PANEL: CPT

## 2024-01-14 PROCEDURE — 6370000000 HC RX 637 (ALT 250 FOR IP): Performed by: INTERNAL MEDICINE

## 2024-01-14 PROCEDURE — 6370000000 HC RX 637 (ALT 250 FOR IP): Performed by: HOSPITALIST

## 2024-01-14 PROCEDURE — 83735 ASSAY OF MAGNESIUM: CPT

## 2024-01-14 PROCEDURE — 36415 COLL VENOUS BLD VENIPUNCTURE: CPT

## 2024-01-14 RX ORDER — POTASSIUM CHLORIDE 750 MG/1
40 TABLET, FILM COATED, EXTENDED RELEASE ORAL ONCE
Status: COMPLETED | OUTPATIENT
Start: 2024-01-14 | End: 2024-01-14

## 2024-01-14 RX ADMIN — ACETAMINOPHEN 650 MG: 325 TABLET ORAL at 12:26

## 2024-01-14 RX ADMIN — ERYTHROMYCIN: 5 OINTMENT OPHTHALMIC at 17:02

## 2024-01-14 RX ADMIN — ERYTHROMYCIN: 5 OINTMENT OPHTHALMIC at 07:00

## 2024-01-14 RX ADMIN — APIXABAN 2.5 MG: 5 TABLET, FILM COATED ORAL at 09:32

## 2024-01-14 RX ADMIN — MEMANTINE 10 MG: 10 TABLET ORAL at 20:24

## 2024-01-14 RX ADMIN — QUETIAPINE FUMARATE 12.5 MG: 25 TABLET ORAL at 20:24

## 2024-01-14 RX ADMIN — PANTOPRAZOLE SODIUM 40 MG: 40 TABLET, DELAYED RELEASE ORAL at 09:31

## 2024-01-14 RX ADMIN — FINASTERIDE 5 MG: 5 TABLET, FILM COATED ORAL at 09:31

## 2024-01-14 RX ADMIN — ERYTHROMYCIN: 5 OINTMENT OPHTHALMIC at 22:44

## 2024-01-14 RX ADMIN — POTASSIUM CHLORIDE 40 MEQ: 750 TABLET, FILM COATED, EXTENDED RELEASE ORAL at 09:32

## 2024-01-14 RX ADMIN — MONTELUKAST 10 MG: 10 TABLET, FILM COATED ORAL at 20:25

## 2024-01-14 RX ADMIN — APIXABAN 2.5 MG: 5 TABLET, FILM COATED ORAL at 20:24

## 2024-01-14 RX ADMIN — MEMANTINE 10 MG: 10 TABLET ORAL at 09:32

## 2024-01-14 RX ADMIN — TAMSULOSIN HYDROCHLORIDE 0.4 MG: 0.4 CAPSULE ORAL at 09:32

## 2024-01-14 RX ADMIN — BUPROPION HYDROCHLORIDE 150 MG: 150 TABLET, FILM COATED, EXTENDED RELEASE ORAL at 09:31

## 2024-01-14 ASSESSMENT — PAIN DESCRIPTION - DESCRIPTORS: DESCRIPTORS: ACHING

## 2024-01-14 ASSESSMENT — PAIN DESCRIPTION - LOCATION: LOCATION: LEG

## 2024-01-14 ASSESSMENT — PAIN SCALES - GENERAL: PAINLEVEL_OUTOF10: 5

## 2024-01-14 ASSESSMENT — PAIN DESCRIPTION - ORIENTATION: ORIENTATION: RIGHT;LEFT

## 2024-01-14 NOTE — CARE COORDINATION
MURIEL- Pending referral to the [facility]  Cm noted that the [facility] has not answered the referral for SNF yet. CM called Trinh ([phone])in admissions at the [facility] and left her a message asking her to call this CM. Mckenna Wilcox,BSW,ACM-SW

## 2024-01-15 VITALS
OXYGEN SATURATION: 100 % | HEIGHT: 70 IN | TEMPERATURE: 98.2 F | SYSTOLIC BLOOD PRESSURE: 141 MMHG | DIASTOLIC BLOOD PRESSURE: 67 MMHG | HEART RATE: 59 BPM | BODY MASS INDEX: 21.47 KG/M2 | RESPIRATION RATE: 18 BRPM | WEIGHT: 150 LBS

## 2024-01-15 PROBLEM — R41.82 AMS (ALTERED MENTAL STATUS): Status: RESOLVED | Noted: 2024-01-09 | Resolved: 2024-01-15

## 2024-01-15 LAB
ANION GAP SERPL CALC-SCNC: 4 MMOL/L (ref 5–15)
BUN SERPL-MCNC: 15 MG/DL (ref 6–20)
BUN/CREAT SERPL: 14 (ref 12–20)
CALCIUM SERPL-MCNC: 7.1 MG/DL (ref 8.5–10.1)
CHLORIDE SERPL-SCNC: 110 MMOL/L (ref 97–108)
CO2 SERPL-SCNC: 29 MMOL/L (ref 21–32)
CREAT SERPL-MCNC: 1.09 MG/DL (ref 0.7–1.3)
GLUCOSE SERPL-MCNC: 92 MG/DL (ref 65–100)
PHOSPHATE SERPL-MCNC: 2.2 MG/DL (ref 2.6–4.7)
POTASSIUM SERPL-SCNC: 3.7 MMOL/L (ref 3.5–5.1)
SODIUM SERPL-SCNC: 143 MMOL/L (ref 136–145)

## 2024-01-15 PROCEDURE — 84100 ASSAY OF PHOSPHORUS: CPT

## 2024-01-15 PROCEDURE — 97530 THERAPEUTIC ACTIVITIES: CPT

## 2024-01-15 PROCEDURE — 6370000000 HC RX 637 (ALT 250 FOR IP): Performed by: HOSPITALIST

## 2024-01-15 PROCEDURE — 80048 BASIC METABOLIC PNL TOTAL CA: CPT

## 2024-01-15 PROCEDURE — 36415 COLL VENOUS BLD VENIPUNCTURE: CPT

## 2024-01-15 RX ORDER — TAMSULOSIN HYDROCHLORIDE 0.4 MG/1
0.4 CAPSULE ORAL DAILY
Qty: 30 CAPSULE | Refills: 0 | Status: SHIPPED | OUTPATIENT
Start: 2024-01-15

## 2024-01-15 RX ORDER — FUROSEMIDE 20 MG/1
TABLET ORAL
Qty: 1 TABLET | Refills: 0 | Status: SHIPPED
Start: 2024-01-15 | End: 2024-01-15

## 2024-01-15 RX ORDER — TAMSULOSIN HYDROCHLORIDE 0.4 MG/1
0.4 CAPSULE ORAL DAILY
Qty: 30 CAPSULE | Refills: 0 | Status: SHIPPED
Start: 2024-01-15 | End: 2024-01-15

## 2024-01-15 RX ORDER — ERYTHROMYCIN 5 MG/G
OINTMENT OPHTHALMIC
Qty: 1 EACH | Refills: 0 | Status: SHIPPED | OUTPATIENT
Start: 2024-01-15 | End: 2024-01-16

## 2024-01-15 RX ORDER — ERYTHROMYCIN 5 MG/G
OINTMENT OPHTHALMIC
Qty: 1 EACH | Refills: 0 | Status: SHIPPED
Start: 2024-01-15 | End: 2024-01-15

## 2024-01-15 RX ORDER — FUROSEMIDE 20 MG/1
TABLET ORAL
Qty: 1 TABLET | Refills: 0 | Status: SHIPPED | OUTPATIENT
Start: 2024-01-15

## 2024-01-15 RX ORDER — AMLODIPINE BESYLATE 10 MG/1
10 TABLET ORAL DAILY
Qty: 30 TABLET | Refills: 3 | Status: SHIPPED
Start: 2024-01-15

## 2024-01-15 RX ADMIN — FINASTERIDE 5 MG: 5 TABLET, FILM COATED ORAL at 12:03

## 2024-01-15 RX ADMIN — TAMSULOSIN HYDROCHLORIDE 0.4 MG: 0.4 CAPSULE ORAL at 12:03

## 2024-01-15 RX ADMIN — APIXABAN 2.5 MG: 5 TABLET, FILM COATED ORAL at 12:02

## 2024-01-15 RX ADMIN — MEMANTINE 10 MG: 10 TABLET ORAL at 12:02

## 2024-01-15 RX ADMIN — PANTOPRAZOLE SODIUM 40 MG: 40 TABLET, DELAYED RELEASE ORAL at 12:03

## 2024-01-15 RX ADMIN — BUPROPION HYDROCHLORIDE 150 MG: 150 TABLET, FILM COATED, EXTENDED RELEASE ORAL at 12:02

## 2024-01-15 RX ADMIN — ERYTHROMYCIN: 5 OINTMENT OPHTHALMIC at 06:03

## 2024-01-15 NOTE — PROGRESS NOTES
Hospitalist Progress Note  Olamide Singleton MD  Answering service: [phone] OR [phone] from in house phone        Date of Service:  2024  NAME:  Deon Lui  :  1935  MRN:  [id number]      Admission Summary:   Deon Lui is a 88 y.o. male with underlying dementia and PMH significant for atrial fibrillation, heart failure, hypertension, stroke resident at [facility] unit was sent to the ED for evaluation of being altered.  No report of fever, cough or signs of acute illness.  His wife tells me he has been taken to the ED multiple times recently for chest pain, and most recently  on 2023 he was diagnosed with pneumonia and discharged from the ED on Augmentin.  On eval in the ED, vital signs stable.  Lab work significant for elevated BUN 82, creatinine 3.16.  His creatinine has fluctuated but mostly under 2 with return to normalcy in the past.  Lactic acid 3.0  CBC unremarkable except left shift, neutrophils 76%  Blood cultures drawn.  UA pending.  Chest x-ray negative  CT head negative.  Treatment in the ED: IV saline bolus initiated.  Given a dose of IV ceftriaxone    Interval history / Subjective:   No acute complaints  NAD  Pleasantly demented  AFVSS     Assessment & Plan:     Acute metabolic encephalopathy on underlying dementia likely 2/2 volume depletion- improved, back to baseline  OSCAR on CKD stage III, resolved, Cr wnl  Lactic acidosis- cleared  -UC no growth, BC NG    Hypernatremia d/t FWD from poor PO intake, resolved  Wean IVF 1/4NS  Encourage PO intake    Hypokalemia, replete  Hypomag, replete  Hypophos, replete     Dementia with behavioral disorder  -Currently calm, continue home Wellbutrin, Seroquel     PAF: Continue apixaban  Metoprolol held d/t bradycardia     Left eye conjunctivitis: cont erythromycin ointment    Debility  PT/OT recs SNF    Acute urinary 
                                                                                                Hospitalist Progress Note  Olamide Singleton MD  Answering service: [phone] OR [phone] from in house phone        Date of Service:  2024  NAME:  Deon Lui  :  1935  MRN:  [id number]      Admission Summary:   Deon Lui is a 88 y.o. male with underlying dementia and PMH significant for atrial fibrillation, heart failure, hypertension, stroke resident at [facility] unit was sent to the ED for evaluation of being altered.  No report of fever, cough or signs of acute illness.  His wife tells me he has been taken to the ED multiple times recently for chest pain, and most recently  on 2023 he was diagnosed with pneumonia and discharged from the ED on Augmentin.  On eval in the ED, vital signs stable.  Lab work significant for elevated BUN 82, creatinine 3.16.  His creatinine has fluctuated but mostly under 2 with return to normalcy in the past.  Lactic acid 3.0  CBC unremarkable except left shift, neutrophils 76%  Blood cultures drawn.  UA pending.  Chest x-ray negative  CT head negative.  Treatment in the ED: IV saline bolus initiated.  Given a dose of IV ceftriaxone    Interval history / Subjective:   \"Please let me sleep a little longer\"  NAD  Pleasantly demented  AFVSS     Assessment & Plan:     Acute metabolic encephalopathy on underlying dementia likely 2/2 volume depletion- improved, back to baseline  OSCAR on CKD stage III, resolved, Cr wnl  Lactic acidosis- cleared  -UC no growth, BC NG    Hypernatremia d/t FWD from poor PO intake, resolved  S/p IVF 1/4NS  Encourage PO intake    Hypokalemia, replete  Hypomag, repleted  Hypophos, repleted     Dementia with behavioral disorder  -Currently calm, continue home Wellbutrin, Seroquel     PAF: Continue apixaban  Metoprolol held d/t bradycardia     Left eye conjunctivitis: cont erythromycin ointment    Debility  PT/OT recs 
                                                                                                Hospitalist Progress Note  Olamide Singleton MD  Answering service: [phone] OR [phone] from in house phone        Date of Service:  2024  NAME:  Deon Lui  :  1935  MRN:  [id number]      Admission Summary:   Deon Lui is a 88 y.o. male with underlying dementia and PMH significant for atrial fibrillation, heart failure, hypertension, stroke resident at [facility] unit was sent to the ED for evaluation of being altered.  No report of fever, cough or signs of acute illness.  His wife tells me he has been taken to the ED multiple times recently for chest pain, and most recently  on 2023 he was diagnosed with pneumonia and discharged from the ED on Augmentin.  On eval in the ED, vital signs stable.  Lab work significant for elevated BUN 82, creatinine 3.16.  His creatinine has fluctuated but mostly under 2 with return to normalcy in the past.  Lactic acid 3.0  CBC unremarkable except left shift, neutrophils 76%  Blood cultures drawn.  UA pending.  Chest x-ray negative  CT head negative.  Treatment in the ED: IV saline bolus initiated.  Given a dose of IV ceftriaxone    Interval history / Subjective:   Seen and examined for AMS. Unclear baseline  Oriented to self, follows commands  NAD  No acute complaints  RN noted HR dropped to 39 then back up to 50s     Assessment & Plan:     Acute metabolic encephalopathy on underlying dementia likely 2/2 volume depletion  OSCAR on CKD stage III  Lactic acidosis- cleared  -continue IVF, Cr improving, likely near baseline  -UC no growth, BC NGTD; will dc empiric abx as no e/o infection    Hypernatremia d/t FWD  Change IVF from NS to 1/4NS     Dementia with behavioral disorder  -Currently calm, continue home Wellbutrin, Seroquel     PAF: Continue apixaban  Bradycardia this AM, will reduce metoprolol dose and add hold parameters     Left eye 
                                                                                                Hospitalist Progress Note  Olamide Singleton MD  Answering service: [phone] OR [phone] from in house phone        Date of Service:  2024  NAME:  Deon Lui  :  1935  MRN:  [id number]      Admission Summary:   Deon Lui is a 88 y.o. male with underlying dementia and PMH significant for atrial fibrillation, heart failure, hypertension, stroke resident at [facility] unit was sent to the ED for evaluation of being altered.  No report of fever, cough or signs of acute illness.  His wife tells me he has been taken to the ED multiple times recently for chest pain, and most recently  on 2023 he was diagnosed with pneumonia and discharged from the ED on Augmentin.  On eval in the ED, vital signs stable.  Lab work significant for elevated BUN 82, creatinine 3.16.  His creatinine has fluctuated but mostly under 2 with return to normalcy in the past.  Lactic acid 3.0  CBC unremarkable except left shift, neutrophils 76%  Blood cultures drawn.  UA pending.  Chest x-ray negative  CT head negative.  Treatment in the ED: IV saline bolus initiated.  Given a dose of IV ceftriaxone    Interval history / Subjective:   Seen and examined for AMS  A&Ox2  NAD  No acute complaints  HR drops to 40s per tele, currently in 60s     Assessment & Plan:     Acute metabolic encephalopathy on underlying dementia likely 2/2 volume depletion  OSCAR on CKD stage III, resolved, Cr wnl  Lactic acidosis- cleared  -continue IVF for hypernatremia  -UC no growth, BC NG; dc'd empiric abx as no e/o infection    Hypernatremia d/t FWD from poor PO intake  Cont IVF 1/4NS    Hypokalemia, replete  Hypomag, replete  Hypophos, replete     Dementia with behavioral disorder  -Currently calm, continue home Wellbutrin, Seroquel     PAF: Continue apixaban  Still bradycardia despite lower dose of metoprolol, will hold     Left eye 
  Physician Progress Note      PATIENT:               ROCIO WOLFE  CSN #:                  [id number]  :                       1935  ADMIT DATE:       2024 9:14 AM  DISCH DATE:  RESPONDING  PROVIDER #:        Olamide Singleton MD          QUERY TEXT:    Patient admitted with Dehydration, noted to have Paroxysmal atrial   fibrillation in the H&P on  and is maintained on Apixaban. If possible,   please document in progress notes and discharge summary if you are evaluating   and/or treating any of the following:    The medical record reflects the following:  Risk Factors: Paroxysmal atrial fibrillation, HTN, CHF, Age 88,  Clinical Indicators:  H&P  ?PAF: Continue metoprolol, apixaban.?  Treatment: Eliquis, EKG, Cardiology    Thank you  Norma Simon RN, CDI. CCDS, CRCR  Certified  Clinical Documentation   O: [phone]  [email or web address]  I can also be reached by perfect serve  Options provided:  -- Secondary hypercoagulable state in a patient with atrial fibrillation  -- Other - I will add my own diagnosis  -- Disagree - Not applicable / Not valid  -- Disagree - Clinically unable to determine / Unknown  -- Refer to Clinical Documentation Reviewer    PROVIDER RESPONSE TEXT:    This patient has secondary hypercoagulable state in a patient with atrial   fibrillation.    Query created by: Norma Simon on 2024 10:11 AM      Electronically signed by:  Olamide Singleton MD 2024 2:56 PM          
0800: Bedside and Verbal shift change report given to Aisha RN (oncoming nurse) by Bari RN (offgoing nurse). Report included the following information Nurse Handoff Report and Index.     1000: Pt refusing RN to draw labs, pt agreeable to medications but becomes combative when RN attempts to draw blood.    1400: Pt refusing lab draw, MD aware.       
Admission Medication Reconciliation:    Information obtained from:  medication list from SNF  RxQuery data available¹:  Yes    Comments/Recommendations: Updated PTA meds/reviewed patient's allergies.    1)  Medication changes (since last review):  Added  - amlodipine, losartan, artifical tears, senna, calprotect ointment, Eucerin creme     Adjusted  - n/s    Removed  - cetirizine, erythromycin ophthalmic, famotidine, metoprolol, montelukast, quetiapine, spironolactone, sucralfate, tamsulosin, Vit D       ¹RxQuery pharmacy benefit data reflects medications filled and processed through the patient's insurance, however   this data does NOT capture whether the medication was picked up or is currently being taken by the patient.    Allergies:  Patient has no known allergies.    Significant PMH/Disease States:   Past Medical History:   Diagnosis Date    Allergy     seasonal    Arrhythmia     Asthma     Dementia (HCC)     Depression     Heart failure (HCC)     Hypertension     Neurological disorder     dementia    Other ill-defined conditions(799.89)     a-fib    Stroke ([facility])      Chief Complaint for this Admission:    Chief Complaint   Patient presents with    Altered Mental Status     Prior to Admission Medications:   Prior to Admission Medications   Prescriptions Last Dose Informant   Skin Protectants, Misc. (EUCERIN) cream 1/9/2024    Sig: Apply topically daily Apply topically qd for dry skin   amLODIPine (NORVASC) 10 MG tablet 1/9/2024    Sig: Take 1 tablet by mouth daily   apixaban (ELIQUIS) 2.5 MG TABS tablet 1/9/2024    Sig: Take 1 tablet by mouth in the morning and 1 tablet in the evening.   buPROPion (WELLBUTRIN XL) 150 MG extended release tablet 1/9/2024    Sig: Take 1 tablet by mouth daily   empagliflozin (JARDIANCE) 10 MG tablet 1/9/2024    Sig: Take 1 tablet by mouth daily   famotidine (PEPCID) 20 MG tablet     Sig: Take 1 tablet by mouth 2 times daily for 10 days   finasteride (PROSCAR) 5 MG tablet 1/9/2024  
Lovenox Monitoring  Indication: DVT Prophylaxis  Recent Labs     01/09/24  0934   HGB 14.8        Current Weight: ~71.6 kg (last month - order placed to check     Est. CrCl = ~17 ml/min (based on last months weight  Current Dose: 40 mg subcutaneously every 24 hours.    Plan: Change to 30 mg subcutaneously every 24 hours  for CrCl  mL/min and weight .9 kg (~71.6 kg)      Maximo Graham, PharmD  Clinical Pharmacist  [facility] ([phone])    
Notified wife Lexus of patients new room assignment.   
Patient discharged in stable condition.  Dc instructions and scripts sent to [facility].  No reports of pain, bleeding or sob.  Lexie intact.  Called [phone] to give report, left message with  for call back.  
Pt's HR drops to 39 & then comes back up to the 50s. MD notified.  
  Medication Sig    amLODIPine (NORVASC) 10 MG tablet Take 1 tablet by mouth daily    polyvinyl alcohol (LIQUIFILM TEARS) 1.4 % ophthalmic solution Place 1 drop into both eyes in the morning, at noon, and at bedtime    menthol-zinc oxide (CALPROTECT) 0.44-20.6 % OINT ointment Apply topically daily Apply to genitals & buttocks qshift for skin protection  Max 30 ml per day.    Skin Protectants, Misc. (EUCERIN) cream Apply topically daily Apply topically qd for dry skin    losartan (COZAAR) 25 MG tablet Take 1 tablet by mouth daily    senna (SENOKOT) 8.6 MG tablet Take 2 tablets by mouth nightly    pantoprazole (PROTONIX) 40 MG tablet Take 1 tablet by mouth daily    famotidine (PEPCID) 20 MG tablet Take 1 tablet by mouth 2 times daily for 10 days    sucralfate (CARAFATE) 1 GM tablet Take 1 tablet by mouth 4 times daily for 10 days    apixaban (ELIQUIS) 2.5 MG TABS tablet Take 1 tablet by mouth in the morning and 1 tablet in the evening.    buPROPion (WELLBUTRIN XL) 150 MG extended release tablet Take 1 tablet by mouth daily    empagliflozin (JARDIANCE) 10 MG tablet Take 1 tablet by mouth daily    finasteride (PROSCAR) 5 MG tablet Take 1 tablet by mouth daily    furosemide (LASIX) 40 MG tablet Take 0.5 tablets by mouth daily    memantine (NAMENDA) 10 MG tablet Take 1 tablet by mouth in the morning and 1 tablet in the evening.     ______________________________________________________________________  EXPECTED LENGTH OF STAY: Unable to retrieve estimated LOS  ACTUAL LENGTH OF STAY:          1                 Rakan Hargrove MD

## 2024-01-15 NOTE — CONSULTS
artifact.    US Result (most recent):  US RETROPERITONEAL COMPLETE 01/09/2024    Narrative  EXAM: US RENAL-RETROPERITONEAL    INDICATION: Acute kidney injury    COMPARISON: Ultrasound 12/18/2023    FINDINGS:  The patient is studied with coronal and axial real-time ultrasound.    The right kidney measures 11.2 cm, and the left kidney measures 11.9 cm.  Renal  contour and echogenicity are normal.  There is no mass or shadowing calculus.  There is no hydronephrosis.    The ureters are not dilated.    The distal aorta is normal in caliber. The proximal origins of the iliac  arteries are obscured by bowel gas. The IVC is patent.    The urinary bladder is nondistended.    Impression  Normal appearance of the kidneys without hydronephrosis.    Cultures   [unfilled]     Past History: (Complete 2+/3 areas)   No Known Allergies   Current Facility-Administered Medications   Medication Dose Route Frequency    [Held by provider] metoprolol tartrate (LOPRESSOR) tablet 12.5 mg  12.5 mg Oral BID    ondansetron (ZOFRAN-ODT) disintegrating tablet 4 mg  4 mg Oral Q8H PRN    Or    ondansetron (ZOFRAN) injection 4 mg  4 mg IntraVENous Q6H PRN    acetaminophen (TYLENOL) tablet 650 mg  650 mg Oral Q6H PRN    Or    acetaminophen (TYLENOL) suppository 650 mg  650 mg Rectal Q6H PRN    polyethylene glycol (GLYCOLAX) packet 17 g  17 g Oral Daily PRN    apixaban (ELIQUIS) tablet 2.5 mg  2.5 mg Oral BID    finasteride (PROSCAR) tablet 5 mg  5 mg Oral Daily    memantine (NAMENDA) tablet 10 mg  10 mg Oral BID    montelukast (SINGULAIR) tablet 10 mg  10 mg Oral Nightly    pantoprazole (PROTONIX) tablet 40 mg  40 mg Oral Daily    QUEtiapine (SEROQUEL) tablet 12.5 mg  12.5 mg Oral Nightly    tamsulosin (FLOMAX) capsule 0.4 mg  0.4 mg Oral Daily    erythromycin (ROMYCIN) ophthalmic ointment   Left Eye 3 times per day    buPROPion (WELLBUTRIN SR) extended release tablet 150 mg  150 mg Oral Daily    Prior to Admission medications    Medication Sig

## 2024-01-15 NOTE — PLAN OF CARE
Problem: Discharge Planning  Goal: Discharge to home or other facility with appropriate resources  1/13/2024 2305 by Lizzy Baig RN  Outcome: Progressing  Flowsheets (Taken 1/13/2024 1945)  Discharge to home or other facility with appropriate resources: Identify barriers to discharge with patient and caregiver  1/13/2024 1206 by Michelle Denis RN  Outcome: Progressing  Flowsheets (Taken 1/13/2024 0927)  Discharge to home or other facility with appropriate resources:   Identify barriers to discharge with patient and caregiver   Arrange for needed discharge resources and transportation as appropriate     Problem: Safety - Adult  Goal: Free from fall injury  1/13/2024 2305 by Lizzy Baig RN  Outcome: Progressing  1/13/2024 1206 by Michelle Denis RN  Outcome: Progressing  Flowsheets (Taken 1/13/2024 0927)  Free From Fall Injury: Instruct family/caregiver on patient safety     Problem: Chronic Conditions and Co-morbidities  Goal: Patient's chronic conditions and co-morbidity symptoms are monitored and maintained or improved  1/13/2024 2305 by Lizzy Baig RN  Outcome: Progressing  Flowsheets (Taken 1/13/2024 1945)  Care Plan - Patient's Chronic Conditions and Co-Morbidity Symptoms are Monitored and Maintained or Improved: Update acute care plan with appropriate goals if chronic or comorbid symptoms are exacerbated and prevent overall improvement and discharge  1/13/2024 1206 by Michelle Denis RN  Outcome: Progressing  Flowsheets (Taken 1/13/2024 0927)  Care Plan - Patient's Chronic Conditions and Co-Morbidity Symptoms are Monitored and Maintained or Improved:   Collaborate with multidisciplinary team to address chronic and comorbid conditions and prevent exacerbation or deterioration   Monitor and assess patient's chronic conditions and comorbid symptoms for stability, deterioration, or improvement     Problem: Skin/Tissue Integrity  Goal: Absence of new skin 
  Problem: Discharge Planning  Goal: Discharge to home or other facility with appropriate resources  1/14/2024 1140 by Michelle Denis RN  Outcome: Progressing  Flowsheets (Taken 1/14/2024 0932)  Discharge to home or other facility with appropriate resources:   Identify barriers to discharge with patient and caregiver   Arrange for needed discharge resources and transportation as appropriate  1/13/2024 2305 by Lizzy Baig RN  Outcome: Progressing  Flowsheets (Taken 1/13/2024 1945)  Discharge to home or other facility with appropriate resources: Identify barriers to discharge with patient and caregiver     Problem: Safety - Adult  Goal: Free from fall injury  1/14/2024 1140 by Michelle Denis RN  Outcome: Progressing  Flowsheets (Taken 1/14/2024 0932)  Free From Fall Injury: Instruct family/caregiver on patient safety  1/13/2024 2305 by Lizzy Baig RN  Outcome: Progressing     Problem: Chronic Conditions and Co-morbidities  Goal: Patient's chronic conditions and co-morbidity symptoms are monitored and maintained or improved  1/14/2024 1140 by Michelle Denis RN  Outcome: Progressing  Flowsheets (Taken 1/14/2024 0932)  Care Plan - Patient's Chronic Conditions and Co-Morbidity Symptoms are Monitored and Maintained or Improved:   Monitor and assess patient's chronic conditions and comorbid symptoms for stability, deterioration, or improvement   Collaborate with multidisciplinary team to address chronic and comorbid conditions and prevent exacerbation or deterioration  1/13/2024 2305 by Lizzy Baig RN  Outcome: Progressing  Flowsheets (Taken 1/13/2024 1945)  Care Plan - Patient's Chronic Conditions and Co-Morbidity Symptoms are Monitored and Maintained or Improved: Update acute care plan with appropriate goals if chronic or comorbid symptoms are exacerbated and prevent overall improvement and discharge     Problem: Skin/Tissue Integrity  Goal: Absence of new skin 
  Problem: Discharge Planning  Goal: Discharge to home or other facility with appropriate resources  Outcome: Progressing  Flowsheets (Taken 1/13/2024 0927)  Discharge to home or other facility with appropriate resources:   Identify barriers to discharge with patient and caregiver   Arrange for needed discharge resources and transportation as appropriate     Problem: Safety - Adult  Goal: Free from fall injury  Outcome: Progressing  Flowsheets (Taken 1/13/2024 0927)  Free From Fall Injury: Instruct family/caregiver on patient safety     Problem: Chronic Conditions and Co-morbidities  Goal: Patient's chronic conditions and co-morbidity symptoms are monitored and maintained or improved  Outcome: Progressing  Flowsheets (Taken 1/13/2024 0927)  Care Plan - Patient's Chronic Conditions and Co-Morbidity Symptoms are Monitored and Maintained or Improved:   Collaborate with multidisciplinary team to address chronic and comorbid conditions and prevent exacerbation or deterioration   Monitor and assess patient's chronic conditions and comorbid symptoms for stability, deterioration, or improvement     Problem: Skin/Tissue Integrity  Goal: Absence of new skin breakdown  Description: 1.  Monitor for areas of redness and/or skin breakdown  2.  Assess vascular access sites hourly  3.  Every 4-6 hours minimum:  Change oxygen saturation probe site  4.  Every 4-6 hours:  If on nasal continuous positive airway pressure, respiratory therapy assess nares and determine need for appliance change or resting period.  Outcome: Progressing     
  Problem: Physical Therapy - Adult  Goal: By Discharge: Performs mobility at highest level of function for planned discharge setting.  See evaluation for individualized goals.  Description: FUNCTIONAL STATUS PRIOR TO ADMISSION: Patient uses a rolling walker at baseline and has lived in a memory care unit for the last 2 years.    HOME SUPPORT PRIOR TO ADMISSION: Memory care assisted living facility.    Physical Therapy Goals  Initiated 1/12/2024  1.  Patient will move from supine to sit and sit to supine and roll side to side in bed with supervision/set-up within 7 day(s).    2.  Patient will perform sit to stand with supervision/set-up within 7 day(s).  3.  Patient will transfer from bed to chair and chair to bed with supervision/set-up using the least restrictive device within 7 day(s).  4.  Patient will ambulate with supervision/set-up for 50 feet with the least restrictive device within 7 day(s).      Outcome: Progressing   PHYSICAL THERAPY EVALUATION    Patient: Deon Lui (88 y.o. male)  Date: 1/12/2024  Primary Diagnosis: Syncope and collapse [R55]  Stupor [R40.1]  Hypotension, unspecified hypotension type [I95.9]  AMS (altered mental status) [R41.82]       Precautions:                        ASSESSMENT :   DEFICITS/IMPAIRMENTS:   The patient is limited by decreased  mobility and endurance after being admitted with AMS.  PMHx is significant for dementia and he has had multiple ED visits over the last several months.      At this time, he is very agreeable to attempting mobility, but once sitting, he immediately wanted to return to bed.  BP was stable in sitting and he reported \"not feeling well\" but no other specific complaint.  He was not able to attempt standing at this time.    Based on the impairments listed above he is far below his baseline level of mobility and would benefit from continued therapy and likely rehab once medically ready.    Patient will benefit from skilled intervention to address 
  Problem: Physical Therapy - Adult  Goal: By Discharge: Performs mobility at highest level of function for planned discharge setting.  See evaluation for individualized goals.  Description: FUNCTIONAL STATUS PRIOR TO ADMISSION: Patient uses a rolling walker at baseline and has lived in a memory care unit for the last 2 years.    HOME SUPPORT PRIOR TO ADMISSION: Memory care assisted living facility.    Physical Therapy Goals  Initiated 1/12/2024  1.  Patient will move from supine to sit and sit to supine and roll side to side in bed with supervision/set-up within 7 day(s).    2.  Patient will perform sit to stand with supervision/set-up within 7 day(s).  3.  Patient will transfer from bed to chair and chair to bed with supervision/set-up using the least restrictive device within 7 day(s).  4.  Patient will ambulate with supervision/set-up for 50 feet with the least restrictive device within 7 day(s).      Outcome: Progressing   PHYSICAL THERAPY TREATMENT    Patient: Deon Lui (88 y.o. male)  Date: 1/15/2024  Diagnosis: Syncope and collapse [R55]  Stupor [R40.1]  Hypotension, unspecified hypotension type [I95.9]  AMS (altered mental status) [R41.82] AMS (altered mental status)      Precautions: Fall Risk                    ASSESSMENT:  Patient continues to benefit from skilled PT services and is slowly progressing towards goals. Pt tolerated session fairly. Pt continues to be limited by impaired cognition, generalized weakness, decreased functional mobility, impaired balance and gait, increased risk for falls. Pt required mod A bed mobility, sit<>stands with  mod A x 2. Pt repeatedly asked to lay back down requiring max cues for encouragement to participate. Pt will benefit from SNF placement upon discharge to continue therapy efforts.       PLAN:  Patient continues to benefit from skilled intervention to address the above impairments.  Continue treatment per established plan of care.    Recommendation for 
Training  Transfer Training: Yes  Sit to Stand: Moderate assistance;Assist X2  Stand to Sit: Moderate assistance;Assist X2           Balance:  Standing: Impaired  Balance  Sitting: Impaired  Sitting - Static: Fair (occasional)  Sitting - Dynamic: Fair (occasional)  Standing: Impaired  Standing - Static: Poor;Constant support  Standing - Dynamic: Not tested      ADL Intervention:                                                    LE Dressing: Dependent/Total  LE Dressing Skilled Clinical Factors: to cameron socks at EOB, pt dependent with dressing at baseline              Pain Ratin/10       Activity Tolerance:   Fair  and Poor  Please refer to the flowsheet for vital signs taken during this treatment.    After treatment:   Patient left in no apparent distress in bed, Call bell within reach, Bed/ chair alarm activated, Caregiver / family present, and Side rails x3    COMMUNICATION/EDUCATION:   The patient's plan of care was discussed with: physical therapist and registered nurse    Patient Education  Education Given To: Family;Patient  Education Provided: Role of Therapy;Plan of Care;Orientation;Precautions  Education Method: Demonstration;Verbal  Barriers to Learning: Cognition  Education Outcome: Unable to demonstrate understanding;Continued education needed    Thank you for this referral.  RHIANNON Gutierrez, OTR/L  Minutes: 10    
supine)      ADL Assessment:          Feeding: Modified independent        Grooming: Minimal assistance       UE Bathing: Moderate assistance       Skin Care: Bath wipes;Chlorhexidine wipes;Incontinent cleanser    LE Bathing: Dependent/Total       UE Dressing: Moderate assistance       LE Dressing: Dependent/Total       Toileting: Dependent/Total       ADL Intervention and task modifications:    Toileting: .  - Bowel Hygiene : Total Assistance and Bed level  - Clothing Management : Total Assistance and Bed level    Feeding: .  - Food to Mouth: Modified Independent  and Bed level    Grooming: .  - Washing Hands : Minimal Assistance and Bed level    Upper Extremity Dressing: .  - Hospital Gown : Moderate Assistance and Bed level     Lower Extremity Dressing: .  - Protective Undergarment : Total Assistance and Bed level  - Socks : Total Assistance and Seated EOB      [facility] AM-PACTM \"6 Clicks\"                                                       Daily Activity Inpatient Short Form  How much help from another person does the patient currently need... Total; A Lot A Little None   1.  Putting on and taking off regular lower body clothing? [x]  1 []  2 []  3 []  4   2.  Bathing (including washing, rinsing, drying)? []  1 [x]  2 []  3 []  4   3.  Toileting, which includes using toilet, bedpan or urinal? [x] 1 []  2 []  3 []  4   4.  Putting on and taking off regular upper body clothing? []  1 [x]  2 []  3 []  4   5.  Taking care of personal grooming such as brushing teeth? []  1 []  2 [x]  3 []  4   6.  Eating meals? []  1 []  2 []  3 [x]  4   © 2007, Trustees of [facility], under license to [employer]. All rights reserved     Score: 13/24     Interpretation of Tool:  Represents clinically-significant functional categories (i.e. Activities of daily living).    Cutoff score 39.4 (19) correlates to a good likelihood of discharging home versus a facility  Arcelia Mcnamara, Vasquez Garnica

## 2024-01-15 NOTE — CARE COORDINATION
MURIEL:    Patient is discharging back to his memory care unit at [facility], [phone]. KATE spoke with HIRAL Mercado at the memory care who states based on therapy notes patient is back at his baseline. Kate scheduled transport with Hospital to home for 2:30 p.m. RN can call report to [phone] ask for memory care unit. His wife is present at bedside, and agreeable to the plan. Kate faxed d/c summary to [facility] at [phone].  KATE sent JOEL orders to [facility]  services for pt/sn.     [facility]  Care Management Department  Ph:[phone]

## 2024-01-15 NOTE — DISCHARGE SUMMARY
Cardiologist if weight gain requires dosing.  What changed:   medication strength  how much to take  how to take this  when to take this  additional instructions            CONTINUE taking these medications      apixaban 2.5 MG Tabs tablet  Commonly known as: ELIQUIS     buPROPion 150 MG extended release tablet  Commonly known as: WELLBUTRIN XL     CalProtect 0.44-20.6 % Oint ointment  Generic drug: menthol-zinc oxide     empagliflozin 10 MG tablet  Commonly known as: JARDIANCE     eucerin cream     famotidine 20 MG tablet  Commonly known as: Pepcid  Take 1 tablet by mouth 2 times daily for 10 days     finasteride 5 MG tablet  Commonly known as: PROSCAR     losartan 25 MG tablet  Commonly known as: COZAAR     memantine 10 MG tablet  Commonly known as: NAMENDA     pantoprazole 40 MG tablet  Commonly known as: Protonix  Take 1 tablet by mouth daily     polyvinyl alcohol 1.4 % ophthalmic solution  Commonly known as: LIQUIFILM TEARS     senna 8.6 MG tablet  Commonly known as: SENOKOT     sucralfate 1 GM tablet  Commonly known as: Carafate  Take 1 tablet by mouth 4 times daily for 10 days     tamsulosin 0.4 MG capsule  Commonly known as: FLOMAX  Take 1 capsule by mouth daily            STOP taking these medications      metoprolol tartrate 25 MG tablet  Commonly known as: LOPRESSOR     montelukast 10 MG tablet  Commonly known as: SINGULAIR     QUEtiapine 25 MG tablet  Commonly known as: SEROQUEL     spironolactone 25 MG tablet  Commonly known as: ALDACTONE     ZyrTEC Allergy 10 MG Caps  Generic drug: Cetirizine HCl               Where to Get Your Medications        Information about where to get these medications is not yet available    Ask your nurse or doctor about these medications  amLODIPine 10 MG tablet  erythromycin 5 MG/GM ophthalmic ointment  furosemide 20 MG tablet  tamsulosin 0.4 MG capsule           NOTIFY YOUR PHYSICIAN FOR ANY OF THE FOLLOWING:   Fever over 101 degrees for 24 hours.   Chest pain, shortness

## 2024-01-15 NOTE — DISCHARGE INSTRUCTIONS
Follow up with [facility] on 1/29/24 at 9 AM with Dr. Bhatia for voiding trial and catheter removal. Please call to re-schedule if unable to make the appointment.   [phone]          Discharge Summary       PATIENT ID: Deon Lui  MRN: [id number]   YOB: 1935    DATE OF ADMISSION: 1/9/2024  9:14 AM    DATE OF DISCHARGE: 01/15/24    PRIMARY CARE PROVIDER: Kylee Tavarez MD     ATTENDING PHYSICIAN: Estrella Parra MD   DISCHARGING PROVIDER: Estrella Parra MD    To contact this individual call [phone] and ask the  to page.  If unavailable ask to be transferred the Adult Hospitalist Department.    CONSULTATIONS: IP CONSULT TO CASE MANAGEMENT  IP CONSULT TO UROLOGY    PROCEDURES/SURGERIES: * No surgery found *     ADMITTING DIAGNOSES & HOSPITAL COURSE:   Acute metabolic encephalopathy with underlying dementia most probably due to dehydration  OSCAR on CKD stage III  Lactic acidosis likely due to hypoperfusion  Dementia with behavioral disorder   PAF  Left eye conjunctivitis    Deon Lui is a 88 y.o. male who presented with altered mental status that was found to be due to dehydration and volume depletion; patient's symptoms improved after IVFs. Infectious work-up was negative. Initially patient was considered for SNF placement, however patient's memory care facility determined that patient is at his baseline and they can accommodate the patient's physical therapy needs. Patient will discharge back to his memory care unit in stable condition; patient and his wife are in agreement with this plan.     Of note, patient developed acute urinary retention during admission. He failed voiding trial on 1/12; Urology was consulted on 1/15 and patient will follow-up in clinic in 2 weeks for repeating. VT.         DISCHARGE DIAGNOSES / PLAN:      Acute metabolic encephalopathy on underlying dementia likely 2/2 volume depletion- improved, back to baseline  OSCAR on CKD stage III, resolved, Cr

## 2024-03-22 ENCOUNTER — HOSPITAL ENCOUNTER (EMERGENCY)
Facility: HOSPITAL | Age: 89
Discharge: HOME | End: 2024-03-22
Attending: STUDENT IN AN ORGANIZED HEALTH CARE EDUCATION/TRAINING PROGRAM
Payer: MEDICARE

## 2024-03-22 VITALS
HEART RATE: 62 BPM | RESPIRATION RATE: 20 BRPM | DIASTOLIC BLOOD PRESSURE: 74 MMHG | SYSTOLIC BLOOD PRESSURE: 124 MMHG | TEMPERATURE: 97.5 F | OXYGEN SATURATION: 95 %

## 2024-03-22 DIAGNOSIS — E87.6 HYPOKALEMIA: ICD-10-CM

## 2024-03-22 DIAGNOSIS — R33.9 URINARY RETENTION: Primary | ICD-10-CM

## 2024-03-22 DIAGNOSIS — T83.511A URINARY TRACT INFECTION ASSOCIATED WITH INDWELLING URETHRAL CATHETER, INITIAL ENCOUNTER (HCC): ICD-10-CM

## 2024-03-22 DIAGNOSIS — N39.0 URINARY TRACT INFECTION ASSOCIATED WITH INDWELLING URETHRAL CATHETER, INITIAL ENCOUNTER (HCC): ICD-10-CM

## 2024-03-22 LAB
ANION GAP SERPL CALC-SCNC: 7 MMOL/L (ref 5–15)
APPEARANCE UR: ABNORMAL
BACTERIA URNS QL MICRO: ABNORMAL /HPF
BASOPHILS # BLD: 0.1 K/UL (ref 0–0.1)
BASOPHILS NFR BLD: 1 % (ref 0–1)
BILIRUB UR QL: NEGATIVE
BUN SERPL-MCNC: 10 MG/DL (ref 6–20)
BUN/CREAT SERPL: 12 (ref 12–20)
CALCIUM SERPL-MCNC: 8.7 MG/DL (ref 8.5–10.1)
CHLORIDE SERPL-SCNC: 105 MMOL/L (ref 97–108)
CO2 SERPL-SCNC: 28 MMOL/L (ref 21–32)
COLOR UR: ABNORMAL
COMMENT:: NORMAL
CREAT SERPL-MCNC: 0.85 MG/DL (ref 0.7–1.3)
DIFFERENTIAL METHOD BLD: ABNORMAL
EOSINOPHIL # BLD: 0.5 K/UL (ref 0–0.4)
EOSINOPHIL NFR BLD: 7 % (ref 0–7)
EPITH CASTS URNS QL MICRO: ABNORMAL /LPF
ERYTHROCYTE [DISTWIDTH] IN BLOOD BY AUTOMATED COUNT: 13.2 % (ref 11.5–14.5)
GLUCOSE SERPL-MCNC: 78 MG/DL (ref 65–100)
GLUCOSE UR STRIP.AUTO-MCNC: NEGATIVE MG/DL
HCT VFR BLD AUTO: 43.2 % (ref 36.6–50.3)
HGB BLD-MCNC: 13.8 G/DL (ref 12.1–17)
HGB UR QL STRIP: ABNORMAL
IMM GRANULOCYTES # BLD AUTO: 0 K/UL (ref 0–0.04)
IMM GRANULOCYTES NFR BLD AUTO: 0 % (ref 0–0.5)
KETONES UR QL STRIP.AUTO: ABNORMAL MG/DL
LEUKOCYTE ESTERASE UR QL STRIP.AUTO: ABNORMAL
LYMPHOCYTES # BLD: 1 K/UL (ref 0.8–3.5)
LYMPHOCYTES NFR BLD: 14 % (ref 12–49)
MCH RBC QN AUTO: 29.3 PG (ref 26–34)
MCHC RBC AUTO-ENTMCNC: 31.9 G/DL (ref 30–36.5)
MCV RBC AUTO: 91.7 FL (ref 80–99)
MONOCYTES # BLD: 0.8 K/UL (ref 0–1)
MONOCYTES NFR BLD: 11 % (ref 5–13)
NEUTS SEG # BLD: 4.8 K/UL (ref 1.8–8)
NEUTS SEG NFR BLD: 67 % (ref 32–75)
NITRITE UR QL STRIP.AUTO: NEGATIVE
NRBC # BLD: 0 K/UL (ref 0–0.01)
NRBC BLD-RTO: 0 PER 100 WBC
PH UR STRIP: 6 (ref 5–8)
PLATELET # BLD AUTO: 275 K/UL (ref 150–400)
PMV BLD AUTO: 9.5 FL (ref 8.9–12.9)
POTASSIUM SERPL-SCNC: 3 MMOL/L (ref 3.5–5.1)
PROT UR STRIP-MCNC: 30 MG/DL
RBC # BLD AUTO: 4.71 M/UL (ref 4.1–5.7)
RBC #/AREA URNS HPF: ABNORMAL /HPF (ref 0–5)
SODIUM SERPL-SCNC: 140 MMOL/L (ref 136–145)
SP GR UR REFRACTOMETRY: 1.01 (ref 1–1.03)
SPECIMEN HOLD: NORMAL
URINE CULTURE IF INDICATED: ABNORMAL
UROBILINOGEN UR QL STRIP.AUTO: 1 EU/DL (ref 0.2–1)
WBC # BLD AUTO: 7.2 K/UL (ref 4.1–11.1)
WBC URNS QL MICRO: ABNORMAL /HPF (ref 0–4)

## 2024-03-22 PROCEDURE — 85025 COMPLETE CBC W/AUTO DIFF WBC: CPT

## 2024-03-22 PROCEDURE — 51798 US URINE CAPACITY MEASURE: CPT

## 2024-03-22 PROCEDURE — 51702 INSERT TEMP BLADDER CATH: CPT

## 2024-03-22 PROCEDURE — 96375 TX/PRO/DX INJ NEW DRUG ADDON: CPT

## 2024-03-22 PROCEDURE — 96366 THER/PROPH/DIAG IV INF ADDON: CPT

## 2024-03-22 PROCEDURE — 6360000002 HC RX W HCPCS: Performed by: STUDENT IN AN ORGANIZED HEALTH CARE EDUCATION/TRAINING PROGRAM

## 2024-03-22 PROCEDURE — 81001 URINALYSIS AUTO W/SCOPE: CPT

## 2024-03-22 PROCEDURE — 96376 TX/PRO/DX INJ SAME DRUG ADON: CPT

## 2024-03-22 PROCEDURE — 99284 EMERGENCY DEPT VISIT MOD MDM: CPT

## 2024-03-22 PROCEDURE — 80048 BASIC METABOLIC PNL TOTAL CA: CPT

## 2024-03-22 PROCEDURE — 87086 URINE CULTURE/COLONY COUNT: CPT

## 2024-03-22 PROCEDURE — 36415 COLL VENOUS BLD VENIPUNCTURE: CPT

## 2024-03-22 PROCEDURE — 2580000003 HC RX 258: Performed by: STUDENT IN AN ORGANIZED HEALTH CARE EDUCATION/TRAINING PROGRAM

## 2024-03-22 PROCEDURE — 96365 THER/PROPH/DIAG IV INF INIT: CPT

## 2024-03-22 RX ORDER — CEFPODOXIME PROXETIL 200 MG/1
200 TABLET, FILM COATED ORAL 2 TIMES DAILY
Qty: 20 TABLET | Refills: 0 | Status: SHIPPED | OUTPATIENT
Start: 2024-03-22 | End: 2024-04-01

## 2024-03-22 RX ORDER — POTASSIUM CHLORIDE 750 MG/1
40 TABLET, FILM COATED, EXTENDED RELEASE ORAL ONCE
Status: DISCONTINUED | OUTPATIENT
Start: 2024-03-22 | End: 2024-03-22

## 2024-03-22 RX ORDER — POTASSIUM CHLORIDE 7.45 MG/ML
10 INJECTION INTRAVENOUS
Status: COMPLETED | OUTPATIENT
Start: 2024-03-22 | End: 2024-03-22

## 2024-03-22 RX ORDER — LIDOCAINE HYDROCHLORIDE 20 MG/ML
JELLY TOPICAL ONCE
Status: DISCONTINUED | OUTPATIENT
Start: 2024-03-22 | End: 2024-03-23 | Stop reason: HOSPADM

## 2024-03-22 RX ORDER — LORAZEPAM 1 MG/1
0.5 TABLET ORAL ONCE
Status: DISCONTINUED | OUTPATIENT
Start: 2024-03-22 | End: 2024-03-23 | Stop reason: HOSPADM

## 2024-03-22 RX ADMIN — WATER 1000 MG: 1 INJECTION INTRAMUSCULAR; INTRAVENOUS; SUBCUTANEOUS at 14:33

## 2024-03-22 RX ADMIN — POTASSIUM CHLORIDE 10 MEQ: 10 INJECTION, SOLUTION INTRAVENOUS at 16:25

## 2024-03-22 RX ADMIN — POTASSIUM CHLORIDE 10 MEQ: 10 INJECTION, SOLUTION INTRAVENOUS at 14:37

## 2024-03-22 ASSESSMENT — PAIN - FUNCTIONAL ASSESSMENT: PAIN_FUNCTIONAL_ASSESSMENT: NONE - DENIES PAIN

## 2024-03-22 NOTE — ED NOTES
Attempted to irrigate noonan but unable to. Pt was able to force a small amount of urine into legbag.

## 2024-03-22 NOTE — ED PROVIDER NOTES
[facility] EMERGENCY DEP  EMERGENCY DEPARTMENT ENCOUNTER      Pt Name: Deon Lui  MRN: [id number]  Birthdate 1935  Date of evaluation: 3/22/2024  Provider: Minna Coppola DO    CHIEF COMPLAINT       Chief Complaint   Patient presents with    Owen Catheter Not Draining         HISTORY OF PRESENT ILLNESS    HPI    Deon Lui is a 88 y.o. male with a history of hypertension, dementia, paroxysmal atrial fibrillation on Eliquis who presents to the emergency department who presents from nursing facility for urinary retention.  Patient presents from nursing facility with EMS for reported no urine output to Owen catheter since yesterday.  Patient had Owen catheter placed on Tuesday.  Patient denies any pain complaints.    Nursing Notes were reviewed.    REVIEW OF SYSTEMS       Review of Systems   Unable to perform ROS: Dementia           PAST MEDICAL HISTORY     Past Medical History:   Diagnosis Date    Allergy     seasonal    Arrhythmia     Asthma     Dementia (HCC)     Depression     Heart failure (HCC)     Hypertension     Neurological disorder     dementia    Other ill-defined conditions(799.89)     a-fib    Stroke (HCC)          SURGICAL HISTORY       Past Surgical History:   Procedure Laterality Date    HEENT      cateract surgery         CURRENT MEDICATIONS       Previous Medications    AMLODIPINE (NORVASC) 10 MG TABLET    Take 1 tablet by mouth daily HOLD until PCP follow-up for BP check.    APIXABAN (ELIQUIS) 2.5 MG TABS TABLET    Take 1 tablet by mouth in the morning and 1 tablet in the evening.    BUPROPION (WELLBUTRIN XL) 150 MG EXTENDED RELEASE TABLET    Take 1 tablet by mouth daily    EMPAGLIFLOZIN (JARDIANCE) 10 MG TABLET    Take 1 tablet by mouth daily    FAMOTIDINE (PEPCID) 20 MG TABLET    Take 1 tablet by mouth 2 times daily for 10 days    FINASTERIDE (PROSCAR) 5 MG TABLET    Take 1 tablet by mouth daily    FUROSEMIDE (LASIX) 20 MG TABLET    Take one tablet (20mg) by mouth if

## 2024-03-22 NOTE — ED TRIAGE NOTES
[facility]an catheter placed on Tuesday. Patient is on eliquis and has a pertinent history of dementia.

## 2024-03-22 NOTE — DISCHARGE INSTRUCTIONS
Take antibiotics as prescribed.  Return to the emergency department for any new or worsening symptoms.

## 2024-03-24 ENCOUNTER — HOSPITAL ENCOUNTER (EMERGENCY)
Facility: HOSPITAL | Age: 89
Discharge: HOME | End: 2024-03-24
Attending: EMERGENCY MEDICINE
Payer: MEDICARE

## 2024-03-24 VITALS
WEIGHT: 157.85 LBS | RESPIRATION RATE: 19 BRPM | TEMPERATURE: 97.5 F | HEIGHT: 70 IN | DIASTOLIC BLOOD PRESSURE: 78 MMHG | OXYGEN SATURATION: 92 % | SYSTOLIC BLOOD PRESSURE: 135 MMHG | HEART RATE: 66 BPM | BODY MASS INDEX: 22.6 KG/M2

## 2024-03-24 DIAGNOSIS — T83.9XXA FOLEY CATHETER PROBLEM, INITIAL ENCOUNTER (HCC): Primary | ICD-10-CM

## 2024-03-24 LAB
BACTERIA SPEC CULT: ABNORMAL
CC UR VC: ABNORMAL
SERVICE CMNT-IMP: ABNORMAL

## 2024-03-24 PROCEDURE — 99283 EMERGENCY DEPT VISIT LOW MDM: CPT

## 2024-03-24 PROCEDURE — 51702 INSERT TEMP BLADDER CATH: CPT

## 2024-03-24 ASSESSMENT — PAIN - FUNCTIONAL ASSESSMENT: PAIN_FUNCTIONAL_ASSESSMENT: NONE - DENIES PAIN

## 2024-03-24 NOTE — ED NOTES
Called [facility] on patient returning and spoke to nurse Hodges.

## 2024-03-24 NOTE — ED TRIAGE NOTES
Patient arrives via EMS from [facility] for hematuria. Recently here for noonan placement/UTI. Was suppose to start abx, but has not yet.

## 2024-03-24 NOTE — ED PROVIDER NOTES
Mouth/Throat:      Mouth: Mucous membranes are moist.      Pharynx: Oropharynx is clear. No oropharyngeal exudate.   Eyes:      Extraocular Movements: Extraocular movements intact.      Pupils: Pupils are equal, round, and reactive to light.   Cardiovascular:      Rate and Rhythm: Rhythm irregular.      Comments: A-fib, rate in the 80s  Pulmonary:      Effort: Pulmonary effort is normal.      Breath sounds: Normal breath sounds.   Abdominal:      Palpations: Abdomen is soft.      Comments: Suprapubic fullness and tenderness to palpation   Genitourinary:     Comments: Indwelling Owne catheter with scant hematuria noted in the tube, split penile glans  Musculoskeletal:         General: No swelling or deformity. Normal range of motion.      Cervical back: Normal range of motion.   Skin:     General: Skin is warm and dry.      Findings: No rash.   Neurological:      General: No focal deficit present.      Mental Status: He is alert and oriented to person, place, and time.      Cranial Nerves: No cranial nerve deficit.   Psychiatric:         Mood and Affect: Mood normal.             EMERGENCY DEPARTMENT COURSE and DIFFERENTIAL DIAGNOSIS/MDM:   Vitals:    Vitals:    03/24/24 1234 03/24/24 1245   BP: 126/86    Pulse: 64    Resp: 18    Temp: 97.5 °F (36.4 °C)    TempSrc: Oral    SpO2: 98% 98%   Weight: 71.6 kg (157 lb 13.6 oz)    Height: 1.778 m (5' 10\")          Medical Decision Making          REASSESSMENT     ED Course as of 03/24/24 1317   Sun Mar 24, 2024   1303 Bladder scan reveals greater than 400 cc of urine in the bladder.  Owen catheter will be removed and replaced by a new one. [BN]   1314 Bedside RN replaced indwelling Owen catheter, greater than 500 cc of urine voided immediately.  Patient is suitable for discharge back to care facility with properly placed Owen. [BN]      ED Course User Index  [BN] Landen Gleason MD         CONSULTS:  None    PROCEDURES:     Procedures          (Please note that

## 2024-03-25 LAB
BACTERIA SPEC CULT: ABNORMAL
CC UR VC: ABNORMAL
SERVICE CMNT-IMP: ABNORMAL

## 2024-05-23 ENCOUNTER — HOSPITAL ENCOUNTER (EMERGENCY)
Facility: HOSPITAL | Age: 89
Discharge: HOME | End: 2024-05-23
Attending: STUDENT IN AN ORGANIZED HEALTH CARE EDUCATION/TRAINING PROGRAM
Payer: MEDICARE

## 2024-05-23 VITALS
SYSTOLIC BLOOD PRESSURE: 139 MMHG | DIASTOLIC BLOOD PRESSURE: 78 MMHG | TEMPERATURE: 97.7 F | HEIGHT: 70 IN | HEART RATE: 65 BPM | BODY MASS INDEX: 20.7 KG/M2 | RESPIRATION RATE: 18 BRPM | OXYGEN SATURATION: 96 % | WEIGHT: 144.62 LBS

## 2024-05-23 DIAGNOSIS — K02.9 DENTAL CARIES: ICD-10-CM

## 2024-05-23 DIAGNOSIS — K08.89 ODONTALGIA: Primary | ICD-10-CM

## 2024-05-23 PROCEDURE — 99283 EMERGENCY DEPT VISIT LOW MDM: CPT

## 2024-05-23 RX ORDER — PENICILLIN V POTASSIUM 500 MG/1
500 TABLET ORAL 4 TIMES DAILY
Qty: 28 TABLET | Refills: 0 | Status: SHIPPED | OUTPATIENT
Start: 2024-05-23 | End: 2024-05-23

## 2024-05-23 RX ORDER — PENICILLIN V POTASSIUM 500 MG/1
500 TABLET ORAL 4 TIMES DAILY
Qty: 28 TABLET | Refills: 0 | Status: SHIPPED | OUTPATIENT
Start: 2024-05-23 | End: 2024-05-30

## 2024-05-23 RX ORDER — ACETAMINOPHEN 325 MG/1
650 TABLET ORAL ONCE
Status: DISCONTINUED | OUTPATIENT
Start: 2024-05-23 | End: 2024-05-23 | Stop reason: HOSPADM

## 2024-05-23 ASSESSMENT — PAIN - FUNCTIONAL ASSESSMENT: PAIN_FUNCTIONAL_ASSESSMENT: ACTIVITIES ARE NOT PREVENTED

## 2024-05-23 ASSESSMENT — PAIN DESCRIPTION - ONSET: ONSET: ON-GOING

## 2024-05-23 ASSESSMENT — PAIN DESCRIPTION - FREQUENCY: FREQUENCY: CONTINUOUS

## 2024-05-23 ASSESSMENT — PAIN DESCRIPTION - DESCRIPTORS: DESCRIPTORS: ACHING

## 2024-05-23 ASSESSMENT — PAIN DESCRIPTION - ORIENTATION: ORIENTATION: RIGHT;UPPER

## 2024-05-23 ASSESSMENT — PAIN DESCRIPTION - PAIN TYPE: TYPE: ACUTE PAIN

## 2024-05-23 ASSESSMENT — PAIN SCALES - GENERAL: PAINLEVEL_OUTOF10: 2

## 2024-05-23 NOTE — ED PROVIDER NOTES
BUPROPION (WELLBUTRIN XL) 150 MG EXTENDED RELEASE TABLET    Take 1 tablet by mouth daily    EMPAGLIFLOZIN (JARDIANCE) 10 MG TABLET    Take 1 tablet by mouth daily    FAMOTIDINE (PEPCID) 20 MG TABLET    Take 1 tablet by mouth 2 times daily for 10 days    FINASTERIDE (PROSCAR) 5 MG TABLET    Take 1 tablet by mouth daily    FUROSEMIDE (LASIX) 20 MG TABLET    Take one tablet (20mg) by mouth if weight gain is >3lbs in 1 day or >5lbs in 3 days. Call Cardiologist if weight gain requires dosing.    LOSARTAN (COZAAR) 25 MG TABLET    Take 1 tablet by mouth daily    MEMANTINE (NAMENDA) 10 MG TABLET    Take 1 tablet by mouth in the morning and 1 tablet in the evening.    MENTHOL-ZINC OXIDE (CALPROTECT) 0.44-20.6 % OINT OINTMENT    Apply topically daily Apply to genitals & buttocks qshift for skin protection  Max 30 ml per day.    PANTOPRAZOLE (PROTONIX) 40 MG TABLET    Take 1 tablet by mouth daily    POLYVINYL ALCOHOL (LIQUIFILM TEARS) 1.4 % OPHTHALMIC SOLUTION    Place 1 drop into both eyes in the morning, at noon, and at bedtime    SENNA (SENOKOT) 8.6 MG TABLET    Take 2 tablets by mouth nightly    SKIN PROTECTANTS, MISC. (EUCERIN) CREAM    Apply topically daily Apply topically qd for dry skin    SUCRALFATE (CARAFATE) 1 GM TABLET    Take 1 tablet by mouth 4 times daily for 10 days    TAMSULOSIN (FLOMAX) 0.4 MG CAPSULE    Take 1 capsule by mouth daily       ALLERGIES     Patient has no known allergies.    FAMILY HISTORY       Family History   Problem Relation Age of Onset    Cancer Father         prostate    Heart Disease Mother     Stroke Mother     Cancer Brother         colon          SOCIAL HISTORY       Social History     Socioeconomic History    Marital status:    Tobacco Use    Smoking status: Never    Smokeless tobacco: Never   Substance and Sexual Activity    Alcohol use: No    Drug use: No     Types: OTC, Prescription           PHYSICAL EXAM    (up to 7 for level 4, 8 or more for level 5)     ED Triage Vitals

## 2024-05-23 NOTE — ED TRIAGE NOTES
BIBEMS. Per EMS, coming from [facility] for Rt upper jaw pain. Started in the last few days. VSS, no falls. On eliquis. Pt's wife en route. Pt's dentition poor. Pt otherwise in NAD.

## 2024-09-09 ENCOUNTER — APPOINTMENT (OUTPATIENT)
Facility: HOSPITAL | Age: 89
End: 2024-09-09
Payer: MEDICARE

## 2024-09-09 ENCOUNTER — HOSPITAL ENCOUNTER (EMERGENCY)
Facility: HOSPITAL | Age: 89
Discharge: HOME | End: 2024-09-09
Attending: EMERGENCY MEDICINE
Payer: MEDICARE

## 2024-09-09 VITALS
RESPIRATION RATE: 17 BRPM | HEART RATE: 68 BPM | SYSTOLIC BLOOD PRESSURE: 116 MMHG | OXYGEN SATURATION: 97 % | TEMPERATURE: 98.9 F | DIASTOLIC BLOOD PRESSURE: 79 MMHG

## 2024-09-09 DIAGNOSIS — I50.9 CHRONIC CONGESTIVE HEART FAILURE, UNSPECIFIED HEART FAILURE TYPE (HCC): ICD-10-CM

## 2024-09-09 DIAGNOSIS — R10.9 ABDOMINAL PAIN, UNSPECIFIED ABDOMINAL LOCATION: Primary | ICD-10-CM

## 2024-09-09 LAB
ALBUMIN SERPL-MCNC: 2.9 G/DL (ref 3.5–5)
ALBUMIN/GLOB SERPL: 0.9 (ref 1.1–2.2)
ALP SERPL-CCNC: 132 U/L (ref 45–117)
ALT SERPL-CCNC: 14 U/L (ref 12–78)
ANION GAP SERPL CALC-SCNC: 2 MMOL/L (ref 2–12)
APPEARANCE UR: CLEAR
AST SERPL-CCNC: 11 U/L (ref 15–37)
BACTERIA URNS QL MICRO: NEGATIVE /HPF
BASOPHILS # BLD: 0.1 K/UL (ref 0–0.1)
BASOPHILS NFR BLD: 1 % (ref 0–1)
BILIRUB SERPL-MCNC: 1.1 MG/DL (ref 0.2–1)
BILIRUB UR QL: NEGATIVE
BUN SERPL-MCNC: 16 MG/DL (ref 6–20)
BUN/CREAT SERPL: 12 (ref 12–20)
CALCIUM SERPL-MCNC: 8.4 MG/DL (ref 8.5–10.1)
CHLORIDE SERPL-SCNC: 106 MMOL/L (ref 97–108)
CO2 SERPL-SCNC: 31 MMOL/L (ref 21–32)
COLOR UR: NORMAL
COMMENT:: NORMAL
CREAT SERPL-MCNC: 1.29 MG/DL (ref 0.7–1.3)
DIFFERENTIAL METHOD BLD: ABNORMAL
EOSINOPHIL # BLD: 0.2 K/UL (ref 0–0.4)
EOSINOPHIL NFR BLD: 4 % (ref 0–7)
EPITH CASTS URNS QL MICRO: NORMAL /LPF
ERYTHROCYTE [DISTWIDTH] IN BLOOD BY AUTOMATED COUNT: 14.2 % (ref 11.5–14.5)
GLOBULIN SER CALC-MCNC: 3.1 G/DL (ref 2–4)
GLUCOSE SERPL-MCNC: 89 MG/DL (ref 65–100)
GLUCOSE UR STRIP.AUTO-MCNC: NEGATIVE MG/DL
HCT VFR BLD AUTO: 37.5 % (ref 36.6–50.3)
HGB BLD-MCNC: 12.4 G/DL (ref 12.1–17)
HGB UR QL STRIP: NEGATIVE
HYALINE CASTS URNS QL MICRO: NORMAL /LPF (ref 0–5)
IMM GRANULOCYTES # BLD AUTO: 0 K/UL (ref 0–0.04)
IMM GRANULOCYTES NFR BLD AUTO: 0 % (ref 0–0.5)
KETONES UR QL STRIP.AUTO: NEGATIVE MG/DL
LEUKOCYTE ESTERASE UR QL STRIP.AUTO: NEGATIVE
LYMPHOCYTES # BLD: 0.7 K/UL (ref 0.8–3.5)
LYMPHOCYTES NFR BLD: 13 % (ref 12–49)
MCH RBC QN AUTO: 30 PG (ref 26–34)
MCHC RBC AUTO-ENTMCNC: 33.1 G/DL (ref 30–36.5)
MCV RBC AUTO: 90.8 FL (ref 80–99)
MONOCYTES # BLD: 0.5 K/UL (ref 0–1)
MONOCYTES NFR BLD: 9 % (ref 5–13)
NEUTS SEG # BLD: 4 K/UL (ref 1.8–8)
NEUTS SEG NFR BLD: 73 % (ref 32–75)
NITRITE UR QL STRIP.AUTO: NEGATIVE
NRBC # BLD: 0 K/UL (ref 0–0.01)
NRBC BLD-RTO: 0 PER 100 WBC
NT PRO BNP: 3281 PG/ML
PH UR STRIP: 5.5 (ref 5–8)
PLATELET # BLD AUTO: 187 K/UL (ref 150–400)
PMV BLD AUTO: 9.7 FL (ref 8.9–12.9)
POTASSIUM SERPL-SCNC: 3.3 MMOL/L (ref 3.5–5.1)
PROT SERPL-MCNC: 6 G/DL (ref 6.4–8.2)
PROT UR STRIP-MCNC: NEGATIVE MG/DL
RBC # BLD AUTO: 4.13 M/UL (ref 4.1–5.7)
RBC #/AREA URNS HPF: NORMAL /HPF (ref 0–5)
RBC MORPH BLD: ABNORMAL
SODIUM SERPL-SCNC: 139 MMOL/L (ref 136–145)
SP GR UR REFRACTOMETRY: 1.01 (ref 1–1.03)
SPECIMEN HOLD: NORMAL
SPECIMEN HOLD: NORMAL
TROPONIN I SERPL HS-MCNC: 8 NG/L (ref 0–76)
UROBILINOGEN UR QL STRIP.AUTO: 1 EU/DL (ref 0.2–1)
WBC # BLD AUTO: 5.5 K/UL (ref 4.1–11.1)
WBC URNS QL MICRO: NORMAL /HPF (ref 0–4)

## 2024-09-09 PROCEDURE — 99285 EMERGENCY DEPT VISIT HI MDM: CPT

## 2024-09-09 PROCEDURE — 85025 COMPLETE CBC W/AUTO DIFF WBC: CPT

## 2024-09-09 PROCEDURE — 84484 ASSAY OF TROPONIN QUANT: CPT

## 2024-09-09 PROCEDURE — 83880 ASSAY OF NATRIURETIC PEPTIDE: CPT

## 2024-09-09 PROCEDURE — 96374 THER/PROPH/DIAG INJ IV PUSH: CPT

## 2024-09-09 PROCEDURE — 6360000004 HC RX CONTRAST MEDICATION: Performed by: RADIOLOGY

## 2024-09-09 PROCEDURE — 80053 COMPREHEN METABOLIC PANEL: CPT

## 2024-09-09 PROCEDURE — 36415 COLL VENOUS BLD VENIPUNCTURE: CPT

## 2024-09-09 PROCEDURE — 6360000002 HC RX W HCPCS: Performed by: EMERGENCY MEDICINE

## 2024-09-09 PROCEDURE — 81001 URINALYSIS AUTO W/SCOPE: CPT

## 2024-09-09 PROCEDURE — 93005 ELECTROCARDIOGRAM TRACING: CPT | Performed by: EMERGENCY MEDICINE

## 2024-09-09 PROCEDURE — 74177 CT ABD & PELVIS W/CONTRAST: CPT

## 2024-09-09 PROCEDURE — 71046 X-RAY EXAM CHEST 2 VIEWS: CPT

## 2024-09-09 RX ORDER — IOPAMIDOL 755 MG/ML
100 INJECTION, SOLUTION INTRAVASCULAR
Status: COMPLETED | OUTPATIENT
Start: 2024-09-09 | End: 2024-09-09

## 2024-09-09 RX ORDER — HALOPERIDOL 5 MG/ML
3 INJECTION INTRAMUSCULAR ONCE
Status: COMPLETED | OUTPATIENT
Start: 2024-09-09 | End: 2024-09-09

## 2024-09-09 RX ADMIN — HALOPERIDOL LACTATE 3 MG: 5 INJECTION, SOLUTION INTRAMUSCULAR at 13:37

## 2024-09-09 RX ADMIN — IOPAMIDOL 100 ML: 755 INJECTION, SOLUTION INTRAVENOUS at 14:18

## 2024-09-09 ASSESSMENT — PAIN - FUNCTIONAL ASSESSMENT: PAIN_FUNCTIONAL_ASSESSMENT: NONE - DENIES PAIN

## 2024-09-12 LAB
EKG DIAGNOSIS: NORMAL
EKG Q-T INTERVAL: 442 MS
EKG QRS DURATION: 100 MS
EKG QTC CALCULATION (BAZETT): 455 MS
EKG R AXIS: -68 DEGREES
EKG T AXIS: 64 DEGREES
EKG VENTRICULAR RATE: 64 BPM

## 2024-09-12 PROCEDURE — 93010 ELECTROCARDIOGRAM REPORT: CPT | Performed by: SPECIALIST
